# Patient Record
Sex: FEMALE | Race: WHITE | Employment: FULL TIME | URBAN - METROPOLITAN AREA
[De-identification: names, ages, dates, MRNs, and addresses within clinical notes are randomized per-mention and may not be internally consistent; named-entity substitution may affect disease eponyms.]

---

## 2017-01-27 ENCOUNTER — PRENATAL OFFICE VISIT (OUTPATIENT)
Dept: FAMILY MEDICINE | Facility: CLINIC | Age: 32
End: 2017-01-27
Payer: COMMERCIAL

## 2017-01-27 VITALS
TEMPERATURE: 98.2 F | HEART RATE: 80 BPM | BODY MASS INDEX: 20.89 KG/M2 | HEIGHT: 65 IN | WEIGHT: 125.4 LBS | SYSTOLIC BLOOD PRESSURE: 92 MMHG | DIASTOLIC BLOOD PRESSURE: 64 MMHG

## 2017-01-27 DIAGNOSIS — Z34.91 PRENATAL CARE IN FIRST TRIMESTER: Primary | ICD-10-CM

## 2017-01-27 DIAGNOSIS — Z12.4 SCREENING FOR CERVICAL CANCER: ICD-10-CM

## 2017-01-27 LAB
ABO + RH BLD: NORMAL
ABO + RH BLD: NORMAL
ALBUMIN UR-MCNC: NEGATIVE MG/DL
APPEARANCE UR: CLEAR
BETA HCG QUAL IFA URINE: POSITIVE
BILIRUB UR QL STRIP: NEGATIVE
BLD GP AB SCN SERPL QL: NORMAL
BLOOD BANK CMNT PATIENT-IMP: NORMAL
COLOR UR AUTO: YELLOW
ERYTHROCYTE [DISTWIDTH] IN BLOOD BY AUTOMATED COUNT: 11.7 % (ref 10–15)
GLUCOSE UR STRIP-MCNC: NEGATIVE MG/DL
HCT VFR BLD AUTO: 37.1 % (ref 35–47)
HGB BLD-MCNC: 12.7 G/DL (ref 11.7–15.7)
HGB UR QL STRIP: NEGATIVE
KETONES UR STRIP-MCNC: NEGATIVE MG/DL
LEUKOCYTE ESTERASE UR QL STRIP: NEGATIVE
MCH RBC QN AUTO: 30.7 PG (ref 26.5–33)
MCHC RBC AUTO-ENTMCNC: 34.2 G/DL (ref 31.5–36.5)
MCV RBC AUTO: 90 FL (ref 78–100)
NITRATE UR QL: NEGATIVE
PH UR STRIP: 7 PH (ref 5–7)
PLATELET # BLD AUTO: 299 10E9/L (ref 150–450)
RBC # BLD AUTO: 4.14 10E12/L (ref 3.8–5.2)
SP GR UR STRIP: 1.02 (ref 1–1.03)
SPECIMEN EXP DATE BLD: NORMAL
URN SPEC COLLECT METH UR: NORMAL
UROBILINOGEN UR STRIP-ACNC: 0.2 EU/DL (ref 0.2–1)
WBC # BLD AUTO: 7.8 10E9/L (ref 4–11)

## 2017-01-27 PROCEDURE — 87591 N.GONORRHOEAE DNA AMP PROB: CPT | Performed by: FAMILY MEDICINE

## 2017-01-27 PROCEDURE — 99207 ZZC FIRST OB VISIT: CPT | Performed by: FAMILY MEDICINE

## 2017-01-27 PROCEDURE — 86762 RUBELLA ANTIBODY: CPT | Performed by: FAMILY MEDICINE

## 2017-01-27 PROCEDURE — 87389 HIV-1 AG W/HIV-1&-2 AB AG IA: CPT | Performed by: FAMILY MEDICINE

## 2017-01-27 PROCEDURE — 36415 COLL VENOUS BLD VENIPUNCTURE: CPT | Performed by: FAMILY MEDICINE

## 2017-01-27 PROCEDURE — 87491 CHLMYD TRACH DNA AMP PROBE: CPT | Performed by: FAMILY MEDICINE

## 2017-01-27 PROCEDURE — 87340 HEPATITIS B SURFACE AG IA: CPT | Performed by: FAMILY MEDICINE

## 2017-01-27 PROCEDURE — 86900 BLOOD TYPING SEROLOGIC ABO: CPT | Performed by: FAMILY MEDICINE

## 2017-01-27 PROCEDURE — 84703 CHORIONIC GONADOTROPIN ASSAY: CPT | Performed by: FAMILY MEDICINE

## 2017-01-27 PROCEDURE — 85027 COMPLETE CBC AUTOMATED: CPT | Performed by: FAMILY MEDICINE

## 2017-01-27 PROCEDURE — 86850 RBC ANTIBODY SCREEN: CPT | Performed by: FAMILY MEDICINE

## 2017-01-27 PROCEDURE — 81003 URINALYSIS AUTO W/O SCOPE: CPT | Performed by: FAMILY MEDICINE

## 2017-01-27 PROCEDURE — 86901 BLOOD TYPING SEROLOGIC RH(D): CPT | Performed by: FAMILY MEDICINE

## 2017-01-27 PROCEDURE — 86780 TREPONEMA PALLIDUM: CPT | Performed by: FAMILY MEDICINE

## 2017-01-27 PROCEDURE — G0145 SCR C/V CYTO,THINLAYER,RESCR: HCPCS | Performed by: FAMILY MEDICINE

## 2017-01-27 PROCEDURE — 87624 HPV HI-RISK TYP POOLED RSLT: CPT | Performed by: FAMILY MEDICINE

## 2017-01-27 NOTE — PROGRESS NOTES
Serina Washington is a 31 year old  who presents to the clinic for an new ob visit.       6w4d    Estimated Date of Delivery: Sep 18, 2017  Reviewed nurse intake visit on not yet completed  Concerns: has been feeling nauseated and fatigued.  Not vomiting but does feel queasy.  Has been able to eat and remain hydrated.  Has also been very fatigued, just feels like she needs a lot more sleep.    Some rre cramping, no vaginal bleeding.    This was a planned pregnancy, pt and  excited.      There is no problem list on file for this patient.    History reviewed. No pertinent past medical history.  History reviewed. No pertinent past surgical history.  Current Outpatient Prescriptions   Medication Sig Dispense Refill     Prenatal Multivit-Min-Fe-FA (PRENATAL VITAMINS PO) Take 1 tablet by mouth daily         ====================================================  PERSONAL/SOCIAL HISTORY  Social History     Social History     Marital Status:      Spouse Name: N/A     Number of Children: N/A     Years of Education: N/A     Social History Main Topics     Smoking status: Never Smoker      Smokeless tobacco: Not on file     Alcohol Use: Not on file     Drug Use: Not on file     Sexual Activity: Not on file     Other Topics Concern     Not on file     Social History Narrative     No narrative on file     =====================================================   REVIEW OF SYSTEMS  C: NEGATIVE for fever, chills, change in weight  I: NEGATIVE for worrisome rashes, moles or lesions  E: NEGATIVE for vision changes or irritation  ENT: NEGATIVE for ear, mouth and throat problems  R: NEGATIVE for significant cough or SOB  B: NEGATIVE for masses, tenderness or discharge  CV: NEGATIVE for chest pain, palpitations or peripheral edema  M: NEGATIVE for significant arthralgias or myalgia  N: NEGATIVE for weakness, dizziness or paresthesias  P: NEGATIVE for changes in mood or  "affect  ====================================================  PHYSICAL EXAM:  Ht 5' 4.75\" (1.645 m)  Wt 125 lb 6.4 oz (56.881 kg)  BMI 21.02 kg/m2  LMP 12/12/2016 (Exact Date)  Breastfeeding? No        GENERAL:  Pleasant pregnant female, alert, well groomed.  SKIN:  Warm and dry, without lesions or rashes  HEAD: Symmetrical features.  EYES:  PERRLA,   MOUTH:  Buccal mucosa pink, moist without lesions.    NECK:  Thyroid without enlargement and nodules.  Lymph nodes not palpable.   LUNGS:  Clear to auscultation.  HEART:  RRR without murmur.  ABDOMEN: Soft without masses , tenderness or organomegaly.  No CVA tenderness.   MUSCULOSKELETAL:  Full range of motion  EXTREMITIES:  No edema. No significant varicosities.   GENITALIA:  BUS WNL, no lesions noted   VAGINA:  Pink, normal rugae and discharge normal and physiologic  CERVIX:  smooth, without discharge or CMT and nulliparous os,   firm/ closed 4 cm long.  UTERUS: Anteverted, nontender 6 weeks in size.  ADNEXA: Without masses or tenderness  PELVIS:   Adequate, Pelvis proven to -pelvis not tested.    =========================================  ICSI Routine Prenatal Carfe Guideline  ASSESSMENT/PLAN    No diagnosis found.     RECOMMENDED WEIGHT GAIN: 25-35 lbs.  Instructed on best evidence for: weight gain for her BMI for pregnancy; healthy diet and foods to avoid; exercise and activity during pregnancy;avoiding exposure to toxoplasmosis; and maintenance of a generally healthy lifestyle.   Discussed the harms, benefits, side effects and alternative therapies for current prescribed and OTC medications.    Reviewed plan of care and co-management with OB.  Reviewed facilities at [facility] and referral sites if necessary.    Early pregnancy education completed.    Patient Instructions   You can call [phone] to schedule your ultrasound.      We'll let you know your results as they become available.    I'll plan on seeing you back again in 4 weeks.    Please feel " free to message or call with any questions or concerns.    Congratulations!

## 2017-01-27 NOTE — PATIENT INSTRUCTIONS
You can call [phone] to schedule your ultrasound.      We'll let you know your results as they become available.    I'll plan on seeing you back again in 4 weeks.    Please feel free to message or call with any questions or concerns.    Congratulations!

## 2017-01-27 NOTE — NURSING NOTE
"Initial BP 92/64 mmHg  Pulse 80  Temp(Src) 98.2  F (36.8  C) (Tympanic)  Ht 5' 4.75\" (1.645 m)  Wt 125 lb 6.4 oz (56.881 kg)  BMI 21.02 kg/m2  LMP 12/12/2016 (Exact Date)  Breastfeeding? No Estimated body mass index is 21.02 kg/(m^2) as calculated from the following:    Height as of this encounter: 5' 4.75\" (1.645 m).    Weight as of this encounter: 125 lb 6.4 oz (56.881 kg). .      "

## 2017-01-27 NOTE — MR AVS SNAPSHOT
After Visit Summary   1/27/2017    Serina Washington    MRN: [id number]           Patient Information     Date Of Birth          1985        Visit Information        Provider Department      1/27/2017 11:00 AM Gemini Esquivel DO [facility]        Today's Diagnoses     Prenatal care in first trimester    -  1     Screening for cervical cancer           Care Instructions    You can call [phone] to schedule your ultrasound.      We'll let you know your results as they become available.    I'll plan on seeing you back again in 4 weeks.    Please feel free to message or call with any questions or concerns.    Congratulations!        Follow-ups after your visit        Future tests that were ordered for you today     Open Future Orders        Priority Expected Expires Ordered    US OB < 14 Weeks Single Routine  1/27/2018 1/27/2017            Who to contact     Normal or non-critical lab and imaging results will be communicated to you by Smart Adventurehart, letter or phone within 4 business days after the clinic has received the results. If you do not hear from us within 7 days, please contact the clinic through Smart Adventurehart or phone. If you have a critical or abnormal lab result, we will notify you by phone as soon as possible.  Submit refill requests through [employer] or call your pharmacy and they will forward the refill request to us. Please allow 3 business days for your refill to be completed.          If you need to speak with a  for additional information , please call: [phone]           Additional Information About Your Visit        [employer] Information     [employer] gives you secure access to your electronic health record. If you see a primary care provider, you can also send messages to your care team and make appointments. If you have questions, please call your primary care clinic.  If you do not have a primary care provider, please call [phone] and they will  "assist you.        Care EveryWhere ID     This is your Care EveryWhere ID. This could be used by other organizations to access your [place] medical records  [id number]        Your Vitals Were     Pulse Temperature Height BMI (Body Mass Index) Last Period Breastfeeding?    80 98.2  F (36.8  C) (Tympanic) 5' 4.75\" (1.645 m) 21.02 kg/m2 12/12/2016 (Exact Date) No       Blood Pressure from Last 3 Encounters:   01/27/17 92/64   09/26/16 84/60    Weight from Last 3 Encounters:   01/27/17 125 lb 6.4 oz (56.881 kg)   09/26/16 123 lb (55.792 kg)              We Performed the Following     *UA reflex to Microscopic and Culture ([facility] (except [place] and [place])     ABO/Rh type and screen     Anti Treponema     Beta HCG qual IFA urine     CBC with platelets     CHLAMYDIA TRACHOMATIS PCR     Hepatitis B surface antigen     HIV Antigen Antibody Combo     HPV High Risk Types DNA Cervical     NEISSERIA GONORRHOEA PCR     Pap imaged thin layer screen with HPV - recommended age 30 - 65 years (select HPV order below)     Rubella Antibody IgG Quantitative        Primary Care Provider    None Specified       No primary provider on file.        Thank you!     Thank you for choosing [facility]  for your care. Our goal is always to provide you with excellent care. Hearing back from our patients is one way we can continue to improve our services. Please take a few minutes to complete the written survey that you may receive in the mail after your visit with us. Thank you!             Your Updated Medication List - Protect others around you: Learn how to safely use, store and throw away your medicines at www.disposemymeds.org.          This list is accurate as of: 1/27/17 12:05 PM.  Always use your most recent med list.                   Brand Name Dispense Instructions for use    PRENATAL VITAMINS PO      Take 1 tablet by mouth daily         "

## 2017-01-28 LAB — T PALLIDUM IGG+IGM SER QL: NEGATIVE

## 2017-01-29 LAB
C TRACH DNA SPEC QL NAA+PROBE: NORMAL
N GONORRHOEA DNA SPEC QL NAA+PROBE: NORMAL
SPECIMEN SOURCE: NORMAL
SPECIMEN SOURCE: NORMAL

## 2017-01-30 LAB
HBV SURFACE AG SERPL QL IA: NONREACTIVE
HIV 1+2 AB+HIV1 P24 AG SERPL QL IA: NORMAL
RUBV IGG SERPL IA-ACNC: 72 IU/ML

## 2017-01-31 LAB
COPATH REPORT: NORMAL
PAP: NORMAL

## 2017-02-01 ENCOUNTER — PRENATAL OFFICE VISIT (OUTPATIENT)
Dept: FAMILY MEDICINE | Facility: CLINIC | Age: 32
End: 2017-02-01
Payer: COMMERCIAL

## 2017-02-01 DIAGNOSIS — Z34.00 PRENATAL CARE, FIRST PREGNANCY: Primary | ICD-10-CM

## 2017-02-01 PROCEDURE — 99207 ZZC NO CHARGE NURSE ONLY: CPT | Performed by: FAMILY MEDICINE

## 2017-02-02 LAB
FINAL DIAGNOSIS: NORMAL
HPV HR 12 DNA CVX QL NAA+PROBE: NEGATIVE
HPV16 DNA SPEC QL NAA+PROBE: NEGATIVE
HPV18 DNA SPEC QL NAA+PROBE: NEGATIVE
SPECIMEN DESCRIPTION: NORMAL

## 2017-02-06 ENCOUNTER — MYC MEDICAL ADVICE (OUTPATIENT)
Dept: FAMILY MEDICINE | Facility: CLINIC | Age: 32
End: 2017-02-06

## 2017-02-06 ENCOUNTER — HOSPITAL ENCOUNTER (OUTPATIENT)
Dept: ULTRASOUND IMAGING | Facility: CLINIC | Age: 32
Discharge: HOME | End: 2017-02-06
Attending: FAMILY MEDICINE | Admitting: FAMILY MEDICINE
Payer: COMMERCIAL

## 2017-02-06 DIAGNOSIS — Z34.91 PRENATAL CARE IN FIRST TRIMESTER: ICD-10-CM

## 2017-02-06 PROCEDURE — 76801 OB US < 14 WKS SINGLE FETUS: CPT

## 2017-03-14 ENCOUNTER — PRENATAL OFFICE VISIT (OUTPATIENT)
Dept: FAMILY MEDICINE | Facility: CLINIC | Age: 32
End: 2017-03-14
Payer: COMMERCIAL

## 2017-03-14 VITALS
WEIGHT: 126.6 LBS | DIASTOLIC BLOOD PRESSURE: 60 MMHG | BODY MASS INDEX: 21.09 KG/M2 | TEMPERATURE: 98.6 F | HEIGHT: 65 IN | HEART RATE: 92 BPM | SYSTOLIC BLOOD PRESSURE: 112 MMHG

## 2017-03-14 DIAGNOSIS — Z34.01 PRENATAL CARE, FIRST PREGNANCY, FIRST TRIMESTER: Primary | ICD-10-CM

## 2017-03-14 PROCEDURE — 99207 ZZC PRENATAL VISIT: CPT | Performed by: FAMILY MEDICINE

## 2017-03-14 PROCEDURE — 87086 URINE CULTURE/COLONY COUNT: CPT | Performed by: FAMILY MEDICINE

## 2017-03-14 NOTE — PROGRESS NOTES
13w1d    Has been feeling a bit better.  Still fatigued but nausea has improved.   Very rare cramping, no VB or change in discharge.  Tolerating prenatals    Reviewed quad screen, they will consider for next visit.    Plan:    Patient Instructions   We'll visit again in 4 weeks and can consider the Quad screen testing at that time if you wish.

## 2017-03-14 NOTE — MR AVS SNAPSHOT
"              After Visit Summary   3/14/2017    Serina Washington    MRN: [id number]           Patient Information     Date Of Birth          1985        Visit Information        Provider Department      3/14/2017 3:40 PM Gemini Esquivel, DO [facility]        Today's Diagnoses     Prenatal care, first pregnancy, first trimester    -  1      Care Instructions    We'll visit again in 4 weeks and can consider the Quad screen testing at that time if you wish.        Follow-ups after your visit        Who to contact     Normal or non-critical lab and imaging results will be communicated to you by Cloudbothart, letter or phone within 4 business days after the clinic has received the results. If you do not hear from us within 7 days, please contact the clinic through [employer] or phone. If you have a critical or abnormal lab result, we will notify you by phone as soon as possible.  Submit refill requests through [employer] or call your pharmacy and they will forward the refill request to us. Please allow 3 business days for your refill to be completed.          If you need to speak with a  for additional information , please call: [phone]           Additional Information About Your Visit        MyChart Information     [employer] gives you secure access to your electronic health record. If you see a primary care provider, you can also send messages to your care team and make appointments. If you have questions, please call your primary care clinic.  If you do not have a primary care provider, please call [phone] and they will assist you.        Care EveryWhere ID     This is your Care EveryWhere ID. This could be used by other organizations to access your [place] medical records  [id number]        Your Vitals Were     Pulse Temperature Height Last Period Breastfeeding? BMI (Body Mass Index)    92 98.6  F (37  C) (Tympanic) 5' 5\" (1.651 m) 12/12/2016 (Exact Date) No 21.07 kg/m2       " Blood Pressure from Last 3 Encounters:   03/14/17 112/60   01/27/17 92/64   09/26/16 (!) 84/60    Weight from Last 3 Encounters:   03/14/17 126 lb 9.6 oz (57.4 kg)   01/27/17 125 lb 6.4 oz (56.9 kg)   09/26/16 123 lb (55.8 kg)              We Performed the Following     Urine Culture Aerobic Bacterial        Primary Care Provider Office Phone # Fax #    Gemini Cleveland DO [place] [phone]       [facility] [address] [place] [address]        Thank you!     Thank you for choosing [facility]  for your care. Our goal is always to provide you with excellent care. Hearing back from our patients is one way we can continue to improve our services. Please take a few minutes to complete the written survey that you may receive in the mail after your visit with us. Thank you!             Your Updated Medication List - Protect others around you: Learn how to safely use, store and throw away your medicines at www.disposemymeds.org.          This list is accurate as of: 3/14/17  4:12 PM.  Always use your most recent med list.                   Brand Name Dispense Instructions for use    PRENATAL VITAMINS PO      Take 1 tablet by mouth daily

## 2017-03-16 LAB
BACTERIA SPEC CULT: NORMAL
MICRO REPORT STATUS: NORMAL
SPECIMEN SOURCE: NORMAL

## 2017-04-11 ENCOUNTER — PRENATAL OFFICE VISIT (OUTPATIENT)
Dept: FAMILY MEDICINE | Facility: CLINIC | Age: 32
End: 2017-04-11
Payer: COMMERCIAL

## 2017-04-11 VITALS
WEIGHT: 131.4 LBS | BODY MASS INDEX: 21.87 KG/M2 | HEART RATE: 76 BPM | TEMPERATURE: 98.1 F | DIASTOLIC BLOOD PRESSURE: 60 MMHG | SYSTOLIC BLOOD PRESSURE: 104 MMHG

## 2017-04-11 DIAGNOSIS — Z34.02 PRENATAL CARE, FIRST PREGNANCY, SECOND TRIMESTER: Primary | ICD-10-CM

## 2017-04-11 PROCEDURE — 99207 ZZC PRENATAL VISIT: CPT | Performed by: FAMILY MEDICINE

## 2017-04-11 NOTE — PROGRESS NOTES
17w1d  Has been feeling better.  Energy level is improved.  ? fetal movement, perhaps felt something last week.  No cramping or VB.  Reviewed quad screen, pt declines    Plan:    Patient Instructions   Please schedule your anomaly screen ultrasound at 20 weeks.  I'll let you know the results as soon as I get them.  You cna call [phone] to schedule    Your next regular pregnancy visit will be in 4 weeks.  Please schedule that with my OB partners either here at [facility] with Dr Cash or at the [facility].    I'll see you back here again in 8 weeks.  We'll plan on doing your gestational diabetes screen at that visit.  Please plan on being in clinic for 1 hour.  You will need to have nothing to eat or drink but water for the 2 hours prior to you visit.

## 2017-04-11 NOTE — MR AVS SNAPSHOT
After Visit Summary   4/11/2017    Serina Washington    MRN: [id number]           Patient Information     Date Of Birth          1985        Visit Information        Provider Department      4/11/2017 9:40 AM Gemini Esquivel, DO [facility]        Today's Diagnoses     Prenatal care, first pregnancy, second trimester    -  1      Care Instructions    Please schedule your anomaly screen ultrasound at 20 weeks.  I'll let you know the results as soon as I get them.  You cna call [phone] to schedule    Your next regular pregnancy visit will be in 4 weeks.  Please schedule that with my OB partners either here at [facility] with Dr Cash or at the [facility].    I'll see you back here again in 8 weeks.  We'll plan on doing your gestational diabetes screen at that visit.  Please plan on being in clinic for 1 hour.  You will need to have nothing to eat or drink but water for the 2 hours prior to you visit.          Follow-ups after your visit        Additional Services     OB/GYN REFERRAL       Your provider has referred you to:  [facility]: [facility] [phone]   [email or web address]    Comanagement prenatal care    Please be aware that coverage of these services is subject to the terms and limitations of your health insurance plan.  Call member services at your health plan with any benefit or coverage questions.      Please bring the following with you to your appointment:    (1) Any X-Rays, CTs or MRIs which have been performed.  Contact the facility where they were done to arrange for  prior to your scheduled appointment.   (2) List of current medications   (3) This referral request   (4) Any documents/labs given to you for this referral                  Future tests that were ordered for you today     Open Future Orders        Priority Expected Expires Ordered    US OB > 14 Weeks Complete Single Routine  4/11/2018  4/11/2017            Who to contact     Normal or non-critical lab and imaging results will be communicated to you by 2Uhart, letter or phone within 4 business days after the clinic has received the results. If you do not hear from us within 7 days, please contact the clinic through 2Uhart or phone. If you have a critical or abnormal lab result, we will notify you by phone as soon as possible.  Submit refill requests through [employer] or call your pharmacy and they will forward the refill request to us. Please allow 3 business days for your refill to be completed.          If you need to speak with a  for additional information , please call: [phone]           Additional Information About Your Visit        [employer] Information     [employer] gives you secure access to your electronic health record. If you see a primary care provider, you can also send messages to your care team and make appointments. If you have questions, please call your primary care clinic.  If you do not have a primary care provider, please call [phone] and they will assist you.        Care EveryWhere ID     This is your Care EveryWhere ID. This could be used by other organizations to access your [place] medical records  [id number]        Your Vitals Were     Pulse Temperature Last Period Breastfeeding? BMI (Body Mass Index)       76 98.1  F (36.7  C) (Tympanic) 12/12/2016 (Exact Date) No 21.87 kg/m2        Blood Pressure from Last 3 Encounters:   04/11/17 104/60   03/14/17 112/60   01/27/17 92/64    Weight from Last 3 Encounters:   04/11/17 131 lb 6.4 oz (59.6 kg)   03/14/17 126 lb 9.6 oz (57.4 kg)   01/27/17 125 lb 6.4 oz (56.9 kg)              We Performed the Following     OB/GYN REFERRAL        Primary Care Provider Office Phone # Fax #    Gemini Esquivel [phone]       [facility] [address] [place] [address]        Thank you!     [facility]  [facility] your [facility]. Our goal is always to provide you with excellent care. Hearing back from our patients is one way we can continue to improve our services. Please take a few minutes to complete the written survey that you may receive in the mail after your visit with us. Thank you!             Your Updated Medication List - Protect others around you: Learn how to safely use, store and throw away your medicines at www.disposemymeds.org.          This list is accurate as of: 4/11/17 10:17 AM.  Always use your most recent med list.                   Brand Name Dispense Instructions for use    PRENATAL VITAMINS PO      Take 1 tablet by mouth daily

## 2017-04-11 NOTE — PATIENT INSTRUCTIONS
Please schedule your anomaly screen ultrasound at 20 weeks.  I'll let you know the results as soon as I get them.  You cna call [phone] to schedule    Your next regular pregnancy visit will be in 4 weeks.  Please schedule that with my OB partners either here at [facility] with Dr Cash or at the [facility].    I'll see you back here again in 8 weeks.  We'll plan on doing your gestational diabetes screen at that visit.  Please plan on being in clinic for 1 hour.  You will need to have nothing to eat or drink but water for the 2 hours prior to you visit.

## 2017-04-14 ENCOUNTER — MYC MEDICAL ADVICE (OUTPATIENT)
Dept: FAMILY MEDICINE | Facility: CLINIC | Age: 32
End: 2017-04-14

## 2017-05-01 ENCOUNTER — HOSPITAL ENCOUNTER (OUTPATIENT)
Dept: ULTRASOUND IMAGING | Facility: CLINIC | Age: 32
Discharge: HOME | End: 2017-05-01
Attending: FAMILY MEDICINE | Admitting: FAMILY MEDICINE
Payer: COMMERCIAL

## 2017-05-01 DIAGNOSIS — Z34.02 PRENATAL CARE, FIRST PREGNANCY, SECOND TRIMESTER: ICD-10-CM

## 2017-05-01 PROCEDURE — 76805 OB US >/= 14 WKS SNGL FETUS: CPT

## 2017-05-16 ENCOUNTER — PRENATAL OFFICE VISIT (OUTPATIENT)
Dept: OBGYN | Facility: CLINIC | Age: 32
End: 2017-05-16
Payer: COMMERCIAL

## 2017-05-16 VITALS
SYSTOLIC BLOOD PRESSURE: 98 MMHG | HEIGHT: 65 IN | BODY MASS INDEX: 23.32 KG/M2 | HEART RATE: 73 BPM | DIASTOLIC BLOOD PRESSURE: 63 MMHG | WEIGHT: 140 LBS

## 2017-05-16 DIAGNOSIS — Z3A.22 22 WEEKS GESTATION OF PREGNANCY: Primary | ICD-10-CM

## 2017-05-16 PROCEDURE — 99207 ZZC PRENATAL VISIT: CPT | Performed by: OBSTETRICS & GYNECOLOGY

## 2017-05-16 NOTE — MR AVS SNAPSHOT
After Visit Summary   5/16/2017    Serina Washington    MRN: [id number]           Patient Information     Date Of Birth          1985        Visit Information        Provider Department      5/16/2017 11:30 AM Quinn Cash MD [facility]        Today's Diagnoses     22 weeks gestation of pregnancy    -  1       Follow-ups after your visit        Follow-up notes from your care team     Return in about 4 weeks (around 6/13/2017).      Your next 10 appointments already scheduled     Jun 05, 2017  8:20 AM CDT   SHORT with Gemini Esquivel,    [facility]    [address] [id number] [phone]            Jun 05, 2017  8:45 AM CDT   LAB with  LAB   [facility]    [address] [id number] [phone]           Patient must bring picture ID.  Patient should be prepared to give a urine specimen  Please do not eat 10-12 hours before your appointment if you are coming in fasting for labs on lipids, cholesterol, or glucose (sugar).  Pregnant women should follow their Care Team instructions. Water with medications is okay. Do not drink coffee or other fluids.   If you have concerns about taking  your medications, please ask at office or if scheduling via CloudSplit, send a message by clicking on Secure Messaging, Message Your Care Team.              Future tests that were ordered for you today     Open Future Orders        Priority Expected Expires Ordered    Anti Treponema Routine  7/16/2017 5/16/2017    OB hemoglobin Routine  7/16/2017 5/16/2017    Glucose tolerance, gest screen, 1 hour Routine  7/16/2017 5/16/2017            Who to contact     If you have questions or need follow up information about today's clinic visit or your schedule please contact [facility] directly at [phone].  Normal or non-critical lab and imaging  "results will be communicated to you by MyChart, letter or phone within 4 business days after the clinic has received the results. If you do not hear from us within 7 days, please contact the clinic through VoxPopMe or phone. If you have a critical or abnormal lab result, we will notify you by phone as soon as possible.  Submit refill requests through [employer] or call your pharmacy and they will forward the refill request to us. Please allow 3 business days for your refill to be completed.          Additional Information About Your Visit        [employer] Information     [employer] gives you secure access to your electronic health record. If you see a primary care provider, you can also send messages to your care team and make appointments. If you have questions, please call your primary care clinic.  If you do not have a primary care provider, please call [phone] and they will assist you.        Care EveryWhere ID     This is your Care EveryWhere ID. This could be used by other organizations to access your [place] medical records  [id number]        Your Vitals Were     Pulse Height Last Period BMI (Body Mass Index)          73 5' 5\" (1.651 m) 12/12/2016 (Exact Date) 23.3 kg/m2         Blood Pressure from Last 3 Encounters:   05/16/17 98/63   04/11/17 104/60   03/14/17 112/60    Weight from Last 3 Encounters:   05/16/17 140 lb (63.5 kg)   04/11/17 131 lb 6.4 oz (59.6 kg)   03/14/17 126 lb 9.6 oz (57.4 kg)               Primary Care Provider Office Phone # Fax #    Gemini Esquivel [phone]       [facility] [address] [place] [address]        Thank you!     Thank you for choosing [facility]  for your care. Our goal is always to provide you with excellent care. Hearing back from our patients is one way we can continue to improve our services. Please take a few minutes to complete the written survey that you may receive in the mail after your visit with us. Thank " you!             Your Updated Medication List - Protect others around you: Learn how to safely use, store and throw away your medicines at www.disposemymeds.org.          This list is accurate as of: 5/16/17  3:23 PM.  Always use your most recent med list.                   Brand Name Dispense Instructions for use    PRENATAL VITAMINS PO      Take 1 tablet by mouth daily

## 2017-05-16 NOTE — NURSING NOTE
"Initial BP 98/63 (BP Location: Right arm, Patient Position: Chair, Cuff Size: Adult Small)  Pulse 73  Ht 5' 5\" (1.651 m)  Wt 140 lb (63.5 kg)  LMP 12/12/2016 (Exact Date)  BMI 23.3 kg/m2 Estimated body mass index is 23.3 kg/(m^2) as calculated from the following:    Height as of this encounter: 5' 5\" (1.651 m).    Weight as of this encounter: 140 lb (63.5 kg). .      "

## 2017-05-16 NOTE — PROGRESS NOTES
"CC: Prenatal visit    S: ULTRASOUND films are reviewed   Having a GIRL!  Feeling well  Feeling movement  O: BP 98/63 (BP Location: Right arm, Patient Position: Chair, Cuff Size: Adult Small)  Pulse 73  Ht 5' 5\" (1.651 m)  Wt 140 lb (63.5 kg)  LMP 12/12/2016 (Exact Date)  BMI 23.3 kg/m2  See above table    A: IUP @ 22+1 week EGA    P RTC 4 weeks  GCT/Hgb next visit  Quinn Cash      "

## 2017-06-05 ENCOUNTER — PRENATAL OFFICE VISIT (OUTPATIENT)
Dept: FAMILY MEDICINE | Facility: CLINIC | Age: 32
End: 2017-06-05
Payer: COMMERCIAL

## 2017-06-05 VITALS
WEIGHT: 143.4 LBS | HEIGHT: 65 IN | DIASTOLIC BLOOD PRESSURE: 66 MMHG | SYSTOLIC BLOOD PRESSURE: 92 MMHG | BODY MASS INDEX: 23.89 KG/M2 | TEMPERATURE: 97 F | HEART RATE: 80 BPM

## 2017-06-05 DIAGNOSIS — Z34.02 PRENATAL CARE, FIRST PREGNANCY, SECOND TRIMESTER: Primary | ICD-10-CM

## 2017-06-05 DIAGNOSIS — Z3A.22 22 WEEKS GESTATION OF PREGNANCY: ICD-10-CM

## 2017-06-05 LAB
GLUCOSE 1H P 50 G GLC PO SERPL-MCNC: 137 MG/DL (ref 60–129)
HGB BLD-MCNC: 11.6 G/DL (ref 11.7–15.7)

## 2017-06-05 PROCEDURE — 86780 TREPONEMA PALLIDUM: CPT | Performed by: OBSTETRICS & GYNECOLOGY

## 2017-06-05 PROCEDURE — 99207 ZZC PRENATAL VISIT: CPT | Performed by: FAMILY MEDICINE

## 2017-06-05 PROCEDURE — 00000218 ZZHCL STATISTIC OBHBG - HEMOGLOBIN: Performed by: OBSTETRICS & GYNECOLOGY

## 2017-06-05 PROCEDURE — 82950 GLUCOSE TEST: CPT | Performed by: OBSTETRICS & GYNECOLOGY

## 2017-06-05 PROCEDURE — 36415 COLL VENOUS BLD VENIPUNCTURE: CPT | Performed by: OBSTETRICS & GYNECOLOGY

## 2017-06-05 NOTE — PROGRESS NOTES
25w0d    * low back pain when standing after sitting for the last 1-2 weeks  Feels like it is her tailbone that hurts.  Resolves after a minute or so of standing.  Tries to get up frequently.    Good fetal movement.  No cramping or contractions.  No VB or LOF.    Doing GCT today    Plan:    Patient Instructions   Dr Cash will let you know your results from today when available.  Please let me know if you would like to visit with physical therapy for the tail bone pain.    I'll see you back in 4 weeks.

## 2017-06-05 NOTE — PATIENT INSTRUCTIONS
Dr Cash will let you know your results from today when available.  Please let me know if you would like to visit with physical therapy for the tail bone pain.    I'll see you back in 4 weeks.

## 2017-06-05 NOTE — MR AVS SNAPSHOT
"              After Visit Summary   6/5/2017    Serina Washington    MRN: [id number]           Patient Information     Date Of Birth          1985        Visit Information        Provider Department      6/5/2017 8:20 AM Gemini Esquivel, [facility] Instructions    Dr Cash will let you know your results from today when available.  Please let me know if you would like to visit with physical therapy for the tail bone pain.    I'll see you back in 4 weeks.              Follow-ups after your visit        Who to contact     Normal or non-critical lab and imaging results will be communicated to you by [employer], letter or phone within 4 business days after the clinic has received the results. If you do not hear from us within 7 days, please contact the clinic through GeneCapturet or phone. If you have a critical or abnormal lab result, we will notify you by phone as soon as possible.  Submit refill requests through [employer] or call your pharmacy and they will forward the refill request to us. Please allow 3 business days for your refill to be completed.          If you need to speak with a  for additional information , please call: [phone]           Additional Information About Your Visit        [employer] Information     Ink[employer] gives you secure access to your electronic health record. If you see a primary care provider, you can also send messages to your care team and make appointments. If you have questions, please call your primary care clinic.  If you do not have a primary care provider, please call [phone] and they will assist you.        Care EveryWhere ID     This is your Care EveryWhere ID. This could be used by other organizations to access your [place] medical records  [id number]        Your Vitals Were     Pulse Temperature Height Last Period Breastfeeding? BMI (Body Mass Index)    80 97  F (36.1  C) (Tympanic) 5' 5\" (1.651 m) 12/12/2016 (Exact Date) No " 23.86 kg/m2       Blood Pressure from Last 3 Encounters:   06/05/17 92/66   05/16/17 98/63   04/11/17 104/60    Weight from Last 3 Encounters:   06/05/17 143 lb 6.4 oz (65 kg)   05/16/17 140 lb (63.5 kg)   04/11/17 131 lb 6.4 oz (59.6 kg)              Today, you had the following     No orders found for display       Primary Care Provider Office Phone # Fax #    Gemini Cleveland DO Alvin [phone]       [facility] [address] [place] [address]        Thank you!     Thank you for choosing [facility]  for your care. Our goal is always to provide you with excellent care. Hearing back from our patients is one way we can continue to improve our services. Please take a few minutes to complete the written survey that you may receive in the mail after your visit with us. Thank you!             Your Updated Medication List - Protect others around you: Learn how to safely use, store and throw away your medicines at www.disposemymeds.org.          This list is accurate as of: 6/5/17  8:52 AM.  Always use your most recent med list.                   Brand Name Dispense Instructions for use    PRENATAL VITAMINS PO      Take 1 tablet by mouth daily

## 2017-06-06 DIAGNOSIS — Z34.02 PRENATAL CARE, FIRST PREGNANCY, SECOND TRIMESTER: Primary | ICD-10-CM

## 2017-06-06 LAB — T PALLIDUM IGG+IGM SER QL: NEGATIVE

## 2017-06-08 ENCOUNTER — MYC MEDICAL ADVICE (OUTPATIENT)
Dept: FAMILY MEDICINE | Facility: CLINIC | Age: 32
End: 2017-06-08

## 2017-06-09 DIAGNOSIS — Z34.02 PRENATAL CARE, FIRST PREGNANCY, SECOND TRIMESTER: ICD-10-CM

## 2017-06-09 LAB
GLUCOSE 1H P 100 G GLC PO SERPL-MCNC: 130 MG/DL (ref 60–179)
GLUCOSE 2H P 100 G GLC PO SERPL-MCNC: 182 MG/DL (ref 60–154)
GLUCOSE 3H P 100 G GLC PO SERPL-MCNC: 137 MG/DL (ref 60–139)
GLUCOSE P FAST SERPL-MCNC: 77 MG/DL (ref 60–94)

## 2017-06-09 PROCEDURE — 36415 COLL VENOUS BLD VENIPUNCTURE: CPT | Performed by: OBSTETRICS & GYNECOLOGY

## 2017-06-09 PROCEDURE — 82951 GLUCOSE TOLERANCE TEST (GTT): CPT | Performed by: OBSTETRICS & GYNECOLOGY

## 2017-06-09 PROCEDURE — 82952 GTT-ADDED SAMPLES: CPT | Performed by: OBSTETRICS & GYNECOLOGY

## 2017-07-11 ENCOUNTER — PRENATAL OFFICE VISIT (OUTPATIENT)
Dept: OBGYN | Facility: CLINIC | Age: 32
End: 2017-07-11
Payer: COMMERCIAL

## 2017-07-11 VITALS
HEIGHT: 65 IN | WEIGHT: 150.2 LBS | HEART RATE: 86 BPM | DIASTOLIC BLOOD PRESSURE: 62 MMHG | SYSTOLIC BLOOD PRESSURE: 94 MMHG | BODY MASS INDEX: 25.02 KG/M2

## 2017-07-11 DIAGNOSIS — Z34.03 PRENATAL CARE, FIRST PREGNANCY, THIRD TRIMESTER: Primary | ICD-10-CM

## 2017-07-11 PROCEDURE — 99207 ZZC PRENATAL VISIT: CPT | Performed by: OBSTETRICS & GYNECOLOGY

## 2017-07-11 NOTE — NURSING NOTE
"Initial BP 94/62 (BP Location: Left arm, Cuff Size: Adult Regular)  Pulse 86  Ht 5' 5\" (1.651 m)  Wt 150 lb 3.2 oz (68.1 kg)  LMP 12/12/2016 (Exact Date)  BMI 24.99 kg/m2 Estimated body mass index is 24.99 kg/(m^2) as calculated from the following:    Height as of this encounter: 5' 5\" (1.651 m).    Weight as of this encounter: 150 lb 3.2 oz (68.1 kg). .      "

## 2017-07-11 NOTE — PROGRESS NOTES
"CC: Prenatal visit    S: She is having a better time with her back  She is doing more stretching  O: BP 94/62 (BP Location: Left arm, Cuff Size: Adult Regular)  Pulse 86  Ht 5' 5\" (1.651 m)  Wt 150 lb 3.2 oz (68.1 kg)  LMP 12/12/2016 (Exact Date)  BMI 24.99 kg/m2  See above table    A: IUP @ 30+1 week EGA    P RTC 2 weeks  PTL precautions reviewed     Quinn Cash      "

## 2017-07-11 NOTE — MR AVS SNAPSHOT
After Visit Summary   7/11/2017    Serina Washington    MRN: [id number]           Patient Information     Date Of Birth          1985        Visit Information        Provider Department      7/11/2017 11:30 AM Quinn Cash MD [facility]        Today's Diagnoses     Prenatal care, first pregnancy, third trimester    -  1       Follow-ups after your visit        Follow-up notes from your care team     Return in about 2 weeks (around 7/25/2017).      Your next 10 appointments already scheduled     Aug 04, 2017  8:20 AM CDT   ESTABLISHED PRENATAL with Gemini Esquivel DO   [facility]    [address] [id number] [phone]            Aug 18, 2017  8:20 AM CDT   ESTABLISHED PRENATAL with Gemini Esquivel DO   [facility]    [address] [id number] [phone]              Who to contact     If you have questions or need follow up information about today's clinic visit or your schedule please contact [facility] directly at [phone].  Normal or non-critical lab and imaging results will be communicated to you by MyChart, letter or phone within 4 business days after the clinic has received the results. If you do not hear from us within 7 days, please contact the clinic through zervedhart or phone. If you have a critical or abnormal lab result, we will notify you by phone as soon as possible.  Submit refill requests through [employer] or call your pharmacy and they will forward the refill request to us. Please allow 3 business days for your refill to be completed.          Additional Information About Your Visit        MyChart Information     [employer] gives you secure access to your electronic health record. If you see a primary care provider, you can also send messages to your care team and make appointments. If you have questions,  "please call your primary care clinic.  If you do not have a primary care provider, please call [phone] and they will assist you.        Care EveryWhere ID     This is your Care EveryWhere ID. This could be used by other organizations to access your [place] medical records  [id number]        Your Vitals Were     Pulse Height Last Period BMI (Body Mass Index)          86 5' 5\" (1.651 m) 12/12/2016 (Exact Date) 24.99 kg/m2         Blood Pressure from Last 3 Encounters:   07/11/17 94/62   06/05/17 92/66   05/16/17 98/63    Weight from Last 3 Encounters:   07/11/17 150 lb 3.2 oz (68.1 kg)   06/05/17 143 lb 6.4 oz (65 kg)   05/16/17 140 lb (63.5 kg)              Today, you had the following     No orders found for display       Primary Care Provider Office Phone # Fax #    Gemini Mckinneymansi Esquivel [phone]       [facility] [address] [place] [address]        Equal Access to Services     [facility] : Hadii aad ku hadasho Soomaali, waaxda luqadaha, qaybta kaalmada adeegyada, marge ehrnandez . So [facility] [phone].    ATENCIÓN: Si habla español, tiene a sanchez disposición servicios gratuitos de asistencia lingüística. Brandon al [phone].    We comply with applicable federal civil rights laws and [place] laws. We do not discriminate on the basis of race, color, national origin, age, disability sex, sexual orientation or gender identity.            Thank you!     Thank you for choosing [facility]  for your care. Our goal is always to provide you with excellent care. Hearing back from our patients is one way we can continue to improve our services. Please take a few minutes to complete the written survey that you may receive in the mail after your visit with us. Thank you!             Your Updated Medication List - Protect others around you: Learn how to safely use, store and throw away your medicines at www.disposemymeds.org.          This list " is accurate as of: 7/11/17  1:51 PM.  Always use your most recent med list.                   Brand Name Dispense Instructions for use Diagnosis    PRENATAL VITAMINS PO      Take 1 tablet by mouth daily

## 2017-08-04 ENCOUNTER — PRENATAL OFFICE VISIT (OUTPATIENT)
Dept: FAMILY MEDICINE | Facility: CLINIC | Age: 32
End: 2017-08-04
Payer: COMMERCIAL

## 2017-08-04 VITALS
BODY MASS INDEX: 26.06 KG/M2 | DIASTOLIC BLOOD PRESSURE: 60 MMHG | WEIGHT: 156.4 LBS | TEMPERATURE: 97.9 F | SYSTOLIC BLOOD PRESSURE: 112 MMHG | HEART RATE: 100 BPM | HEIGHT: 65 IN

## 2017-08-04 DIAGNOSIS — Z34.03 PRENATAL CARE, FIRST PREGNANCY, THIRD TRIMESTER: Primary | ICD-10-CM

## 2017-08-04 DIAGNOSIS — Z23 NEED FOR VACCINATION: ICD-10-CM

## 2017-08-04 PROCEDURE — 90715 TDAP VACCINE 7 YRS/> IM: CPT | Performed by: FAMILY MEDICINE

## 2017-08-04 PROCEDURE — 99207 ZZC PRENATAL VISIT: CPT | Performed by: FAMILY MEDICINE

## 2017-08-04 PROCEDURE — 90471 IMMUNIZATION ADMIN: CPT | Performed by: FAMILY MEDICINE

## 2017-08-04 RX ORDER — BENZOCAINE/MENTHOL 6 MG-10 MG
LOZENGE MUCOUS MEMBRANE PRN
COMMUNITY
End: 2019-05-16

## 2017-08-04 NOTE — PROGRESS NOTES
33w4d    Increased levels of fatigue for the past couple of weeks.  Just gets tired more easily  Good fetal movement reported.  No cramping or contractions.  No VB or LOF.    Administered Adacel vaccine today.  Reviewed PLT, when to call    Plan:  Patient Instructions   We'll see you back in 2 weeks

## 2017-08-04 NOTE — MR AVS SNAPSHOT
After Visit Summary   8/4/2017    Serina Washington    MRN: [id number]           Patient Information     Date Of Birth          1985        Visit Information        Provider Department      8/4/2017 8:20 AM Gemini Esquivel DO [facility]        Care Instructions    We'll see you back in 2 weeks            Follow-ups after your visit        Your next 10 appointments already scheduled     Aug 18, 2017  8:20 AM CDT   ESTABLISHED PRENATAL with Gemini Esquivel DO   [facility]    [address] [address]   [phone]              Who to contact     Normal or non-critical lab and imaging results will be communicated to you by Twicehart, letter or phone within 4 business days after the clinic has received the results. If you do not hear from us within 7 days, please contact the clinic through MyChart or phone. If you have a critical or abnormal lab result, we will notify you by phone as soon as possible.  Submit refill requests through [employer] or call your pharmacy and they will forward the refill request to us. Please allow 3 business days for your refill to be completed.          If you need to speak with a  for additional information , please call: [phone]           Additional Information About Your Visit        [employer] Information     [employer] gives you secure access to your electronic health record. If you see a primary care provider, you can also send messages to your care team and make appointments. If you have questions, please call your primary care clinic.  If you do not have a primary care provider, please call [phone] and they will assist you.        Care EveryWhere ID     This is your Care EveryWhere ID. This could be used by other organizations to access your [place] medical records  [id number]        Your Vitals Were     Pulse Temperature Height Last Period Breastfeeding? BMI  "(Body Mass Index)    100 97.9  F (36.6  C) (Tympanic) 5' 5\" (1.651 m) 12/12/2016 (Exact Date) No 26.03 kg/m2       Blood Pressure from Last 3 Encounters:   08/04/17 112/60   07/11/17 94/62   06/05/17 92/66    Weight from Last 3 Encounters:   08/04/17 156 lb 6.4 oz (70.9 kg)   07/11/17 150 lb 3.2 oz (68.1 kg)   06/05/17 143 lb 6.4 oz (65 kg)              Today, you had the following     No orders found for display       Primary Care Provider Office Phone # Fax #    Gemini Cleveland DO Alvin [phone]       [facility] [address] [place] [address]        Equal Access to Services     AMINATA CHENG : Hadii aad nabila hadasho Soomaali, waaxda luqadaha, qaybta kaalmada adeegyada, marge hernandez . So [facility] [phone].    ATENCIÓN: Si habla español, tiene a sanchez disposición servicios gratuitos de asistencia lingüística. Brandon al [phone].    We comply with applicable federal civil rights laws and [place] laws. We do not discriminate on the basis of race, color, national origin, age, disability sex, sexual orientation or gender identity.            Thank you!     Thank you for choosing [facility]  for your care. Our goal is always to provide you with excellent care. Hearing back from our patients is one way we can continue to improve our services. Please take a few minutes to complete the written survey that you may receive in the mail after your visit with us. Thank you!             Your Updated Medication List - Protect others around you: Learn how to safely use, store and throw away your medicines at www.disposemymeds.org.          This list is accurate as of: 8/4/17  9:08 AM.  Always use your most recent med list.                   Brand Name Dispense Instructions for use Diagnosis    hydrocortisone 1 % cream    CORTAID     Apply topically as needed        PRENATAL VITAMINS PO      Take 1 tablet by mouth daily          "

## 2017-08-18 ENCOUNTER — PRENATAL OFFICE VISIT (OUTPATIENT)
Dept: FAMILY MEDICINE | Facility: CLINIC | Age: 32
End: 2017-08-18
Payer: COMMERCIAL

## 2017-08-18 VITALS
BODY MASS INDEX: 26.62 KG/M2 | HEART RATE: 116 BPM | SYSTOLIC BLOOD PRESSURE: 104 MMHG | DIASTOLIC BLOOD PRESSURE: 60 MMHG | HEIGHT: 65 IN | TEMPERATURE: 97.3 F | WEIGHT: 159.8 LBS

## 2017-08-18 DIAGNOSIS — Z34.03 PRENATAL CARE, FIRST PREGNANCY, THIRD TRIMESTER: Primary | ICD-10-CM

## 2017-08-18 PROCEDURE — 99207 ZZC PRENATAL VISIT: CPT | Performed by: FAMILY MEDICINE

## 2017-08-18 NOTE — PATIENT INSTRUCTIONS
Everything looks great today.  Please let us know if you have another day where you feel lightheaded.      Good luck with the rest of your pregnancy and delivery!  We can't wait to meet baby!

## 2017-08-18 NOTE — MR AVS SNAPSHOT
After Visit Summary   8/18/2017    Serina Washington    MRN: [id number]           Patient Information     Date Of Birth          1985        Visit Information        Provider Department      8/18/2017 8:20 AM Gemini Esquivel,  [facility]        Care Instructions    Everything looks great today.  Please let us know if you have another day where you feel lightheaded.      Good luck with the rest of your pregnancy and delivery!  We can't wait to meet baby!          Follow-ups after your visit        Your next 10 appointments already scheduled     Aug 25, 2017  8:00 AM CDT   ESTABL[facility]    [address] [address]   [phone]            Sep 01, 2017  9:00 AM CDT   ESTABLISHED PRENATAL with Atiya Sharp MD   [facility]    [address] [address]   [phone]            Sep 08, 2017  9:00 AM CDT   ESTABLISHED PRENATAL with Quinn Cash MD   [facility]    [address] [address]   [phone]            Sep 15, 2017  9:00 AM CDT   ESTABLISHED PRENATAL with Mu Miranda MD   [facility]    [address] [address]   [phone]            Sep 22, 2017  8:30 AM CDT   ESTABLISHED PRENATAL with Mu Miranda MD   [facility]    [address] [address]   [phone]              Who to contact     Normal or non-critical lab and imaging results will be communicated to you by MyChart, letter or phone within 4 business days after the clinic has received the results. If you do not hear from us within 7 days, please contact the clinic through MyChart or phone. If you have a critical or  "abnormal lab result, we will notify you by phone as soon as possible.  Submit refill requests through [employer] or call your pharmacy and they will forward the refill request to us. Please allow 3 business days for your refill to be completed.          If you need to speak with a  for additional information , please call: [phone]           Additional Information About Your Visit        [employer] Information     [employer] gives you secure access to your electronic health record. If you see a primary care provider, you can also send messages to your care team and make appointments. If you have questions, please call your primary care clinic.  If you do not have a primary care provider, please call [phone] and they will assist you.        Care EveryWhere ID     This is your Care EveryWhere ID. This could be used by other organizations to access your [place] medical records  [id number]        Your Vitals Were     Pulse Temperature Height Last Period Breastfeeding? BMI (Body Mass Index)    116 97.3  F (36.3  C) (Tympanic) 5' 5\" (1.651 m) 12/12/2016 (Exact Date) No 26.59 kg/m2       Blood Pressure from Last 3 Encounters:   08/18/17 104/60   08/04/17 112/60   07/11/17 94/62    Weight from Last 3 Encounters:   08/18/17 159 lb 12.8 oz (72.5 kg)   08/04/17 156 lb 6.4 oz (70.9 kg)   07/11/17 150 lb 3.2 oz (68.1 kg)              Today, you had the following     No orders found for display       Primary Care Provider Office Phone # Fax #    Gemini Megha Esquivel [phone] [address] [place] [address]        Equal Access to Services     [facility]: Hadii moshe Breaux, leana briones, qaradha kaalmarge rodríguez . So [facility] [phone].    ATENCIÓN: Si habla español, tiene a sanchez disposición servicios gratuitos de asistencia lingüística. Llame al [phone].    We comply with applicable federal civil rights laws and [place] " laws. We do not discriminate on the basis of race, color, national origin, age, disability sex, sexual orientation or gender identity.            Thank you!     Thank you for choosing [facility]  for your care. Our goal is always to provide you with excellent care. Hearing back from our patients is one way we can continue to improve our services. Please take a few minutes to complete the written survey that you may receive in the mail after your visit with us. Thank you!             Your Updated Medication List - Protect others around you: Learn how to safely use, store and throw away your medicines at www.disposemymeds.org.          This list is accurate as of: 8/18/17  9:05 AM.  Always use your most recent med list.                   Brand Name Dispense Instructions for use Diagnosis    hydrocortisone 1 % cream    CORTAID     Apply topically as needed        PRENATAL VITAMINS PO      Take 1 tablet by mouth daily

## 2017-08-18 NOTE — PROGRESS NOTES
"35w4d    Has been feeling well.  A bit more fatigued and \"heavier\".  Good fetal movement, no contractions, no VB or LOF.    Last Friday evening and into Saturday felt very fatigued and \"lightheaded\".  Rested most the the day and felt better Saturday evening.  This feeling has not returned.  No CP, SOB or palpitations associated.      Pt has already scheduled appointments weekly with OB.      Plan:    Labor precautions discussed.  Reviewed reasons to call/seek care.  return to clinic 1 week  Patient Instructions   Everything looks great today.  Please let us know if you have another day where you feel lightheaded.      Good luck with the rest of your pregnancy and delivery!  We can't wait to meet baby!      "

## 2017-08-25 ENCOUNTER — PRENATAL OFFICE VISIT (OUTPATIENT)
Dept: OBGYN | Facility: CLINIC | Age: 32
End: 2017-08-25
Payer: COMMERCIAL

## 2017-08-25 VITALS
BODY MASS INDEX: 26.82 KG/M2 | HEIGHT: 65 IN | HEART RATE: 86 BPM | WEIGHT: 161 LBS | SYSTOLIC BLOOD PRESSURE: 110 MMHG | DIASTOLIC BLOOD PRESSURE: 67 MMHG

## 2017-08-25 DIAGNOSIS — Z34.03 PRENATAL CARE, FIRST PREGNANCY, THIRD TRIMESTER: Primary | ICD-10-CM

## 2017-08-25 PROCEDURE — 99207 ZZC PRENATAL VISIT: CPT | Performed by: OBSTETRICS & GYNECOLOGY

## 2017-08-25 PROCEDURE — 87186 SC STD MICRODIL/AGAR DIL: CPT | Performed by: OBSTETRICS & GYNECOLOGY

## 2017-08-25 PROCEDURE — 87653 STREP B DNA AMP PROBE: CPT | Performed by: OBSTETRICS & GYNECOLOGY

## 2017-08-25 NOTE — PROGRESS NOTES
"CC: prenatal  S: sometimes night leg cramps on and off.  No lof/vb/dc.  Good fm. No ctx  /67 (BP Location: Left arm, Patient Position: Chair, Cuff Size: Adult Regular)  Pulse 86  Ht 5' 5\" (1.651 m)  Wt 161 lb (73 kg)  LMP 12/12/2016 (Exact Date)  BMI 26.79 kg/m2   Ext-no e/c/c  gbs done  Declined cervix exam  A/P routine precautions  RTC in 1 week  Planning on natural but not against pain medications  Breast feeding planned  Alexia Isaacs MD    "

## 2017-08-25 NOTE — NURSING NOTE
"Chief Complaint   Patient presents with     Prenatal Care       Initial /67 (BP Location: Left arm, Patient Position: Chair, Cuff Size: Adult Regular)  Pulse 86  Ht 5' 5\" (1.651 m)  Wt 161 lb (73 kg)  LMP 12/12/2016 (Exact Date)  BMI 26.79 kg/m2 Estimated body mass index is 26.79 kg/(m^2) as calculated from the following:    Height as of this encounter: 5' 5\" (1.651 m).    Weight as of this encounter: 161 lb (73 kg).  Medication Reconciliation: complete     Maryanne Courtney LPN      "

## 2017-08-25 NOTE — MR AVS SNAPSHOT
After Visit Summary   8/25/2017    Serina Washington    MRN: [id number]           Patient Information     Date Of Birth          1985        Visit Information        Provider Department      8/25/2017 9:15 AM Alexia Isaacs MD [facility]        Today's Diagnoses     Prenatal care, first pregnancy, third trimester    -  1       Follow-ups after your visit        Your next 10 appointments already scheduled     Sep 01, 2017  9:00 AM CDT   ESTABLISHED PRENATAL with Atiya Sharp MD   [facility]    [address] [address]   [phone]            Sep 08, 2017  9:00 AM CDT   ESTABLISHED PRENATAL with Quinn Cash MD   [facility]    [address] [address]   [phone]            Sep 15, 2017  9:00 AM CDT   ESTABLISHED PRENATAL with Mu Miranda MD   [facility]    [address] [address]   [phone]            Sep 22, 2017  8:30 AM CDT   ESTABLISHED PRENATAL with Mu Miranda MD   [facility]    [address] [address]   [phone]              Who to contact     If you have questions or need follow up information about today's clinic visit or your schedule please contact [facility] directly at [phone].  Normal or non-critical lab and imaging results will be communicated to you by MyChart, letter or phone within 4 business days after the clinic has received the results. If you do not hear from us within 7 days, please contact the clinic through MyChart or phone. If you have a critical or abnormal lab result, we will notify you by phone as soon as possible.  Submit refill requests through [employer] or call your pharmacy and they will forward the refill request to  "us. Please allow 3 business days for your refill to be completed.          Additional Information About Your Visit        MyChart Information     HelpHubhart gives you secure access to your electronic health record. If you see a primary care provider, you can also send messages to your care team and make appointments. If you have questions, please call your primary care clinic.  If you do not have a primary care provider, please call [phone] and they will assist you.        Care EveryWhere ID     This is your Care EveryWhere ID. This could be used by other organizations to access your [place] medical records  [id number]        Your Vitals Were     Pulse Height Last Period BMI (Body Mass Index)          86 5' 5\" (1.651 m) 12/12/2016 (Exact Date) 26.79 kg/m2         Blood Pressure from Last 3 Encounters:   08/25/17 110/67   08/18/17 104/60   08/04/17 112/60    Weight from Last 3 Encounters:   08/25/17 161 lb (73 kg)   08/18/17 159 lb 12.8 oz (72.5 kg)   08/04/17 156 lb 6.4 oz (70.9 kg)              We Performed the Following     Strep, Group B by [facility]        Primary Care Provider Office Phone # Fax #    Gemini Megha Esquivel [phone] [address] [place] [address]        Equal Access to Services     NOAH CHENG : Hadii aad ku hadasho Soomaali, waaxda luqadaha, qaybta kaalmada adeegyada, marge canas haydhaval hernandez . So [facility] [phone].    ATENCIÓN: Si habla español, tiene a sanchez disposición servicios gratuitos de asistencia lingüística. Llame al [phone].    We comply with applicable federal civil rights laws and [place] laws. We do not discriminate on the basis of race, color, national origin, age, disability sex, sexual orientation or gender identity.            Thank you!     Thank you for choosing [facility]  for your care. Our goal is always to provide you with excellent care. Hearing back from our patients is one way we can continue to improve our " services. Please take a few minutes to complete the written survey that you may receive in the mail after your visit with us. Thank you!             Your Updated Medication List - Protect others around you: Learn how to safely use, store and throw away your medicines at www.disposemymeds.org.          This list is accurate as of: 8/25/17  9:29 AM.  Always use your most recent med list.                   Brand Name Dispense Instructions for use Diagnosis    hydrocortisone 1 % cream    CORTAID     Apply topically as needed        PRENATAL VITAMINS PO      Take 1 tablet by mouth daily

## 2017-08-26 LAB
GP B STREP DNA SPEC QL NAA+PROBE: POSITIVE
SPECIMEN SOURCE: ABNORMAL

## 2017-08-28 PROBLEM — O99.820 GBS (GROUP B STREPTOCOCCUS CARRIER), +RV CULTURE, CURRENTLY PREGNANT: Status: ACTIVE | Noted: 2017-08-28

## 2017-08-29 LAB
BACTERIA SPEC CULT: ABNORMAL
SPECIMEN SOURCE: ABNORMAL

## 2017-09-01 ENCOUNTER — PRENATAL OFFICE VISIT (OUTPATIENT)
Dept: OBGYN | Facility: CLINIC | Age: 32
End: 2017-09-01
Payer: COMMERCIAL

## 2017-09-01 VITALS
SYSTOLIC BLOOD PRESSURE: 98 MMHG | HEART RATE: 110 BPM | HEIGHT: 65 IN | WEIGHT: 163 LBS | DIASTOLIC BLOOD PRESSURE: 62 MMHG | BODY MASS INDEX: 27.16 KG/M2

## 2017-09-01 DIAGNOSIS — Z34.03 PRENATAL CARE, FIRST PREGNANCY, THIRD TRIMESTER: Primary | ICD-10-CM

## 2017-09-01 PROCEDURE — 99207 ZZC PRENATAL VISIT: CPT | Performed by: OBSTETRICS & GYNECOLOGY

## 2017-09-01 NOTE — MR AVS SNAPSHOT
After Visit Summary   9/1/2017    Serina Washington    MRN: [id number]           Patient Information     Date Of Birth          1985        Visit Information        Provider Department      9/1/2017 9:00 AM Atiya Sharp MD [facility]        Today's Diagnoses     Prenatal care, first pregnancy, third trimester    -  1       Follow-ups after your visit        Your next 10 appointments already scheduled     Sep 08, 2017  9:00 AM CDT   ESTABLISHED PRENATAL with Quinn Cash MD   [facility]    [address] [address]   [phone]            Sep 15, 2017  9:00 AM CDT   ESTABLISHED PRENATAL with Mu Miranda MD   [facility]    [address] [address]   [phone]            Sep 22, 2017  8:30 AM CDT   ESTABLISHED PRENATAL with Mu Miranda MD   [facility]    [address] [address]   [phone]              Who to contact     If you have questions or need follow up information about today's clinic visit or your schedule please contact [facility] directly at [phone].  Normal or non-critical lab and imaging results will be communicated to you by Aphriahart, letter or phone within 4 business days after the clinic has received the results. If you do not hear from us within 7 days, please contact the clinic through Aphriahart or phone. If you have a critical or abnormal lab result, we will notify you by phone as soon as possible.  Submit refill requests through [employer]lingo or call your pharmacy and they will forward the refill request to us. Please allow 3 business days for your refill to be completed.          Additional Information About Your Visit        Aphriahart Information     tolingo gives you secure access to your electronic health record. If you see  "a primary care provider, you can also send messages to your care team and make appointments. If you have questions, please call your primary care clinic.  If you do not have a primary care provider, please call [phone] and they will assist you.        Care EveryWhere ID     This is your Care EveryWhere ID. This could be used by other organizations to access your [place] medical records  [id number]        Your Vitals Were     Pulse Height Last Period Breastfeeding? BMI (Body Mass Index)       110 5' 5\" (1.651 m) 12/12/2016 (Exact Date) No 27.12 kg/m2        Blood Pressure from Last 3 Encounters:   09/01/17 98/62   08/25/17 110/67   08/18/17 104/60    Weight from Last 3 Encounters:   09/01/17 163 lb (73.9 kg)   08/25/17 161 lb (73 kg)   08/18/17 159 lb 12.8 oz (72.5 kg)              Today, you had the following     No orders found for display       Primary Care Provider Office Phone # Fax #    Gemini Megha Esquivel [phone] [address] [place] [address]        Equal Access to Services     [facility]: Hadii aad nabila hadyaso Soabner, waaxda luqadaha, qaybta kaalmada adeegyada, marge hernandez . So [facility] [phone].    ATENCIÓN: Si habla español, tiene a sanchez disposición servicios gratuitos de asistencia lingüística. Llame al [phone].    We comply with applicable federal civil rights laws and [place] laws. We do not discriminate on the basis of race, color, national origin, age, disability sex, sexual orientation or gender identity.            Thank you!     Thank you for choosing [facility]  for your care. Our goal is always to provide you with excellent care. Hearing back from our patients is one way we can continue to improve our services. Please take a few minutes to complete the written survey that you may receive in the mail after your visit with us. Thank you!             Your Updated Medication List - Protect others around you: " Learn how to safely use, store and throw away your medicines at www.disposemymeds.org.          This list is accurate as of: 9/1/17 10:21 AM.  Always use your most recent med list.                   Brand Name Dispense Instructions for use Diagnosis    hydrocortisone 1 % cream    CORTAID     Apply topically as needed        PRENATAL VITAMINS PO      Take 1 tablet by mouth daily

## 2017-09-01 NOTE — NURSING NOTE
"Chief Complaint   Patient presents with     Prenatal Care       Initial BP 98/62 (BP Location: Right arm, Patient Position: Chair, Cuff Size: Adult Regular)  Pulse 110  Ht 5' 5\" (1.651 m)  Wt 163 lb (73.9 kg)  LMP 12/12/2016 (Exact Date)  Breastfeeding? No  BMI 27.12 kg/m2 Estimated body mass index is 27.12 kg/(m^2) as calculated from the following:    Height as of this encounter: 5' 5\" (1.651 m).    Weight as of this encounter: 163 lb (73.9 kg).  Medication Reconciliation: complete   Fara Ortez CMA      "

## 2017-09-01 NOTE — PROGRESS NOTES
"CC: Here for routine prenatal visit @ 37w4d   HPI: + FM, no ctx, no LOF, no VB.  No complaints.     PE: BP 98/62 (BP Location: Right arm, Patient Position: Chair, Cuff Size: Adult Regular)  Pulse 110  Ht 5' 5\" (1.651 m)  Wt 163 lb (73.9 kg)  LMP 2016 (Exact Date)  Breastfeeding? No  BMI 27.12 kg/m2   See OB flowsheet    GBS positive  Patient declines exam today    A/P G1 @ 37w4d normal pregnancy    1. Routine prenatal care.  Discussed with Serina Washington, the following; indications; the agents and methods of labor augmentation, including risks, benefits, and alternative approaches; and the possible need for  birth. EFW is AGA.    The Labor Induction:what you need to know information sheet was made available to her. Questions and concerns were addressed and patient agrees to above if necessary during the course of her labor.    RTC 1 week    Atiya Sharp M.D.    "

## 2017-09-01 NOTE — PROGRESS NOTES
Prenatal Breastfeeding Education Toolkit provided for patient to review, helping her to make an informed decision on a feeding choice for her baby. Questions directed to the provider.  Fara Ortez, [facility]

## 2017-09-08 ENCOUNTER — PRENATAL OFFICE VISIT (OUTPATIENT)
Dept: OBGYN | Facility: CLINIC | Age: 32
End: 2017-09-08
Payer: COMMERCIAL

## 2017-09-08 VITALS
HEART RATE: 115 BPM | SYSTOLIC BLOOD PRESSURE: 102 MMHG | WEIGHT: 162.4 LBS | HEIGHT: 65 IN | BODY MASS INDEX: 27.06 KG/M2 | DIASTOLIC BLOOD PRESSURE: 68 MMHG

## 2017-09-08 DIAGNOSIS — Z34.03 PRENATAL CARE, FIRST PREGNANCY, THIRD TRIMESTER: Primary | ICD-10-CM

## 2017-09-08 DIAGNOSIS — O99.820 GBS (GROUP B STREPTOCOCCUS CARRIER), +RV CULTURE, CURRENTLY PREGNANT: ICD-10-CM

## 2017-09-08 PROCEDURE — 99207 ZZC PRENATAL VISIT: CPT | Performed by: OBSTETRICS & GYNECOLOGY

## 2017-09-08 NOTE — NURSING NOTE
"Initial /68 (BP Location: Right arm, Cuff Size: Adult Regular)  Pulse 115  Ht 5' 5\" (1.651 m)  Wt 162 lb 6.4 oz (73.7 kg)  LMP 12/12/2016 (Exact Date)  BMI 27.02 kg/m2 Estimated body mass index is 27.02 kg/(m^2) as calculated from the following:    Height as of this encounter: 5' 5\" (1.651 m).    Weight as of this encounter: 162 lb 6.4 oz (73.7 kg). .      "

## 2017-09-08 NOTE — MR AVS SNAPSHOT
After Visit Summary   9/8/2017    Serina Washington    MRN: [id number]           Patient Information     Date Of Birth          1985        Visit Information        Provider Department      9/8/2017 9:00 AM Quinn Cash MD [facility]        Today's Diagnoses     Prenatal care, first pregnancy, third trimester    -  1    GBS (group B Streptococcus carrier), +RV culture, currently pregnant           Follow-ups after your visit        Follow-up notes from your care team     Return in about 1 week (around 9/15/2017).      Your next 10 appointments already scheduled     Sep 15, 2017  9:00 AM CDT   ESTABLISHED PRENATAL with Mu Miranda MD   [facility]    [address] [address]   [phone]            Sep 22, 2017  8:30 AM CDT   ESTABLISHED PRENATAL with Mu Miranda MD   [facility])    [address] [address]   [phone]              Who to contact     If you have questions or need follow up information about today's clinic visit or your schedule please contact [facility] directly at [phone].  Normal or non-critical lab and imaging results will be communicated to you by MyChart, letter or phone within 4 business days after the clinic has received the results. If you do not hear from us within 7 days, please contact the clinic through Ringpayhart or phone. If you have a critical or abnormal lab result, we will notify you by phone as soon as possible.  Submit refill requests through [employer] or call your pharmacy and they will forward the refill request to us. Please allow 3 business days for your refill to be completed.          Additional Information About Your Visit        Ringpayhart Information     Simperium gives you secure access to your electronic health record. If you see a primary care provider, you can also  "send messages to your care team and make appointments. If you have questions, please call your primary care clinic.  If you do not have a primary care provider, please call [phone] and they will assist you.        Care EveryWhere ID     This is your Care EveryWhere ID. This could be used by other organizations to access your [place] medical records  [id number]        Your Vitals Were     Pulse Height Last Period BMI (Body Mass Index)          115 5' 5\" (1.651 m) 12/12/2016 (Exact Date) 27.02 kg/m2         Blood Pressure from Last 3 Encounters:   09/08/17 102/68   09/01/17 98/62   08/25/17 110/67    Weight from Last 3 Encounters:   09/08/17 162 lb 6.4 oz (73.7 kg)   09/01/17 163 lb (73.9 kg)   08/25/17 161 lb (73 kg)              Today, you had the following     No orders found for display       Primary Care Provider Office Phone # Fax #    Gemini Cleveland DO Alvin [phone] [address] [place] [address]        Equal Access to Services     NOAH CHENG AH: Hadii moshe Breaux, waaxda anselmo, qaybta kaalmaamita nicolas, marge bullock. So [facility] [phone].    ATENCIÓN: Si habla español, tiene a sanchez disposición servicios gratuitos de asistencia lingüística. [facility] [phone].    We comply with applicable federal civil rights laws and [place] laws. We do not discriminate on the basis of race, color, national origin, age, disability sex, sexual orientation or gender identity.            Thank you!     Thank you for choosing [facility]  for your care. Our goal is always to provide you with excellent care. Hearing back from our patients is one way we can continue to improve our services. Please take a few minutes to complete the written survey that you may receive in the mail after your visit with us. Thank you!             Your Updated Medication List - Protect others around you: Learn how to safely use, store and throw away your " medicines at www.disposemymeds.org.          This list is accurate as of: 9/8/17  9:45 AM.  Always use your most recent med list.                   Brand Name Dispense Instructions for use Diagnosis    hydrocortisone 1 % cream    CORTAID     Apply topically as needed        PRENATAL VITAMINS PO      Take 1 tablet by mouth daily

## 2017-09-08 NOTE — PROGRESS NOTES
"CC: Prenatal visit    S: Feeling good Mvmt   No LOF   No VB  Occasional tightenings  GBS+-  Having some itching at night after buying a new lotion- upper arms and legs  O: /68 (BP Location: Right arm, Cuff Size: Adult Regular)  Pulse 115  Ht 5' 5\" (1.651 m)  Wt 162 lb 6.4 oz (73.7 kg)  LMP 12/12/2016 (Exact Date)  BMI 27.02 kg/m2  See above table    A: IUP @ 38+4 week EGA  1. Prenatal care, first pregnancy, third trimester    2. GBS (group B Streptococcus carrier), +RV culture, currently pregnant     pruritis - temporally related to the new lotion- counseled to stop  P RTC 1 weeks  Labor precautions  Penicillin in labor   GBS discussed/clarified    Quinn Cash      "

## 2017-09-09 ENCOUNTER — ANESTHESIA (OUTPATIENT)
Dept: OBGYN | Facility: CLINIC | Age: 32
End: 2017-09-09
Payer: COMMERCIAL

## 2017-09-09 ENCOUNTER — HOSPITAL ENCOUNTER (INPATIENT)
Facility: CLINIC | Age: 32
LOS: 4 days | Discharge: HOME | End: 2017-09-13
Attending: OBSTETRICS & GYNECOLOGY | Admitting: OBSTETRICS & GYNECOLOGY
Payer: COMMERCIAL

## 2017-09-09 ENCOUNTER — ANESTHESIA EVENT (OUTPATIENT)
Dept: OBGYN | Facility: CLINIC | Age: 32
End: 2017-09-09
Payer: COMMERCIAL

## 2017-09-09 DIAGNOSIS — Z98.891 S/P C-SECTION: Primary | ICD-10-CM

## 2017-09-09 PROBLEM — Z34.90 NORMAL PREGNANCY: Status: ACTIVE | Noted: 2017-09-09

## 2017-09-09 LAB
ABO + RH BLD: NORMAL
ABO + RH BLD: NORMAL
BLD GP AB SCN SERPL QL: NORMAL
BLOOD BANK CMNT PATIENT-IMP: NORMAL
SPECIMEN EXP DATE BLD: NORMAL

## 2017-09-09 PROCEDURE — 36415 COLL VENOUS BLD VENIPUNCTURE: CPT | Performed by: OBSTETRICS & GYNECOLOGY

## 2017-09-09 PROCEDURE — 25000125 ZZHC RX 250: Performed by: NURSE ANESTHETIST, CERTIFIED REGISTERED

## 2017-09-09 PROCEDURE — 12000031 ZZH R&B OB CRITICAL

## 2017-09-09 PROCEDURE — 3E0R3CZ INTRODUCTION OF REGIONAL ANESTHETIC INTO SPINAL CANAL, PERCUTANEOUS APPROACH: ICD-10-PCS | Performed by: OBSTETRICS & GYNECOLOGY

## 2017-09-09 PROCEDURE — 99215 OFFICE O/P EST HI 40 MIN: CPT

## 2017-09-09 PROCEDURE — 25000128 H RX IP 250 OP 636: Performed by: NURSE ANESTHETIST, CERTIFIED REGISTERED

## 2017-09-09 PROCEDURE — 37000011 ZZH ANESTHESIA WARD SERVICE: Performed by: NURSE ANESTHETIST, CERTIFIED REGISTERED

## 2017-09-09 PROCEDURE — 86900 BLOOD TYPING SEROLOGIC ABO: CPT | Performed by: OBSTETRICS & GYNECOLOGY

## 2017-09-09 PROCEDURE — S0020 INJECTION, BUPIVICAINE HYDRO: HCPCS | Performed by: NURSE ANESTHETIST, CERTIFIED REGISTERED

## 2017-09-09 PROCEDURE — 40000671 ZZH STATISTIC ANESTHESIA CASE

## 2017-09-09 PROCEDURE — 86901 BLOOD TYPING SEROLOGIC RH(D): CPT | Performed by: OBSTETRICS & GYNECOLOGY

## 2017-09-09 PROCEDURE — 25000128 H RX IP 250 OP 636: Performed by: OBSTETRICS & GYNECOLOGY

## 2017-09-09 PROCEDURE — 00HU33Z INSERTION OF INFUSION DEVICE INTO SPINAL CANAL, PERCUTANEOUS APPROACH: ICD-10-PCS | Performed by: OBSTETRICS & GYNECOLOGY

## 2017-09-09 PROCEDURE — 86780 TREPONEMA PALLIDUM: CPT | Performed by: OBSTETRICS & GYNECOLOGY

## 2017-09-09 PROCEDURE — 86850 RBC ANTIBODY SCREEN: CPT | Performed by: OBSTETRICS & GYNECOLOGY

## 2017-09-09 PROCEDURE — 25000125 ZZHC RX 250: Performed by: OBSTETRICS & GYNECOLOGY

## 2017-09-09 RX ORDER — SODIUM CHLORIDE, SODIUM LACTATE, POTASSIUM CHLORIDE, CALCIUM CHLORIDE 600; 310; 30; 20 MG/100ML; MG/100ML; MG/100ML; MG/100ML
INJECTION, SOLUTION INTRAVENOUS CONTINUOUS
Status: DISCONTINUED | OUTPATIENT
Start: 2017-09-09 | End: 2017-09-10

## 2017-09-09 RX ORDER — BUPIVACAINE HYDROCHLORIDE 2.5 MG/ML
INJECTION, SOLUTION INFILTRATION; PERINEURAL PRN
Status: DISCONTINUED | OUTPATIENT
Start: 2017-09-09 | End: 2017-09-10

## 2017-09-09 RX ORDER — OXYTOCIN/0.9 % SODIUM CHLORIDE 30/500 ML
100-340 PLASTIC BAG, INJECTION (ML) INTRAVENOUS CONTINUOUS PRN
Status: DISCONTINUED | OUTPATIENT
Start: 2017-09-09 | End: 2017-09-10

## 2017-09-09 RX ORDER — LIDOCAINE HYDROCHLORIDE 10 MG/ML
INJECTION, SOLUTION INFILTRATION; PERINEURAL PRN
Status: DISCONTINUED | OUTPATIENT
Start: 2017-09-09 | End: 2017-09-10

## 2017-09-09 RX ORDER — ACETAMINOPHEN 325 MG/1
650 TABLET ORAL EVERY 4 HOURS PRN
Status: DISCONTINUED | OUTPATIENT
Start: 2017-09-09 | End: 2017-09-10

## 2017-09-09 RX ORDER — LIDOCAINE 40 MG/G
CREAM TOPICAL
Status: DISCONTINUED | OUTPATIENT
Start: 2017-09-09 | End: 2017-09-10

## 2017-09-09 RX ORDER — LIDOCAINE HYDROCHLORIDE AND EPINEPHRINE 15; 5 MG/ML; UG/ML
INJECTION, SOLUTION EPIDURAL PRN
Status: DISCONTINUED | OUTPATIENT
Start: 2017-09-09 | End: 2017-09-10

## 2017-09-09 RX ORDER — METHYLERGONOVINE MALEATE 0.2 MG/ML
200 INJECTION INTRAVENOUS
Status: DISCONTINUED | OUTPATIENT
Start: 2017-09-09 | End: 2017-09-10

## 2017-09-09 RX ORDER — CARBOPROST TROMETHAMINE 250 UG/ML
250 INJECTION, SOLUTION INTRAMUSCULAR
Status: DISCONTINUED | OUTPATIENT
Start: 2017-09-09 | End: 2017-09-10

## 2017-09-09 RX ORDER — ONDANSETRON 2 MG/ML
4 INJECTION INTRAMUSCULAR; INTRAVENOUS EVERY 6 HOURS PRN
Status: DISCONTINUED | OUTPATIENT
Start: 2017-09-09 | End: 2017-09-10

## 2017-09-09 RX ORDER — IBUPROFEN 800 MG/1
800 TABLET, FILM COATED ORAL
Status: DISCONTINUED | OUTPATIENT
Start: 2017-09-09 | End: 2017-09-10

## 2017-09-09 RX ORDER — OXYCODONE AND ACETAMINOPHEN 5; 325 MG/1; MG/1
1 TABLET ORAL
Status: DISCONTINUED | OUTPATIENT
Start: 2017-09-09 | End: 2017-09-10

## 2017-09-09 RX ORDER — OXYTOCIN 10 [USP'U]/ML
10 INJECTION, SOLUTION INTRAMUSCULAR; INTRAVENOUS
Status: DISCONTINUED | OUTPATIENT
Start: 2017-09-09 | End: 2017-09-10

## 2017-09-09 RX ORDER — EPHEDRINE SULFATE 50 MG/ML
5 INJECTION, SOLUTION INTRAMUSCULAR; INTRAVENOUS; SUBCUTANEOUS
Status: DISCONTINUED | OUTPATIENT
Start: 2017-09-09 | End: 2017-09-10

## 2017-09-09 RX ORDER — FENTANYL CITRATE 50 UG/ML
INJECTION, SOLUTION INTRAMUSCULAR; INTRAVENOUS PRN
Status: DISCONTINUED | OUTPATIENT
Start: 2017-09-09 | End: 2017-09-10

## 2017-09-09 RX ORDER — NALOXONE HYDROCHLORIDE 0.4 MG/ML
.1-.4 INJECTION, SOLUTION INTRAMUSCULAR; INTRAVENOUS; SUBCUTANEOUS
Status: DISCONTINUED | OUTPATIENT
Start: 2017-09-09 | End: 2017-09-10

## 2017-09-09 RX ORDER — NALBUPHINE HYDROCHLORIDE 10 MG/ML
2.5-5 INJECTION, SOLUTION INTRAMUSCULAR; INTRAVENOUS; SUBCUTANEOUS EVERY 6 HOURS PRN
Status: DISCONTINUED | OUTPATIENT
Start: 2017-09-09 | End: 2017-09-09 | Stop reason: RX

## 2017-09-09 RX ORDER — ROPIVACAINE HYDROCHLORIDE 2 MG/ML
INJECTION, SOLUTION EPIDURAL; INFILTRATION; PERINEURAL
Status: DISCONTINUED
Start: 2017-09-09 | End: 2017-09-10 | Stop reason: HOSPADM

## 2017-09-09 RX ORDER — ONDANSETRON 2 MG/ML
4 INJECTION INTRAMUSCULAR; INTRAVENOUS EVERY 6 HOURS PRN
Status: DISCONTINUED | OUTPATIENT
Start: 2017-09-09 | End: 2017-09-09 | Stop reason: DRUGHIGH

## 2017-09-09 RX ORDER — FENTANYL CITRATE 50 UG/ML
INJECTION, SOLUTION INTRAMUSCULAR; INTRAVENOUS
Status: DISCONTINUED
Start: 2017-09-09 | End: 2017-09-10 | Stop reason: HOSPADM

## 2017-09-09 RX ORDER — PENICILLIN G POTASSIUM 5000000 [IU]/1
5 INJECTION, POWDER, FOR SOLUTION INTRAMUSCULAR; INTRAVENOUS ONCE
Status: COMPLETED | OUTPATIENT
Start: 2017-09-09 | End: 2017-09-09

## 2017-09-09 RX ORDER — NALOXONE HYDROCHLORIDE 0.4 MG/ML
.1-.4 INJECTION, SOLUTION INTRAMUSCULAR; INTRAVENOUS; SUBCUTANEOUS
Status: DISCONTINUED | OUTPATIENT
Start: 2017-09-09 | End: 2017-09-09

## 2017-09-09 RX ORDER — OXYTOCIN/0.9 % SODIUM CHLORIDE 30/500 ML
1-24 PLASTIC BAG, INJECTION (ML) INTRAVENOUS CONTINUOUS
Status: DISCONTINUED | OUTPATIENT
Start: 2017-09-09 | End: 2017-09-10

## 2017-09-09 RX ADMIN — SODIUM CHLORIDE, POTASSIUM CHLORIDE, SODIUM LACTATE AND CALCIUM CHLORIDE: 600; 310; 30; 20 INJECTION, SOLUTION INTRAVENOUS at 20:38

## 2017-09-09 RX ADMIN — EPHEDRINE SULFATE 5 MG: 50 INJECTION INTRAMUSCULAR; INTRAVENOUS; SUBCUTANEOUS at 21:25

## 2017-09-09 RX ADMIN — OXYTOCIN-SODIUM CHLORIDE 0.9% IV SOLN 30 UNIT/500ML 2 MILLI-UNITS/MIN: 30-0.9/5 SOLUTION at 17:47

## 2017-09-09 RX ADMIN — Medication 10 ML/HR: at 20:52

## 2017-09-09 RX ADMIN — SODIUM CHLORIDE, POTASSIUM CHLORIDE, SODIUM LACTATE AND CALCIUM CHLORIDE 1000 ML: 600; 310; 30; 20 INJECTION, SOLUTION INTRAVENOUS at 08:20

## 2017-09-09 RX ADMIN — PENICILLIN G POTASSIUM 5 MILLION UNITS: 5000000 POWDER, FOR SOLUTION INTRAMUSCULAR; INTRAPLEURAL; INTRATHECAL; INTRAVENOUS at 08:22

## 2017-09-09 RX ADMIN — BUPIVACAINE HYDROCHLORIDE 3 ML: 2.5 INJECTION, SOLUTION INFILTRATION; PERINEURAL at 20:44

## 2017-09-09 RX ADMIN — LIDOCAINE HYDROCHLORIDE,EPINEPHRINE BITARTRATE 45 MG: 15; .005 INJECTION, SOLUTION EPIDURAL; INFILTRATION; INTRACAUDAL; PERINEURAL at 20:38

## 2017-09-09 RX ADMIN — EPHEDRINE SULFATE 5 MG: 50 INJECTION INTRAMUSCULAR; INTRAVENOUS; SUBCUTANEOUS at 21:21

## 2017-09-09 RX ADMIN — BUPIVACAINE HYDROCHLORIDE 4 ML: 2.5 INJECTION, SOLUTION INFILTRATION; PERINEURAL at 20:50

## 2017-09-09 RX ADMIN — EPHEDRINE SULFATE 5 MG: 50 INJECTION INTRAMUSCULAR; INTRAVENOUS; SUBCUTANEOUS at 22:05

## 2017-09-09 RX ADMIN — FENTANYL CITRATE 100 MCG: 50 INJECTION, SOLUTION INTRAMUSCULAR; INTRAVENOUS at 20:44

## 2017-09-09 RX ADMIN — SODIUM CHLORIDE, POTASSIUM CHLORIDE, SODIUM LACTATE AND CALCIUM CHLORIDE: 600; 310; 30; 20 INJECTION, SOLUTION INTRAVENOUS at 22:09

## 2017-09-09 RX ADMIN — SODIUM CHLORIDE 2.5 MILLION UNITS: 9 INJECTION, SOLUTION INTRAVENOUS at 16:09

## 2017-09-09 RX ADMIN — SODIUM CHLORIDE 2.5 MILLION UNITS: 9 INJECTION, SOLUTION INTRAVENOUS at 23:50

## 2017-09-09 RX ADMIN — SODIUM CHLORIDE 2.5 MILLION UNITS: 9 INJECTION, SOLUTION INTRAVENOUS at 12:13

## 2017-09-09 RX ADMIN — SODIUM CHLORIDE 2.5 MILLION UNITS: 9 INJECTION, SOLUTION INTRAVENOUS at 19:48

## 2017-09-09 RX ADMIN — LIDOCAINE HYDROCHLORIDE 80 MG: 10 INJECTION, SOLUTION INFILTRATION; PERINEURAL at 17:17

## 2017-09-09 RX ADMIN — EPHEDRINE SULFATE 5 MG: 50 INJECTION INTRAMUSCULAR; INTRAVENOUS; SUBCUTANEOUS at 21:29

## 2017-09-09 NOTE — PLAN OF CARE
Problem: Labor (Cervical Ripen, Induct, Augment) (Adult,Obstetrics,Pediatric)  Goal: Signs and Symptoms of Listed Potential Problems Will be Absent or Manageable (Labor)  Signs and symptoms of listed potential problems will be absent or manageable by discharge/transition of care (reference Labor (Cervical Ripen, Induct, Augment) (Adult,Obstetrics,Pediatric) CPG).   Outcome: Declining  Pt put the call light on.  Found her in the bathroom.  Had a question because she saw green on her pad.  Checked her pad and there was moderate amount of green meconium on her pad.  Gave reassurance and told her and spouse that a PNP will be present for delivery.  Left a message for Ivan SALAZAR on his Vocera.  Will notify Dr and charge nurse.

## 2017-09-09 NOTE — ANESTHESIA PROCEDURE NOTES
Peripheral nerve/Neuraxial procedure note : epidural catheter  Pre-Procedure  Performed by  MARK PRINCE   Location: OB      Pre-Anesthestic Checklist: patient identified, IV checked, site marked, risks and benefits discussed, informed consent, monitors and equipment checked and pre-op evaluation    Timeout  Correct Patient: Yes   Correct Procedure: Yes   Correct Site: Yes   Correct Laterality: N/A   Correct Position: Yes   Site Marked: N/A   .   Procedure Documentation    .    Procedure:    Epidural catheter.  Insertion Site:L3-4  (midline approach) Injection technique: LORT air   Local skin infiltrated with 8 mL of 1% lidocaine.       Patient Prep;mask, sterile gloves, chlorhexidine gluconate and isopropyl alcohol, patient draped.  .  Needle: Touhy needle Needle Gauge: 17.    Needle Length (Inches) 3.5  # of attempts: 1 and # of redirects:  .   Catheter: 19 G . .  Catheter threaded easily  4.5 cm epidural space.  9 cm at skin.   .    Assessment/Narrative  Paresthesias: No.  .  .  .

## 2017-09-09 NOTE — ANESTHESIA PREPROCEDURE EVALUATION
Anesthesia Evaluation       history and physical reviewed .             ROS/MED HX    ENT/Pulmonary:  - neg pulmonary ROS     Neurologic:  - neg neurologic ROS     Cardiovascular:  - neg cardiovascular ROS       METS/Exercise Tolerance:     Hematologic:         Musculoskeletal:         GI/Hepatic:  - neg GI/hepatic ROS       Renal/Genitourinary:         Endo:         Psychiatric:         Infectious Disease:         Malignancy:         Other:                     Physical Exam  Normal systems: cardiovascular, pulmonary and dental    Airway   Mallampati: I  TM distance: >3 FB  Neck ROM: full    Dental     Cardiovascular       Pulmonary           neg OB ROS                 Anesthesia Plan      History & Physical Review  History and physical reviewed and following examination; no interval change.    ASA Status:  2 .  OB Epidural Asa: 2   NPO Status:  Full stomach (pregnant)    Plan for Epidural   PONV prophylaxis:  Ondansetron (or other 5HT-3) and Dexamethasone or Solumedrol       Postoperative Care  Postoperative pain management:  Neuraxial analgesia, IV analgesics and Multi-modal analgesia.      Consents  Anesthetic plan, risks, benefits and alternatives discussed with:  Patient..                          .

## 2017-09-09 NOTE — PROGRESS NOTES
Pt [facility] to the birthcenter at 0620 stating her water broke and hasn't felt the baby move since 0400. Pt was put in triage room. Pt is grossly ruptured. Monitors placed on pt. FHT's 150. Pt moved to room 2050.  SVE closed/70/-2. Orientated to room and surroundings. Call light in reach. Will cont to monitor.

## 2017-09-09 NOTE — IP AVS SNAPSHOT
MRN:[id number]                      After Visit Summary   2017    Serina Washington    MRN: [id number]           Thank you!     Thank you for choosing [facility] for your care. Our goal is always to provide you with excellent care. Hearing back from our patients is one way we can continue to improve our services. Please take a few minutes to complete the written survey that you may receive in the mail after you visit with us. Thank you!        Patient Information     Date Of Birth          1985        About your hospital stay     You were admitted on:  2017 You last received care in the:  [facility]    You were discharged on:  2017       Who to Call     For medical emergencies, please call 911.  For non-urgent questions about your medical care, please call your primary care provider or clinic, [phone]  For questions related to your surgery, please call your surgery clinic        Attending Provider     Provider Specialty    Quinn Cash MD OB/Gyn    RubenMu simeon MD OB/Gyn       Primary Care Provider Office Phone # Fax #    Gemini Cleveland DO Alvin [phone]      After Care Instructions     Activity       Review discharge instructions            Diet       Resume previous diet            Discharge Instructions - Gestational diabetic patients       Gestational diabetic patients to follow up for fasting blood sugar and 2 hour 75gm glucose load at 6 weeks postpartum.            Discharge Instructions - Postpartum visit       Schedule postpartum visit with your provider and return to clinic in 6 weeks.                  Further instructions from your care team       Postop  Birth Instructions    Activity       Do not lift more than 10 pounds for 6 weeks after surgery.  Ask family and friends for help when you need it.    No driving until you have stopped taking your pain medications (usually two weeks after  surgery).    No heavy exercise or activity for 6 weeks.  Don't do anything that will put a strain on your surgery site.    Don't strain when using the toilet.  Your care team may prescribe a stool softener if you have problems with your bowel movements.     To care for your incision:       Keep the incision clean and dry.    Do not soak your incision in water. No swimming or hot tubs until it has fully healed. You may soak in the bathtub if the water level is below your incision.    Do not use peroxide, gel, cream, lotion, or ointment on your incision.    Adjust your clothes to avoid pressure on your surgery site (check the elastic in your underwear for example).     You may see a small amount of clear or pink drainage and this is normal.  Check with your health care provider:       If the drainage increases or has an odor.    If the incision reddens, you have swelling, or develop a rash.    If you have increased pain and the medicine we prescribed doesn't help.    If you have a fever above 100.4 F (38 C) with or without chills when placing thermometer under your tongue.   The area around your incision (surgery wound), will feel numb.  This is normal. The numbness should go away in less than a year.     Keep your hands clean:  Always wash your hands before touching your incision (surgery wound). This helps reduce your risk of infection. If your hands aren't dirty, you may use an alcohol hand-rub to clean your hands. Keep your nails clean and short.    Call your healthcare provider if you have any of these symptoms:       You soak a sanitary pad with blood within 1 hour, or you see blood clots larger than a golf ball.    Bleeding that lasts more than 6 weeks.    Vaginal discharge that smells bad.    Severe pain, cramping or tenderness in your lower belly area.    A need to urinate more frequently (use the toilet more often), more urgently (use the toilet very quickly), or it burns when you urinate.    Nausea and  vomiting.    Redness, swelling or pain around a vein in your leg.    Problems breastfeeding or a red or painful area on your breast.    Chest pain and cough or are gasping for air.    Problems with coping with sadness, anxiety or depression. If you have concerns about hurting yourself or the baby, call your provider immediately.      You have questions or concerns after you return home.     Make a 1 week follow up appt. With Dr. Miranda.             Pending Results     No orders found from 9/7/2017 to 9/10/2017.            Statement of Approval     Ordered          09/13/17 1310  I have reviewed and agree with all the recommendations and orders detailed in this document.  EFFECTIVE NOW     Approved and electronically signed by:  Alexia Isaacs MD             Admission Information     Date & Time Provider Department Dept. Phone    9/9/2017 [facility] [phone]      Your Vitals Were     Blood Pressure Pulse Temperature Respirations Last Period Pulse Oximetry    105/65 83 97.4  F (36.3  C) (Oral) 16 12/12/2016 (Exact Date) 98%      MyChart Information     [facility] gives you secure access to your electronic health record. If you see a primary care provider, you can also send messages to your care team and make appointments. If you have questions, please call your primary care clinic.  If you do not have a primary care provider, please call [phone] and they will assist you.        Care EveryWhere ID     This is your Care EveryWhere ID. This could be used by other organizations to access your [facility] medical records  [id number]        Equal Access to Services     [facility] AH: Hadii aad ku hadasho Sokennali, waaxda luqadaha, qaybta kaalmada adeegyada, marge bullock. So [facility] [phone].    ATENCIÓN: Si habla español, tiene a sanchez disposición servicios gratuitos de asistencia lingüística. Llame al [phone].    We  comply with applicable federal civil rights laws and [place] laws. We do not discriminate on the basis of race, color, national origin, age, disability sex, sexual orientation or gender identity.               Review of your medicines      START taking        Dose / Directions    ibuprofen 400 MG tablet   Commonly known as:  ADVIL/MOTRIN        Dose:  400-800 mg   Take 1-2 tablets (400-800 mg) by mouth every 6 hours as needed for other (cramping)   Quantity:  120 tablet   Refills:  0       oxyCODONE-acetaminophen 5-325 MG per tablet   Commonly known as:  PERCOCET        Dose:  1-2 tablet   Take 1-2 tablets by mouth every 4 hours as needed for moderate to severe pain   Quantity:  30 tablet   Refills:  0       senna-docusate 8.6-50 MG per tablet   Commonly known as:  SENOKOT-S;PERICOLACE        Dose:  1-2 tablet   Take 1-2 tablets by mouth 2 times daily   Quantity:  100 tablet   Refills:  0         CONTINUE these medicines which have NOT CHANGED        Dose / Directions    hydrocortisone 1 % cream   Commonly known as:  CORTAID        Apply topically as needed   Refills:  0       PRENATAL VITAMINS PO        Dose:  2 tablet   Take 2 tablets by mouth every evening [place]    Refills:  0            Where to get your medicines      These medications were sent to [facility] [place], [place] - [address]  [address] [address]     Phone:  [phone]     ibuprofen 400 MG tablet    senna-docusate 8.6-50 MG per tablet         Some of these will need a paper prescription and others can be bought over the counter. Ask your nurse if you have questions.     Bring a paper prescription for each of these medications     oxyCODONE-acetaminophen 5-325 MG per tablet                Protect others around you: Learn how to safely use, store and throw away your medicines at www.disposemymeds.org.             Medication List: This is a list of all your medications and when to take them. Check  marks below indicate your daily home schedule. Keep this list as a reference.      Medications           Morning Afternoon Evening Bedtime As Needed    hydrocortisone 1 % cream   Commonly known as:  CORTAID   Apply topically as needed                                ibuprofen 400 MG tablet   Commonly known as:  ADVIL/MOTRIN   Take 1-2 tablets (400-800 mg) by mouth every 6 hours as needed for other (cramping)   Last time this was given:  800 mg on 2017  8:32 AM                                oxyCODONE-acetaminophen 5-325 MG per tablet   Commonly known as:  PERCOCET   Take 1-2 tablets by mouth every 4 hours as needed for moderate to severe pain   Last time this was given:  1 tablet on 2017  1:28 PM                                PRENATAL VITAMINS PO   Take 2 tablets by mouth every evening [place]                                 senna-docusate 8.6-50 MG per tablet   Commonly known as:  SENOKOT-S;PERICOLACE   Take 1-2 tablets by mouth 2 times daily   Last time this was given:  1 tablet on 2017  8:33 AM

## 2017-09-09 NOTE — PLAN OF CARE
Problem: Labor (Cervical Ripen, Induct, Augment) (Adult,Obstetrics,Pediatric)  Goal: Signs and Symptoms of Listed Potential Problems Will be Absent or Manageable (Labor)  Signs and symptoms of listed potential problems will be absent or manageable by discharge/transition of care (reference Labor (Cervical Ripen, Induct, Augment) (Adult,Obstetrics,Pediatric) CPG).   Outcome: No Change  Pt did not tolerate her second SVE.  It brought her to tears and there was minimal change.  2/80/-2 .  Notified Dr Miranda.  He encouraged pitocin augmentation as soon as pt is willing.  Discussed with pt and .  They are in agreement.  Had her epidural placed and started pitocin @ 1747.  Pit had the induction discussion with Dr Sharp in the clinic.  AGA baby is expected.

## 2017-09-09 NOTE — H&P
[facility] Labor and Delivery History and Physical    Serina Washington MRN# [id number]   Age: 32 year old YOB: 1985     Date of Admission:  2017    Primary care provider: Gemini Esquivel           Chief Complaint:   Serina Washington is a 32 year old female  who is 38w5d pregnant and being admitted for active labor management, SROM and antibiotic therapy. SROM @ [address] [facility]  with small blood. Known GBS positive no allergy          Pregnancy history:     OBSTETRIC HISTORY:    Obstetric History       T0      L0     SAB0   TAB0   Ectopic0   Multiple0   Live Births0       # Outcome Date GA Lbr Mack/2nd Weight Sex Delivery Anes PTL Lv   1 Current                   EDC: Estimated Date of Delivery: 17    Prenatal Labs:   Lab Results   Component Value Date    ABO O 2017    RH  Pos 2017    AS Neg 2017    HEPBANG Nonreactive 2017    CHPCRT  2017     Negative   Negative for C. trachomatis rRNA by transcription mediated amplification.   A negative result by transcription mediated amplification does not preclude the   presence of C. trachomatis infection because results are dependent on proper   and adequate collection, absence of inhibitors, and sufficient rRNA to be   detected.      GCPCRT  2017     Negative   Negative for N. gonorrhoeae rRNA by transcription mediated amplification.   A negative result by transcription mediated amplification does not preclude the   presence of N. gonorrhoeae infection because results are dependent on proper   and adequate collection, absence of inhibitors, and sufficient rRNA to be   detected.      TREPAB Negative 2017    HGB 11.6 (L) 2017       GBS Status:   Lab Results   Component Value Date    GBS Positive (A) 2017       Active Problem List  Patient Active Problem List   Diagnosis     Prenatal care, first pregnancy     GBS (group B Streptococcus carrier), +RV culture, currently pregnant      Normal pregnancy       Medication Prior to Admission  Prescriptions Prior to Admission   Medication Sig Dispense Refill Last Dose     hydrocortisone (CORTAID) 1 % cream Apply topically as needed   Past Month at Unknown time     Prenatal Multivit-Min-Fe-FA (PRENATAL VITAMINS PO) Take 1 tablet by mouth daily   9/8/2017 at 2100   .        Maternal Past Medical History:   No past medical history on file.                    Family History:   I have reviewed this patient's family history            Social History:   I have reviewed this patient's social history         Review of Systems:   The Review of Systems is negative other than noted in the HPI          Physical Exam:   Vitals were reviewed  All vitals stable  Temp: 97.5  F (36.4  C) Temp src: Oral BP: 122/74                Constitutional:   awake, alert, cooperative, no apparent distress, and appears stated age     Neck:   Supple, symmetrical, trachea midline, no adenopathy, thyroid symmetric, not enlarged and no tenderness, skin normal     Lungs:   No increased work of breathing, good air exchange, clear to auscultation bilaterally, no crackles or wheezing     Cardiovascular:   Normal apical impulse, regular rate and rhythm, normal S1 and S2, no S3 or S4, and no murmur noted     Abdomen:   No scars, normal bowel sounds, soft, non-distended, non-tender, no masses palpated, no hepatosplenomegally     Genitounirinary:   External Genitalia:  General appearance; normal     Musculoskeletal:   There is no redness, warmth, or swelling of the joints.  Full range of motion noted.  Motor strength is 5 out of 5 all extremities bilaterally.  Tone is normal.     Neurologic:   Awake, alert, oriented to name, place and time.  Cranial nerves II-XII are grossly intact.  Motor is 5 out of 5 bilaterally.  Cerebellar finger to nose, heel to shin intact.  Sensory is intact.  Babinski down going, Romberg negative, and gait is normal.     Neuropsychiatric:   General: normal, calm and  normal eye contact  Level of consciousness: alert / normal  Affect: normal  Orientation: oriented to self, place, time and situation  Memory and insight: normal, memory for past and recent events intact and thought process normal     Skin:   no bruising or bleeding, normal skin color, texture, turgor and no redness, warmth, or swelling                          Cervix:   Membranes: SROM 0400   Dilation: 1   Effacement: 80%   Station:-1   Consistency: soft   Position: Mid  Presentation:Cephalic  Fetal Heart Rate Tracing: reactive and reassuring, Tier 1 (normal)  Tocometer: external monitor, frequency q 5 minutes and inadequate                       Assessment:   Serina Washington is a 38w5d pregnant female admitted with active labor management, SROM and antibiotic therapy.    Discussed with Serina Washington, the following; indications; the agents and methods of labor augmentation, including risks, benefits, and alternative approaches; and the possible need for  birth. EFW is AGA.    The Labor Induction:what you need to know information sheet was made available to her. Questions and concerns were addressed and patient agrees to above if necessary during the course of her labor.    Quinn Cash MD          Plan:   Admit - see IP orders  Pain medication prn  Prophylactic antibiotic for + GBS status Penicillin   Anticipate   Labor augmentation with Pitocin as needed   Quinn Cash MD

## 2017-09-09 NOTE — IP AVS SNAPSHOT
[facility]    [address] [address]    Phone:  [phone]    Fax:  [phone]                                       After Visit Summary   9/9/2017    Serina Washington    MRN: [id number]           After Visit Summary Signature Page     I have received my discharge instructions, and my questions have been answered. I have discussed any challenges I see with this plan with the nurse or doctor.    ..........................................................................................................................................  Patient/Patient Representative Signature      ..........................................................................................................................................  Patient Representative Print Name and Relationship to Patient    ..................................................               ................................................  Date                                            Time    ..........................................................................................................................................  Reviewed by Signature/Title    ...................................................              ..............................................  Date                                                            Time

## 2017-09-10 ENCOUNTER — ANESTHESIA (OUTPATIENT)
Dept: SURGERY | Facility: CLINIC | Age: 32
End: 2017-09-10
Payer: COMMERCIAL

## 2017-09-10 ENCOUNTER — ANESTHESIA EVENT (OUTPATIENT)
Dept: SURGERY | Facility: CLINIC | Age: 32
End: 2017-09-10
Payer: COMMERCIAL

## 2017-09-10 LAB — T PALLIDUM IGG+IGM SER QL: NEGATIVE

## 2017-09-10 PROCEDURE — 25000132 ZZH RX MED GY IP 250 OP 250 PS 637: Performed by: OBSTETRICS & GYNECOLOGY

## 2017-09-10 PROCEDURE — 27210995 ZZH RX 272: Performed by: OBSTETRICS & GYNECOLOGY

## 2017-09-10 PROCEDURE — 59510 CESAREAN DELIVERY: CPT | Performed by: OBSTETRICS & GYNECOLOGY

## 2017-09-10 PROCEDURE — 25000128 H RX IP 250 OP 636: Performed by: OBSTETRICS & GYNECOLOGY

## 2017-09-10 PROCEDURE — 25000125 ZZHC RX 250: Performed by: NURSE ANESTHETIST, CERTIFIED REGISTERED

## 2017-09-10 PROCEDURE — 59514 CESAREAN DELIVERY ONLY: CPT | Mod: AS | Performed by: NURSE PRACTITIONER

## 2017-09-10 PROCEDURE — 37000009 ZZH ANESTHESIA TECHNICAL FEE, EACH ADDTL 15 MIN: Performed by: OBSTETRICS & GYNECOLOGY

## 2017-09-10 PROCEDURE — 71000027 ZZH RECOVERY PHASE 2 EACH 15 MINS: Performed by: OBSTETRICS & GYNECOLOGY

## 2017-09-10 PROCEDURE — 36000056 ZZH SURGERY LEVEL 3 1ST 30 MIN: Performed by: OBSTETRICS & GYNECOLOGY

## 2017-09-10 PROCEDURE — 36000058 ZZH SURGERY LEVEL 3 EA 15 ADDTL MIN: Performed by: OBSTETRICS & GYNECOLOGY

## 2017-09-10 PROCEDURE — 27210794 ZZH OR GENERAL SUPPLY STERILE: Performed by: OBSTETRICS & GYNECOLOGY

## 2017-09-10 PROCEDURE — 27110028 ZZH OR GENERAL SUPPLY NON-STERILE: Performed by: OBSTETRICS & GYNECOLOGY

## 2017-09-10 PROCEDURE — 12000027 ZZH R&B OB

## 2017-09-10 PROCEDURE — 25000128 H RX IP 250 OP 636: Performed by: NURSE ANESTHETIST, CERTIFIED REGISTERED

## 2017-09-10 PROCEDURE — 37000008 ZZH ANESTHESIA TECHNICAL FEE, 1ST 30 MIN: Performed by: OBSTETRICS & GYNECOLOGY

## 2017-09-10 RX ORDER — FENTANYL CITRATE 50 UG/ML
INJECTION, SOLUTION INTRAMUSCULAR; INTRAVENOUS PRN
Status: DISCONTINUED | OUTPATIENT
Start: 2017-09-10 | End: 2017-09-10

## 2017-09-10 RX ORDER — LIDOCAINE 40 MG/G
CREAM TOPICAL
Status: DISCONTINUED | OUTPATIENT
Start: 2017-09-10 | End: 2017-09-10

## 2017-09-10 RX ORDER — ONDANSETRON 2 MG/ML
4 INJECTION INTRAMUSCULAR; INTRAVENOUS EVERY 6 HOURS PRN
Status: DISCONTINUED | OUTPATIENT
Start: 2017-09-10 | End: 2017-09-10

## 2017-09-10 RX ORDER — KETOROLAC TROMETHAMINE 30 MG/ML
30 INJECTION, SOLUTION INTRAMUSCULAR; INTRAVENOUS EVERY 6 HOURS
Status: COMPLETED | OUTPATIENT
Start: 2017-09-10 | End: 2017-09-10

## 2017-09-10 RX ORDER — SCOLOPAMINE TRANSDERMAL SYSTEM 1 MG/1
1 PATCH, EXTENDED RELEASE TRANSDERMAL ONCE
Status: DISCONTINUED | OUTPATIENT
Start: 2017-09-10 | End: 2017-09-10

## 2017-09-10 RX ORDER — LIDOCAINE 40 MG/G
CREAM TOPICAL
Status: DISCONTINUED | OUTPATIENT
Start: 2017-09-10 | End: 2017-09-13 | Stop reason: HOSPADM

## 2017-09-10 RX ORDER — MEPERIDINE HYDROCHLORIDE 25 MG/ML
12.5 INJECTION INTRAMUSCULAR; INTRAVENOUS; SUBCUTANEOUS EVERY 5 MIN PRN
Status: DISCONTINUED | OUTPATIENT
Start: 2017-09-10 | End: 2017-09-10

## 2017-09-10 RX ORDER — CITRIC ACID/SODIUM CITRATE 334-500MG
30 SOLUTION, ORAL ORAL
Status: COMPLETED | OUTPATIENT
Start: 2017-09-10 | End: 2017-09-10

## 2017-09-10 RX ORDER — ONDANSETRON 4 MG/1
4 TABLET, ORALLY DISINTEGRATING ORAL EVERY 30 MIN PRN
Status: DISCONTINUED | OUTPATIENT
Start: 2017-09-10 | End: 2017-09-10

## 2017-09-10 RX ORDER — CITRIC ACID/SODIUM CITRATE 334-500MG
SOLUTION, ORAL ORAL
Status: DISCONTINUED
Start: 2017-09-10 | End: 2017-09-10 | Stop reason: HOSPADM

## 2017-09-10 RX ORDER — OXYTOCIN 10 [USP'U]/ML
10 INJECTION, SOLUTION INTRAMUSCULAR; INTRAVENOUS
Status: DISCONTINUED | OUTPATIENT
Start: 2017-09-10 | End: 2017-09-13 | Stop reason: HOSPADM

## 2017-09-10 RX ORDER — LANOLIN 100 %
OINTMENT (GRAM) TOPICAL
Status: DISCONTINUED | OUTPATIENT
Start: 2017-09-10 | End: 2017-09-13 | Stop reason: HOSPADM

## 2017-09-10 RX ORDER — ONDANSETRON 4 MG/1
4 TABLET, ORALLY DISINTEGRATING ORAL EVERY 6 HOURS PRN
Status: DISCONTINUED | OUTPATIENT
Start: 2017-09-10 | End: 2017-09-10

## 2017-09-10 RX ORDER — SODIUM CHLORIDE, SODIUM LACTATE, POTASSIUM CHLORIDE, CALCIUM CHLORIDE 600; 310; 30; 20 MG/100ML; MG/100ML; MG/100ML; MG/100ML
INJECTION, SOLUTION INTRAVENOUS CONTINUOUS
Status: DISCONTINUED | OUTPATIENT
Start: 2017-09-10 | End: 2017-09-10

## 2017-09-10 RX ORDER — LIDOCAINE 40 MG/G
CREAM TOPICAL
Status: DISCONTINUED | OUTPATIENT
Start: 2017-09-10 | End: 2017-09-10 | Stop reason: HOSPADM

## 2017-09-10 RX ORDER — ONDANSETRON 2 MG/ML
4 INJECTION INTRAMUSCULAR; INTRAVENOUS EVERY 30 MIN PRN
Status: DISCONTINUED | OUTPATIENT
Start: 2017-09-10 | End: 2017-09-10

## 2017-09-10 RX ORDER — OXYTOCIN/0.9 % SODIUM CHLORIDE 30/500 ML
PLASTIC BAG, INJECTION (ML) INTRAVENOUS PRN
Status: DISCONTINUED | OUTPATIENT
Start: 2017-09-10 | End: 2017-09-10

## 2017-09-10 RX ORDER — METHYLERGONOVINE MALEATE 0.2 MG/ML
INJECTION INTRAVENOUS PRN
Status: DISCONTINUED | OUTPATIENT
Start: 2017-09-10 | End: 2017-09-10

## 2017-09-10 RX ORDER — MAGNESIUM HYDROXIDE 1200 MG/15ML
LIQUID ORAL PRN
Status: DISCONTINUED | OUTPATIENT
Start: 2017-09-10 | End: 2017-09-10 | Stop reason: HOSPADM

## 2017-09-10 RX ORDER — OXYTOCIN/0.9 % SODIUM CHLORIDE 30/500 ML
100 PLASTIC BAG, INJECTION (ML) INTRAVENOUS CONTINUOUS
Status: DISCONTINUED | OUTPATIENT
Start: 2017-09-10 | End: 2017-09-13 | Stop reason: HOSPADM

## 2017-09-10 RX ORDER — OXYTOCIN/0.9 % SODIUM CHLORIDE 30/500 ML
340 PLASTIC BAG, INJECTION (ML) INTRAVENOUS CONTINUOUS PRN
Status: DISCONTINUED | OUTPATIENT
Start: 2017-09-10 | End: 2017-09-13 | Stop reason: HOSPADM

## 2017-09-10 RX ORDER — HYDROMORPHONE HYDROCHLORIDE 1 MG/ML
.3-.5 INJECTION, SOLUTION INTRAMUSCULAR; INTRAVENOUS; SUBCUTANEOUS EVERY 5 MIN PRN
Status: DISCONTINUED | OUTPATIENT
Start: 2017-09-10 | End: 2017-09-13 | Stop reason: HOSPADM

## 2017-09-10 RX ORDER — HYDROMORPHONE HYDROCHLORIDE 1 MG/ML
.3-.5 INJECTION, SOLUTION INTRAMUSCULAR; INTRAVENOUS; SUBCUTANEOUS EVERY 30 MIN PRN
Status: DISCONTINUED | OUTPATIENT
Start: 2017-09-10 | End: 2017-09-13 | Stop reason: HOSPADM

## 2017-09-10 RX ORDER — LIDOCAINE HCL/EPINEPHRINE/PF 2%-1:200K
VIAL (ML) INJECTION PRN
Status: DISCONTINUED | OUTPATIENT
Start: 2017-09-10 | End: 2017-09-10

## 2017-09-10 RX ORDER — FENTANYL CITRATE 50 UG/ML
25-50 INJECTION, SOLUTION INTRAMUSCULAR; INTRAVENOUS
Status: DISCONTINUED | OUTPATIENT
Start: 2017-09-10 | End: 2017-09-10

## 2017-09-10 RX ORDER — SIMETHICONE 80 MG
80 TABLET,CHEWABLE ORAL 4 TIMES DAILY PRN
Status: DISCONTINUED | OUTPATIENT
Start: 2017-09-10 | End: 2017-09-13 | Stop reason: HOSPADM

## 2017-09-10 RX ORDER — NALOXONE HYDROCHLORIDE 0.4 MG/ML
.1-.4 INJECTION, SOLUTION INTRAMUSCULAR; INTRAVENOUS; SUBCUTANEOUS
Status: DISCONTINUED | OUTPATIENT
Start: 2017-09-10 | End: 2017-09-10

## 2017-09-10 RX ORDER — CEFAZOLIN SODIUM 2 G/100ML
2 INJECTION, SOLUTION INTRAVENOUS
Status: COMPLETED | OUTPATIENT
Start: 2017-09-10 | End: 2017-09-10

## 2017-09-10 RX ORDER — NALBUPHINE HYDROCHLORIDE 10 MG/ML
2.5-5 INJECTION, SOLUTION INTRAMUSCULAR; INTRAVENOUS; SUBCUTANEOUS EVERY 6 HOURS PRN
Status: DISCONTINUED | OUTPATIENT
Start: 2017-09-10 | End: 2017-09-10 | Stop reason: RX

## 2017-09-10 RX ORDER — DIPHENHYDRAMINE HCL 25 MG
25 CAPSULE ORAL EVERY 6 HOURS PRN
Status: DISCONTINUED | OUTPATIENT
Start: 2017-09-10 | End: 2017-09-13 | Stop reason: HOSPADM

## 2017-09-10 RX ORDER — DIPHENHYDRAMINE HYDROCHLORIDE 50 MG/ML
25 INJECTION INTRAMUSCULAR; INTRAVENOUS EVERY 6 HOURS PRN
Status: DISCONTINUED | OUTPATIENT
Start: 2017-09-10 | End: 2017-09-13 | Stop reason: HOSPADM

## 2017-09-10 RX ORDER — SODIUM CHLORIDE, SODIUM LACTATE, POTASSIUM CHLORIDE, CALCIUM CHLORIDE 600; 310; 30; 20 MG/100ML; MG/100ML; MG/100ML; MG/100ML
INJECTION, SOLUTION INTRAVENOUS CONTINUOUS
Status: DISCONTINUED | OUTPATIENT
Start: 2017-09-10 | End: 2017-09-10 | Stop reason: HOSPADM

## 2017-09-10 RX ORDER — HYDROCORTISONE 2.5 %
CREAM (GRAM) TOPICAL 3 TIMES DAILY PRN
Status: DISCONTINUED | OUTPATIENT
Start: 2017-09-10 | End: 2017-09-13 | Stop reason: HOSPADM

## 2017-09-10 RX ORDER — SODIUM CHLORIDE, SODIUM LACTATE, POTASSIUM CHLORIDE, CALCIUM CHLORIDE 600; 310; 30; 20 MG/100ML; MG/100ML; MG/100ML; MG/100ML
INJECTION, SOLUTION INTRAVENOUS CONTINUOUS PRN
Status: DISCONTINUED | OUTPATIENT
Start: 2017-09-10 | End: 2017-09-10

## 2017-09-10 RX ORDER — NALOXONE HYDROCHLORIDE 0.4 MG/ML
.1-.4 INJECTION, SOLUTION INTRAMUSCULAR; INTRAVENOUS; SUBCUTANEOUS
Status: DISCONTINUED | OUTPATIENT
Start: 2017-09-10 | End: 2017-09-13 | Stop reason: HOSPADM

## 2017-09-10 RX ORDER — EPHEDRINE SULFATE 50 MG/ML
5 INJECTION, SOLUTION INTRAMUSCULAR; INTRAVENOUS; SUBCUTANEOUS
Status: DISCONTINUED | OUTPATIENT
Start: 2017-09-10 | End: 2017-09-10

## 2017-09-10 RX ORDER — BISACODYL 10 MG
10 SUPPOSITORY, RECTAL RECTAL DAILY PRN
Status: DISCONTINUED | OUTPATIENT
Start: 2017-09-12 | End: 2017-09-13 | Stop reason: HOSPADM

## 2017-09-10 RX ORDER — OXYCODONE AND ACETAMINOPHEN 5; 325 MG/1; MG/1
1-2 TABLET ORAL EVERY 4 HOURS PRN
Status: DISCONTINUED | OUTPATIENT
Start: 2017-09-10 | End: 2017-09-13 | Stop reason: HOSPADM

## 2017-09-10 RX ORDER — IBUPROFEN 400 MG/1
400-800 TABLET, FILM COATED ORAL EVERY 6 HOURS PRN
Status: DISCONTINUED | OUTPATIENT
Start: 2017-09-10 | End: 2017-09-13 | Stop reason: HOSPADM

## 2017-09-10 RX ORDER — DEXTROSE, SODIUM CHLORIDE, SODIUM LACTATE, POTASSIUM CHLORIDE, AND CALCIUM CHLORIDE 5; .6; .31; .03; .02 G/100ML; G/100ML; G/100ML; G/100ML; G/100ML
INJECTION, SOLUTION INTRAVENOUS CONTINUOUS
Status: DISCONTINUED | OUTPATIENT
Start: 2017-09-10 | End: 2017-09-13 | Stop reason: HOSPADM

## 2017-09-10 RX ORDER — MORPHINE SULFATE 1 MG/ML
INJECTION, SOLUTION EPIDURAL; INTRATHECAL; INTRAVENOUS PRN
Status: DISCONTINUED | OUTPATIENT
Start: 2017-09-10 | End: 2017-09-10

## 2017-09-10 RX ORDER — MISOPROSTOL 200 UG/1
400 TABLET ORAL
Status: DISCONTINUED | OUTPATIENT
Start: 2017-09-10 | End: 2017-09-13 | Stop reason: HOSPADM

## 2017-09-10 RX ORDER — AMOXICILLIN 250 MG
1-2 CAPSULE ORAL 2 TIMES DAILY
Status: DISCONTINUED | OUTPATIENT
Start: 2017-09-10 | End: 2017-09-13 | Stop reason: HOSPADM

## 2017-09-10 RX ORDER — ONDANSETRON 2 MG/ML
4 INJECTION INTRAMUSCULAR; INTRAVENOUS EVERY 6 HOURS PRN
Status: DISCONTINUED | OUTPATIENT
Start: 2017-09-10 | End: 2017-09-13 | Stop reason: HOSPADM

## 2017-09-10 RX ADMIN — SODIUM CHLORIDE, POTASSIUM CHLORIDE, SODIUM LACTATE AND CALCIUM CHLORIDE: 600; 310; 30; 20 INJECTION, SOLUTION INTRAVENOUS at 02:55

## 2017-09-10 RX ADMIN — LIDOCAINE HYDROCHLORIDE,EPINEPHRINE BITARTRATE 2 ML: 20; .005 INJECTION, SOLUTION EPIDURAL; INFILTRATION; INTRACAUDAL; PERINEURAL at 03:14

## 2017-09-10 RX ADMIN — KETOROLAC TROMETHAMINE 30 MG: 30 INJECTION, SOLUTION INTRAMUSCULAR at 11:21

## 2017-09-10 RX ADMIN — METHYLERGONOVINE MALEATE 200 MCG: 0.2 INJECTION INTRAVENOUS at 03:30

## 2017-09-10 RX ADMIN — LIDOCAINE HYDROCHLORIDE,EPINEPHRINE BITARTRATE 4 ML: 20; .005 INJECTION, SOLUTION EPIDURAL; INFILTRATION; INTRACAUDAL; PERINEURAL at 03:09

## 2017-09-10 RX ADMIN — OXYCODONE HYDROCHLORIDE AND ACETAMINOPHEN 1 TABLET: 5; 325 TABLET ORAL at 20:26

## 2017-09-10 RX ADMIN — CEFAZOLIN SODIUM 2 G: 2 INJECTION, SOLUTION INTRAVENOUS at 02:53

## 2017-09-10 RX ADMIN — FENTANYL CITRATE 50 MCG: 50 INJECTION, SOLUTION INTRAMUSCULAR; INTRAVENOUS at 03:27

## 2017-09-10 RX ADMIN — MORPHINE SULFATE 4 MG: 1 INJECTION, SOLUTION EPIDURAL; INTRATHECAL; INTRAVENOUS at 03:40

## 2017-09-10 RX ADMIN — KETOROLAC TROMETHAMINE 30 MG: 30 INJECTION, SOLUTION INTRAMUSCULAR at 23:35

## 2017-09-10 RX ADMIN — SIMETHICONE CHEW TAB 80 MG 80 MG: 80 TABLET ORAL at 11:21

## 2017-09-10 RX ADMIN — OXYTOCIN-SODIUM CHLORIDE 0.9% IV SOLN 30 UNIT/500ML 400 ML: 30-0.9/5 SOLUTION at 04:11

## 2017-09-10 RX ADMIN — OXYCODONE HYDROCHLORIDE AND ACETAMINOPHEN 1 TABLET: 5; 325 TABLET ORAL at 17:00

## 2017-09-10 RX ADMIN — KETOROLAC TROMETHAMINE 30 MG: 30 INJECTION, SOLUTION INTRAMUSCULAR at 17:00

## 2017-09-10 RX ADMIN — Medication: at 17:00

## 2017-09-10 RX ADMIN — LIDOCAINE HYDROCHLORIDE,EPINEPHRINE BITARTRATE 4 ML: 20; .005 INJECTION, SOLUTION EPIDURAL; INFILTRATION; INTRACAUDAL; PERINEURAL at 03:12

## 2017-09-10 RX ADMIN — KETOROLAC TROMETHAMINE 30 MG: 30 INJECTION, SOLUTION INTRAMUSCULAR at 05:29

## 2017-09-10 RX ADMIN — SODIUM CHLORIDE, POTASSIUM CHLORIDE, SODIUM LACTATE AND CALCIUM CHLORIDE: 600; 310; 30; 20 INJECTION, SOLUTION INTRAVENOUS at 03:58

## 2017-09-10 RX ADMIN — SENNOSIDES AND DOCUSATE SODIUM 1 TABLET: 8.6; 5 TABLET ORAL at 11:21

## 2017-09-10 RX ADMIN — MIDAZOLAM HYDROCHLORIDE 1 MG: 1 INJECTION, SOLUTION INTRAMUSCULAR; INTRAVENOUS at 03:28

## 2017-09-10 RX ADMIN — SIMETHICONE CHEW TAB 80 MG 80 MG: 80 TABLET ORAL at 17:00

## 2017-09-10 RX ADMIN — SENNOSIDES AND DOCUSATE SODIUM 1 TABLET: 8.6; 5 TABLET ORAL at 20:26

## 2017-09-10 RX ADMIN — SODIUM CHLORIDE, POTASSIUM CHLORIDE, SODIUM LACTATE AND CALCIUM CHLORIDE: 600; 310; 30; 20 INJECTION, SOLUTION INTRAVENOUS at 00:38

## 2017-09-10 RX ADMIN — ANTISEPTIC SURGICAL SCRUB: 0.04 SOLUTION TOPICAL at 02:40

## 2017-09-10 RX ADMIN — SODIUM CITRATE AND CITRIC ACID MONOHYDRATE 30 ML: 500; 334 SOLUTION ORAL at 02:53

## 2017-09-10 NOTE — PROGRESS NOTES
FHT with minimal to absent variability, baseline 150, no accels, +VD and +LD. Multiple fluid boluses, position changed often, oxygen therapy at 10L, no improvement in FHT. Dr Miranda updated. Decision for  section made at 0217.    Katey Sequeira RN 9/10/2017

## 2017-09-10 NOTE — PROVIDER NOTIFICATION
09/10/17 0018   Provider Notification   Provider Name/Title Dr Miranda   Method of Notification Phone   Notification Reason Variability Change;Status Update;SVE     FHT with minimal to absent variablilty. Multiple fluid flushes given, O2 administered, pitocin halved with no improvement in FHT. Pitocin turned off, FHT improved minimally. Dr. Miranda updated. Per Dr Miranda, keep pitocin off, monitor and update as needed.     Katey Sequeira RN 9/10/2017

## 2017-09-10 NOTE — ANESTHESIA CARE TRANSFER NOTE
Patient: Serina Washington    Procedure(s):  Primary  Section - Wound Class: II-Clean Contaminated    Diagnosis: Fetal Intolerance of Labor  Diagnosis Additional Information: No value filed.    Anesthesia Type:   Epidural     Note:  Airway :Nasal Cannula  Patient transferred to:Phase II        Vitals: (Last set prior to Anesthesia Care Transfer)    CRNA VITALS  9/10/2017 0349 - 9/10/2017 0427      9/10/2017             Pulse: 88    SpO2: 93 %                Electronically Signed By: Reynold Kyle CRNA, APRN CRNA  September 10, 2017  4:27 AM

## 2017-09-10 NOTE — PROGRESS NOTES
Intrapartum note  Paged by RN re: Cat 2 fetal heart tracing. Pitocin was stopped and resuscitation measures employed have not improved fetal heart tracing status at Category 2. Had been on Pi

## 2017-09-10 NOTE — PROGRESS NOTES
Pt repositioned to R side, fluid flush running, oxygen administered via simple face mask at 10L. FHT improving, moderate variability noted, baseline 145. Will continue to monitor.     Katey Sequeira RN 9/10/2017 1:29 AM

## 2017-09-10 NOTE — PROVIDER NOTIFICATION
09/09/17 1957   Provider Notification   Provider Name/Title Dr Miranda   Method of Notification Phone   Notification Reason Labor Status;Status Update     Dr Miranda updated, will continue with augmentation of labor per policy.     Katey Sequeira RN 9/9/2017 7:58 PM

## 2017-09-10 NOTE — PROGRESS NOTES
Paged by RN re: Cat 2 fetal heart tracing.   Resuscitative measures have not improved status of fetal tracing.   Pitocin was stopped; it was administere

## 2017-09-10 NOTE — PLAN OF CARE
Problem: Goal Outcome Summary  Goal: Goal Outcome Summary  Outcome: No Change  Data: Vital signs within normal limits. Postpartum checks within normal limits - see flow record. Patient eating and drinking normally. Patient has an indwelling catheter. . Patient has performed SBA to w/c to see NB in special care nursery.   No apparent signs of infection. Incision healing well. Patient is not performing self cares and is not able to care for infant.  is in SCN.  Positive attachment behaviors are  observed with infant. Support persons are present.  Action:  Pain plan was discussed. Patient would like pain meds to be brought in when they are due. Patient was medicated during the shift for pain and cramping. See MAR.Patient education done about breastfeeding and  cares. See flow record.  Response:   Patient reassessed within 1 hour after each medication for pain. Patient stated that pain had improved. Patient stated that she was comfortable. .   Plan: Anticipate discharge on 17.

## 2017-09-10 NOTE — ANESTHESIA PREPROCEDURE EVALUATION
Anesthesia Evaluation     . Pt has had prior anesthetic. Type: General           ROS/MED HX    ENT/Pulmonary:  - neg pulmonary ROS     Neurologic:  - neg neurologic ROS     Cardiovascular:  - neg cardiovascular ROS       METS/Exercise Tolerance:  >4 METS   Hematologic:  - neg hematologic  ROS       Musculoskeletal:  - neg musculoskeletal ROS       GI/Hepatic:  - neg GI/hepatic ROS      (-) liver disease   Renal/Genitourinary:  - ROS Renal section negative       Endo:  - neg endo ROS       Psychiatric:  - neg psychiatric ROS       Infectious Disease:  - neg infectious disease ROS       Malignancy:      - no malignancy   Other: Comment: Fetal intolerance of labor   - neg other ROS                 Physical Exam  Normal systems: cardiovascular, pulmonary and dental    Airway   Mallampati: II    Dental     Cardiovascular       Pulmonary                     Anesthesia Plan      History & Physical Review  History and physical reviewed and following examination; no interval change.    ASA Status:  2 .    NPO Status:  > 2 hours    Plan for Epidural     Agrees to epidural, OK with general if necessary.      Postoperative Care  Postoperative pain management:  IV analgesics.      Consents  Anesthetic plan, risks, benefits and alternatives discussed with:  Patient..                          .

## 2017-09-10 NOTE — PROGRESS NOTES
Pt in PACU Phase II at 0430. Vitals stable. Fundus firm at U2. Bleeding small, rubra, no clots.     Katey Sequeira RN 9/10/2017 4:42 AM

## 2017-09-10 NOTE — PLAN OF CARE
Problem: Labor (Cervical Ripen, Induct, Augment) (Adult,Obstetrics,Pediatric)  Goal: Signs and Symptoms of Listed Potential Problems Will be Absent or Manageable (Labor)  Signs and symptoms of listed potential problems will be absent or manageable by discharge/transition of care (reference Labor (Cervical Ripen, Induct, Augment) (Adult,Obstetrics,Pediatric) CPG).   Outcome: Improving  Pt comfortable after epidural dosing. Turned to R side with peanut ball. SVE 2/80/-1, cervix more mid and softer than before. FHT baseline 130-150, + accels, occasional LD/VD, minimal to moderate variability, category 2 tracing. Pitocin running at 8mu/hr. Contractions every 3 minutes lasting 70-90 seconds long. Vitals stable, no temp, mec fluid. Will continue to monitor.      Katey Sequeira RN 9/9/2017

## 2017-09-10 NOTE — ANESTHESIA POSTPROCEDURE EVALUATION
Patient: Serina Washington    Procedure(s):  Primary  Section - Wound Class: II-Clean Contaminated    Diagnosis:Fetal Intolerance of Labor  Diagnosis Additional Information: No value filed.    Anesthesia Type:  Epidural    Note:  Anesthesia Post Evaluation    Patient location during evaluation: Phase 2  Patient participation: Able to fully participate in evaluation  Level of consciousness: awake  Pain management: adequate  Airway patency: patent  Cardiovascular status: acceptable  Respiratory status: acceptable  Hydration status: acceptable  PONV: none     Anesthetic complications: None          Last vitals:  Vitals:    09/10/17 0200 09/10/17 0210 09/10/17 0245   BP: 97/55     Pulse:      Resp:      Temp:  37.5  C (99.5  F) 37.1  C (98.8  F)   SpO2: 100%           Electronically Signed By: Reynold Kyle CRNA, APRN CRNA  September 10, 2017  4:27 AM

## 2017-09-10 NOTE — L&D DELIVERY NOTE
"Delivery Summary    Serina Washington MRN# [id number]   Age: 32 year old YOB: 1985       32 year old  38w5d presented with PROM, meconium stained on  early AM  GBS positive status  Cx on admission 0/60/-2 at 0700,   Pitocin induction with max to 8 mU/min  S/p epidural placement  Progressed to 2 cm dilation; but remained at that dilation for greater than 8 hrs  Cat 2 fetal heart tracing with , min-mod variability and recurrent variable decels; resuscitative measures ineffective   section recommended.   Risks reviewed and patient agreed to proceed.     Uncomplicated PTLCS  Viable female infant, delivered.   Weight 8#15oz  Apgar 9 and 9 at 1 and 5 minutes, respectively  Normal appearing uterus, bilateral fallopian tubes and ovaries.    mL   \"Regine Sanford\"    Mu Miranda MD  [facility]      Labor Event Times    Labor onset date:  17 Onset time:   5:30 AM   Decision date/time (emergent ):  9/10/2017 0217            Labor Events     labor?:  No      Rupture identifier:  Rupture 1   Rupture date/time: 1730   Rupture type:  Spontaneous rupture of membranes occuring during spontaneous labor or augmentation   Fluid color:  Clear      1:1 continuous labor support provided by?:  RN          Delivery/Placenta Date and Time    Delivery Date:  9/10/17 Delivery Time:   3:25 AM   Placenta Date/Time:  9/10/2017  3:27 AM   Oxytocin given at the time of delivery:  after delivery of baby      Apgars    Living status:  Living    1 Minute 5 Minute 10 Minute 15 Minute 20 Minute   Skin color: 1  1       Heart rate: 2  2       Reflex irritability: 2  2       Muscle tone: 2  2       Respiratory effort: 2  2       Total: 9  9          Apgars assigned by:  DANIKA HOYOS      Cord    Vessels:  3 Vessels Complications:  None         [place] Resuscitation    Methods:  None, Suctioning      [place] Care at Delivery:  bulb   Output in Delivery " "Room:  Voided      [place] Measurements    Weight:  8 lb 15.2 oz Length:  1' 8\"   Head circumference:  34.3 cm       Skin to Skin and Feeding Plan    Skin to skin initiation date/time:     Skin to skin end date/time:     How do you plan to feed your baby:  Breastfeeding      Labor Events and Shoulder Dystocia    Fetal Tracing Prior to Delivery:  Category 2   Shoulder dystocia present?:  Neg            Delivery (Maternal) (Provider to Complete) (078511)          Mother's Information  Mother: Serina Washington #[id number]    Start of Mother's Information     IO Blood Loss  17 0530 - 09/10/17 0424    Mom's I/O Activity            End of Mother's Information  Mother: Serina Washington #[id number]            Delivery - Provider to Complete ([id number])    Delivering clinician:  MU ORTIZ   Attempted Delivery Types (Choose all that apply):  Spontaneous Vaginal Delivery   Delivery Type (Choose the 1 that will go to the Birth History):  , Low Transverse                      Specifics:  Primary nulliparius   Indications for Primary:  Fetal Status   Other personnel:   Provider Role   DANIKA HOYOS Delivery Nurse   GABRIEL GARDNER Delivery Assist            Placenta    Delayed Cord Clamping:  Done   Date/Time:  9/10/2017  3:27 AM   Removal:  Manual Removal   Comments:  intact, 3 vessel cord   Disposition:  Hospital disposal      Anesthesia    Method:  Epidural         Presentation and Position    Presentation:  Vertex    Occiput Transverse                    Mu Ortiz MD  "

## 2017-09-10 NOTE — PLAN OF CARE
Problem: Postpartum ( Delivery) (Adult)  Goal: Signs and Symptoms of Listed Potential Problems Will be Absent or Manageable (Postpartum)  Signs and symptoms of listed potential problems will be absent or manageable by discharge/transition of care (reference Postpartum ( Delivery) (Adult) CPG).   Outcome: Improving  Data: Vital signs within normal limits. Postpartum checks within normal limits - see flow record. Patient eating and drinking normally. Patient has an indwelling catheter. . Patient has not yet ambulated..   No apparent signs of infection. Incision healing well. Patient Is not performing self cares and Is not able to care for infant due to  being in SCN. Positive attachment behaviors are observed with infant. Support persons are present.  Action:  Pain plan was discussed. Patient would like pain meds to be brought in when they are due. Patient was medicated during the shift for pain. See MAR.Patient education done about postpartum cares, pain management/plan, fall risk and rest. See flow record.  Response:   Patient reassessed within 1 hour after each medication for pain. Patient stated that pain had improved .   Plan: Anticipate discharge on .

## 2017-09-10 NOTE — BRIEF OP NOTE
[facility]  Brief Op Note    Patient Name:Serina Washington  MRN: [id number]  YOB: 1985  Surgery Date: 9/10/2017    Surgeon: Mu Miranda MD  Assistant: Mayito Scrheiber NP who was instrumental in retraction, uterine fundal pressure to facilitate delivery of infant from hysterotomy, maintaining hemostasis.     Pre-operative Diagnosis: 1) Intrauterine pregnancy at 38w6d 2) Category 2 fetal heart tracing, remote from delivery  Post-operative Diagnosis: 1) Viable female infant, delivered  Procedure: Primary lower transverse  section via Pfannenstiel skin incision with two layer uterine closure    Anesthesia: Epidural  FRA Score: 2    EBL: 500 mL   IV fluids: 1500 mL crystalloids  Urine Output: 400 mL of clear yellow urine at the end of the procedure    Complications: None  Specimen: None  Drains: Rodriges catheter      Findings: Viable female infant delivered in cephalic presentation at 0325 on 9/10/2017. Meconium stained fluid. No nuchal cord. Weight 8#15oz. Apgar 9 and 9 at 1 and 5 minutes, respectively. Normal appearing uterus, bilateral fallopian tubes and ovaries.     Technique: To follow with full operative note    Mu Miranda MD  [facility]

## 2017-09-10 NOTE — PLAN OF CARE
Problem: Postpartum ( Delivery) (Adult)  Goal: Signs and Symptoms of Listed Potential Problems Will be Absent or Manageable (Postpartum)  Signs and symptoms of listed potential problems will be absent or manageable by discharge/transition of care (reference Postpartum ( Delivery) (Adult) CPG).  Outcome: Improving  S:Delivery  B:Spontaneous Labor,38w6d    Lab Results   Component Value Date     GBS Positive (A) 2017    with antibiotic treatment greater than or equal to 4 hours prior to delivery.  A: Patient delivered Primary C/S for  fetal intolerance at 0325 with Dr. SOPHIA Miranda in attendance and baby placed on mother's abdomen for delayed cord clamping. Baby received from surgeon. Baby to warmer for assessment/resusitative efforts. Skin to skin for 2 minutes prior to nursery. Baby brought to nursery for HFNC.. Apgars 9/9.  IV infusion of Oxytocin  infused. Placenta removal manual. See Flowsheet for VS and PP checks. Labor care plan goals met, transition now to postpartum care.  R: Expect routine postpartum care. Anticipate first feeding within the hour or whenever infant displays feeding cues. Continue skin to skin. Prior discussion with mother indicates that feeding plan is Breast feeding . Educated mother on importance of exclusive breastfeeding, expected feeding readiness cues and encouraged her to observe for these cues while rooming in. Informed her that breastfeeding assistance would be provided.

## 2017-09-10 NOTE — PROGRESS NOTES
Intrapartum Note  Paged by RN regarding Cat 2 fetal heart tracing.   Resuscitative measures have not improved status of fetal heart tracing.   Pitocin was stopped; infusing at max of 8 mU/min.   Labor course has been complicated by thick meconium stained amniotic fluid. Cx has remained unchanged for > 8 hours.   Seen and evaluated patient.   Discussed findings of fetal heart tracing demonstrating  with minimal-moderate variability with recurrent variable decelerations.  Patient is afebrile and hemodynamically stable.   Options reviewed regarding expectant management vs  section with associated risks.   Patient elects to proceed with  section; risks reviewed including but not limited to bleeding, need for blood transfusion, infection, injury to surrounding organs (ie bowel/intestines, bladder, ureters, major blood vessels and nerves). If any of these organs are injured, then we identify it and try to fix it. If we cant fix it ourselves, then we consult surgeons that specialize in those areas that are damaged and they fix it for us. Unintended injuries can go unnoticed at the time of surgery as well, and present with complications days to weeks later, which may required additional surgeries. Also discussed fetal injury and  hysterectomy for life threatening bleeding. Patient conveyed understanding of these risks and agrees to proceed. She signed the consent form.     Mu Miranda MD  [facility]

## 2017-09-10 NOTE — PROGRESS NOTES
Dominguez Rodriges CRNA called and in room at 2035. Patient and procedure correctly identified/verified with CRNA. Time out completed. Consent signed. 1000cc fluid bolus given. Patient in position for epidural placement. Epidural placed without complications. Test dose/bolus given by CRNA and patient tolerated well. Patient rates her pain after procedure as 0/10. Ephedrine Given .    Katey Sequeira RN 9/9/2017 9:38 PM

## 2017-09-11 LAB — HGB BLD-MCNC: 10.6 G/DL (ref 11.7–15.7)

## 2017-09-11 PROCEDURE — 36415 COLL VENOUS BLD VENIPUNCTURE: CPT | Performed by: OBSTETRICS & GYNECOLOGY

## 2017-09-11 PROCEDURE — 85018 HEMOGLOBIN: CPT | Performed by: OBSTETRICS & GYNECOLOGY

## 2017-09-11 PROCEDURE — 12000027 ZZH R&B OB

## 2017-09-11 PROCEDURE — 12000031 ZZH R&B OB CRITICAL

## 2017-09-11 PROCEDURE — 25000132 ZZH RX MED GY IP 250 OP 250 PS 637: Performed by: OBSTETRICS & GYNECOLOGY

## 2017-09-11 RX ADMIN — IBUPROFEN 800 MG: 400 TABLET ORAL at 23:42

## 2017-09-11 RX ADMIN — IBUPROFEN 800 MG: 400 TABLET ORAL at 05:09

## 2017-09-11 RX ADMIN — SENNOSIDES AND DOCUSATE SODIUM 2 TABLET: 8.6; 5 TABLET ORAL at 20:43

## 2017-09-11 RX ADMIN — OXYCODONE HYDROCHLORIDE AND ACETAMINOPHEN 1 TABLET: 5; 325 TABLET ORAL at 18:31

## 2017-09-11 RX ADMIN — OXYCODONE HYDROCHLORIDE AND ACETAMINOPHEN 1 TABLET: 5; 325 TABLET ORAL at 11:59

## 2017-09-11 RX ADMIN — SENNOSIDES AND DOCUSATE SODIUM 1 TABLET: 8.6; 5 TABLET ORAL at 11:34

## 2017-09-11 RX ADMIN — IBUPROFEN 800 MG: 400 TABLET ORAL at 17:55

## 2017-09-11 RX ADMIN — IBUPROFEN 800 MG: 400 TABLET ORAL at 11:34

## 2017-09-11 RX ADMIN — OXYCODONE HYDROCHLORIDE AND ACETAMINOPHEN 1 TABLET: 5; 325 TABLET ORAL at 06:56

## 2017-09-11 RX ADMIN — OXYCODONE HYDROCHLORIDE AND ACETAMINOPHEN 1 TABLET: 5; 325 TABLET ORAL at 23:42

## 2017-09-11 NOTE — PLAN OF CARE
Problem: Postpartum ( Delivery) (Adult)  Goal: Signs and Symptoms of Listed Potential Problems Will be Absent or Manageable (Postpartum)  Signs and symptoms of listed potential problems will be absent or manageable by discharge/transition of care (reference Postpartum ( Delivery) (Adult) CPG).   Outcome: Improving  Data: Vital signs within normal limits. Postpartum checks within normal limits - see flow record. Patient eating and drinking normally. Patient able to empty bladder independently. . Patient ambulating independently..   No apparent signs of infection. Incision healing well. Patient Is performing self cares and Is able to care for infant. Positive attachment behaviors are observed with infant. Support persons are present.  Action:  Pain plan was discussed. Patient will request pain med when she is ready for it.  Patient would like pain meds to be brought in when they are due. Patient was medicated during the shift for pain and cramping. See MAR.Patient education done about breastfeeding,  cares, postpartum cares, pain management/plan, fall risk,  safety and rest. See flow record.  Response:   Patient reassessed within 1 hour after each medication for pain. Patient stated that pain had improved. Patient stated that she was comfortable. .   Plan: Anticipate discharge on .     Katey Sequeira RN 2017 6:39 AM

## 2017-09-11 NOTE — PLAN OF CARE
Problem: Postpartum ( Delivery) (Adult)  Goal: Signs and Symptoms of Listed Potential Problems Will be Absent or Manageable (Postpartum)  Signs and symptoms of listed potential problems will be absent or manageable by discharge/transition of care (reference Postpartum ( Delivery) (Adult) CPG).   Outcome: Improving  Data: Vital signs within normal limits. Postpartum checks within normal limits - see flow record. Patient eating and drinking normally. Patient needs to void after alegria removal . Patient ambulating independently..   No apparent signs of infection. Incision healing well. Patient Is performing self cares and Is able to care for infant. Positive attachment behaviors are observed with infant. Support persons are present.  Action:  Pain plan was discussed. Patient would like pain meds to be brought in when they are due. Patient was medicated during the shift for pain and cramping. See MAR.Patient education done about breastfeeding,  cares, postpartum cares, pain management/plan, fall risk,  safety and rest. See flow record.  Response:   Patient reassessed within 1 hour after each medication for pain. Patient stated that pain had improved. Patient stated that she was comfortable. .   Plan: Anticipate discharge on .     Pt showered, walked in leos.     Katey Sequeiar RN 9/10/2017 9:11 PM

## 2017-09-11 NOTE — PLAN OF CARE
Problem: Postpartum ( Delivery) (Adult)  Goal: Signs and Symptoms of Listed Potential Problems Will be Absent or Manageable (Postpartum)  Signs and symptoms of listed potential problems will be absent or manageable by discharge/transition of care (reference Postpartum ( Delivery) (Adult) CPG).   Outcome: Improving  Pt is using ibuprofen and percocet for discomfort with good relief.  Walking in hallway today without difficulty and voiding well.  Placed a hat in room to measure output due to pt stating she feels like her ankles are swelling.  Incision C/D/I and fundus firm.  Normal lochia.  Runs a lower blood pressure but no signs of dizziness being lightheaded, headaches or visual changes.  Pt requested stockings for her legs.  Medium patty hose placed which pt feels has helped

## 2017-09-11 NOTE — PROGRESS NOTES
PPD#1  Doing well. Pain well controlled on oral pain medication. Tolerating regular diet. Voiding well. Ambulating without difficulty. Passing flatus.  Lochia is scant. Breast feeding.     Vitals:    09/10/17 2330 09/10/17 2334 17 0811 17 0814   BP: 91/54 99/60 (!) 86/49 (!) 87/49   Cuff Size:       Pulse:       Resp:       Temp:  97.3  F (36.3  C)     TempSrc:  Oral     SpO2:    96%     Urine output: 4200 in the last 24 hours.     General Appearance: NAD  Abdomen: Soft, NT, ND. Fundus firm at U-2  Inciaion: Clean dry and intact.   Extremities: NT, trace edema    Hemoglobin   Date Value Ref Range Status   2017 10.6 (L) 11.7 - 15.7 g/dL Final   2017 11.6 (L) 11.7 - 15.7 g/dL Final       A/P: 32 year old  PPD#1 s/p PLTCS for Cat 2 fetal heart tracing remote from delivery. Hemodynamically stable.   -- routine post-op/PP cares  -- encouraged to ambulate   -- anticipate discharge home on PPD#3 when meeting discharge goals    Mu Miranda MD  [facility]

## 2017-09-12 PROCEDURE — 25000132 ZZH RX MED GY IP 250 OP 250 PS 637: Performed by: OBSTETRICS & GYNECOLOGY

## 2017-09-12 PROCEDURE — 12000027 ZZH R&B OB

## 2017-09-12 RX ADMIN — SENNOSIDES AND DOCUSATE SODIUM 1 TABLET: 8.6; 5 TABLET ORAL at 10:54

## 2017-09-12 RX ADMIN — OXYCODONE HYDROCHLORIDE AND ACETAMINOPHEN 1 TABLET: 5; 325 TABLET ORAL at 10:55

## 2017-09-12 RX ADMIN — SENNOSIDES AND DOCUSATE SODIUM 1 TABLET: 8.6; 5 TABLET ORAL at 19:43

## 2017-09-12 RX ADMIN — IBUPROFEN 800 MG: 400 TABLET ORAL at 06:33

## 2017-09-12 RX ADMIN — OXYCODONE HYDROCHLORIDE AND ACETAMINOPHEN 1 TABLET: 5; 325 TABLET ORAL at 15:00

## 2017-09-12 RX ADMIN — OXYCODONE HYDROCHLORIDE AND ACETAMINOPHEN 1 TABLET: 5; 325 TABLET ORAL at 06:33

## 2017-09-12 RX ADMIN — IBUPROFEN 800 MG: 400 TABLET ORAL at 19:43

## 2017-09-12 RX ADMIN — IBUPROFEN 800 MG: 400 TABLET ORAL at 12:38

## 2017-09-12 RX ADMIN — OXYCODONE HYDROCHLORIDE AND ACETAMINOPHEN 1 TABLET: 5; 325 TABLET ORAL at 19:43

## 2017-09-12 NOTE — PROGRESS NOTES
PPD#2  Doing well. Pain well controlled on oral pain medication. Tolerating regular diet. Voiding well. Ambulating without difficulty. + BMs.  Lochia is scant. Breast feeding.     Vitals:    17 0814 17 1659 17 2330 17 0827   BP: (!) 87/49 (!) 85/54 100/57 (!) 86/57   Cuff Size:       Pulse:    67   Resp: 16 16 18 16   Temp:  97.6  F (36.4  C)  97.9  F (36.6  C)   TempSrc:  Oral     SpO2: 96%  98%      General Appearance: NAD  Abdomen: Soft, NT, ND. Fundus firm at U-2  Inciaion: Clean dry and intact.   Extremities: NT, trace edema    Hemoglobin   Date Value Ref Range Status   2017 10.6 (L) 11.7 - 15.7 g/dL Final   2017 11.6 (L) 11.7 - 15.7 g/dL Final       A/P: 32 year old  PPD#2 s/p PLTCS for Cat 2 fetal heart tracing remote from delivery. Hemodynamically stable.   -- routine post-op/PP cares  -- encouraged to ambulate   -- anticipate discharge home on PPD#3 when meeting discharge goals    Mu Miranda MD  [facility]

## 2017-09-12 NOTE — PROGRESS NOTES
Data: Vital signs within normal limits. Postpartum checks within normal limits - see flow record. Patient eating and drinking normally. Patient able to empty bladder independently. . Patient ambulating independently..  No apparent signs of infection. Incision healing well. Patient is performing self cares and is able to care for infant. Positive attachment behaviors are observed with infant. Support persons are present. Pt expressing some breast/nipple soreness, has been applying lanolin cream to nipples. Pt given cold compresses for additional comfort.   Action: Patient medicated with ibuprofen and percocet during the shift for pain. See MAR.Patient education done about breastfeeding,  cares and postpartum cares. See flow record.  Response:   Patient reassessed within 1 hour after each medication and pain. Patient stated that pain had improved. Patient stated that she was comfortable. .   Plan: Pt denies any further questions or concerns at this time. Hits call light appropriately to make needs known. Call light within reach. Anticipate discharge on 2017.

## 2017-09-12 NOTE — PLAN OF CARE
Problem: Goal Outcome Summary  Goal: Goal Outcome Summary  Outcome: Improving  Pt is following her postpartum  pathway with VSS, assessments WNL and pain is being adequately controlled using oral pain medications and ice to abdominal incision for comfort. Pt reported being very fatigued this morning and was able to nap for an hour. Father of the baby is present and involved in baby cares. Pt received some education on breast feeding. See doc flow.

## 2017-09-12 NOTE — PROGRESS NOTES
1900: report received from Amanda PAINTING RN; this writer assumed care and responsibility of this patient.  Pt stable; ambulating independently in room and around unit.  Performing self cares independently and infant cares with minimal assistance.   Pain plan discussed with pt - pt prefers to call for pain medication overnight.  Pt reported mild pain with getting in and out of bed - declined need for additional medication at this time; ice applied to incision.   Questions encouraged and answered.  Enc to call with needs.

## 2017-09-12 NOTE — LACTATION NOTE
This note was copied from a baby's chart.  Mom and this writer has been attempting all day today to get infant to latch.  Early morning infant under bili lights and iv infusing.  Very fussy-would go to breast and fall asleep.  Discussed with parents not to use the pacifier which had been given for being under the lights and fussiness. Worked with mom throughout the day to get to latch.  Multiple positions tried/infant would refuse to suck.  See note about starting supplementation.  Currently mom is attempting every 2 hours for approx 10-15 min.  If no latch takes place/mom pumps and dad dropper feeds formula.  If nurses well.  Give 10-15mls supplementation and if nurses poorly give 20-30 mls supplementation per Lasha Washington-BJ.

## 2017-09-12 NOTE — PLAN OF CARE
Problem: Postpartum ( Delivery) (Adult)  Goal: Signs and Symptoms of Listed Potential Problems Will be Absent or Manageable (Postpartum)  Signs and symptoms of listed potential problems will be absent or manageable by discharge/transition of care (reference Postpartum ( Delivery) (Adult) CPG).   Outcome: Improving  VSS, postpartum assessment WDL - see flowsheet.  Pt independent in self cares and gaining confidence in infant cares.  Pt ambulating independently; voiding appropriately; tolerating regular diet.    Fundus firm, midline, U/1; lochia rubra - scant; denies clots.  Educated pt and spouse regarding postpartum bleeding, what to expect, causes for concern.  Incision healing appropriately - no drainage, no s/s of infection.   Pt reported incision pain overnight; PRN percocet and ibuprofen administered.  Pain reassessed one hour after administration - pt reported decreased pain.  Pt prefers to call for pain medication when needed.    Pt breastfeeding infant on demand overnight - breastfeeding latch improving. Pt bonding appropriately with infant. Spouse present and attentive to pt and infants needs.    Plan for discharge .  Questions encouraged and answered.

## 2017-09-13 VITALS
HEART RATE: 83 BPM | SYSTOLIC BLOOD PRESSURE: 105 MMHG | TEMPERATURE: 97.4 F | RESPIRATION RATE: 16 BRPM | DIASTOLIC BLOOD PRESSURE: 65 MMHG | OXYGEN SATURATION: 98 %

## 2017-09-13 PROCEDURE — 25000132 ZZH RX MED GY IP 250 OP 250 PS 637: Performed by: OBSTETRICS & GYNECOLOGY

## 2017-09-13 RX ORDER — AMOXICILLIN 250 MG
1-2 CAPSULE ORAL 2 TIMES DAILY
Qty: 100 TABLET | Refills: 0 | Status: SHIPPED | OUTPATIENT
Start: 2017-09-13 | End: 2017-10-24

## 2017-09-13 RX ORDER — IBUPROFEN 400 MG/1
400-800 TABLET, FILM COATED ORAL EVERY 6 HOURS PRN
Qty: 120 TABLET | Refills: 0 | Status: SHIPPED | OUTPATIENT
Start: 2017-09-13 | End: 2019-11-19

## 2017-09-13 RX ORDER — OXYCODONE AND ACETAMINOPHEN 5; 325 MG/1; MG/1
1-2 TABLET ORAL EVERY 4 HOURS PRN
Qty: 30 TABLET | Refills: 0 | Status: SHIPPED | OUTPATIENT
Start: 2017-09-13 | End: 2017-10-24

## 2017-09-13 RX ADMIN — IBUPROFEN 800 MG: 400 TABLET ORAL at 08:32

## 2017-09-13 RX ADMIN — IBUPROFEN 800 MG: 400 TABLET ORAL at 01:18

## 2017-09-13 RX ADMIN — SENNOSIDES AND DOCUSATE SODIUM 1 TABLET: 8.6; 5 TABLET ORAL at 08:33

## 2017-09-13 RX ADMIN — OXYCODONE HYDROCHLORIDE AND ACETAMINOPHEN 1 TABLET: 5; 325 TABLET ORAL at 00:47

## 2017-09-13 RX ADMIN — OXYCODONE HYDROCHLORIDE AND ACETAMINOPHEN 1 TABLET: 5; 325 TABLET ORAL at 13:28

## 2017-09-13 RX ADMIN — OXYCODONE HYDROCHLORIDE AND ACETAMINOPHEN 1 TABLET: 5; 325 TABLET ORAL at 08:33

## 2017-09-13 NOTE — DISCHARGE INSTRUCTIONS

## 2017-09-13 NOTE — DISCHARGE SUMMARY
[facility] [place] Discharge Summary    Serina Washington MRN# [id number]   Age: 32 year old YOB: 1985     Date of Admission:  2017  Date of Discharge::  2017  Admitting Physician:  Mu Miranda MD  Discharge Physician:  Alexia Isaacs MD     Home clinic: [facility]          Admission Diagnoses:   Normal pregnancy  Normal pregnancy  Fetal Intolerance of Labor  S/P           Discharge Diagnosis:   Intrauterine pregnancy at 38w6d   weeks gestation  Primary  section          Procedures:   Procedure(s): Primary low transverse  section       No other procedures performed during this admission           Medications Prior to Admission:     Prescriptions Prior to Admission   Medication Sig Dispense Refill Last Dose     hydrocortisone (CORTAID) 1 % cream Apply topically as needed   Past Month at Unknown time     Prenatal Multivit-Min-Fe-FA (PRENATAL VITAMINS PO) Take 2 tablets by mouth every evening [facility]    2017 at 2100             Discharge Medications:     Current Discharge Medication List      START taking these medications    Details   oxyCODONE-acetaminophen (PERCOCET) 5-325 MG per tablet Take 1-2 tablets by mouth every 4 hours as needed for moderate to severe pain  Qty: 30 tablet, Refills: 0    Associated Diagnoses: S/P       ibuprofen (ADVIL/MOTRIN) 400 MG tablet Take 1-2 tablets (400-800 mg) by mouth every 6 hours as needed for other (cramping)  Qty: 120 tablet, Refills: 0    Associated Diagnoses: S/P       senna-docusate (SENOKOT-S;PERICOLACE) 8.6-50 MG per tablet Take 1-2 tablets by mouth 2 times daily  Qty: 100 tablet, Refills: 0    Associated Diagnoses: S/P          CONTINUE these medications which have NOT CHANGED    Details   hydrocortisone (CORTAID) 1 % cream Apply topically as needed      Prenatal Multivit-Min-Fe-FA (PRENATAL VITAMINS PO) Take 2 tablets by mouth every evening [facility]  "[facility]                   Consultations:   No consultations were requested during this admission          Brief History of Labor or Admission:     32 year old  38w5d presented with PROM, meconium stained on  early AM  GBS positive status  Cx on admission 0/60/-2 at 0700,   Pitocin induction with max to 8 mU/min  S/p epidural placement  Progressed to 2 cm dilation; but remained at that dilation for greater than 8 hrs  Cat 2 fetal heart tracing with , min-mod variability and recurrent variable decels; resuscitative measures ineffective   section recommended.   Risks reviewed and patient agreed to proceed.     Uncomplicated PTLCS  Viable female infant, delivered.   Weight 8#15oz  Apgar 9 and 9 at 1 and 5 minutes, respectively  Normal appearing uterus, bilateral fallopian tubes and ovaries.    mL   \"Regine Sanford\"    Mu Miranda MD  [facility] Course:   The patient's hospital course was unremarkable.  She recovered as anticipated and experienced no post-operative complications.  On discharge, her pain was well controlled. Vaginal bleeding is similar to peak menstrual flow.  Voiding without difficulty.  Ambulating well and tolerating a normal diet.  No fever or significant wound drainage.  Breastfeeding well.  Infant is stable.  Had several bowel movements already.  She was discharged on post-partum day #3.  She is somewhat weepy at times about having the  and troubles with breastfeeding.  Just feels tired.  Denies depression or SI/HI.    /65  Pulse 83  Temp 97.4  F (36.3  C) (Oral)  Resp 16  LMP 2016 (Exact Date)  SpO2 98%  Breastfeeding? Unknown  Alert and orientedx3, in NAD  Lungs-CTA bl  CV-RRR  Abdomen-soft,nontender, NABS, fundus U -3cm/firm/appropriately TTP  Incision clear/dry/intact  Ext-no e/c/c    Post-partum hemoglobin:   Hemoglobin   Date Value Ref Range Status   2017 10.6 (L) 11.7 - 15.7 " g/dL Final             Discharge Instructions and Follow-Up:   Discharge diet: Regular   Discharge activity: No lifting, driving, or strenuous exercise for 6 week(s)  No driving or operating machinery while on narcotic analgesics  Pelvic rest: abstain from intercourse and do not use tampons for 6 week(s)   Discharge follow-up: Follow up with Dr. CASSIDY in 6 weeks   Wound care: Ice to area for comfort  Keep wound clean and dry           Discharge Disposition:   Discharged to home      Attestation:  I have reviewed today's vital signs, notes, medications, labs and imaging.  Amount of time performed on this discharge summary: 20 minutes.    Alexia Isaacs MD

## 2017-09-13 NOTE — PLAN OF CARE
Problem: Goal Outcome Summary  Goal: Goal Outcome Summary  Outcome: Completed Date Met:  17  Pt has VSS, postpartum assessments WNL, is tolerating activities and caring for her  with the supportive of her . Bonding appropriately with her baby.  Pt reports adequate pain control using oral pain medications. Pt is preparing for discharge today;awaitng orders.

## 2017-09-13 NOTE — PLAN OF CARE
Problem: Postpartum ( Delivery) (Adult)  Goal: Signs and Symptoms of Listed Potential Problems Will be Absent or Manageable (Postpartum)  Signs and symptoms of listed potential problems will be absent or manageable by discharge/transition of care (reference Postpartum ( Delivery) (Adult) CPG).   Outcome: Improving  Pt follows PP pathway without incident, tolerates po meds for pain which are working well for her.  She is up and about independently caring for herself & her .  She is breastfeeding, that has improved; she is also pumping & supplementing.  FOB present & supportive, bonding well; infant rooming in with parents tonight.  Will continue to monitor & update as needed.

## 2017-09-15 NOTE — OP NOTE
[facility]  Full Operative Note    Patient Name:Serina Washington  MRN: [id number]  YOB: 1985  Surgery Date: 9/10/2017    Surgeon: Mu Miranda MD  Assistant: Mayito Schreiber NP who was instrumental in retraction, uterine fundal pressure to facilitate delivery of infant from hysterotomy, maintaining hemostasis.     Pre-operative Diagnosis: 1) Intrauterine pregnancy at 38w6d 2) Category 2 fetal heart tracing, remote from delivery  Post-operative Diagnosis: 1) Viable female infant, delivered  Procedure: Primary lower transverse  section via Pfannenstiel skin incision with two layer uterine closure    Anesthesia: Epidural  FRA Score: 2    EBL: 500 mL   IV fluids: 1500 mL crystalloids  Urine Output: 400 mL of clear yellow urine at the end of the procedure    Complications: None  Specimen: None  Drains: Rodriges catheter    Findings: Viable female infant delivered in cephalic presentation at 0325 on 9/10/2017. Meconium stained fluid. No nuchal cord. Weight 8#15oz. Apgar 9 and 9 at 1 and 5 minutes, respectively. Normal appearing uterus, bilateral fallopian tubes and ovaries.     Indication: Paged by RN regarding Cat 2 fetal heart tracing at cervical dilation of 2 cm  Resuscitative measures have not improved status of fetal heart tracing.   Pitocin was stopped; infusing at max of 8 mU/min.   Labor course has been complicated by thick meconium stained amniotic fluid. Cx has remained unchanged for > 8 hours.   Seen and evaluated patient.   Discussed findings of fetal heart tracing demonstrating  with minimal-moderate variability with recurrent variable decelerations.  Patient is afebrile and hemodynamically stable.   Options reviewed regarding expectant management vs  section with associated risks.   Patient elects to proceed with  section; risks reviewed including but not limited to bleeding, need for blood transfusion, infection, injury to  surrounding organs (ie bowel/intestines, bladder, ureters, major blood vessels and nerves). If any of these organs are injured, then we identify it and try to fix it. If we cant fix it ourselves, then we consult surgeons that specialize in those areas that are damaged and they fix it for us. Unintended injuries can go unnoticed at the time of surgery as well, and present with complications days to weeks later, which may required additional surgeries. Also discussed fetal injury and  hysterectomy for life threatening bleeding. Patient conveyed understanding of these risks and agrees to proceed. She signed the consent form.     Technique: After signing the consent form, the patient was taken to the operating room where epidural anesthesia was found to be adequate. She was then positioned in the supine position with a leftward tilt. She was then prepped and draped in the usual sterile fashion. A formal TIME-OUT was conducted with correct identification of the patient and procedure being performed. The site of incision was tested for adequate anesthesia before incision was made and verified with the anesthesia staff and members. A Pfannenstiel skin incision was made with the scalpel and carried down to the underlying fascia with the scalpel. The fascia was incised at the midline and extended laterally with the Johnson scissors. Kocher clamps were applied to the superior and inferior aspect of the fascia, lifted and tented up so as to bluntly and sharply dissect the fascia from the underlying rectus muscles. The rectus muscles were the  at the midline in a blunt fashion. The peritoneum was identified and entered bluntly. The peritoneum was extended superiorly and inferiorly by stretching and blunt dissection with careful attention to avoid injury to the bowel and bladder. Once adequate extension of the peritoneum was achieved to allow for eventual delivery of the baby, the Santana 0 retractor was inserted  into the peritoneum. A bladder flap was created using Metzenbaum scissors.     A lower uterine segment incision was made with the scalpel and extended laterally in a digital blunt fashion. The head was grasped, elevated and delivered along with the rest of the infant through the hysterotomy without difficulty. The cord was clamped, cut and handed off to the waiting nursery team. Cord gases were collected and sent.     The placenta was delivered spontaneously using gentle traction of the cord. The uterus was exteriorized and cleared of all clots and debris. Gentle massage was employed to achieve firmness to the uterus along with the instillation of uterotonics in the way of Pitocin and methergine. The hysterotomy incision was repaired with 0 Vicryl in a running locked fashion. A second imbricating layer was used using 0 Monocryl in a running fashion. The repaired hysterotomy incision was inspected for hemostasis and achieved. .    The posterior cul-de-sac was irrigated and suctioned with warm saline and the uterus returned to the abdomen. The paracolic gutters were irrigated and suctioned with warm saline. The repaired hysterotomy inspected for adequate hemostasis. The Santana 0 retraction was removed from the peritoneum    The peritoneum was reapproximated using 2-0 Vicryl in a running fashion. The rectus muscles were inspected for adequate hemostasis and  re-approximated using 2-0 Vicryl in an interrupted figure of eight fashion. The fascia layer was reapproximated using 0 Vicryl in a running fashion. The subcutaneous layer was irrigated, inspected for adequate hemostasis and re-approximated using 3-0 plain gut in a running fashion. The skin was closed with subcuticular fashion using 4-0 Vicryl.     Sponge, laps, needle counts were correct times 2. The patient tolerated the procedure well and was taken to the PACU in stable condition. Two grams of ancef were given prior to skin incision.     Mu Ledesma  MD Ruben  [facility]

## 2017-09-21 ENCOUNTER — PRENATAL OFFICE VISIT (OUTPATIENT)
Dept: OBGYN | Facility: CLINIC | Age: 32
End: 2017-09-21
Payer: COMMERCIAL

## 2017-09-21 VITALS
WEIGHT: 149.2 LBS | HEIGHT: 65 IN | SYSTOLIC BLOOD PRESSURE: 91 MMHG | BODY MASS INDEX: 24.86 KG/M2 | HEART RATE: 78 BPM | DIASTOLIC BLOOD PRESSURE: 61 MMHG

## 2017-09-21 DIAGNOSIS — Z98.891 S/P C-SECTION: Primary | ICD-10-CM

## 2017-09-21 PROBLEM — Z34.00 PRENATAL CARE, FIRST PREGNANCY: Status: RESOLVED | Noted: 2017-02-01 | Resolved: 2017-09-21

## 2017-09-21 PROCEDURE — 99024 POSTOP FOLLOW-UP VISIT: CPT | Performed by: OBSTETRICS & GYNECOLOGY

## 2017-09-21 ASSESSMENT — PATIENT HEALTH QUESTIONNAIRE - PHQ9
SUM OF ALL RESPONSES TO PHQ QUESTIONS 1-9: 8
5. POOR APPETITE OR OVEREATING: SEVERAL DAYS

## 2017-09-21 ASSESSMENT — ANXIETY QUESTIONNAIRES
6. BECOMING EASILY ANNOYED OR IRRITABLE: SEVERAL DAYS
1. FEELING NERVOUS, ANXIOUS, OR ON EDGE: MORE THAN HALF THE DAYS
5. BEING SO RESTLESS THAT IT IS HARD TO SIT STILL: NOT AT ALL
7. FEELING AFRAID AS IF SOMETHING AWFUL MIGHT HAPPEN: NEARLY EVERY DAY
2. NOT BEING ABLE TO STOP OR CONTROL WORRYING: SEVERAL DAYS
GAD7 TOTAL SCORE: 9
3. WORRYING TOO MUCH ABOUT DIFFERENT THINGS: SEVERAL DAYS

## 2017-09-21 NOTE — PROGRESS NOTES
"SUBJECTIVE:  Serina Washington is a 32 year old female  here for a postpartum visit.  She had a  Section  on 9/10/2017 delivering a healthy baby girl weighing 8 lbs 15 oz at term.  Arrest of progress and CPD with fetal intolerance of labor    delivery complications:  No  breast feeding:  Yes,   bladder problems:  No  bowel problems/hemorrhoids:  No  episiotomy/laceration/incision healed? No  vaginal flow:  Small and intermittent  [place]:  No  contraception:  none  emotional adjustment:  doing well and happy   PHQ-9 score:    PHQ-9 SCORE 2017   Total Score 8           back to work:  8 weeks      OBJECTIVE:  Blood pressure 91/61, pulse 78, height 5' 5\" (1.651 m), weight 149 lb 3.2 oz (67.7 kg), last menstrual period 2016, currently breastfeeding.   General - pleasant female in no acute distress.  Breast - no nodularity, asymmetry or nipple discharge bilaterally.  Abdomen - soft, nontender, nondistended, no hepatosplenomegaly.  Pelvic - EG: normal adult female, BUS: within normal limits, Vagina: well rugated, no discharge, Cervix: no lesions or CMT, Uterus: firm, normal sized and nontender, Adnexae: no masses or tenderness.  Rectovaginal - deferred.    ASSESSMENT:  normal postpartum exam  (Z98.891) S/P   (primary encounter diagnosis)  Comment:   Hemoglobin   Date Value Ref Range Status   2017 10.6 (L) 11.7 - 15.7 g/dL Final   ]   Plan: postpartum cares     PLAN:  Discussed kegel exercises   May resume normal activities without restrictions  Pap smear was not  done today    The patient will use abstinence for birth control. Full counseling was provided, and all questions answered. Compliance is strongly emphasized.  Return to clinic in 5 weeks    Quinn Cash    "

## 2017-09-21 NOTE — NURSING NOTE
"Chief Complaint   Patient presents with     Post Partum Exam       Initial BP 91/61 (BP Location: Right arm, Patient Position: Sitting, Cuff Size: Adult Regular)  Pulse 78  Ht 5' 5\" (1.651 m)  Wt 149 lb 3.2 oz (67.7 kg)  LMP 12/12/2016 (Exact Date)  Breastfeeding? Yes  BMI 24.83 kg/m2 Estimated body mass index is 24.83 kg/(m^2) as calculated from the following:    Height as of this encounter: 5' 5\" (1.651 m).    Weight as of this encounter: 149 lb 3.2 oz (67.7 kg).  Medication Reconciliation: complete   Nadine Easley CMA      "

## 2017-09-21 NOTE — MR AVS SNAPSHOT
"              After Visit Summary   2017    Serina Washington    MRN: [id number]           Patient Information     Date Of Birth          1985        Visit Information        Provider Department      2017 1:00 PM Quinn Cash MD [facility]        Today's Diagnoses     S/P     -  1       Follow-ups after your visit        Follow-up notes from your care team     Return in about 5 weeks (around 10/26/2017).      Who to contact     If you have questions or need follow up information about today's clinic visit or your schedule please contact [facility] directly at [phone].  Normal or non-critical lab and imaging results will be communicated to you by MyChart, letter or phone within 4 business days after the clinic has received the results. If you do not hear from us within 7 days, please contact the clinic through [employer] or phone. If you have a critical or abnormal lab result, we will notify you by phone as soon as possible.  Submit refill requests through [employer] or call your pharmacy and they will forward the refill request to us. Please allow 3 business days for your refill to be completed.          Additional Information About Your Visit        MyChart Information     [employer] gives you secure access to your electronic health record. If you see a primary care provider, you can also send messages to your care team and make appointments. If you have questions, please call your primary care clinic.  If you do not have a primary care provider, please call [phone] and they will assist you.        Care EveryWhere ID     This is your Care EveryWhere ID. This could be used by other organizations to access your [place] medical records  [id number]        Your Vitals Were     Pulse Height Last Period Breastfeeding? BMI (Body Mass Index)       78 5' 5\" (1.651 m) 2016 (Exact Date) Yes 24.83 kg/m2        Blood Pressure from Last 3 Encounters:   17 " 91/61   17 105/65   17 102/68    Weight from Last 3 Encounters:   17 149 lb 3.2 oz (67.7 kg)   17 162 lb 6.4 oz (73.7 kg)   17 163 lb (73.9 kg)              Today, you had the following     No orders found for display       Primary Care Provider Office Phone # Fax #    Gemini EsquivelDO [phone] [address] [place] [address]        Equal Access to Services     [facility]: Hadii aad ku hadasho Soomaali, waaxda luqadaha, qaybta kaalmada adeegyada, marge hernandez . So [facility] [phone].    ATENCIÓN: Si habla español, tiene a sanchez disposición servicios gratuitos de asistencia lingüística. [facility] [phone].    We comply with applicable federal civil rights laws and [place] laws. We do not discriminate on the basis of race, color, national origin, age, disability sex, sexual orientation or gender identity.            Thank you!     Thank you for choosing [facility]  for your care. Our goal is always to provide you with excellent care. Hearing back from our patients is one way we can continue to improve our services. Please take a few minutes to complete the written survey that you may receive in the mail after your visit with us. Thank you!             Your Updated Medication List - Protect others around you: Learn how to safely use, store and throw away your medicines at www.disposemymeds.org.          This list is accurate as of: 17  2:39 PM.  Always use your most recent med list.                   Brand Name Dispense Instructions for use Diagnosis    hydrocortisone 1 % cream    CORTAID     Apply topically as needed        ibuprofen 400 MG tablet    ADVIL/MOTRIN    120 tablet    Take 1-2 tablets (400-800 mg) by mouth every 6 hours as needed for other (cramping)    S/P        oxyCODONE-acetaminophen 5-325 MG per tablet    PERCOCET    30 tablet    Take 1-2 tablets by mouth every 4 hours as needed for  moderate to severe pain    S/P        PRENATAL VITAMINS PO      Take 2 tablets by mouth every evening [place]        senna-docusate 8.6-50 MG per tablet    SENOKOT-S;PERICOLACE    100 tablet    Take 1-2 tablets by mouth 2 times daily    S/P

## 2017-09-22 ASSESSMENT — ANXIETY QUESTIONNAIRES: GAD7 TOTAL SCORE: 9

## 2017-10-24 ENCOUNTER — PRENATAL OFFICE VISIT (OUTPATIENT)
Dept: OBGYN | Facility: CLINIC | Age: 32
End: 2017-10-24
Payer: COMMERCIAL

## 2017-10-24 VITALS
BODY MASS INDEX: 24.59 KG/M2 | HEART RATE: 88 BPM | TEMPERATURE: 97.1 F | WEIGHT: 147.6 LBS | HEIGHT: 65 IN | DIASTOLIC BLOOD PRESSURE: 69 MMHG | SYSTOLIC BLOOD PRESSURE: 103 MMHG

## 2017-10-24 PROCEDURE — 99207 ZZC POST PARTUM EXAM: CPT | Performed by: OBSTETRICS & GYNECOLOGY

## 2017-10-24 ASSESSMENT — ANXIETY QUESTIONNAIRES
1. FEELING NERVOUS, ANXIOUS, OR ON EDGE: SEVERAL DAYS
7. FEELING AFRAID AS IF SOMETHING AWFUL MIGHT HAPPEN: NOT AT ALL
2. NOT BEING ABLE TO STOP OR CONTROL WORRYING: SEVERAL DAYS
GAD7 TOTAL SCORE: 3
6. BECOMING EASILY ANNOYED OR IRRITABLE: NOT AT ALL
3. WORRYING TOO MUCH ABOUT DIFFERENT THINGS: SEVERAL DAYS
5. BEING SO RESTLESS THAT IT IS HARD TO SIT STILL: NOT AT ALL

## 2017-10-24 ASSESSMENT — PATIENT HEALTH QUESTIONNAIRE - PHQ9
5. POOR APPETITE OR OVEREATING: NOT AT ALL
SUM OF ALL RESPONSES TO PHQ QUESTIONS 1-9: 3

## 2017-10-24 NOTE — NURSING NOTE
"Chief Complaint   Patient presents with     Prenatal Care     hands and knuckles are hurting and numbness around the incision of        Initial /69 (BP Location: Right arm, Patient Position: Chair, Cuff Size: Adult Regular)  Pulse 88  Temp 97.1  F (36.2  C) (Oral)  Ht 5' 5\" (1.651 m)  Wt 147 lb 9.6 oz (67 kg)  LMP 2016 (Exact Date)  Breastfeeding? Yes  BMI 24.56 kg/m2 Estimated body mass index is 24.56 kg/(m^2) as calculated from the following:    Height as of this encounter: 5' 5\" (1.651 m).    Weight as of this encounter: 147 lb 9.6 oz (67 kg).  Medication Reconciliation: complete     Patricia Mcmullen CMA      "

## 2017-10-24 NOTE — PROGRESS NOTES
"6 week Postpartum Visit Note    S:  Serina Washington is here for her 6-week postpartum checkup.     Delivery Date: 9/10/2017.    Delivering provider:  Mu Miranda MD   Type of delivery:  PLTCS for Cat 2 fetal heart tracing remote from delivery  Infant gender:  Girl \" Regine Sanford\", weight 8 pounds 15 oz.  Feeding Method:  .  Complications reported with feeding:  none, infant thriving .    Bleeding:  Light.   Menses resumed:  No  Bowel/Urinary problems:  No    PHQ-9 score: 3  Last Pap smear: NIL, HPV negative as of 1/2017    Contraception Planned:  Natural planning  She  has not had intercourse since delivery..    Current tobacco use:  No  Hx of Abuse:  No  ================================================================  ROS: 10 point ROS neg other than the symptoms noted above in the HPI.     O:  EXAM:  /69 (BP Location: Right arm, Patient Position: Chair, Cuff Size: Adult Regular)  Pulse 88  Temp 97.1  F (36.2  C) (Oral)  Ht 5' 5\" (1.651 m)  Wt 147 lb 9.6 oz (67 kg)  LMP 12/12/2016 (Exact Date)  Breastfeeding? Yes  BMI 24.56 kg/m2    General: healthy, alert and no distress  Psych: negative for sleep disturbance, anxiety, nervous breakdown, depression, thoughts of self-harm, thoughts of hurting someone else and hallucinations  Breasts:  Lactating, Nipples intact with no lesions, Non-tender and No S/S of yeast or mastitis  Abdomen: Soft, flat, non-tender  Incision:  well healed   Vulva:  Deferred  Vagina:  Deferred  Cervix:  Deferred.    Uterus:  Deferred    Adnexa:  Deferred  Recto-vaginal:   Deferred    A:   32 year old  s/p PLTCS here for  6 weeks postpartum visit. Doing well.     P:  Contraception: Natural planning  Feeding: Breast  Return for annual or PRN    Mu Miranda MD  [facility]                  "

## 2017-10-24 NOTE — MR AVS SNAPSHOT
"              After Visit Summary   10/24/2017    Serina Washington    MRN: [id number]           Patient Information     Date Of Birth          1985        Visit Information        Provider Department      10/24/2017 9:30 AM Mu Miranda MD [facility]        Today's Diagnoses     Routine postpartum follow-up    -  1       Follow-ups after your visit        Who to contact     If you have questions or need follow up information about today's clinic visit or your schedule please contact [facility] directly at [phone].  Normal or non-critical lab and imaging results will be communicated to you by [employer], letter or phone within 4 business days after the clinic has received the results. If you do not hear from us within 7 days, please contact the clinic through [employer] or phone. If you have a critical or abnormal lab result, we will notify you by phone as soon as possible.  Submit refill requests through [employer] or call your pharmacy and they will forward the refill request to us. Please allow 3 business days for your refill to be completed.          Additional Information About Your Visit        MyChart Information     [employer] gives you secure access to your electronic health record. If you see a primary care provider, you can also send messages to your care team and make appointments. If you have questions, please call your primary care clinic.  If you do not have a primary care provider, please call [phone] and they will assist you.        Care EveryWhere ID     This is your Care EveryWhere ID. This could be used by other organizations to access your [place] medical records  [id number]        Your Vitals Were     Pulse Temperature Height Last Period Breastfeeding? BMI (Body Mass Index)    88 97.1  F (36.2  C) (Oral) 5' 5\" (1.651 m) 12/12/2016 (Exact Date) Yes 24.56 kg/m2       Blood Pressure from Last 3 Encounters:   10/24/17 103/69   09/21/17 91/61   09/13/17 " 105/65    Weight from Last 3 Encounters:   10/24/17 147 lb 9.6 oz (67 kg)   17 149 lb 3.2 oz (67.7 kg)   17 162 lb 6.4 oz (73.7 kg)              Today, you had the following     No orders found for display       Primary Care Provider Office Phone # Fax #    Gemini Esquivel [phone] [address] [place] [address]        Equal Access to Services     [facility] : Hadii aad ku hadasho Soomaali, waaxda luqadaha, qaybta kaalmada adeegyada, waxay idiin hayaan adepravin hernandez . So [facility] [phone].    ATENCIÓN: Si habla español, tiene a sanchez disposición servicios gratuitos de asistencia lingüística. [facility] [phone].    We comply with applicable federal civil rights laws and [place] laws. We do not discriminate on the basis of race, color, national origin, age, disability, sex, sexual orientation, or gender identity.            Thank you!     Thank you for choosing [facility]  for your care. Our goal is always to provide you with excellent care. Hearing back from our patients is one way we can continue to improve our services. Please take a few minutes to complete the written survey that you may receive in the mail after your visit with us. Thank you!             Your Updated Medication List - Protect others around you: Learn how to safely use, store and throw away your medicines at www.disposemymeds.org.          This list is accurate as of: 10/24/17 11:07 AM.  Always use your most recent med list.                   Brand Name Dispense Instructions for use Diagnosis    hydrocortisone 1 % cream    CORTAID     Apply topically as needed        ibuprofen 400 MG tablet    ADVIL/MOTRIN    120 tablet    Take 1-2 tablets (400-800 mg) by mouth every 6 hours as needed for other (cramping)    S/P        PRENATAL VITAMINS PO      Take 2 tablets by mouth every evening [place]        UNABLE TO FIND      MEDICATION NAME: paul

## 2017-10-25 ASSESSMENT — ANXIETY QUESTIONNAIRES: GAD7 TOTAL SCORE: 3

## 2019-05-15 ENCOUNTER — MYC MEDICAL ADVICE (OUTPATIENT)
Dept: FAMILY MEDICINE | Facility: CLINIC | Age: 34
End: 2019-05-15

## 2019-05-16 ENCOUNTER — OFFICE VISIT (OUTPATIENT)
Dept: FAMILY MEDICINE | Facility: CLINIC | Age: 34
End: 2019-05-16
Payer: COMMERCIAL

## 2019-05-16 ENCOUNTER — TELEPHONE (OUTPATIENT)
Dept: FAMILY MEDICINE | Facility: CLINIC | Age: 34
End: 2019-05-16

## 2019-05-16 VITALS
DIASTOLIC BLOOD PRESSURE: 72 MMHG | HEIGHT: 65 IN | BODY MASS INDEX: 24.99 KG/M2 | WEIGHT: 150 LBS | SYSTOLIC BLOOD PRESSURE: 118 MMHG | HEART RATE: 72 BPM | TEMPERATURE: 98 F

## 2019-05-16 DIAGNOSIS — O20.9 BLEEDING IN EARLY PREGNANCY: Primary | ICD-10-CM

## 2019-05-16 LAB — B-HCG SERPL-ACNC: 291 IU/L (ref 0–5)

## 2019-05-16 PROCEDURE — 99213 OFFICE O/P EST LOW 20 MIN: CPT | Performed by: FAMILY MEDICINE

## 2019-05-16 PROCEDURE — 84702 CHORIONIC GONADOTROPIN TEST: CPT | Performed by: FAMILY MEDICINE

## 2019-05-16 PROCEDURE — 36415 COLL VENOUS BLD VENIPUNCTURE: CPT | Performed by: FAMILY MEDICINE

## 2019-05-16 ASSESSMENT — MIFFLIN-ST. JEOR: SCORE: 1378.34

## 2019-05-16 NOTE — TELEPHONE ENCOUNTER
Spoke with pt warm passed    She awoke this AM with bright red vaginal bleeding  Slight cramping   LNMP 4/7/2019  Reg x 26 x 5   +HPT 5/1/2019  EGA   5wks 4 days by LMP  Blood type O positive   Gr 2  1001  Del by CB 9/2017 female   Failure to progress and mild fetal distress by Dr Cash   Pt is having bright red bleeding now and passed small clot  Some mild cramping  Crying emotional  Would like to be seen     appt made for today with KATHIE Esquivel  Clinic  RN/Constantino Gimenez

## 2019-05-16 NOTE — TELEPHONE ENCOUNTER
Pt is calling because she sent a mychart letting Dr Esquivel know that she was pregnant, but she woke up this morning bleeding and thinks she may be having a miscarriage. Please call to triage and advise.     Diana Gaytan, [place]

## 2019-05-16 NOTE — PATIENT INSTRUCTIONS
I will let you know this afternoon's results as soon as I see them.      If your flow pick sup a lot (filling 1 pad per hour) or you develop severe pain, please make sure to get to the emergency room.

## 2019-05-16 NOTE — PROGRESS NOTES
SUBJECTIVE:   Serina Washington is a 33 year old female who presents to clinic today for the following health issues:    *  vaginal bleeding    Pt is currently 5w4d by LMP with her second child.  Has been having some intermittent cramping for a few weeks but nothing concerning.  This AM woke and went to the bathroom and noted a few drops of bright red blood.  Bleeding volume picked up and she ended up having to wear a pad.  Felt flow was similar to her heavy menstrual flow, filled the pad in about 5 hours.  Noted passage of a few small clots but nothing she would not expect with her menses.  No further cramping and no pelvic pain noted.    Feels that flow has slowed since lunch    Additional history: as documented    Reviewed  and updated as needed this visit by clinical staff  Tobacco  Allergies  Meds  Med Hx  Surg Hx  Fam Hx  Soc Hx        Reviewed and updated as needed this visit by Provider  Tobacco  Meds  Med Hx  Surg Hx  Fam Hx  Soc Hx        ROS: Remainder of Constitutional, CV, Respiratory, GI,  negative with exception of that mentioned above    PE:  VS as above   Gen:  WN/WD/WH female in NAD, tearful   Abd: soft, postive bowel sounds, NT/ND, no HSM, no rebounding/guarding/ridigity   :  normal appearing external female gentalia without lesions, cervix appears wnl, no lesions.  Small amount of bright red blood in vaginal vault, small amount of flow noted.  Bimanual exam reveals normally shaped and sized uterus, no cervical motion or adnexal tenderness.  No masses appreciated.    A/P:      ICD-10-CM    1. Bleeding in early pregnancy O20.9 HCG Quantitative Pregnancy, Blood (ABF063)     Reviewed with pt and spouse.  Given the volume of flow the concern is for pregnancy loss.  Reviewed that we would not be able to determine viability on ultrasound at this gestational age.  Pt's exam is normal and she has no pain ectopic unlikely.    Would advise quant HCG today and probable repeat Saturday.   Discussed limits of a single HCG level.      Pt is known Rh positive from previous pregnancy, Rhogam not indicated.    Reassurance given to pt and spouse regarding reasons for early pregnancy loss and likely ability to go on to subsequent normal pregnancies.  They verbalized understanding and were reassured.    Reviewed red flag and warning signs that should prompt emergent evaluation including heavy vaginal flow and pelvic pain.  Pt and spouse verbalized understanding and agree to seek care if needed.    Will notify of results from today when available.

## 2019-05-16 NOTE — NURSING NOTE
"Initial /72   Pulse 72   Temp 98  F (36.7  C) (Tympanic)   Ht 1.638 m (5' 4.5\")   Wt 68 kg (150 lb)   LMP 04/07/2019   Breastfeeding? No   BMI 25.35 kg/m   Estimated body mass index is 25.35 kg/m  as calculated from the following:    Height as of this encounter: 1.638 m (5' 4.5\").    Weight as of this encounter: 68 kg (150 lb). .      "

## 2019-05-17 DIAGNOSIS — O20.9 BLEEDING IN EARLY PREGNANCY: Primary | ICD-10-CM

## 2019-05-20 ENCOUNTER — MYC MEDICAL ADVICE (OUTPATIENT)
Dept: FAMILY MEDICINE | Facility: CLINIC | Age: 34
End: 2019-05-20

## 2019-05-20 DIAGNOSIS — O20.9 BLEEDING IN EARLY PREGNANCY: ICD-10-CM

## 2019-05-20 DIAGNOSIS — O03.9 MISCARRIAGE: Primary | ICD-10-CM

## 2019-05-20 LAB — B-HCG SERPL-ACNC: 159 IU/L (ref 0–5)

## 2019-05-20 PROCEDURE — 36415 COLL VENOUS BLD VENIPUNCTURE: CPT | Performed by: FAMILY MEDICINE

## 2019-05-20 PROCEDURE — 84702 CHORIONIC GONADOTROPIN TEST: CPT | Performed by: FAMILY MEDICINE

## 2019-05-30 DIAGNOSIS — O03.9 MISCARRIAGE: ICD-10-CM

## 2019-05-30 LAB — B-HCG SERPL-ACNC: 75 IU/L (ref 0–5)

## 2019-05-30 PROCEDURE — 84702 CHORIONIC GONADOTROPIN TEST: CPT | Performed by: FAMILY MEDICINE

## 2019-05-30 PROCEDURE — 36415 COLL VENOUS BLD VENIPUNCTURE: CPT | Performed by: FAMILY MEDICINE

## 2019-07-11 NOTE — PROGRESS NOTES
Pt up with stand by assist in room.  Pt ambulated in leos.  Rodriges removed.  Pt advised to call RN when she needs to void.  Pt up in chair breast feeding.  Plan:  Shower this evening.   No

## 2019-11-18 ENCOUNTER — TELEPHONE (OUTPATIENT)
Dept: FAMILY MEDICINE | Facility: CLINIC | Age: 34
End: 2019-11-18

## 2019-11-18 NOTE — TELEPHONE ENCOUNTER
Pt placed a appt request thru [facility] and was asking for an appt to see  on Tuesday , an  appt was  made for 1:40 pm.      in the Comments sections this is what she wrote:  Its looking like raphael miscarried again, first day of last period was sept 29th. I had a positive pregnancy test weeks after. I started bleeding on Nov 16th, per my calculations this was 6 weeks and 6 days, symptoms are very much similar to previous miscarriage in may this yr- heavy bleeding ,clots.... this has been so emotionally difficult and frustrating.The timing is bad as Im out of town for work but will be back late 11/18, id like to see you, if you have any appts open on 11/19 or 11/20 in the afternoon, if you think I should wait, please let me know.     Thank you,     Serina.       Diana Gaytan, [place]

## 2019-11-19 ENCOUNTER — OFFICE VISIT (OUTPATIENT)
Dept: FAMILY MEDICINE | Facility: CLINIC | Age: 34
End: 2019-11-19
Payer: COMMERCIAL

## 2019-11-19 VITALS
RESPIRATION RATE: 16 BRPM | HEIGHT: 65 IN | WEIGHT: 152.2 LBS | BODY MASS INDEX: 25.36 KG/M2 | SYSTOLIC BLOOD PRESSURE: 130 MMHG | DIASTOLIC BLOOD PRESSURE: 68 MMHG | HEART RATE: 80 BPM | TEMPERATURE: 97.8 F

## 2019-11-19 DIAGNOSIS — O03.9 MISCARRIAGE: Primary | ICD-10-CM

## 2019-11-19 LAB
B-HCG SERPL-ACNC: 922 IU/L (ref 0–5)
ERYTHROCYTE [DISTWIDTH] IN BLOOD BY AUTOMATED COUNT: 12.2 % (ref 10–15)
HCT VFR BLD AUTO: 38.8 % (ref 35–47)
HGB BLD-MCNC: 13 G/DL (ref 11.7–15.7)
MCH RBC QN AUTO: 30.5 PG (ref 26.5–33)
MCHC RBC AUTO-ENTMCNC: 33.5 G/DL (ref 31.5–36.5)
MCV RBC AUTO: 91 FL (ref 78–100)
PLATELET # BLD AUTO: 338 10E9/L (ref 150–450)
RBC # BLD AUTO: 4.26 10E12/L (ref 3.8–5.2)
WBC # BLD AUTO: 8 10E9/L (ref 4–11)

## 2019-11-19 PROCEDURE — 85027 COMPLETE CBC AUTOMATED: CPT | Performed by: FAMILY MEDICINE

## 2019-11-19 PROCEDURE — 84702 CHORIONIC GONADOTROPIN TEST: CPT | Performed by: FAMILY MEDICINE

## 2019-11-19 PROCEDURE — 99213 OFFICE O/P EST LOW 20 MIN: CPT | Performed by: FAMILY MEDICINE

## 2019-11-19 PROCEDURE — 36415 COLL VENOUS BLD VENIPUNCTURE: CPT | Performed by: FAMILY MEDICINE

## 2019-11-19 ASSESSMENT — MIFFLIN-ST. JEOR: SCORE: 1383.31

## 2019-11-19 NOTE — NURSING NOTE
"Initial /68   Pulse 80   Temp 97.8  F (36.6  C) (Tympanic)   Resp 16   Ht 1.638 m (5' 4.5\")   Wt 69 kg (152 lb 3.2 oz)   LMP 09/29/2019 (Exact Date)   Breastfeeding No   BMI 25.72 kg/m   Estimated body mass index is 25.72 kg/m  as calculated from the following:    Height as of this encounter: 1.638 m (5' 4.5\").    Weight as of this encounter: 69 kg (152 lb 3.2 oz). .      "

## 2019-11-19 NOTE — PROGRESS NOTES
"Subjective     Serina Washington is a 34 year old female who presents to clinic today for the following health issues:    HPI     * concerned about miscarriage - LMP 9/29/19, started heavy bleeding/clotting over the weekend.    Pt had a positive pregnancy test a few weeks ago.  Was feeling okay and then started having some bleeding.  Was initially light and not persistent.  Had to go out of town for a work meeting and bleeding picked up significantly overnight both Sunday and Monday.  Felt she was okay during the day and was able to manage flow.  Flow was very heavy and was passing clots.  This AM pt passed tissue which she recognized as an embryo.  Since then, bleeding has slowed significantly.      She has had very minimal pain with this episode, only mild cramping.  Denies any pain at all now.    Pt and  both upset and concerned.  Had a miscarriage in May and waited a few months after this to begin trying again.  Conceived again their second month trying.  Periods had been regular leading up to this.  Did have a successful pregnancy, their first, in 2017.    Reviewed and updated as needed this visit by Provider         Review of Systems   ROS COMP: Constitutional, HEENT, cardiovascular, pulmonary, gi and gu systems are negative, except as otherwise noted.      Objective    /68   Pulse 80   Temp 97.8  F (36.6  C) (Tympanic)   Resp 16   Ht 1.638 m (5' 4.5\")   Wt 69 kg (152 lb 3.2 oz)   LMP 09/29/2019 (Exact Date)   Breastfeeding No   BMI 25.72 kg/m    Body mass index is 25.72 kg/m .  Physical Exam   PE:  VS as above   Gen:  WN/WD/WH female in NAD, tearful   Abd: soft, postive bowel sounds, NT/ND, no HSM, no rebounding/guarding/ridigity   Ext:  No pedal edema   :  deferred      Diagnostic Test Results:  pending        A/p:      ICD-10-CM    1. Miscarriage O03.9 CBC with platelets     HCG Quantitative Pregnancy, Blood ([id number])     OB/GYN REFERRAL     Patient Instructions   I will let you know lab " results from this afternoon when available.  Please schedule to see Dr Cash when it works best for you.    Reviewed with pt and  that symptoms are very consistent with another early pregnancy loss.  Pt recognizes symptoms as very consistent with her past experience.  She feels bleeding has slowed today and has no pain.  Will defer exam at this time as it is unlikely to add to clinical picture.    Labs pending now.  Anticipate HCG will be lower then expected.  Referral to GYN for discussion of second miscarriage.  Will defer f/u HCG to that time.    Reviewed with pt that I would expect bleeding to be slowing.  Should bleeding  or she develop any pelvic pain or fever she should seek emergent care.  Pt and  verbalized understanding.    Reassurance and support given.

## 2019-11-19 NOTE — PATIENT INSTRUCTIONS
I will let you know lab results from this afternoon when available.  Please schedule to see Dr Cash when it works best for you.

## 2019-11-26 ENCOUNTER — OFFICE VISIT (OUTPATIENT)
Dept: OBGYN | Facility: CLINIC | Age: 34
End: 2019-11-26
Payer: COMMERCIAL

## 2019-11-26 VITALS
HEART RATE: 81 BPM | RESPIRATION RATE: 18 BRPM | DIASTOLIC BLOOD PRESSURE: 60 MMHG | WEIGHT: 155 LBS | SYSTOLIC BLOOD PRESSURE: 100 MMHG | TEMPERATURE: 98.1 F | BODY MASS INDEX: 25.83 KG/M2 | HEIGHT: 65 IN

## 2019-11-26 DIAGNOSIS — O03.9 SAB (SPONTANEOUS ABORTION): Primary | ICD-10-CM

## 2019-11-26 PROCEDURE — 99213 OFFICE O/P EST LOW 20 MIN: CPT | Performed by: OBSTETRICS & GYNECOLOGY

## 2019-11-26 ASSESSMENT — MIFFLIN-ST. JEOR: SCORE: 1396.02

## 2019-11-26 NOTE — NURSING NOTE
"Initial /60 (BP Location: Left arm, Patient Position: Chair, Cuff Size: Adult Small)   Pulse 81   Temp 98.1  F (36.7  C) (Tympanic)   Resp 18   Ht 1.638 m (5' 4.5\")   Wt 70.3 kg (155 lb)   LMP 09/29/2019 (Exact Date)   BMI 26.19 kg/m   Estimated body mass index is 26.19 kg/m  as calculated from the following:    Height as of this encounter: 1.638 m (5' 4.5\").    Weight as of this encounter: 70.3 kg (155 lb). .      "

## 2019-11-26 NOTE — PROGRESS NOTES
CC:  Consult from Gemini Esquivel  for recurrent miscarriage   HPI:  Serina Washington is a 34 year old female is a   .  Patient's last menstrual period was 2019 (exact date).  Menses are regular q 28-30 days, lasting 5 days.   She presents today with her  to discuss her most recent pregnancy which ended spontaneously.      Patients records are available and reviewed at today's visit.    Past GYN history:  No STD history       Last PAP smear:  Normal  Last TSH:   TSH   Date Value Ref Range Status   2016 1.33 0.40 - 4.00 mU/L Final      , normal?  Yes    History reviewed. No pertinent past medical history.    Past Surgical History:   Procedure Laterality Date      SECTION N/A 9/10/2017    Procedure:  SECTION;  Primary  Section;  Surgeon: Mu Miranda MD;  Location: [facility] OR     SURGICAL HISTORY OF -       left knee surgery       Family History   Problem Relation Age of Onset     Kidney Disease Father         dialysis     Hypertension Father      Gastrointestinal Disease Father         stomach ulcer     Hypertension Mother      Other Cancer Paternal Grandfather      Other Cancer Paternal Grandmother        Allergies: Patient has no known allergies.    Current Outpatient Medications   Medication Sig Dispense Refill     Prenatal Multivit-Min-Fe-FA (PRENATAL VITAMINS PO) Take 2 tablets by mouth every evening [facility]          ROS:  C: NEGATIVE for fever, chills, change in weight  I: NEGATIVE for worrisome rashes, moles or lesions  E: NEGATIVE for vision changes or irritation  E/M: NEGATIVE for ear, mouth and throat problems  R: NEGATIVE for significant cough or SOB  CV: NEGATIVE for chest pain, palpitations or peripheral edema  GI: NEGATIVE for nausea, abdominal pain, heartburn, or change in bowel habits  : NEGATIVE for frequency, dysuria, hematuria, vaginal discharge  M: NEGATIVE for significant arthralgias or myalgia  N: NEGATIVE for weakness, dizziness  "or paresthesias  E: NEGATIVE for temperature intolerance, skin/hair changes  P: NEGATIVE for changes in mood or affect    EXAM:  Blood pressure 100/60, pulse 81, temperature 98.1  F (36.7  C), temperature source Tympanic, resp. rate 18, height 1.638 m (5' 4.5\"), weight 70.3 kg (155 lb), last menstrual period 2019, not currently breastfeeding.   BMI= Body mass index is 26.19 kg/m .  General - pleasant female in no acute distress.  No exam today    ASSESSMENT/PLAN:  (O03.9) SAB (spontaneous )  (primary encounter diagnosis)  Comment: History consistent with a completed SAB  Plan: Conception after the next menstrual period.  Contact the clinic with a positive hCG  Recommend quantitative hCG with serum progesterone level to assure IUP.      Letter will be sent to the referring provider.    Quinn Cash MD  15 of 15 minutes spent Face to face counseling relative to the issues noted above.      "

## 2019-12-03 PROBLEM — Z34.90 NORMAL PREGNANCY: Status: RESOLVED | Noted: 2017-09-09 | Resolved: 2019-12-03

## 2020-01-02 ENCOUNTER — MYC MEDICAL ADVICE (OUTPATIENT)
Dept: FAMILY MEDICINE | Facility: CLINIC | Age: 35
End: 2020-01-02

## 2020-01-02 DIAGNOSIS — M25.562 ACUTE PAIN OF LEFT KNEE: Primary | ICD-10-CM

## 2020-02-12 ENCOUNTER — TRANSFERRED RECORDS (OUTPATIENT)
Dept: HEALTH INFORMATION MANAGEMENT | Facility: CLINIC | Age: 35
End: 2020-02-12

## 2020-02-28 ENCOUNTER — OFFICE VISIT (OUTPATIENT)
Dept: FAMILY MEDICINE | Facility: CLINIC | Age: 35
End: 2020-02-28
Payer: COMMERCIAL

## 2020-02-28 VITALS
DIASTOLIC BLOOD PRESSURE: 72 MMHG | BODY MASS INDEX: 25.39 KG/M2 | RESPIRATION RATE: 16 BRPM | TEMPERATURE: 98.5 F | SYSTOLIC BLOOD PRESSURE: 112 MMHG | HEIGHT: 65 IN | HEART RATE: 68 BPM | WEIGHT: 152.4 LBS

## 2020-02-28 DIAGNOSIS — M25.562 ACUTE PAIN OF LEFT KNEE: ICD-10-CM

## 2020-02-28 DIAGNOSIS — Z01.818 PREOP GENERAL PHYSICAL EXAM: Primary | ICD-10-CM

## 2020-02-28 LAB
ERYTHROCYTE [DISTWIDTH] IN BLOOD BY AUTOMATED COUNT: 12.5 % (ref 10–15)
HCG UR QL: NEGATIVE
HCT VFR BLD AUTO: 40.9 % (ref 35–47)
HGB BLD-MCNC: 13.8 G/DL (ref 11.7–15.7)
MCH RBC QN AUTO: 30.1 PG (ref 26.5–33)
MCHC RBC AUTO-ENTMCNC: 33.7 G/DL (ref 31.5–36.5)
MCV RBC AUTO: 89 FL (ref 78–100)
PLATELET # BLD AUTO: 345 10E9/L (ref 150–450)
RBC # BLD AUTO: 4.59 10E12/L (ref 3.8–5.2)
WBC # BLD AUTO: 8.3 10E9/L (ref 4–11)

## 2020-02-28 PROCEDURE — 85027 COMPLETE CBC AUTOMATED: CPT | Performed by: FAMILY MEDICINE

## 2020-02-28 PROCEDURE — 36415 COLL VENOUS BLD VENIPUNCTURE: CPT | Performed by: FAMILY MEDICINE

## 2020-02-28 PROCEDURE — 81025 URINE PREGNANCY TEST: CPT | Performed by: FAMILY MEDICINE

## 2020-02-28 PROCEDURE — 99214 OFFICE O/P EST MOD 30 MIN: CPT | Performed by: FAMILY MEDICINE

## 2020-02-28 ASSESSMENT — MIFFLIN-ST. JEOR: SCORE: 1384.22

## 2020-02-28 NOTE — PROGRESS NOTES
[facility]  [address] [address]  [phone]  Dept: [phone]    PRE-OP EVALUATION:  Today's date: 2020    Serina Washington (: 1985) presents for pre-operative evaluation assessment as requested by Dr. Gray.  She requires evaluation and anesthesia risk assessment prior to undergoing surgery/procedure for treatment of Left Knee.    Proposed Surgery/ Procedure: Left Knee Scope, PMM, decompression parameniscal cyst.   Date of Surgery/ Procedure: 3/4/20  Time of Surgery/ Procedure: To be determined.   Hospital/Surgical Facility: [facility]   Fax number for surgical facility: [phone]  Primary Physician: Gemini Esquivel  Type of Anesthesia Anticipated: General     Patient has a Health Care Directive or Living Will:  NO    1. NO - Do you have a history of heart attack, stroke, stent, bypass or surgery on an artery in the head, neck, heart or legs?  2. YES - DO YOU EVER HAVE ANY PAIN OR DISCOMFORT IN YOUR CHEST? Rare fleeting sharp pain that is unchanged over years.  3. NO - Do you have a history of  Heart Failure?  4. NO - Are you troubled by shortness of breath when: walking on the level, up a slight hill or at night?  5. NO - Do you currently have a cold, bronchitis or other respiratory infection?  6. NO - Do you have a cough, shortness of breath or wheezing?  7. NO - Do you sometimes get pains in the calves of your legs when you walk?  8. NO - Do you or anyone in your family have previous history of blood clots?  9. NO - Do you or does anyone in your family have a serious bleeding problem such as prolonged bleeding following surgeries or cuts?  10. NO - Have you ever had problems with anemia or been told to take iron pills?  11. YES - HAVE YOU HAD ANY ABNORMAL BLOOD LOSS SUCH AS BLACK, TARRY OR BLOODY STOOLS, OR ABNORMAL VAGINAL BLEEDING? During miscarriage, none since  12. NO - Have you ever had a blood transfusion?  13. NO - Have  "you or any of your relatives ever had problems with anesthesia?  14. NO - Do you have sleep apnea, excessive snoring or daytime drowsiness?  15. NO - Do you have any prosthetic heart valves?  16. NO - Do you have prosthetic joints?  17. NO - Is there any chance that you may be pregnant?      HPI:     HPI related to upcoming procedure: acute pain of knee related to a parameniscal cyst    See problem list for active medical problems.  Problems all longstanding and stable, except as noted/documented.  See ROS for pertinent symptoms related to these conditions.      MEDICAL HISTORY:     Patient Active Problem List    Diagnosis Date Noted     S/P  09/10/2017     Priority: Medium     GBS (group B Streptococcus carrier), +RV culture, currently pregnant 2017     Priority: Medium      History reviewed. No pertinent past medical history.  Past Surgical History:   Procedure Laterality Date      SECTION N/A 9/10/2017    Procedure:  SECTION;  Primary  Section;  Surgeon: Mu Miranda MD;  Location: [facility] OR     SURGICAL HISTORY OF -       left knee surgery     Current Outpatient Medications   Medication Sig Dispense Refill     Prenatal Multivit-Min-Fe-FA (PRENATAL VITAMINS PO) Take 2 tablets by mouth every evening [facility]       OTC products: None, except as noted above    No Known Allergies   Latex Allergy: NO    Social History     Tobacco Use     Smoking status: Never Smoker     Smokeless tobacco: Never Used   Substance Use Topics     Alcohol use: No     Alcohol/week: 0.0 standard drinks     History   Drug Use No       REVIEW OF SYSTEMS:   Constitutional, HEENT, cardiovascular, pulmonary, gi and gu systems are negative, except as otherwise noted.    EXAM:   /72   Pulse 68   Temp 98.5  F (36.9  C) (Tympanic)   Resp 16   Ht 1.638 m (5' 4.5\")   Wt 69.1 kg (152 lb 6.4 oz)   BMI 25.76 kg/m      GENERAL APPEARANCE: healthy, alert and no distress     EYES: EOMI, " PERRL     HENT: ear canals and TM's normal and nose and mouth without ulcers or lesions     NECK: no adenopathy, no asymmetry, masses, or scars and thyroid normal to palpation     RESP: lungs clear to auscultation - no rales, rhonchi or wheezes     CV: regular rates and rhythm, normal S1 S2, no S3 or S4 and no murmur, click or rub     ABDOMEN:  soft, nontender, no HSM or masses and bowel sounds normal     MS: extremities normal- no gross deformities noted, no evidence of inflammation in joints, FROM in all extremities.     NEURO: Normal strength and tone, sensory exam grossly normal, mentation intact and speech normal     PSYCH: mentation appears normal. and affect normal/bright     LYMPHATICS: No cervical adenopathy    DIAGNOSTICS:     EKG: Not indicated due to non-vascular surgery and low risk of event (age <65 and without cardiac risk factors)  Hemoglobin (indicated for history of anemia or procedure with significant blood loss such as tonsillectomy, major intraperitoneal surgery, vascular surgery, major spine surgery, total joint replacement)  Labs Resulted Today:   Results for orders placed or performed in visit on 02/28/20   HCG Qual, Urine ([id number])     Status: None   Result Value Ref Range    HCG Qual Urine Negative NEG^Negative   CBC with platelets     Status: None   Result Value Ref Range    WBC 8.3 4.0 - 11.0 10e9/L    RBC Count 4.59 3.8 - 5.2 10e12/L    Hemoglobin 13.8 11.7 - 15.7 g/dL    Hematocrit 40.9 35.0 - 47.0 %    MCV 89 78 - 100 fl    MCH 30.1 26.5 - 33.0 pg    MCHC 33.7 31.5 - 36.5 g/dL    RDW 12.5 10.0 - 15.0 %    Platelet Count 345 150 - 450 10e9/L       Recent Labs   Lab Test 11/19/19  1418 09/11/17  0641  01/27/17  1216 09/26/16  1327   HGB 13.0 10.6*   < > 12.7 12.9     --   --  299 356   NA  --   --   --   --  138   POTASSIUM  --   --   --   --  3.8   CR  --   --   --   --  0.66    < > = values in this interval not displayed.        IMPRESSION:   Reason for surgery/procedure: knee  pain    The proposed surgical procedure is considered INTERMEDIATE risk.    REVISED CARDIAC RISK INDEX  The patient has the following serious cardiovascular risks for perioperative complications such as (MI, PE, VFib and 3  AV Block):  No serious cardiac risks  INTERPRETATION: 0 risks: Class I (very low risk - 0.4% complication rate)    The patient has the following additional risks for perioperative complications:  No identified additional risks      ICD-10-CM    1. Preop general physical exam Z01.818 HCG Qual, Urine ([id number])     CBC with platelets   2. Acute pain of left knee M25.562 HCG Qual, Urine ([id number])     CBC with platelets       RECOMMENDATIONS:       --Patient is on no chronic medications    APPROVAL GIVEN to proceed with proposed procedure, without further diagnostic evaluation       Signed Electronically by: Gemini Esquivel DO    Copy of this evaluation report is provided to requesting physician.    [facility] Preop Guidelines    Revised Cardiac Risk Index

## 2020-02-28 NOTE — NURSING NOTE
"Initial /72   Pulse 68   Temp 98.5  F (36.9  C) (Tympanic)   Resp 16   Ht 1.638 m (5' 4.5\")   Wt 69.1 kg (152 lb 6.4 oz)   BMI 25.76 kg/m   Estimated body mass index is 25.76 kg/m  as calculated from the following:    Height as of this encounter: 1.638 m (5' 4.5\").    Weight as of this encounter: 69.1 kg (152 lb 6.4 oz). .    Caro Carbajal CMA ([facility])  "

## 2020-03-04 ENCOUNTER — TRANSFERRED RECORDS (OUTPATIENT)
Dept: HEALTH INFORMATION MANAGEMENT | Facility: CLINIC | Age: 35
End: 2020-03-04

## 2020-03-10 ENCOUNTER — HEALTH MAINTENANCE LETTER (OUTPATIENT)
Age: 35
End: 2020-03-10

## 2020-03-18 ENCOUNTER — TRANSFERRED RECORDS (OUTPATIENT)
Dept: HEALTH INFORMATION MANAGEMENT | Facility: CLINIC | Age: 35
End: 2020-03-18

## 2020-04-28 ENCOUNTER — TRANSFERRED RECORDS (OUTPATIENT)
Dept: HEALTH INFORMATION MANAGEMENT | Facility: CLINIC | Age: 35
End: 2020-04-28

## 2020-10-08 ENCOUNTER — OFFICE VISIT (OUTPATIENT)
Dept: FAMILY MEDICINE | Facility: CLINIC | Age: 35
End: 2020-10-08
Payer: COMMERCIAL

## 2020-10-08 VITALS
HEIGHT: 65 IN | TEMPERATURE: 97.6 F | BODY MASS INDEX: 24.96 KG/M2 | SYSTOLIC BLOOD PRESSURE: 116 MMHG | HEART RATE: 95 BPM | DIASTOLIC BLOOD PRESSURE: 68 MMHG | WEIGHT: 149.8 LBS

## 2020-10-08 DIAGNOSIS — N92.0 MENORRHAGIA WITH REGULAR CYCLE: ICD-10-CM

## 2020-10-08 DIAGNOSIS — Z00.00 ROUTINE GENERAL MEDICAL EXAMINATION AT A HEALTH CARE FACILITY: Primary | ICD-10-CM

## 2020-10-08 PROBLEM — O99.820 GBS (GROUP B STREPTOCOCCUS CARRIER), +RV CULTURE, CURRENTLY PREGNANT: Status: RESOLVED | Noted: 2017-08-28 | Resolved: 2020-10-08

## 2020-10-08 LAB
ERYTHROCYTE [DISTWIDTH] IN BLOOD BY AUTOMATED COUNT: 12.1 % (ref 10–15)
HCT VFR BLD AUTO: 40.7 % (ref 35–47)
HGB BLD-MCNC: 13.7 G/DL (ref 11.7–15.7)
MCH RBC QN AUTO: 31 PG (ref 26.5–33)
MCHC RBC AUTO-ENTMCNC: 33.7 G/DL (ref 31.5–36.5)
MCV RBC AUTO: 92 FL (ref 78–100)
PLATELET # BLD AUTO: 333 10E9/L (ref 150–450)
RBC # BLD AUTO: 4.42 10E12/L (ref 3.8–5.2)
TSH SERPL DL<=0.005 MIU/L-ACNC: 1.03 MU/L (ref 0.4–4)
WBC # BLD AUTO: 6.1 10E9/L (ref 4–11)

## 2020-10-08 PROCEDURE — 99213 OFFICE O/P EST LOW 20 MIN: CPT | Mod: 25 | Performed by: FAMILY MEDICINE

## 2020-10-08 PROCEDURE — 90686 IIV4 VACC NO PRSV 0.5 ML IM: CPT | Performed by: FAMILY MEDICINE

## 2020-10-08 PROCEDURE — 90471 IMMUNIZATION ADMIN: CPT | Performed by: FAMILY MEDICINE

## 2020-10-08 PROCEDURE — 84443 ASSAY THYROID STIM HORMONE: CPT | Performed by: FAMILY MEDICINE

## 2020-10-08 PROCEDURE — 99395 PREV VISIT EST AGE 18-39: CPT | Mod: 25 | Performed by: FAMILY MEDICINE

## 2020-10-08 PROCEDURE — 85027 COMPLETE CBC AUTOMATED: CPT | Performed by: FAMILY MEDICINE

## 2020-10-08 PROCEDURE — 36415 COLL VENOUS BLD VENIPUNCTURE: CPT | Performed by: FAMILY MEDICINE

## 2020-10-08 ASSESSMENT — MIFFLIN-ST. JEOR: SCORE: 1367.43

## 2020-10-08 NOTE — PATIENT INSTRUCTIONS
I will let you know lab results today when available.    Please call [phone] to schedule your ultrasound following this period       Preventive Health Recommendations  Female Ages 26 - 39  Yearly exam:   See your health care provider every year in order to    Review health changes.     Discuss preventive care.      Review your medicines if you your doctor has prescribed any.    Until age 30: Get a Pap test every three years (more often if you have had an abnormal result).    After age 30: Talk to your doctor about whether you should have a Pap test every 3 years or have a Pap test with HPV screening every 5 years.   You do not need a Pap test if your uterus was removed (hysterectomy) and you have not had cancer.  You should be tested each year for STDs (sexually transmitted diseases), if you're at risk.   Talk to your provider about how often to have your cholesterol checked.  If you are at risk for diabetes, you should have a diabetes test (fasting glucose).  Shots: Get a flu shot each year. Get a tetanus shot every 10 years.   Nutrition:     Eat at least 5 servings of fruits and vegetables each day.    Eat whole-grain bread, whole-wheat pasta and brown rice instead of white grains and rice.    Get adequate Calcium and Vitamin D.     Lifestyle    Exercise at least 150 minutes a week (30 minutes a day, 5 days of the week). This will help you control your weight and prevent disease.    Limit alcohol to one drink per day.    No smoking.     Wear sunscreen to prevent skin cancer.    See your dentist every six months for an exam and cleaning.

## 2020-10-08 NOTE — NURSING NOTE
"Initial /68   Pulse 95   Temp 97.6  F (36.4  C) (Tympanic)   Ht 1.638 m (5' 4.5\")   Wt 67.9 kg (149 lb 12.8 oz)   LMP 09/13/2020   Breastfeeding No   BMI 25.32 kg/m   Estimated body mass index is 25.32 kg/m  as calculated from the following:    Height as of this encounter: 1.638 m (5' 4.5\").    Weight as of this encounter: 67.9 kg (149 lb 12.8 oz). .      "

## 2020-10-08 NOTE — PROGRESS NOTES
SUBJECTIVE:   CC: Serina Washington is an 35 year old woman who presents for preventive health visit.     Patient has been advised of split billing requirements and indicates understanding: Yes     Healthy Habits:    Do you get at least three servings of calcium containing foods daily (dairy, green leafy vegetables, etc.)? yes    Amount of exercise or daily activities, outside of work: 4 day(s) per week    Problems taking medications regularly No    Medication side effects: No    Have you had an eye exam in the past two years? yes    Do you see a dentist twice per year? yes    Do you have sleep apnea, excessive snoring or daytime drowsiness? Yes daytime drowsiness       Concerns:  * heavy periods    Menses still regular, every 27-29 days.  The first few days flow is heavy, hard to leave the house.  Bleeds through at night wearing tampon and super pads.  Wakes 1-2 times per night to change.    Last period was very heavy with lots of clots.  Not painful.  Flow generally lasts 5 days.    Periods became heavier after birth of her daughter.  Heavier still after her 2 miscarriages.    Took a break from trying for pregnancy for 1-2 cycles after last loss.  Now trying again since the beginning of the year.        * fatigue   * dizziness    Has felt more cold over the last winter as well.  Weight has been more of a struggle.      *  vision has been worsening, planning an eye exam      Today's PHQ-2 Score:   PHQ-2 ( 1999 [employer]) 10/8/2020 2/28/2020   Q1: Little interest or pleasure in doing things 0 0   Q2: Feeling down, depressed or hopeless 1 0   PHQ-2 Score 1 0       Abuse: Current or Past(Physical, Sexual or Emotional)- No  Do you feel safe in your environment? Yes        Social History     Tobacco Use     Smoking status: Never Smoker     Smokeless tobacco: Never Used   Substance Use Topics     Alcohol use: No     Alcohol/week: 0.0 standard drinks     If you drink alcohol do you typically have >3 drinks per day or >7  "drinks per week? No                     Reviewed orders with patient.  Reviewed health maintenance and updated orders accordingly - Yes      Mammogram not appropriate for this patient based on age.    Pertinent mammograms are reviewed under the imaging tab.  History of abnormal Pap smear: NO - age 30-65 PAP every 5 years with negative HPV co-testing recommended  PAP / HPV Latest Ref Rng & Units 2017   PAP - NIL   HPV 16 DNA NEG Negative   HPV 18 DNA NEG Negative   OTHER HR HPV NEG Negative     Reviewed and updated as needed this visit by clinical staff  Tobacco  Allergies  Meds   Med Hx  Surg Hx  Fam Hx  Soc Hx        Reviewed and updated as needed this visit by Provider  Tobacco  Allergies  Meds   Med Hx  Surg Hx  Fam Hx  Soc Hx       History reviewed. No pertinent past medical history.   Past Surgical History:   Procedure Laterality Date     ARTHROSCOPY KNEE Left       SECTION N/A 9/10/2017    Procedure:  SECTION;  Primary  Section;  Surgeon: Mu Miranda MD;  Location: [facility]     SURGICAL HISTORY OF -       left knee surgery       ROS:  CONSTITUTIONAL: NEGATIVE for fever, chills, change in weight  INTEGUMENTARU/SKIN: NEGATIVE for worrisome rashes, moles or lesions  EYES: NEGATIVE for vision changes or irritation  ENT: NEGATIVE for ear, mouth and throat problems  RESP: NEGATIVE for significant cough or SOB  BREAST: NEGATIVE for masses, tenderness or discharge  CV: NEGATIVE for chest pain, palpitations or peripheral edema  GI: NEGATIVE for nausea, abdominal pain, heartburn, or change in bowel habits   female: as above  MUSCULOSKELETAL: NEGATIVE for significant arthralgias or myalgia  NEURO: NEGATIVE for weakness, dizziness or paresthesias  PSYCHIATRIC: NEGATIVE for changes in mood or affect    OBJECTIVE:   /68   Pulse 95   Temp 97.6  F (36.4  C) (Tympanic)   Ht 1.638 m (5' 4.5\")   Wt 67.9 kg (149 lb 12.8 oz)   LMP 2020   Breastfeeding No   " "BMI 25.32 kg/m    EXAM:  GENERAL: healthy, alert and no distress  EYES: Eyes grossly normal to inspection, PERRL and conjunctivae and sclerae normal  HENT: normal cephalic/atraumatic and ear canals and TM's normal  NECK: no adenopathy, no asymmetry, masses, or scars and thyroid normal to palpation  RESP: lungs clear to auscultation - no rales, rhonchi or wheezes  BREAST: normal without masses, tenderness or nipple discharge and no palpable axillary masses or adenopathy  CV: regular rate and rhythm, normal S1 S2, no S3 or S4, no murmur, click or rub, no peripheral edema and peripheral pulses strong  ABDOMEN: soft, nontender, no hepatosplenomegaly, no masses and bowel sounds normal  MS: no gross musculoskeletal defects noted, no edema  NEURO: Normal strength and tone, mentation intact and speech normal  PSYCH: mentation appears normal, affect normal/bright    Diagnostic Test Results:  Labs reviewed in Epic    ASSESSMENT/PLAN:       ICD-10-CM    1. Routine general medical examination at a health care facility  Z00.00 INFLUENZA VACCINE IM > 6 MONTHS VALENT IIV4 [91512]     ADMIN 1st VACCINE   2. Menorrhagia with regular cycle  N92.0 CBC with platelets     TSH with free T4 reflex     US Pelvic Complete with Transvaginal     Menorrhagia:  Reviewed eval including thyroid eval today, cbc and ultrasound.  If all wnl, consider referral to gyn for menorrhagia and fertility.  Will inform pt of results once available.    Patient has been advised of split billing requirements and indicates understanding: Yes  COUNSELING:   Reviewed preventive health counseling, as reflected in patient instructions    Estimated body mass index is 25.32 kg/m  as calculated from the following:    Height as of this encounter: 1.638 m (5' 4.5\").    Weight as of this encounter: 67.9 kg (149 lb 12.8 oz).        She reports that she has never smoked. She has never used smokeless tobacco.      Counseling Resources:  ATP IV Guidelines  Pooled Cohorts " Equation Calculator  Breast Cancer Risk Calculator  BRCA-Related Cancer Risk Assessment: FHS-7 Tool  FRAX Risk Assessment  ICSI Preventive Guidelines  Dietary Guidelines for Americans, 2010  USDA's MyPlate  ASA Prophylaxis  Lung CA Screening    Gemini Esquivel DO  [facility]    Patient Instructions   I will let you know lab results today when available.    Please call [phone] to schedule your ultrasound following this period       Preventive Health Recommendations  Female Ages 26 - 39  Yearly exam:   See your health care provider every year in order to    Review health changes.     Discuss preventive care.      Review your medicines if you your doctor has prescribed any.    Until age 30: Get a Pap test every three years (more often if you have had an abnormal result).    After age 30: Talk to your doctor about whether you should have a Pap test every 3 years or have a Pap test with HPV screening every 5 years.   You do not need a Pap test if your uterus was removed (hysterectomy) and you have not had cancer.  You should be tested each year for STDs (sexually transmitted diseases), if you're at risk.   Talk to your provider about how often to have your cholesterol checked.  If you are at risk for diabetes, you should have a diabetes test (fasting glucose).  Shots: Get a flu shot each year. Get a tetanus shot every 10 years.   Nutrition:     Eat at least 5 servings of fruits and vegetables each day.    Eat whole-grain bread, whole-wheat pasta and brown rice instead of white grains and rice.    Get adequate Calcium and Vitamin D.     Lifestyle    Exercise at least 150 minutes a week (30 minutes a day, 5 days of the week). This will help you control your weight and prevent disease.    Limit alcohol to one drink per day.    No smoking.     Wear sunscreen to prevent skin cancer.    See your dentist every six months for an exam and cleaning.

## 2020-10-20 ENCOUNTER — HOSPITAL ENCOUNTER (OUTPATIENT)
Dept: ULTRASOUND IMAGING | Facility: CLINIC | Age: 35
Discharge: HOME | End: 2020-10-20
Attending: FAMILY MEDICINE | Admitting: FAMILY MEDICINE
Payer: COMMERCIAL

## 2020-10-20 DIAGNOSIS — N92.0 MENORRHAGIA WITH REGULAR CYCLE: ICD-10-CM

## 2020-10-20 PROCEDURE — 76830 TRANSVAGINAL US NON-OB: CPT

## 2020-10-21 ENCOUNTER — MYC MEDICAL ADVICE (OUTPATIENT)
Dept: FAMILY MEDICINE | Facility: CLINIC | Age: 35
End: 2020-10-21

## 2020-10-29 ENCOUNTER — TELEPHONE (OUTPATIENT)
Dept: OBGYN | Facility: CLINIC | Age: 35
End: 2020-10-29

## 2020-10-29 ENCOUNTER — OFFICE VISIT (OUTPATIENT)
Dept: OBGYN | Facility: CLINIC | Age: 35
End: 2020-10-29
Payer: COMMERCIAL

## 2020-10-29 VITALS
BODY MASS INDEX: 25.62 KG/M2 | SYSTOLIC BLOOD PRESSURE: 117 MMHG | WEIGHT: 153.8 LBS | DIASTOLIC BLOOD PRESSURE: 72 MMHG | RESPIRATION RATE: 16 BRPM | TEMPERATURE: 98.1 F | HEART RATE: 94 BPM | HEIGHT: 65 IN

## 2020-10-29 DIAGNOSIS — N92.0 MENORRHAGIA WITH REGULAR CYCLE: ICD-10-CM

## 2020-10-29 DIAGNOSIS — Z11.59 ENCOUNTER FOR SCREENING FOR OTHER VIRAL DISEASES: Primary | ICD-10-CM

## 2020-10-29 DIAGNOSIS — N84.0 ENDOMETRIAL POLYP: Primary | ICD-10-CM

## 2020-10-29 PROCEDURE — 99213 OFFICE O/P EST LOW 20 MIN: CPT | Performed by: OBSTETRICS & GYNECOLOGY

## 2020-10-29 ASSESSMENT — MIFFLIN-ST. JEOR: SCORE: 1385.57

## 2020-10-29 NOTE — TELEPHONE ENCOUNTER
"You are now scheduled for surgery at [facility]. Below are the details for your surgery. Please read the \"Preparing for Your Surgery\" instructions and let us know if you have any questions.     Surgeon: Quinn Cash MD    Surgical Procedure: Hysteroscopy    Date of Procedure: 11/16/2020  Time: 3:00pm  Arrival Time: 2:00pm (Time can change; to be confirmed a couple days prior by pre-op surgery nurse who will be calling)    Home Preparation:   Preop physical exam:  Needed within 30 days of surgery  Pre-op Scheduled With:  Dr. Alvin Ramírez_____ PCP/FP     Date: 11/09/2020______  Time: _11:00am______    Shower with Hibiclens the night before and the morning of surgery, gently cleaning skin from neck to feet - see included instructions.     Take medications with a sip of water the morning of surgery (if approved by your doctor).    May have a light meal, toast and clear liquids, up to 8 hrs before surgery.     May have clear liquids (liquids one can see through) up to 4 hrs before surgery.    Nothing after 4 hrs before surgery.    Stop aspirin 7-10 days before surgery; Stop NSAIDS (Ibuprofen, Naproxen, etc) 5 days before surgery; Stop Plavix 7-10 days before surgery.     Sincerely,    [facility] [phone]; Fax #: [phone]  Same Day Surgery [phone]; Fax: [phone]    Pre-Op Appt Date: Patient to schedule within 30 days of surgery  Post-Op Appt Date: To be determined by provider.   Packet sent out: Yes  Pre-cert/Authorization completed: TBD by Financial Securing Office  MA Sterilization/Hysterectomy Acknowledgment Consent signed: Not Applicable     PREPROCEDURE COVID SCHEDULED: Artur 11/12 @ 1:15      "

## 2020-10-29 NOTE — PROGRESS NOTES
CC:  Consult from Gemini Esquivel  for heavy menstrual periods, infertility, and an abnormal pelvic ultrasound   HPI:  Serina Washington is a 35 year old female is a   .  No LMP recorded.  Menses are regular q 28-30 days, lasting 5 days.  Periods have been heavier in the recent.  They have been trying to conceive without success.  She is unclear whether or not she is ovulating.  Ultrasound performed on 10/20/2020 showed evidence of a endometrial polyp.  Here to discuss the next step.    Patients records are available and reviewed at today's visit.    Past GYN history:  No STD history       Last PAP smear:  Normal  Last TSH:   TSH   Date Value Ref Range Status   10/08/2020 1.03 0.40 - 4.00 mU/L Final      , normal?  Yes    History reviewed. No pertinent past medical history.    Past Surgical History:   Procedure Laterality Date     ARTHROSCOPY KNEE Left       SECTION N/A 9/10/2017    Procedure:  SECTION;  Primary  Section;  Surgeon: Mu Miranda MD;  Location: [facility]     SURGICAL HISTORY OF -       left knee surgery       Family History   Problem Relation Age of Onset     Kidney Disease Father         dialysis     Hypertension Father      Gastrointestinal Disease Father         stomach ulcer     Hypertension Mother      Thyroid Disease Mother      Other Cancer Paternal Grandfather      Other Cancer Paternal Grandmother        Allergies: Patient has no known allergies.    Current Outpatient Medications   Medication Sig Dispense Refill     Prenatal Multivit-Min-Fe-FA (PRENATAL VITAMINS PO) Take 2 tablets by mouth every evening [facility]         ROS:  C: NEGATIVE for fever, chills, change in weight  I: NEGATIVE for worrisome rashes, moles or lesions  E: NEGATIVE for vision changes or irritation  E/M: NEGATIVE for ear, mouth and throat problems  R: NEGATIVE for significant cough or SOB  CV: NEGATIVE for chest pain, palpitations or peripheral edema  GI: NEGATIVE for  nausea, abdominal pain, heartburn, or change in bowel habits  : NEGATIVE for frequency, dysuria, hematuria, vaginal discharge  M: NEGATIVE for significant arthralgias or myalgia  N: NEGATIVE for weakness, dizziness or paresthesias  E: NEGATIVE for temperature intolerance, skin/hair changes  P: NEGATIVE for changes in mood or affect    EXAM:  not currently breastfeeding.   BMI= There is no height or weight on file to calculate BMI.  General - pleasant female in no acute distress.    No exam today     We did review the ultrasound films of the October 20    This study showed a mobile lesion within the endometrial cavity lined by blood consistent with an endometrial polyp      ASSESSMENT/PLAN:  (N84.0) Endometrial polyp  (primary encounter diagnosis)  (N92.0) Menorrhagia with regular cycle  Comment: Secondary infertility  Plan: Case Request: HYSTEROSCOPY, THERAPEUTIC            I described the procedures as indicated above. The types of anesthesia were reviewed. The pre and post-op expectations were noted. We discussed the risks and benefits of the above procedures. The risk of bleeding, infection and damage to other organs was reviewed. The possibility of much larger incision(s) was discussed.  No guarantees were made.  She did express understanding and desires to proceed with surgery.          Letter will be sent to the referring provider.    Quinn Cash MD    15 of 15 minutes spent Face to face counseling relative to the issues noted above.

## 2020-11-01 ENCOUNTER — HOSPITAL ENCOUNTER (EMERGENCY)
Facility: CLINIC | Age: 35
Discharge: HOME | End: 2020-11-01
Attending: FAMILY MEDICINE | Admitting: FAMILY MEDICINE
Payer: COMMERCIAL

## 2020-11-01 ENCOUNTER — APPOINTMENT (OUTPATIENT)
Dept: GENERAL RADIOLOGY | Facility: CLINIC | Age: 35
End: 2020-11-01
Attending: FAMILY MEDICINE
Payer: COMMERCIAL

## 2020-11-01 VITALS
OXYGEN SATURATION: 99 % | HEART RATE: 70 BPM | WEIGHT: 150 LBS | SYSTOLIC BLOOD PRESSURE: 101 MMHG | TEMPERATURE: 97.8 F | RESPIRATION RATE: 18 BRPM | HEIGHT: 64 IN | BODY MASS INDEX: 25.61 KG/M2 | DIASTOLIC BLOOD PRESSURE: 68 MMHG

## 2020-11-01 DIAGNOSIS — F41.0 PANIC ATTACK: ICD-10-CM

## 2020-11-01 LAB
ALBUMIN SERPL-MCNC: 4 G/DL (ref 3.4–5)
ALP SERPL-CCNC: 45 U/L (ref 40–150)
ALT SERPL W P-5'-P-CCNC: 23 U/L (ref 0–50)
ANION GAP SERPL CALCULATED.3IONS-SCNC: 7 MMOL/L (ref 6–17)
AST SERPL W P-5'-P-CCNC: 18 U/L (ref 0–45)
BASOPHILS # BLD AUTO: 0.1 10E9/L (ref 0–0.2)
BASOPHILS NFR BLD AUTO: 1.1 %
BILIRUB SERPL-MCNC: 0.4 MG/DL (ref 0.2–1.3)
BUN SERPL-MCNC: 10 MG/DL (ref 7–30)
CALCIUM SERPL-MCNC: 9.1 MG/DL (ref 8.5–10.1)
CHLORIDE SERPL-SCNC: 108 MMOL/L (ref 94–109)
CO2 SERPL-SCNC: 23 MMOL/L (ref 20–32)
CREAT SERPL-MCNC: 0.78 MG/DL (ref 0.52–1.04)
CRP SERPL-MCNC: <2.9 MG/L (ref 0–8)
D DIMER PPP FEU-MCNC: 0.3 UG/ML FEU (ref 0–0.5)
DIFFERENTIAL METHOD BLD: NORMAL
EOSINOPHIL # BLD AUTO: 0.2 10E9/L (ref 0–0.7)
EOSINOPHIL NFR BLD AUTO: 2.5 %
ERYTHROCYTE [DISTWIDTH] IN BLOOD BY AUTOMATED COUNT: 11.9 % (ref 10–15)
GFR SERPL CREATININE-BSD FRML MDRD: >90 ML/MIN/{1.73_M2}
GLUCOSE SERPL-MCNC: 115 MG/DL (ref 70–99)
HCG SERPL QL: NEGATIVE
HCT VFR BLD AUTO: 38.7 % (ref 35–47)
HGB BLD-MCNC: 13.2 G/DL (ref 11.7–15.7)
IMM GRANULOCYTES # BLD: 0 10E9/L (ref 0–0.4)
IMM GRANULOCYTES NFR BLD: 0.1 %
LIPASE SERPL-CCNC: 209 U/L (ref 73–393)
LYMPHOCYTES # BLD AUTO: 3.3 10E9/L (ref 0.8–5.3)
LYMPHOCYTES NFR BLD AUTO: 39.3 %
MCH RBC QN AUTO: 30.4 PG (ref 26.5–33)
MCHC RBC AUTO-ENTMCNC: 34.1 G/DL (ref 31.5–36.5)
MCV RBC AUTO: 89 FL (ref 78–100)
MONOCYTES # BLD AUTO: 0.9 10E9/L (ref 0–1.3)
MONOCYTES NFR BLD AUTO: 10.8 %
NEUTROPHILS # BLD AUTO: 3.9 10E9/L (ref 1.6–8.3)
NEUTROPHILS NFR BLD AUTO: 46.2 %
NRBC # BLD AUTO: 0 10*3/UL
NRBC BLD AUTO-RTO: 0 /100
PLATELET # BLD AUTO: 334 10E9/L (ref 150–450)
POTASSIUM SERPL-SCNC: 3.4 MMOL/L (ref 3.4–5.3)
PROT SERPL-MCNC: 7.8 G/DL (ref 6.8–8.8)
RBC # BLD AUTO: 4.34 10E12/L (ref 3.8–5.2)
SODIUM SERPL-SCNC: 138 MMOL/L (ref 133–144)
TROPONIN I SERPL-MCNC: <0.015 UG/L (ref 0–0.04)
WBC # BLD AUTO: 8.5 10E9/L (ref 4–11)

## 2020-11-01 PROCEDURE — 99285 EMERGENCY DEPT VISIT HI MDM: CPT | Mod: 25 | Performed by: FAMILY MEDICINE

## 2020-11-01 PROCEDURE — 93010 ELECTROCARDIOGRAM REPORT: CPT | Performed by: FAMILY MEDICINE

## 2020-11-01 PROCEDURE — 85379 FIBRIN DEGRADATION QUANT: CPT | Performed by: FAMILY MEDICINE

## 2020-11-01 PROCEDURE — 93005 ELECTROCARDIOGRAM TRACING: CPT | Performed by: FAMILY MEDICINE

## 2020-11-01 PROCEDURE — 84484 ASSAY OF TROPONIN QUANT: CPT | Performed by: FAMILY MEDICINE

## 2020-11-01 PROCEDURE — 86140 C-REACTIVE PROTEIN: CPT | Performed by: FAMILY MEDICINE

## 2020-11-01 PROCEDURE — 80053 COMPREHEN METABOLIC PANEL: CPT | Performed by: FAMILY MEDICINE

## 2020-11-01 PROCEDURE — 83690 ASSAY OF LIPASE: CPT | Performed by: FAMILY MEDICINE

## 2020-11-01 PROCEDURE — 85025 COMPLETE CBC W/AUTO DIFF WBC: CPT | Performed by: FAMILY MEDICINE

## 2020-11-01 PROCEDURE — 84703 CHORIONIC GONADOTROPIN ASSAY: CPT | Performed by: FAMILY MEDICINE

## 2020-11-01 PROCEDURE — 71046 X-RAY EXAM CHEST 2 VIEWS: CPT

## 2020-11-01 ASSESSMENT — MIFFLIN-ST. JEOR: SCORE: 1360.4

## 2020-11-01 NOTE — ED PROVIDER NOTES
History     Chief Complaint   Patient presents with     Chest Pain   Chest pain  HPI  Serina Washington is a 35 year old female, past medical history significant for endometrial polyps, menorrhagia, presents to the emergency department with concerns of chest pain.  History is obtained from the patient who presents with her .  She states that she was asleep and was awoken by chest pain in the epigastric left chest area that was pressure/fullness with a sharp stabbing component.  Could not recall last meal.  Uncertain if it gets worse with deep breathing or coughing.  She has not had any recent illness or cough.  She felt short of breath and anxious when this happened no lightheadedness.  Has not had symptoms like this before but she questions whether she is having a panic attack.  Began approximately 1 hour prior to presentation and at the time of evaluation she has no discomfort unless she moves.      Allergies:  No Known Allergies    Problem List:    Patient Active Problem List    Diagnosis Date Noted     Endometrial polyp 10/29/2020     Priority: Medium     Added automatically from request for surgery [id number]       Menorrhagia with regular cycle 10/29/2020     Priority: Medium     Added automatically from request for surgery [id number]       S/P  09/10/2017     Priority: Medium        Past Medical History:    No past medical history on file.    Past Surgical History:    Past Surgical History:   Procedure Laterality Date     ARTHROSCOPY KNEE Left       SECTION N/A 9/10/2017    Procedure:  SECTION;  Primary  Section;  Surgeon: Mu Miranda MD;  Location: [facility]     SURGICAL HISTORY OF -       left knee surgery       Family History:    Family History   Problem Relation Age of Onset     Kidney Disease Father         dialysis     Hypertension Father      Gastrointestinal Disease Father         stomach ulcer     Hypertension Mother      Thyroid Disease Mother      Other  "Cancer Paternal Grandfather      Other Cancer Paternal Grandmother        Social History:  Marital Status:   [2]  Social History     Tobacco Use     Smoking status: Never Smoker     Smokeless tobacco: Never Used   Substance Use Topics     Alcohol use: No     Alcohol/week: 0.0 standard drinks     Drug use: No        Medications:         Prenatal Multivit-Min-Fe-FA (PRENATAL VITAMINS PO)          Review of Systems   All other systems reviewed and are negative.      Physical Exam   BP: 125/89  Pulse: 76  Temp: 97.8  F (36.6  C)  Resp: 14  Height: 162.6 cm (5' 4\")  Weight: 68 kg (150 lb)  SpO2: 100 %      Physical Exam  Vitals signs and nursing note reviewed.   Constitutional:       Appearance: She is well-developed and normal weight.      Comments: Anxious.  Does not appear ill or toxic   HENT:      Head: Normocephalic and atraumatic.   Eyes:      Extraocular Movements: Extraocular movements intact.      Pupils: Pupils are equal, round, and reactive to light.   Neck:      Musculoskeletal: Normal range of motion and neck supple.   Cardiovascular:      Rate and Rhythm: Normal rate and regular rhythm.      Heart sounds: Normal heart sounds.   Pulmonary:      Effort: Pulmonary effort is normal.      Breath sounds: Normal breath sounds.   Abdominal:      General: Bowel sounds are normal.      Palpations: Abdomen is soft.   Musculoskeletal: Normal range of motion.   Skin:     General: Skin is warm and dry.      Capillary Refill: Capillary refill takes less than 2 seconds.   Neurological:      General: No focal deficit present.      Mental Status: She is alert.   Psychiatric:         Mood and Affect: Mood normal.         Behavior: Behavior normal.         ED Course        Procedures               EKG Interpretation:      Interpreted by Satya Chaney MD  Time reviewed: Time obtained 2:58 AM time interpreted same.  86 bpm, sinus rhythm, normal axis, normal intervals, no acute ST-T wave changes.  Impression normal " EKG.        Critical Care time:  none               Results for orders placed or performed during the hospital encounter of 11/01/20 (from the past 24 hour(s))   CBC with platelets differential   Result Value Ref Range    WBC 8.5 4.0 - 11.0 10e9/L    RBC Count 4.34 3.8 - 5.2 10e12/L    Hemoglobin 13.2 11.7 - 15.7 g/dL    Hematocrit 38.7 35.0 - 47.0 %    MCV 89 78 - 100 fl    MCH 30.4 26.5 - 33.0 pg    MCHC 34.1 31.5 - 36.5 g/dL    RDW 11.9 10.0 - 15.0 %    Platelet Count 334 150 - 450 10e9/L    Diff Method Automated Method     % Neutrophils 46.2 %    % Lymphocytes 39.3 %    % Monocytes 10.8 %    % Eosinophils 2.5 %    % Basophils 1.1 %    % Immature Granulocytes 0.1 %    Nucleated RBCs 0 0 /100    Absolute Neutrophil 3.9 1.6 - 8.3 10e9/L    Absolute Lymphocytes 3.3 0.8 - 5.3 10e9/L    Absolute Monocytes 0.9 0.0 - 1.3 10e9/L    Absolute Eosinophils 0.2 0.0 - 0.7 10e9/L    Absolute Basophils 0.1 0.0 - 0.2 10e9/L    Abs Immature Granulocytes 0.0 0 - 0.4 10e9/L    Absolute Nucleated RBC 0.0    CRP inflammation   Result Value Ref Range    CRP Inflammation <2.9 0.0 - 8.0 mg/L   Comprehensive metabolic panel   Result Value Ref Range    Sodium 138 133 - 144 mmol/L    Potassium 3.4 3.4 - 5.3 mmol/L    Chloride 108 94 - 109 mmol/L    Carbon Dioxide 23 20 - 32 mmol/L    Anion Gap 7 6 - 17 mmol/L    Glucose 115 (H) 70 - 99 mg/dL    Urea Nitrogen 10 7 - 30 mg/dL    Creatinine 0.78 0.52 - 1.04 mg/dL    GFR Estimate >90 >60 mL/min/[1.73_m2]    GFR Estimate If Black >90 >60 mL/min/[1.73_m2]    Calcium 9.1 8.5 - 10.1 mg/dL    Bilirubin Total 0.4 0.2 - 1.3 mg/dL    Albumin 4.0 3.4 - 5.0 g/dL    Protein Total 7.8 6.8 - 8.8 g/dL    Alkaline Phosphatase 45 40 - 150 U/L    ALT 23 0 - 50 U/L    AST 18 0 - 45 U/L   Lipase   Result Value Ref Range    Lipase 209 73 - 393 U/L   Troponin I   Result Value Ref Range    Troponin I ES <0.015 0.000 - 0.045 ug/L   HCG qualitative Blood   Result Value Ref Range    HCG Qualitative Serum Negative  NEG^Negative   D dimer quantitative   Result Value Ref Range    D Dimer 0.3 0.0 - 0.50 ug/ml FEU   XR Chest 2 Views    Narrative    EXAM: XR CHEST 2 VW  LOCATION: [facility]  DATE/TIME: 11/1/2020 4:27 AM    INDICATION: Chest pain  COMPARISON: 09/26/2016      Impression    IMPRESSION: Negative chest.     4:58 AM  Recheck on the patient at this time.  Remains asymptomatic.  EKG was normal in appearance and reviewed with her, CBC, CMP, cardiac troponin, D-dimer are all without significant abnormals.  Chest x-ray is unremarkable.      Medications - No data to display    Assessments & Plan (with Medical Decision Making)   35-year-old female past medical history reviewed as above who presents to the emergency department with concerns of chest pain as discussed in the HPI and documented with respect to abnormals on exam.  Stable adult range normal vital signs, nonill and nontoxic in appearance.  No focal findings on exam.  Lab diagnostics including cardiac troponin and D-dimer are acutely negative.  EKG revealed no acute abnormalities.  Chest x-ray was without abnormalities.  The patient's discomfort resolved completely in the emergency department without directed intervention.  Differential diagnosis of her entrance complaint was discussed with her and her , based upon her presentation and evaluation I doubt ACS, pleuritis/pericarditis, PE, atypical pneumonia, pneumothorax.  Suspect that this most likely represents a panic attack however certainly possible that she may have some atypical GERD/reflux as explanation.  Consider possibility of biliary colic however at this time no LFT abnormalities, no CBC abnormality, negative Sales's clinically.  If symptoms recur in a similar fashion would consider further evaluation potentially with abdominal ultrasound of the hepatobiliary system.  In the meantime I have reassured the patient.  She will pay close attention with possible recurrent symptoms and return  as needed.    Disclaimer: This note consists of symbols derived from keyboarding, dictation and/or voice recognition software. As a result, there may be errors in the script that have gone undetected. Please consider this when interpreting information found in this chart.      I have reviewed the nursing notes.    I have reviewed the findings, diagnosis, plan and need for follow up with the patient.       New Prescriptions    No medications on file       Final diagnoses:   Panic attack       11/1/2020   [facility] EMERGENCY DEPT     Satya Chaney MD  11/04/20 0652

## 2020-11-01 NOTE — ED TRIAGE NOTES
Pt was awakened by severe chest pain that started about 15 minutes ago, pt denies hx of panic attacks, has never had pain like this before. Chest pain is on L side, terrible pressure with sharp pains when she moves. Pt says she was hyperventilating at home, does not feel SOA at rest now. No pain at  rest but is still sharp with movement.

## 2020-11-01 NOTE — ED AVS SNAPSHOT
[facility] Emergency Dept  [address] [address]  Phone: [phone]  Fax: [phone]                                    Serina Washington   MRN: [id number]    Department: [facility] Emergency Dept   Date of Visit: 11/1/2020           After Visit Summary Signature Page    I have received my discharge instructions, and my questions have been answered. I have discussed any challenges I see with this plan with the nurse or doctor.    ..........................................................................................................................................  Patient/Patient Representative Signature      ..........................................................................................................................................  Patient Representative Print Name and Relationship to Patient    ..................................................               ................................................  Date                                   Time    ..........................................................................................................................................  Reviewed by Signature/Title    ...................................................              ..............................................  Date                                               Time          22EPIC Rev 08/18

## 2020-11-12 ENCOUNTER — OFFICE VISIT (OUTPATIENT)
Dept: FAMILY MEDICINE | Facility: CLINIC | Age: 35
End: 2020-11-12
Payer: COMMERCIAL

## 2020-11-12 VITALS
OXYGEN SATURATION: 98 % | WEIGHT: 151.8 LBS | BODY MASS INDEX: 26.06 KG/M2 | DIASTOLIC BLOOD PRESSURE: 72 MMHG | SYSTOLIC BLOOD PRESSURE: 106 MMHG | RESPIRATION RATE: 18 BRPM | TEMPERATURE: 97.6 F | HEART RATE: 70 BPM

## 2020-11-12 DIAGNOSIS — Z11.59 ENCOUNTER FOR SCREENING FOR OTHER VIRAL DISEASES: ICD-10-CM

## 2020-11-12 DIAGNOSIS — Z01.818 PREOP GENERAL PHYSICAL EXAM: Primary | ICD-10-CM

## 2020-11-12 DIAGNOSIS — N84.0 ENDOMETRIAL POLYP: ICD-10-CM

## 2020-11-12 DIAGNOSIS — N92.0 MENORRHAGIA WITH REGULAR CYCLE: ICD-10-CM

## 2020-11-12 PROCEDURE — 99214 OFFICE O/P EST MOD 30 MIN: CPT | Performed by: NURSE PRACTITIONER

## 2020-11-12 PROCEDURE — U0003 INFECTIOUS AGENT DETECTION BY NUCLEIC ACID (DNA OR RNA); SEVERE ACUTE RESPIRATORY SYNDROME CORONAVIRUS 2 (SARS-COV-2) (CORONAVIRUS DISEASE [COVID-19]), AMPLIFIED PROBE TECHNIQUE, MAKING USE OF HIGH THROUGHPUT TECHNOLOGIES AS DESCRIBED BY CMS-2020-01-R: HCPCS | Performed by: OBSTETRICS & GYNECOLOGY

## 2020-11-12 NOTE — PATIENT INSTRUCTIONS
"Preparing for Your Surgery  Getting started  A surgery nurse will call you to review your health history and instructions. They will give you an arrival time based on your scheduled surgery time.  Please be ready to share the following:    Your doctor's clinic name and phone number    Your medical, surgical and anesthesia history    A list of allergies and sensitivities    A list of medicines, including herbal treatments and over-the-counter drugs    Whether the patient has a legal guardian (ask how to send us the papers in advance)  Preparing for surgery    Within 30 days of surgery: Have an exam at your family clinic (primary care clinic), or go to a pre-operative clinic. This exam is called a \"History and Physical,\" or H&P.    At your H&P exam, talk to your care team about all medicines you take. If you need to stop any medicines before surgery, ask when to start taking them again.  ? We do this for your safety. Many medicines can make you bleed too much during surgery. Some change how well surgery (anesthesia) drugs work.    Call your insurance company to see what it will and won't pay for. Ask if they need to pre-approve the surgery. (If no insurance, call [phone].)    Call your surgeon's clinic if there's any change in your health. This includes signs of a cold or flu (sore throat, runny nose, cough, rash, fever). It also includes a scrape or scratch near the surgery site.    If you have questions on the day of surgery, call your surgery center.  Eating and drinking guidelines  For your safety: Unless your surgeon tells you otherwise, follow the guidelines below.    Eat and drink as usual until 8 hours before surgery. After that, no food or milk.    Drink clear liquids until 2 hours before surgery. These are liquids you can see through, like water, Gatorade and Propel Water. You may also have black coffee and tea (no cream or milk).    Nothing by mouth within 2 hours of surgery. This includes gum, candy " and breath mints.    Stop alcohol the midnight before surgery.    If your family clinic tells you to take medicine on the morning of surgery, it's okay to take it with a sip of water.  Preventing infection    Shower or bathe the night before and morning of your surgery. Follow the instructions your clinic gave you. (If no instructions, use regular soap.)    Don't shave or clip hair near your surgery site. This can lead to skin infection.    Don't smoke the morning of surgery. Smoking increases the risk of infection. You may chew nicotine gum up to 2 hours before surgery. A nicotine patch is okay.  ? Note: Some surgeries require you to completely quit smoking and nicotine. Check with your surgeon.    Your care team will make every effort to keep you safe from infection. We will:  ? Clean our hands often with soap and water (or an alcohol-based hand rub).  ? Clean the skin at your surgery site with a special soap that kills germs. We'll also remove hair from the site as needed.  ? Wear special hair covers, masks, gowns and gloves during surgery.  ? Give antibiotic medicine, if prescribed. Not all surgeries need antibiotics.  What to bring on the day of surgery    Photo ID and insurance card    Copy of your health care directive, if you have one    Glasses and hearing aides (bring cases)  ? You can't wear contacts during surgery    Inhaler and eye drops, if you use them (tell us about these when you arrive)    CPAP machine or breathing device, if you use them    A few personal items, if spending the night    If you have . . .  ? A pacemaker or ICD (cardiac defibrillator): Bring the ID card.  ? An implanted stimulator: Bring the remote control.  ? A legal guardian: Bring a copy of the certified (court-stamped) guardianship papers.  Please remove any jewelry, including body piercings. Leave jewelry and other valuables at home.  If you're going home the day of surgery  Important: If you don't follow the rules below, we  must cancel your surgery.     Arrange for someone to drive you home after surgery. You may not drive, take a taxi or take public transportation by yourself (unless you'll have local anesthesia only).    Arrange for a responsible adult to stay with you overnight. If you don't, we may keep you in the hospital overnight, and you may need to pay the costs yourself.  Questions?   If you have any questions for your care team, list them here: _________________________________________________________________________________________________________________________________________________________________________________________________________________________________________________________________________________________________________________________  For informational purposes only. Not to replace the advice of your health care provider. Copyright   0156-4171 [employer]. All rights reserved. Clinically reviewed by Aby Anand MD. [employer] 811371 - REV 07/19.    How to Take Your Medication Before Surgery  You may hold your multivitamin today until after your surgery.

## 2020-11-12 NOTE — PROGRESS NOTES
[facility]  [address] [address]  Phone: [phone]  Primary Provider: Gemini Esquivel  Pre-op Performing Provider: NATALIYA PATEL    PREOPERATIVE EVALUATION:  Today's date: 11/12/2020    Serina Washington is a 35 year old female who presents for a preoperative evaluation.    Surgical Information:  Surgery/Procedure: HYSTEROSCOPY, THERAPEUTIC  Surgery Location: [facility]   Surgeon: Quinn Cash MD  Surgery Date: 11/16/2020  Time of Surgery: 11:45 arrive at 10:45  Where patient plans to recover: At home with family  Fax number for surgical facility: Note does not need to be faxed, will be available electronically in Epic.    Type of Anesthesia Anticipated: Choice    Subjective     HPI related to upcoming procedure: History of infertility concerns and ongoing menorrhagia for 6+ months. Recent ultrasound showing endometrial polyp.     Preop Questions 11/12/2020   1. Have you ever had a heart attack or stroke? No   2. Have you ever had surgery on your heart or blood vessels, such as a stent placement, a coronary artery bypass, or surgery on an artery in your head, neck, heart, or legs? No   3. Do you have chest pain with activity? No   4. Do you have a history of  heart failure? No   5. Do you currently have a cold, bronchitis or symptoms of other infection? No   6. Do you have a cough, shortness of breath, or wheezing? No   7. Do you or anyone in your family have previous history of blood clots? No   8. Do you or does anyone in your family have a serious bleeding problem such as prolonged bleeding following surgeries or cuts? No   9. Have you ever had problems with anemia or been told to take iron pills? No   10. Have you had any abnormal blood loss such as black, tarry or bloody stools, or abnormal vaginal bleeding? No   11. Have you ever had a blood transfusion? No   12. Are you willing to have a blood transfusion if it is medically needed  before, during, or after your surgery? Yes- if life threatening   13. Have you or any of your relatives ever had problems with anesthesia? No   14. Do you have sleep apnea, excessive snoring or daytime drowsiness? No   15. Do you have any artifical heart valves or other implanted medical devices like a pacemaker, defibrillator, or continuous glucose monitor? No   16. Do you have artificial joints? No   17. Are you allergic to latex? No   18. Is there any chance that you may be pregnant? No       Health Care Directive:  Patient does not have a Health Care Directive or Living Will: Discussed advance care planning with patient; however, patient declined at this time.    Review of Systems  CONSTITUTIONAL: NEGATIVE for fever, chills, change in weight  INTEGUMENTARY/SKIN: NEGATIVE for worrisome rashes, moles or lesions  EYES: NEGATIVE for vision changes or irritation  ENT/MOUTH: NEGATIVE for ear, mouth and throat problems  RESP: NEGATIVE for significant cough or SOB  BREAST: NEGATIVE for masses, tenderness or discharge  CV: NEGATIVE for chest pain, palpitations or peripheral edema  GI: NEGATIVE for nausea, abdominal pain, heartburn, or change in bowel habits  : NEGATIVE for frequency, dysuria, or hematuria  MUSCULOSKELETAL: NEGATIVE for significant arthralgias or myalgia  NEURO: NEGATIVE for weakness, dizziness or paresthesias  ENDOCRINE: NEGATIVE for temperature intolerance, skin/hair changes  HEME: NEGATIVE for bleeding problems  PSYCHIATRIC: POSITIVE for recent panic attack episode- was seen in ED on 20. Otherwise negative for changes to mood of affect    Patient Active Problem List    Diagnosis Date Noted     Endometrial polyp 10/29/2020     Priority: Medium     Added automatically from request for surgery [id number]       Menorrhagia with regular cycle 10/29/2020     Priority: Medium     Added automatically from request for surgery [id number]       S/P  09/10/2017     Priority: Medium      History reviewed.  No pertinent past medical history.  Past Surgical History:   Procedure Laterality Date     ARTHROSCOPY KNEE Left       SECTION N/A 9/10/2017    Procedure:  SECTION;  Primary  Section;  Surgeon: Mu Miranda MD;  Location: [facility] OR     SURGICAL HISTORY OF -       left knee surgery     Current Outpatient Medications   Medication Sig Dispense Refill     Prenatal Multivit-Min-Fe-FA (PRENATAL VITAMINS PO) Take 2 tablets by mouth every evening [facility]         No Known Allergies     Social History     Tobacco Use     Smoking status: Never Smoker     Smokeless tobacco: Never Used   Substance Use Topics     Alcohol use: No     Alcohol/week: 0.0 standard drinks     Family History   Problem Relation Age of Onset     Kidney Disease Father         dialysis     Hypertension Father      Gastrointestinal Disease Father         stomach ulcer     Hypertension Mother      Thyroid Disease Mother      Other Cancer Paternal Grandfather      Other Cancer Paternal Grandmother      History   Drug Use No         Objective     /72 (BP Location: Right arm, Patient Position: Sitting, Cuff Size: Adult Regular)   Pulse 70   Temp 97.6  F (36.4  C) (Tympanic)   Resp 18   Wt 68.9 kg (151 lb 12.8 oz)   LMP 2020   SpO2 98%   BMI 26.06 kg/m      Physical Exam    GENERAL APPEARANCE: healthy, alert and no distress     EYES: EOMI, PERRL     HENT: ear canals and TM's normal and nose and mouth without ulcers or lesions     NECK: no adenopathy, no asymmetry, masses, or scars and thyroid normal to palpation     RESP: lungs clear to auscultation - no rales, rhonchi or wheezes     CV: regular rates and rhythm, normal S1 S2, no S3 or S4 and no murmur, click or rub     ABDOMEN:  soft, nontender, no HSM or masses and bowel sounds normal     MS: extremities normal- no gross deformities noted, no evidence of inflammation in joints, FROM in all extremities.     SKIN: no suspicious lesions or rashes      NEURO: Normal strength and tone, sensory exam grossly normal, mentation intact and speech normal     PSYCH: mentation appears normal. and affect normal/bright     LYMPHATICS: No cervical adenopathy    Recent Labs   Lab Test 11/01/20  0319 10/08/20  0930   HGB 13.2 13.7    333     --    POTASSIUM 3.4  --    CR 0.78  --         Diagnostics:  Labs recently completed on 11/1/20, without significant findings as noted above. May be drawn day of procedure as needed.   No EKG this visit, completed in the last 90 days.    Revised Cardiac Risk Index (RCRI):  The patient has the following serious cardiovascular risks for perioperative complications:   - No serious cardiac risks = 0 points     RCRI Interpretation: 0 points: Class I (very low risk - 0.4% complication rate)     Assessment & Plan   The proposed surgical procedure is considered INTERMEDIATE risk.    Preop general physical exam  Pre-op exam without significant findings. Patient given information to prepare for procedure. Patient will hold prenatal vitamin through day of surgery. Questions answered. COVID-19 screen completed today at pre-op appointment.    Endometrial polyp    Menorrhagia with regular cycle      Risks and Recommendations:  The patient has the following additional risks and recommendations for perioperative complications:   - No identified additional risk factors other than previously addressed    Medication Instructions:  Patient is on no chronic medications. Stop taking prenatal vitamin today. May resume following procedure.    RECOMMENDATION:   APPROVAL GIVEN to proceed with proposed procedure, without further diagnostic evaluation.    Signed Electronically by: Stephanie Morton NP    Copy of this evaluation report is provided to requesting physician.

## 2020-11-12 NOTE — H&P (VIEW-ONLY)
[facility]  [address] [address]  Phone: [phone]  Primary Provider: Gemini Esquivel  Pre-op Performing Provider: NATALIYA PATEL    PREOPERATIVE EVALUATION:  Today's date: 11/12/2020    Serina Washington is a 35 year old female who presents for a preoperative evaluation.    Surgical Information:  Surgery/Procedure: HYSTEROSCOPY, THERAPEUTIC  Surgery Location: [facility]   Surgeon: Quinn Cash MD  Surgery Date: 11/16/2020  Time of Surgery: 11:45 arrive at 10:45  Where patient plans to recover: At home with family  Fax number for surgical facility: Note does not need to be faxed, will be available electronically in Epic.    Type of Anesthesia Anticipated: Choice    Subjective     HPI related to upcoming procedure: History of infertility concerns and ongoing menorrhagia for 6+ months. Recent ultrasound showing endometrial polyp.     Preop Questions 11/12/2020   1. Have you ever had a heart attack or stroke? No   2. Have you ever had surgery on your heart or blood vessels, such as a stent placement, a coronary artery bypass, or surgery on an artery in your head, neck, heart, or legs? No   3. Do you have chest pain with activity? No   4. Do you have a history of  heart failure? No   5. Do you currently have a cold, bronchitis or symptoms of other infection? No   6. Do you have a cough, shortness of breath, or wheezing? No   7. Do you or anyone in your family have previous history of blood clots? No   8. Do you or does anyone in your family have a serious bleeding problem such as prolonged bleeding following surgeries or cuts? No   9. Have you ever had problems with anemia or been told to take iron pills? No   10. Have you had any abnormal blood loss such as black, tarry or bloody stools, or abnormal vaginal bleeding? No   11. Have you ever had a blood transfusion? No   12. Are you willing to have a blood transfusion if it is medically needed  before, during, or after your surgery? Yes- if life threatening   13. Have you or any of your relatives ever had problems with anesthesia? No   14. Do you have sleep apnea, excessive snoring or daytime drowsiness? No   15. Do you have any artifical heart valves or other implanted medical devices like a pacemaker, defibrillator, or continuous glucose monitor? No   16. Do you have artificial joints? No   17. Are you allergic to latex? No   18. Is there any chance that you may be pregnant? No       Health Care Directive:  Patient does not have a Health Care Directive or Living Will: Discussed advance care planning with patient; however, patient declined at this time.    Review of Systems  CONSTITUTIONAL: NEGATIVE for fever, chills, change in weight  INTEGUMENTARY/SKIN: NEGATIVE for worrisome rashes, moles or lesions  EYES: NEGATIVE for vision changes or irritation  ENT/MOUTH: NEGATIVE for ear, mouth and throat problems  RESP: NEGATIVE for significant cough or SOB  BREAST: NEGATIVE for masses, tenderness or discharge  CV: NEGATIVE for chest pain, palpitations or peripheral edema  GI: NEGATIVE for nausea, abdominal pain, heartburn, or change in bowel habits  : NEGATIVE for frequency, dysuria, or hematuria  MUSCULOSKELETAL: NEGATIVE for significant arthralgias or myalgia  NEURO: NEGATIVE for weakness, dizziness or paresthesias  ENDOCRINE: NEGATIVE for temperature intolerance, skin/hair changes  HEME: NEGATIVE for bleeding problems  PSYCHIATRIC: POSITIVE for recent panic attack episode- was seen in ED on 20. Otherwise negative for changes to mood of affect    Patient Active Problem List    Diagnosis Date Noted     Endometrial polyp 10/29/2020     Priority: Medium     Added automatically from request for surgery [id number]       Menorrhagia with regular cycle 10/29/2020     Priority: Medium     Added automatically from request for surgery [id number]       S/P  09/10/2017     Priority: Medium      History reviewed.  No pertinent past medical history.  Past Surgical History:   Procedure Laterality Date     ARTHROSCOPY KNEE Left       SECTION N/A 9/10/2017    Procedure:  SECTION;  Primary  Section;  Surgeon: Mu Miranda MD;  Location: [facility]     SURGICAL HISTORY OF -       left knee surgery     Current Outpatient Medications   Medication Sig Dispense Refill     Prenatal Multivit-Min-Fe-FA (PRENATAL VITAMINS PO) Take 2 tablets by mouth every evening [facility]         No Known Allergies     Social History     Tobacco Use     Smoking status: Never Smoker     Smokeless tobacco: Never Used   Substance Use Topics     Alcohol use: No     Alcohol/week: 0.0 standard drinks     Family History   Problem Relation Age of Onset     Kidney Disease Father         dialysis     Hypertension Father      Gastrointestinal Disease Father         stomach ulcer     Hypertension Mother      Thyroid Disease Mother      Other Cancer Paternal Grandfather      Other Cancer Paternal Grandmother      History   Drug Use No         Objective     /72 (BP Location: Right arm, Patient Position: Sitting, Cuff Size: Adult Regular)   Pulse 70   Temp 97.6  F (36.4  C) (Tympanic)   Resp 18   Wt 68.9 kg (151 lb 12.8 oz)   LMP 2020   SpO2 98%   BMI 26.06 kg/m      Physical Exam    GENERAL APPEARANCE: healthy, alert and no distress     EYES: EOMI, PERRL     HENT: ear canals and TM's normal and nose and mouth without ulcers or lesions     NECK: no adenopathy, no asymmetry, masses, or scars and thyroid normal to palpation     RESP: lungs clear to auscultation - no rales, rhonchi or wheezes     CV: regular rates and rhythm, normal S1 S2, no S3 or S4 and no murmur, click or rub     ABDOMEN:  soft, nontender, no HSM or masses and bowel sounds normal     MS: extremities normal- no gross deformities noted, no evidence of inflammation in joints, FROM in all extremities.     SKIN: no suspicious lesions or rashes      NEURO: Normal strength and tone, sensory exam grossly normal, mentation intact and speech normal     PSYCH: mentation appears normal. and affect normal/bright     LYMPHATICS: No cervical adenopathy    Recent Labs   Lab Test 11/01/20  0319 10/08/20  0930   HGB 13.2 13.7    333     --    POTASSIUM 3.4  --    CR 0.78  --         Diagnostics:  Labs recently completed on 11/1/20, without significant findings as noted above. May be drawn day of procedure as needed.   No EKG this visit, completed in the last 90 days.    Revised Cardiac Risk Index (RCRI):  The patient has the following serious cardiovascular risks for perioperative complications:   - No serious cardiac risks = 0 points     RCRI Interpretation: 0 points: Class I (very low risk - 0.4% complication rate)     Assessment & Plan   The proposed surgical procedure is considered INTERMEDIATE risk.    Preop general physical exam  Pre-op exam without significant findings. Patient given information to prepare for procedure. Patient will hold prenatal vitamin through day of surgery. Questions answered. COVID-19 screen completed today at pre-op appointment.    Endometrial polyp    Menorrhagia with regular cycle      Risks and Recommendations:  The patient has the following additional risks and recommendations for perioperative complications:   - No identified additional risk factors other than previously addressed    Medication Instructions:  Patient is on no chronic medications. Stop taking prenatal vitamin today. May resume following procedure.    RECOMMENDATION:   APPROVAL GIVEN to proceed with proposed procedure, without further diagnostic evaluation.    Signed Electronically by: Stephanie Morton NP    Copy of this evaluation report is provided to requesting physician.

## 2020-11-13 ENCOUNTER — ANESTHESIA EVENT (OUTPATIENT)
Dept: SURGERY | Facility: CLINIC | Age: 35
End: 2020-11-13
Payer: COMMERCIAL

## 2020-11-13 LAB
SARS-COV-2 RNA SPEC QL NAA+PROBE: NOT DETECTED
SPECIMEN SOURCE: NORMAL

## 2020-11-13 NOTE — ANESTHESIA PREPROCEDURE EVALUATION
Anesthesia Pre-Procedure Evaluation    Patient: Serina Washington   MRN: [id number] : 1985          Preoperative Diagnosis: Endometrial polyp [N84.0]  Menorrhagia with regular cycle [N92.0]    Procedure(s):  HYSTEROSCOPY, THERAPEUTIC    No past medical history on file.  Past Surgical History:   Procedure Laterality Date     ARTHROSCOPY KNEE Left       SECTION N/A 9/10/2017    Procedure:  SECTION;  Primary  Section;  Surgeon: Mu Miranda MD;  Location: [facility] OR     SURGICAL HISTORY OF -       left knee surgery       Anesthesia Evaluation     . Pt has had prior anesthetic. Type: Regional and General           ROS/MED HX    ENT/Pulmonary:  - neg pulmonary ROS     Neurologic:  - neg neurologic ROS     Cardiovascular:  - neg cardiovascular ROS       METS/Exercise Tolerance:  >4 METS   Hematologic:  - neg hematologic  ROS       Musculoskeletal:  - neg musculoskeletal ROS       GI/Hepatic:  - neg GI/hepatic ROS       Renal/Genitourinary:  - ROS Renal section negative       Endo:  - neg endo ROS       Psychiatric:  - neg psychiatric ROS       Infectious Disease:  - neg infectious disease ROS       Malignancy:      - no malignancy   Other:    - neg other ROS                      Physical Exam  Normal systems: cardiovascular, pulmonary and dental    Airway   Mallampati: I  TM distance: >3 FB  Neck ROM: full    Dental     Cardiovascular       Pulmonary             Lab Results   Component Value Date    WBC 8.5 2020    HGB 13.2 2020    HCT 38.7 2020     2020    CRP <2.9 2020     2020    POTASSIUM 3.4 2020    CHLORIDE 108 2020    CO2 23 2020    BUN 10 2020    CR 0.78 2020     (H) 2020    LING 9.1 2020    ALBUMIN 4.0 2020    PROTTOTAL 7.8 2020    ALT 23 2020    AST 18 2020    ALKPHOS 45 2020    BILITOTAL 0.4 2020    LIPASE 209 2020    TSH 1.03  "10/08/2020    HCG Negative 02/28/2020    HCGS Negative 11/01/2020       Preop Vitals  BP Readings from Last 3 Encounters:   11/12/20 106/72   11/01/20 101/68   10/29/20 117/72    Pulse Readings from Last 3 Encounters:   11/12/20 70   11/01/20 70   10/29/20 94      Resp Readings from Last 3 Encounters:   11/12/20 18   11/01/20 18   10/29/20 16    SpO2 Readings from Last 3 Encounters:   11/12/20 98%   11/01/20 99%   09/12/17 98%      Temp Readings from Last 1 Encounters:   11/12/20 36.4  C (97.6  F) (Tympanic)    Ht Readings from Last 1 Encounters:   11/01/20 1.626 m (5' 4\")      Wt Readings from Last 1 Encounters:   11/12/20 68.9 kg (151 lb 12.8 oz)    Estimated body mass index is 26.06 kg/m  as calculated from the following:    Height as of 11/1/20: 1.626 m (5' 4\").    Weight as of 11/12/20: 68.9 kg (151 lb 12.8 oz).       Anesthesia Plan      History & Physical Review  History and physical reviewed and following examination; no interval change.    ASA Status:  1 .    NPO Status:  > 6 hours    Plan for General with Propofol and Intravenous induction. Maintenance will be TIVA.    PONV prophylaxis:  Ondansetron (or other 5HT-3) and Dexamethasone or Solumedrol         Postoperative Care  Postoperative pain management:  IV analgesics, Oral pain medications and Multi-modal analgesia.      Consents  Anesthetic plan, risks, benefits and alternatives discussed with:  Patient..                 ISABELLA Corcoran CRNA  "

## 2020-11-16 ENCOUNTER — ANESTHESIA (OUTPATIENT)
Dept: SURGERY | Facility: CLINIC | Age: 35
End: 2020-11-16
Payer: COMMERCIAL

## 2020-11-16 ENCOUNTER — HOSPITAL ENCOUNTER (OUTPATIENT)
Facility: CLINIC | Age: 35
Discharge: HOME | End: 2020-11-16
Attending: OBSTETRICS & GYNECOLOGY | Admitting: OBSTETRICS & GYNECOLOGY
Payer: COMMERCIAL

## 2020-11-16 VITALS
BODY MASS INDEX: 25.78 KG/M2 | TEMPERATURE: 98.7 F | RESPIRATION RATE: 16 BRPM | WEIGHT: 151 LBS | SYSTOLIC BLOOD PRESSURE: 106 MMHG | HEART RATE: 68 BPM | DIASTOLIC BLOOD PRESSURE: 73 MMHG | HEIGHT: 64 IN | OXYGEN SATURATION: 97 %

## 2020-11-16 DIAGNOSIS — N84.0 ENDOMETRIAL POLYP: ICD-10-CM

## 2020-11-16 DIAGNOSIS — N92.0 MENORRHAGIA WITH REGULAR CYCLE: ICD-10-CM

## 2020-11-16 DIAGNOSIS — N84.0 ENDOMETRIAL POLYP: Primary | ICD-10-CM

## 2020-11-16 LAB — HCG UR QL: NEGATIVE

## 2020-11-16 PROCEDURE — 370N000002 HC ANESTHESIA TECHNICAL FEE, EACH ADDTL 15 MIN: Performed by: OBSTETRICS & GYNECOLOGY

## 2020-11-16 PROCEDURE — 88305 TISSUE EXAM BY PATHOLOGIST: CPT | Mod: TC | Performed by: OBSTETRICS & GYNECOLOGY

## 2020-11-16 PROCEDURE — 250N000009 HC RX 250: Performed by: NURSE ANESTHETIST, CERTIFIED REGISTERED

## 2020-11-16 PROCEDURE — 999N000136 HC STATISTIC PRE PROC ASSESS II: Performed by: OBSTETRICS & GYNECOLOGY

## 2020-11-16 PROCEDURE — 761N000007 HC RECOVERY PHASE 2 EACH 15 MINS: Performed by: OBSTETRICS & GYNECOLOGY

## 2020-11-16 PROCEDURE — 58558 HYSTEROSCOPY BIOPSY: CPT | Performed by: OBSTETRICS & GYNECOLOGY

## 2020-11-16 PROCEDURE — 250N000011 HC RX IP 250 OP 636: Performed by: OBSTETRICS & GYNECOLOGY

## 2020-11-16 PROCEDURE — 370N000001 HC ANESTHESIA TECHNICAL FEE, 1ST 30 MIN: Performed by: OBSTETRICS & GYNECOLOGY

## 2020-11-16 PROCEDURE — 258N000003 HC RX IP 258 OP 636: Performed by: NURSE ANESTHETIST, CERTIFIED REGISTERED

## 2020-11-16 PROCEDURE — 81025 URINE PREGNANCY TEST: CPT | Performed by: NURSE ANESTHETIST, CERTIFIED REGISTERED

## 2020-11-16 PROCEDURE — 271N000001 HC OR GENERAL SUPPLY NON-STERILE: Performed by: OBSTETRICS & GYNECOLOGY

## 2020-11-16 PROCEDURE — 272N000001 HC OR GENERAL SUPPLY STERILE: Performed by: OBSTETRICS & GYNECOLOGY

## 2020-11-16 PROCEDURE — 250N000009 HC RX 250: Performed by: OBSTETRICS & GYNECOLOGY

## 2020-11-16 PROCEDURE — 88305 TISSUE EXAM BY PATHOLOGIST: CPT | Mod: 26 | Performed by: PATHOLOGY

## 2020-11-16 PROCEDURE — 250N000011 HC RX IP 250 OP 636: Performed by: NURSE ANESTHETIST, CERTIFIED REGISTERED

## 2020-11-16 PROCEDURE — 360N000020 HC SURGERY LEVEL 3 1ST 30 MIN: Performed by: OBSTETRICS & GYNECOLOGY

## 2020-11-16 PROCEDURE — 360N000021 HC SURGERY LEVEL 3 EA 15 ADDTL MIN: Performed by: OBSTETRICS & GYNECOLOGY

## 2020-11-16 PROCEDURE — 250N000013 HC RX MED GY IP 250 OP 250 PS 637: Performed by: NURSE ANESTHETIST, CERTIFIED REGISTERED

## 2020-11-16 RX ORDER — GABAPENTIN 300 MG/1
300 CAPSULE ORAL ONCE
Status: COMPLETED | OUTPATIENT
Start: 2020-11-16 | End: 2020-11-16

## 2020-11-16 RX ORDER — SODIUM CHLORIDE, SODIUM LACTATE, POTASSIUM CHLORIDE, CALCIUM CHLORIDE 600; 310; 30; 20 MG/100ML; MG/100ML; MG/100ML; MG/100ML
INJECTION, SOLUTION INTRAVENOUS CONTINUOUS
Status: DISCONTINUED | OUTPATIENT
Start: 2020-11-16 | End: 2020-11-16 | Stop reason: HOSPADM

## 2020-11-16 RX ORDER — DEXAMETHASONE SODIUM PHOSPHATE 4 MG/ML
INJECTION, SOLUTION INTRA-ARTICULAR; INTRALESIONAL; INTRAMUSCULAR; INTRAVENOUS; SOFT TISSUE PRN
Status: DISCONTINUED | OUTPATIENT
Start: 2020-11-16 | End: 2020-11-16

## 2020-11-16 RX ORDER — IBUPROFEN 600 MG/1
600 TABLET, FILM COATED ORAL
Status: DISCONTINUED | OUTPATIENT
Start: 2020-11-16 | End: 2020-11-16 | Stop reason: HOSPADM

## 2020-11-16 RX ORDER — IBUPROFEN 200 MG
600 TABLET ORAL EVERY 6 HOURS PRN
COMMUNITY
Start: 2020-11-16 | End: 2021-11-09

## 2020-11-16 RX ORDER — ONDANSETRON 4 MG/1
4 TABLET, ORALLY DISINTEGRATING ORAL EVERY 30 MIN PRN
Status: DISCONTINUED | OUTPATIENT
Start: 2020-11-16 | End: 2020-11-16 | Stop reason: HOSPADM

## 2020-11-16 RX ORDER — NALOXONE HYDROCHLORIDE 0.4 MG/ML
.1-.4 INJECTION, SOLUTION INTRAMUSCULAR; INTRAVENOUS; SUBCUTANEOUS
Status: DISCONTINUED | OUTPATIENT
Start: 2020-11-16 | End: 2020-11-16 | Stop reason: HOSPADM

## 2020-11-16 RX ORDER — HYDROXYZINE HYDROCHLORIDE 50 MG/1
50 TABLET, FILM COATED ORAL EVERY 6 HOURS PRN
Status: DISCONTINUED | OUTPATIENT
Start: 2020-11-16 | End: 2020-11-16 | Stop reason: HOSPADM

## 2020-11-16 RX ORDER — PROPOFOL 10 MG/ML
INJECTION, EMULSION INTRAVENOUS CONTINUOUS PRN
Status: DISCONTINUED | OUTPATIENT
Start: 2020-11-16 | End: 2020-11-16

## 2020-11-16 RX ORDER — LIDOCAINE 40 MG/G
CREAM TOPICAL
Status: DISCONTINUED | OUTPATIENT
Start: 2020-11-16 | End: 2020-11-16 | Stop reason: HOSPADM

## 2020-11-16 RX ORDER — LIDOCAINE HYDROCHLORIDE 10 MG/ML
INJECTION, SOLUTION INFILTRATION; PERINEURAL PRN
Status: DISCONTINUED | OUTPATIENT
Start: 2020-11-16 | End: 2020-11-16

## 2020-11-16 RX ORDER — FENTANYL CITRATE 50 UG/ML
INJECTION, SOLUTION INTRAMUSCULAR; INTRAVENOUS PRN
Status: DISCONTINUED | OUTPATIENT
Start: 2020-11-16 | End: 2020-11-16

## 2020-11-16 RX ORDER — ACETAMINOPHEN 325 MG/1
975 TABLET ORAL ONCE
Status: COMPLETED | OUTPATIENT
Start: 2020-11-16 | End: 2020-11-16

## 2020-11-16 RX ORDER — HYDROMORPHONE HYDROCHLORIDE 1 MG/ML
.3-.5 INJECTION, SOLUTION INTRAMUSCULAR; INTRAVENOUS; SUBCUTANEOUS EVERY 10 MIN PRN
Status: DISCONTINUED | OUTPATIENT
Start: 2020-11-16 | End: 2020-11-16 | Stop reason: HOSPADM

## 2020-11-16 RX ORDER — CEFAZOLIN SODIUM 2 G/100ML
2 INJECTION, SOLUTION INTRAVENOUS
Status: COMPLETED | OUTPATIENT
Start: 2020-11-16 | End: 2020-11-16

## 2020-11-16 RX ORDER — KETOROLAC TROMETHAMINE 30 MG/ML
30 INJECTION, SOLUTION INTRAMUSCULAR; INTRAVENOUS ONCE
Status: COMPLETED | OUTPATIENT
Start: 2020-11-16 | End: 2020-11-16

## 2020-11-16 RX ORDER — ONDANSETRON 2 MG/ML
4 INJECTION INTRAMUSCULAR; INTRAVENOUS EVERY 30 MIN PRN
Status: DISCONTINUED | OUTPATIENT
Start: 2020-11-16 | End: 2020-11-16 | Stop reason: HOSPADM

## 2020-11-16 RX ORDER — CEFAZOLIN SODIUM 1 G/50ML
1 INJECTION, SOLUTION INTRAVENOUS SEE ADMIN INSTRUCTIONS
Status: DISCONTINUED | OUTPATIENT
Start: 2020-11-16 | End: 2020-11-16 | Stop reason: HOSPADM

## 2020-11-16 RX ORDER — BUPIVACAINE HYDROCHLORIDE 5 MG/ML
INJECTION, SOLUTION PERINEURAL PRN
Status: DISCONTINUED | OUTPATIENT
Start: 2020-11-16 | End: 2020-11-16 | Stop reason: HOSPADM

## 2020-11-16 RX ORDER — MEPERIDINE HYDROCHLORIDE 25 MG/ML
12.5 INJECTION INTRAMUSCULAR; INTRAVENOUS; SUBCUTANEOUS
Status: DISCONTINUED | OUTPATIENT
Start: 2020-11-16 | End: 2020-11-16 | Stop reason: HOSPADM

## 2020-11-16 RX ORDER — HYDROXYZINE HYDROCHLORIDE 25 MG/1
25 TABLET, FILM COATED ORAL EVERY 6 HOURS PRN
Status: DISCONTINUED | OUTPATIENT
Start: 2020-11-16 | End: 2020-11-16 | Stop reason: HOSPADM

## 2020-11-16 RX ORDER — ONDANSETRON 2 MG/ML
INJECTION INTRAMUSCULAR; INTRAVENOUS PRN
Status: DISCONTINUED | OUTPATIENT
Start: 2020-11-16 | End: 2020-11-16

## 2020-11-16 RX ORDER — FENTANYL CITRATE 50 UG/ML
25-50 INJECTION, SOLUTION INTRAMUSCULAR; INTRAVENOUS
Status: DISCONTINUED | OUTPATIENT
Start: 2020-11-16 | End: 2020-11-16 | Stop reason: HOSPADM

## 2020-11-16 RX ADMIN — ONDANSETRON 4 MG: 2 INJECTION INTRAMUSCULAR; INTRAVENOUS at 12:43

## 2020-11-16 RX ADMIN — DEXAMETHASONE SODIUM PHOSPHATE 8 MG: 4 INJECTION, SOLUTION INTRA-ARTICULAR; INTRALESIONAL; INTRAMUSCULAR; INTRAVENOUS; SOFT TISSUE at 12:43

## 2020-11-16 RX ADMIN — FENTANYL CITRATE 100 MCG: 50 INJECTION, SOLUTION INTRAMUSCULAR; INTRAVENOUS at 12:43

## 2020-11-16 RX ADMIN — PROPOFOL 200 MCG/KG/MIN: 10 INJECTION, EMULSION INTRAVENOUS at 12:43

## 2020-11-16 RX ADMIN — LIDOCAINE HYDROCHLORIDE 0.2 ML: 10 INJECTION, SOLUTION EPIDURAL; INFILTRATION; INTRACAUDAL; PERINEURAL at 11:52

## 2020-11-16 RX ADMIN — SODIUM CHLORIDE, POTASSIUM CHLORIDE, SODIUM LACTATE AND CALCIUM CHLORIDE: 600; 310; 30; 20 INJECTION, SOLUTION INTRAVENOUS at 11:52

## 2020-11-16 RX ADMIN — ACETAMINOPHEN 975 MG: 325 TABLET, FILM COATED ORAL at 11:27

## 2020-11-16 RX ADMIN — CEFAZOLIN SODIUM 2 G: 2 INJECTION, SOLUTION INTRAVENOUS at 12:38

## 2020-11-16 RX ADMIN — LIDOCAINE HYDROCHLORIDE 100 MG: 10 INJECTION, SOLUTION INFILTRATION; PERINEURAL at 12:43

## 2020-11-16 RX ADMIN — KETOROLAC TROMETHAMINE 30 MG: 30 INJECTION, SOLUTION INTRAMUSCULAR at 11:52

## 2020-11-16 RX ADMIN — GABAPENTIN 300 MG: 300 CAPSULE ORAL at 11:27

## 2020-11-16 RX ADMIN — MIDAZOLAM 2 MG: 1 INJECTION INTRAMUSCULAR; INTRAVENOUS at 12:38

## 2020-11-16 ASSESSMENT — MIFFLIN-ST. JEOR: SCORE: 1364.93

## 2020-11-16 NOTE — OP NOTE
[facility] Gynecology Brief Operative Note    Pre-operative diagnosis: Endometrial polyp [N84.0]  Menorrhagia with regular cycle [N92.0]   Post-operative diagnosis: Same   Procedure: Dilation and curettage  Hysteroscopy   Surgeon: Quinn Cash MD   Assistant(s): None   Anesthesia: MAC (monitored anesthesia care) and Paracervical block:  .5% Buvicaine   Estimated blood loss: 10 ml     Total IV fluids: (See anesthesia record)  700ml   Blood transfusion: No transfusion was given during surgery   Total urine output: Not measured   Drains:  Distension media: NS None  300 ml deficit   Specimens: Endometrial polyp and curettings   Findings: Endometrial polyp   Complications: None   Condition: Stable   Comments: Serina Washington   1985  [id number]      Serina Washington  presented for the above procedure.  She has endometrial polyp , is s/p SAB  I met with Serina  and her , Rufino and discussed the planned procedure as well as the expected post operative course.  Risks of complications were noted and postoperative signs to watch for outlined.  Questions were answered and consent signed.  She was taken to the OR @ [facility] where she was placed in the supine position. She underwent General anesthesia with endotracheal intubation.  She was then placed in the Dorso-lithotomy position in [facility].  An examination under anesthesia was performed that showed: mid position uterus / multip size  She was prepped and draped.  A timeout was held confirming her identity and proposed procedures. All were in agreement.     After adequate anesthesia was documented a speculum was placed in the vagina cervix was isolated.  Paracervical block was placed with 0.5% bupivacaine and a tenaculum placed at 12:00.  Uterus was sounded to 8 cm and easily serially dilated to #7 Hegar dilator.  A Myosure scope was placed into the endocervical loss and the distal end of the polyp was encountered approximately third the  distance into the endocervix.  The base of the polyp was found in the lower uterine segment on her right.  Both tubal ostia were clearly visualized.  The hysteroscopic morcellator was utilized to remove the polyp and the excess posterior endometrium.  She then underwent sharp curettage until palpably clear.  Repeat hysteroscopy showed some fragments that were removed with the morcellator.  At this point procedure was complete.  Sponge and counts were correct.  Patient was awakened taken the PACU in good condition.  Quinn Cash MD

## 2020-11-16 NOTE — OR NURSING
Uneventful recovery until pt. Requested and stated she is ready to go home.  VSS, ate and drank, no nausea.  While dressing on the side of the bed pt became dizz and laid back dopwn on the bed. /70.  Stable but weak.  Pt advised she should lie down for a while longer for observation and steadiness.  Pt complied.  Observe until stable to attempt to get up again.

## 2020-11-16 NOTE — ANESTHESIA CARE TRANSFER NOTE
Patient: Serina Washington    Procedure(s):  HYSTEROSCOPY, THERAPEUTIC    Diagnosis: Endometrial polyp [N84.0]  Menorrhagia with regular cycle [N92.0]  Diagnosis Additional Information: No value filed.    Anesthesia Type:   General     Note:  Airway :Face Mask  Patient transferred to:Phase II  Handoff Report: Identifed the Patient, Identified the Reponsible Provider, Reviewed the pertinent medical history, Discussed the surgical course, Reviewed Intra-OP anesthesia mangement and issues during anesthesia, Set expectations for post-procedure period and Allowed opportunity for questions and acknowledgement of understanding      Vitals: (Last set prior to Anesthesia Care Transfer)    CRNA VITALS  11/16/2020 1245 - 11/16/2020 1316      11/16/2020             Pulse:  61    SpO2:  94 %                Electronically Signed By: ISABELLA Juárez CRNA  November 16, 2020  1:16 PM

## 2020-11-16 NOTE — DISCHARGE INSTRUCTIONS
Nausea and Vomiting  What are nausea and vomiting?   Nausea is the queasy feeling you usually have before you vomit. Vomiting is the forceful emptying (throwing up) of the stomach's contents through the mouth.   What causes nausea and vomiting?   Nausea and vomiting are symptoms that may occur with many conditions, such as:   Anesthesia medications   side effect of narcotic medicines  exposure to unpleasant odors or sights   stress and anxiety     How is it treated?   At first you should rest your stomach for a few hours by eating nothing solid and sipping only clear liquids. A little later you can eat soft bland foods that are easy to digest.   It is important to drink small amounts (1 to 4 ounces) often so that you do not become dehydrated. Gradually drink larger amounts of the clear fluids. If you vomit, wait an hour, then start over with a smaller amount of fluid.   Eat slowly and avoid foods that are acidic, spicy, fatty, or fibrous (such as meats, coarse grains, and raw vegetables). Also avoid extremely hot or cold food. In addition, avoid dairy products if you have diarrhea. You may start eating your normal diet again in 3 days or so, when all signs of illness have passed.   Rest as much as possible. Sit or lie down with your head propped up. Do not lie flat for at least 2 hours after eating. Nausea and vomiting usually last only a short period of time. If you have cramping or pain in your belly you can try putting a heating pad set at low or a covered hot water bottle on your belly. Never set a heating pad on high because you could get burned.   If you have been vomiting for more than a day or have had diarrhea for over 3 days, you may need to have an exam by your provider, including a check for dehydration. If you are very dehydrated, you may need to be given fluids intravenously (IV). In children and older adults dehydration can quickly become life threatening.   When should I call my healthcare provider?    Talk with your provider if you are unable to keep fluids down for more than 12 hours or if you have any of the following symptoms with nausea and vomiting:   severe headache or neck ache, or stiff neck   severe abdominal pain   diarrhea and vomiting that last more than 24 hours   blood in the vomited material that may look red, brown, or black, or like coffee grounds   bloody diarrhea   very forceful vomiting   signs of dehydration such as dry mouth, excessive thirst, little or no urination, severe weakness, dizziness, or lightheadedness.   If you have nausea and pain in the jaw, arm, shoulder, chest, or back; sweating; shortness of breath; or lightheadedness; call 911 for emergency care.     Post-operative Infection  What is a wound infection?    watch for signs of infection. Signs that the wound/incision is infected include:   Pus or cloudy fluid draining from the incision.   A pimple or yellow crust forming on the incision   The scab is increasing in size.   Increasing redness occurs around the incisions  A red streak is spreading from the wound toward the heart.   The incision has become extremely tender and/or hot   The lymph node draining that area of skin may become large and tender.   You may develop a fever over 100?F (37.8?C).     What is the cause?   Most skin infections follow breaks in the skin (for example, surgery,  from cuts, puncture wounds, animal bites, splinters, thorns, or burns). Bacteria (especially staphylococcus or streptococcus) then invade the wound and cause the infection.    Deeper wounds (like surgical incisions) are much more likely to become infected than superficial wounds (for example, scrapes).     What is the treatment?   Call your doctor's clinic if you feel you have the beginnings of an infection   Antibiotics You will probably need antibiotics prescribed by your healthcare provider. This medicine will kill the germs that are causing the wound infection. Try not to forget any  of the doses.  Even if you feel better in a few days, take the antibiotic until it is completely gone to keep the infection from flaring up again.    Fever and pain relief Take acetaminophen or ibuprofen if you develop a fever over 102?F (39?C)    How can I help prevent infections?   Wash around all new incisions vigorously with soap and water for 5 to 10 minutes to remove dirt and bacteria.      When should I call my healthcare provider?   Call IMMEDIATELY if:   The redness keeps spreading.   The wound becomes extremely painful.   Call during office hours if:   The fever is not gone 48 hours after you start taking an antibiotic.   The wound infection does not look better 3 days after your start taking an antibiotic.   The wound isn't completely healed within 10 days.   You have other questions or concerns.   Break through Bleeding  As instructed per Surgeon or Nurse.    Post Op Infection  Be alert for signs of infection: redness, swelling, heat, drainage of pus, and/or elevated temperature.  Contact your surgeon if these occur.    Nausea   If post op nausea occurs, at first rest your stomach for a few hours by eating nothing solid and sipping only clear liquids.  Call your Surgeon if nausea does not resolve in 24 hours.                    Same Day Surgery Discharge Instructions  Special Precautions After Surgery - Adult    1. It is not unusual to feel lightheaded or faint, up to 24 hours after surgery or while taking pain medication.  If you have these symptoms; sit for a few minutes before standing and have someone assist you when getting up.  2. You should rest and relax for the next 24 hours and must have someone stay with you for at least 24 hours after your discharge.  3. DO NOT DRIVE any vehicle or operate mechanical equipment for 24 hours following the end of your surgery.  DO NOT DRIVE while taking narcotic pain medications that have been prescribed by your physician.  If you had a limb operated on, you  must be able to use it fully to drive.  4. DO NOT drink alcoholic beverages for 24 hours following surgery or while taking prescription pain medication.  5. Drink clear liquids (apple juice, ginger ale, broth, 7-Up, etc.).  Progress to your regular diet as you feel able.  6. Any questions call your physician and do not make important decisions for 24 hours.      __________________________________________________________________________________________________________________________________  IMPORTANT NUMBERS:    [facility] Main Number:  [phone], [phone]  Pharmacy:  [phone]  Same Day Surgery:  [phone], Monday - Friday until 8:30 p.m.  Urgent Care:  [phone]  Emergency Room:  [phone]      [facility]:  [phone]                                                                             [facility]:  [phone] option 1  [facility]:  [phone]     [facility]:  [phone]   [facility]:  [phone]   Home Medical Equipment: [phone]  [facility]:  [phone]

## 2020-11-16 NOTE — ANESTHESIA POSTPROCEDURE EVALUATION
Patient: Serina Washington    Procedure(s):  HYSTEROSCOPY, THERAPEUTIC    Diagnosis:Endometrial polyp [N84.0]  Menorrhagia with regular cycle [N92.0]  Diagnosis Additional Information: No value filed.    Anesthesia Type:  General    Note:  Anesthesia Post Evaluation    Patient location during evaluation: Phase 2  Patient participation: Able to fully participate in evaluation  Level of consciousness: awake and alert  Pain management: adequate  Airway patency: patent  Cardiovascular status: acceptable and hemodynamically stable  Respiratory status: acceptable and room air  Hydration status: acceptable  PONV: none     Anesthetic complications: None          Last vitals:  Vitals:    11/16/20 1105 11/16/20 1322 11/16/20 1330   BP: 113/83 95/68 90/55   Pulse: 72 64 64   Resp: 16 16 16   Temp: 37.1  C (98.7  F)     SpO2: 96%  99%         Electronically Signed By: ISABELLA Juárez CRNA  November 16, 2020  1:54 PM

## 2020-11-18 LAB — COPATH REPORT: NORMAL

## 2020-12-07 ENCOUNTER — NURSE TRIAGE (OUTPATIENT)
Dept: NURSING | Facility: CLINIC | Age: 35
End: 2020-12-07

## 2020-12-07 ENCOUNTER — VIRTUAL VISIT (OUTPATIENT)
Dept: URGENT CARE | Facility: CLINIC | Age: 35
End: 2020-12-07
Payer: COMMERCIAL

## 2020-12-07 DIAGNOSIS — J40 BRONCHITIS: Primary | ICD-10-CM

## 2020-12-07 PROCEDURE — 99214 OFFICE O/P EST MOD 30 MIN: CPT | Mod: TEL | Performed by: PHYSICIAN ASSISTANT

## 2020-12-07 RX ORDER — ALBUTEROL SULFATE 90 UG/1
2 AEROSOL, METERED RESPIRATORY (INHALATION) 4 TIMES DAILY
Qty: 18 G | Refills: 0 | Status: SHIPPED | OUTPATIENT
Start: 2020-12-07 | End: 2023-01-11

## 2020-12-07 RX ORDER — PREDNISONE 20 MG/1
20 TABLET ORAL 2 TIMES DAILY
Qty: 10 TABLET | Refills: 0 | Status: SHIPPED | OUTPATIENT
Start: 2020-12-07 | End: 2020-12-12

## 2020-12-07 ASSESSMENT — ENCOUNTER SYMPTOMS
SORE THROAT: 1
COUGH: 1
NEUROLOGICAL NEGATIVE: 1
GASTROINTESTINAL NEGATIVE: 1
FACIAL SWELLING: 0
DIAPHORESIS: 0
FEVER: 0
MUSCULOSKELETAL NEGATIVE: 1
ACTIVITY CHANGE: 1
APPETITE CHANGE: 0
WHEEZING: 0
UNEXPECTED WEIGHT CHANGE: 0
CHOKING: 0
EYES NEGATIVE: 1
FATIGUE: 1
STRIDOR: 0
SINUS PAIN: 0
CHEST TIGHTNESS: 0
CARDIOVASCULAR NEGATIVE: 1
TROUBLE SWALLOWING: 0
PSYCHIATRIC NEGATIVE: 1
SHORTNESS OF BREATH: 1
RHINORRHEA: 0
VOICE CHANGE: 0
SINUS PRESSURE: 0
APNEA: 0
CHILLS: 0

## 2020-12-07 NOTE — PROGRESS NOTES
"The patient has been notified of the following:      \"We have found that certain health care needs can be provided without the need for a face to face visit.  This service lets us provide the care you need with a phone conversation.       I will have full access to your [facility] medical record during this entire phone call.   I will be taking notes for your medical record.      Since this is like an office visit, we will bill your insurance company for this service.       There are potential benefits and risks of telephone visits (e.g. limits to patient confidentiality) that differ from in-person visits.?  Confidentiality still applies for telephone services, and nobody will record the visit.  It is important to be in a quiet, private space that is free of distractions (including cell phone or other devices) during the visit.??      If during the course of the call I believe a telephone visit is not appropriate, you will not be charged for this service\"     Consent has been obtained for this service by care team member: Yes       SUBJECTIVE:   Serina Washington is a 35 year old female presenting with a chief complaint of cough    She is an established patient of [facility]    Onset of symptoms was 2 week(s) ago.  Course of illness is waxing and waning.    Severity moderate  Current and Associated symptoms: stuffy nose, cough - non-productive, shortness of breath, sore throat and fatigue  Treatment measures tried include Decongestants, OTC Cough med, Fluids and Rest.  Predisposing factors include None.      Review of Systems   Constitutional: Positive for activity change and fatigue. Negative for appetite change, chills, diaphoresis, fever and unexpected weight change.   HENT: Positive for congestion and sore throat. Negative for dental problem, drooling, ear discharge, ear pain, facial swelling, hearing loss, mouth sores, nosebleeds, postnasal drip, rhinorrhea, sinus pressure, sinus pain, sneezing, tinnitus, trouble " swallowing and voice change.    Eyes: Negative.    Respiratory: Positive for cough and shortness of breath. Negative for apnea, choking, chest tightness, wheezing and stridor.    Cardiovascular: Negative.    Gastrointestinal: Negative.    Genitourinary: Negative.    Musculoskeletal: Negative.    Skin: Negative.    Neurological: Negative.    Psychiatric/Behavioral: Negative.        No past medical history on file.  Family History   Problem Relation Age of Onset     Kidney Disease Father         dialysis     Hypertension Father      Gastrointestinal Disease Father         stomach ulcer     Hypertension Mother      Thyroid Disease Mother      Other Cancer Paternal Grandfather      Other Cancer Paternal Grandmother      Current Outpatient Medications   Medication Sig Dispense Refill     ibuprofen (ADVIL/MOTRIN) 200 MG tablet Take 3 tablets (600 mg) by mouth every 6 hours as needed for other (mild and/or inflammatory pain)       Prenatal Multivit-Min-Fe-FA (PRENATAL VITAMINS PO) Take 2 tablets by mouth every evening [place]       Social History     Tobacco Use     Smoking status: Never Smoker     Smokeless tobacco: Never Used   Substance Use Topics     Alcohol use: No     Alcohol/week: 0.0 standard drinks       OBJECTIVE  Vital Signs and PE were not done as this was a telephone visit.       ASSESSMENT/PLAN:  (J40) Bronchitis  (primary encounter diagnosis)  Plan: albuterol (PROAIR HFA/PROVENTIL HFA/VENTOLIN         HFA) 108 (90 Base) MCG/ACT inhaler, predniSONE         (DELTASONE) 20 MG tablet,         amoxicillin-clavulanate (AUGMENTIN) 875-125 MG         tablet, Symptomatic COVID-19 Virus         (Coronavirus) by PCR    Our Telephone visit was 15 min in duration.   Age 12 months or more  Okay to use Zarbee's   Okay to use Rx Children Tylenol if prescribed (Dose based on weight)    Age 2-12:   Okay to use Children Motrin or Tylenol over the counter.    Adults:  Okay to take acetaminophen 500 mg- 2 tabs (Total  of 1000 mg) every 8 hrs   Okay to take ibuprofen 200 mg- 3 tabs (Total of 600 mg) every 6 hours        Okay to use Neti pot for sinus lavage up to three times daily for congestion and sinus pressure if present. Daily hot shower can be beneficial for congestion and body aches. Okay to use bedroom vaporizer or humidifier if symptoms are worse at night. Nightly Vicks Vapor rub and 5-10 mg of Melatonin okay to use for sleep.     Over the counter cough medication and decongestants okay if not prescribed by me during this visit. For homeopathic alternatives to cough syrup and decongestant, feel free to try Elderberry extract.    Okay to use salt water gargles, warm tea (or warm water with lemon and honey), and lozenges for any throat discomfort. Chloraseptic spray is also highly encourages for throat pain/irritation.     Patient will need to get plenty of rest and drink at least 1.5-2 liters of fluids daily for adults and 1-1.5 liters for children. If vomiting and not tolerating liquids for more than 24 hrs, please go to your nearest emergency department for IV fluids and further treatment.     Patient is not contagious after 1 week from start of symptoms. If possible, wear mask for first 7 days. Wash hands regularly and vigorously for 30 seconds often.     Discharge Instructions for COVID-19 Patients  You have--or may have--COVID-19. Please follow the instructions listed below.   If you have a weakened immune system, discuss with your doctor any other actions you need to take.  How can I protect others?  If you have symptoms (fever, cough, body aches or trouble breathing):    Stay home and away from others (self-isolate) until:  ? At least 10 days have passed since your symptoms started, And   ? You've had no fever--and no medicine that reduces fever--for 1 full day (24 hours), And    ? Your other symptoms have resolved (gotten better).  If you don't show symptoms, but testing showed that you have COVID-19:    Stay home  "and away from others (self-isolate). Follow the tips under \"How do I self-isolate?\" below for 10 days (20 days if you have a weak immune system).    You don't need to be retested for COVID-19 before going back to school or work. As long as you're fever-free and feeling better, you can go back to school, work and other activities after waiting the 10 or 20 days.   How do I self-isolate?    Stay in your own room, even for meals. Use your own bathroom if you can.    Stay away from others in your home. No hugging, kissing or shaking hands. No visitors.    Don't go to work, school or anywhere else.    Clean \"high touch\" surfaces often (doorknobs, counters, handles). Use household cleaning spray or wipes. You'll find a full list of  on the EPA website: www.epa.gov/pesticide-registration/list-n-disinfectants-use-against-sars-cov-2.    Cover your mouth and nose with a mask or other face covering to avoid spreading germs.    Wash your hands and face often. Use soap and water.    Caregivers in these groups are at risk for severe illness due to COVID-19:  ? People 65 years and older  ? People who live in a nursing home or long-term care facility  ? People with chronic disease (lung, heart, cancer, diabetes, kidney, liver, immunologic)  ? People who have a weakened immune system, including those who:    Are in cancer treatment    Take medicine that weakens the immune system, such as corticosteroids    Had a bone marrow or organ transplant    Have an immune deficiency    Have poorly controlled HIV or AIDS    Are obese (body mass index of 40 or higher)    Smoke regularly    Caregivers should wear gloves while washing dishes, handling laundry and cleaning bedrooms and bathrooms.    Use caution when washing and drying laundry: Don't shake dirty laundry and use the warmest water setting that you can.    For more tips on managing your health at home, go to www.cdc.gov/coronavirus/2019-ncov/downloads/10Things.pdf.  How can I " take care of myself at home?  1. Get lots of rest. Drink extra fluids (unless a doctor has told you not to).    2. Take Tylenol (acetaminophen) for fever or pain. If you have liver or kidney problems, ask your family doctor if it's okay to take Tylenol.     Adults can take either:  ? 650 mg (two 325 mg pills) every 4 to 6 hours, or   ? 1,000 mg (two 500 mg pills) every 8 hours as needed.  ? Note: Don't take more than 3,000 mg in one day. Acetaminophen is found in many medicines (both prescribed and over-the-counter medicines). Read all labels to be sure you don't take too much.   For children, check the Tylenol bottle for the right dose. The dose is based on the child's age or weight.  3. If you have other health problems (like cancer, heart failure, an organ transplant or severe kidney disease): Call your specialty clinic if you don't feel better in the next 2 days.    4. Know when to call 911. Emergency warning signs include:  ? Trouble breathing or shortness of breath  ? Pain or pressure in the chest that doesn't go away  ? Feeling confused like you haven't felt before, or not being able to wake up  ? Bluish-colored lips or face    5. Your doctor may have prescribed a blood thinner medicine. Follow their instructions.  Where can I get more information?    [facility] - About COVID-19: [email or web address]    CDC - What to Do If You're Sick: www.cdc.gov/coronavirus/2019-ncov/about/steps-when-sick.html    CDC - Ending Home Isolation: www.cdc.gov/coronavirus/2019-ncov/hcp/disposition-in-home-patients.html    CDC - Caring for Someone: www.cdc.gov/coronavirus/2019-ncov/if-you-are-sick/care-for-someone.html    [facility] - Interim Guidance for Hospital Discharge to Home: www.health.[facility].[place].us/diseases/coronavirus/hcp/hospdischarge.pdf    [facility] clinical trials (COVID-19 research studies): clinicalaffairs.[facility]-clinical-trials    Below are the COVID-19 hotlines at the [facility]  [facility] ([facility]). Interpreters are available.  ? For health questions: Call 563-286-7507 or 1-625.290.7097 (7 a.m. to 7 p.m.)  ? For questions about schools and childcare: Call 136-217-0843 or 1-989.861.2824 (7 a.m. to 7 p.m.)    For informational purposes only. Not to replace the advice of your health care provider. Clinically reviewed by the Infection Prevention Team. Copyright   2020 [facility]. All rights reserved. [facility]inksport [id number] - REV 08/04/20.    Giuseppe Jason PA-C on 12/7/2020 at 3:34 PM

## 2020-12-07 NOTE — TELEPHONE ENCOUNTER
"Pt calling  + cough for the past 2 weeks off & on   Pt has coughing spasm that \"scares me\"  Feels like she cannot catch her breath when spasm occurs & gets lightheaded.    Pt may have has an exposure to covid 19 11/18/2020  Per protocol pt to be evaluated within 24 hrs  Reviewed care advice & when to call cack  Pt agrees with plan  Transferred call to    Radha Araya RN  [facility]      Additional Information    Negative: Severe difficulty breathing (e.g., struggling for each breath, speaks in single words)    Negative: Bluish (or gray) lips or face now    Negative: [1] Rapid onset of cough AND [2] has hives    Negative: Coughing started suddenly after medicine, an allergic food or bee sting    Negative: [1] Difficulty breathing AND [2] exposure to flames, smoke, or fumes    Negative: [1] Stridor AND [2] difficulty breathing    Negative: Sounds like a life-threatening emergency to the triager    Negative: Choked on object of food that could be caught in the throat    Negative: Chest pain is main symptom    Negative: [1] Previous asthma attacks AND [2] this feels like asthma attack    Negative: Cough lasts > 3 weeks    Negative: Wet (productive) cough (i.e., white-yellow, yellow, green, or brendan colored sputum)    Negative: Chest pain  (Exception: MILD central chest pain, present only when coughing)    Negative: Difficulty breathing    Negative: [1] Coughed up blood AND [2] > 1 tablespoon (15 ml) (Exception: blood-tinged sputum)    Negative: Fever > 103 F (39.4 C)    Negative: [1] Fever > 101 F (38.3 C) AND [2] age > 60    Negative: [1] Fever > 100.0 F (37.8 C) AND [2] bedridden (e.g., nursing home patient, CVA, chronic illness, recovering from surgery)    Negative: [1] Fever > 100.0 F (37.8 C) AND [2] diabetes mellitus or weak immune system (e.g., HIV positive, cancer chemo, splenectomy, organ transplant, chronic steroids)    Negative: Wheezing is present    Negative: [1] Ankle " swelling AND [2] swelling is increasing    Negative: Patient sounds very sick or weak to the triager    [1] Continuous (nonstop) coughing interferes with work or school AND [2] no improvement using cough treatment per protocol    Negative: SEVERE coughing spells (e.g., whooping sound after coughing, vomiting after coughing)    Protocols used: COUGH - ACUTE NON-PRODUCTIVE-A-AH  COVID 19 Nurse Triage Plan/Patient Instructions    Please be aware that novel coronavirus (COVID-19) may be circulating in the community. If you develop symptoms such as fever, cough, or SOB or if you have concerns about the presence of another infection including coronavirus (COVID-19), please contact your health care provider or visit [email or web address].     Disposition/Instructions    Virtual Visit with provider recommended. Reference Visit Selection Guide.    Thank you for taking steps to prevent the spread of this virus.  o Limit your contact with others.  o Wear a simple mask to cover your cough.  o Wash your hands well and often.    Resources    [facility]: About COVID-19: [email or web address]    CDC: What to Do If You're Sick: www.cdc.gov/coronavirus/2019-ncov/about/steps-when-sick.html    CDC: Ending Home Isolation: www.cdc.gov/coronavirus/2019-ncov/hcp/disposition-in-home-patients.html     CDC: Caring for Someone: www.cdc.gov/coronavirus/2019-ncov/if-you-are-sick/care-for-someone.html     [facility]: Interim Guidance for Hospital Discharge to Home: www.health.[facility].[place]/diseases/coronavirus/hcp/hospdischarge.pdf    [facility] clinical trials (COVID-19 research studies): clinicalaffairs.[facility]-clinical-trials     Below are the COVID-19 hotlines at the [place] [facility] of [facility] ([facility]). Interpreters are available.   o For health questions: Call 075-066-8539 or 1-218.127.1036 (7 a.m. to 7 p.m.)  o For questions about schools and childcare: Call 934-964-8180 or 1-506.568.7957 (7 a.m. to 7 p.m.)

## 2020-12-27 ENCOUNTER — HEALTH MAINTENANCE LETTER (OUTPATIENT)
Age: 35
End: 2020-12-27

## 2021-02-19 ENCOUNTER — TELEPHONE (OUTPATIENT)
Dept: OBGYN | Facility: CLINIC | Age: 36
End: 2021-02-19

## 2021-02-19 DIAGNOSIS — O09.299 HISTORY OF MISCARRIAGE, CURRENTLY PREGNANT, UNSPECIFIED TRIMESTER: ICD-10-CM

## 2021-02-19 DIAGNOSIS — O09.299 HISTORY OF MISCARRIAGE, CURRENTLY PREGNANT, UNSPECIFIED TRIMESTER: Primary | ICD-10-CM

## 2021-02-19 PROBLEM — N96 HISTORY OF MULTIPLE MISCARRIAGES: Status: ACTIVE | Noted: 2021-02-19

## 2021-02-19 LAB
B-HCG SERPL-ACNC: 217 IU/L (ref 0–5)
PROGEST SERPL-MCNC: 29.4 NG/ML

## 2021-02-19 PROCEDURE — 36415 COLL VENOUS BLD VENIPUNCTURE: CPT | Performed by: OBSTETRICS & GYNECOLOGY

## 2021-02-19 PROCEDURE — 84702 CHORIONIC GONADOTROPIN TEST: CPT | Performed by: OBSTETRICS & GYNECOLOGY

## 2021-02-19 PROCEDURE — 84144 ASSAY OF PROGESTERONE: CPT | Performed by: OBSTETRICS & GYNECOLOGY

## 2021-02-19 NOTE — TELEPHONE ENCOUNTER
Reason for Call:  Other call back    Detailed comments: Pt calling to schedule early OB visit with Dr. Cash.  Offered 1st opening 3-3-21 - pt doesn't know if this is early enough.  LMP 1-25-21.  Was told by Dr. Cash at last visit that if she found out she was pregnant to call right away and get in right away.  Has h/o 2 pregnancy losses.    Phone Number Patient can be reached at: Home number on file [phone] (home)    Best Time:     Can we leave a detailed message on this number? NA    Call taken on 2/19/2021 at 10:56 AM by Kiara Brannon

## 2021-02-19 NOTE — TELEPHONE ENCOUNTER
Labs ordered.  Quant today and Monday.  Depending on results, we could have her seen for viability ultrasound in office or Radiology    Atiya Sharp M.D.

## 2021-02-19 NOTE — TELEPHONE ENCOUNTER
Please advise if patient is ok to wait for her first OB 3/3/21 with patients h/o 2 losses? Should labs be ordered? LMP: 21 (3w4d).     Copy of Dr. Cash's office visit plan from 19:    ASSESSMENT/PLAN:  (O03.9) SAB (spontaneous )  (primary encounter diagnosis)  Comment: History consistent with a completed SAB  Plan: Conception after the next menstrual period.  Contact the clinic with a positive hCG  Recommend quantitative hCG with serum progesterone level to assure IUP.

## 2021-02-22 DIAGNOSIS — O09.299 HISTORY OF MISCARRIAGE, CURRENTLY PREGNANT, UNSPECIFIED TRIMESTER: ICD-10-CM

## 2021-02-22 LAB — B-HCG SERPL-ACNC: 461 IU/L (ref 0–5)

## 2021-02-22 PROCEDURE — 84702 CHORIONIC GONADOTROPIN TEST: CPT | Performed by: OBSTETRICS & GYNECOLOGY

## 2021-02-22 PROCEDURE — 36415 COLL VENOUS BLD VENIPUNCTURE: CPT | Performed by: OBSTETRICS & GYNECOLOGY

## 2021-02-24 DIAGNOSIS — O09.299 HISTORY OF MISCARRIAGE, CURRENTLY PREGNANT, UNSPECIFIED TRIMESTER: ICD-10-CM

## 2021-02-24 LAB — B-HCG SERPL-ACNC: 719 IU/L (ref 0–5)

## 2021-02-24 PROCEDURE — 84702 CHORIONIC GONADOTROPIN TEST: CPT | Performed by: OBSTETRICS & GYNECOLOGY

## 2021-02-24 PROCEDURE — 36415 COLL VENOUS BLD VENIPUNCTURE: CPT | Performed by: OBSTETRICS & GYNECOLOGY

## 2021-03-08 ENCOUNTER — MYC MEDICAL ADVICE (OUTPATIENT)
Dept: OBGYN | Facility: CLINIC | Age: 36
End: 2021-03-08

## 2021-03-08 ENCOUNTER — TELEPHONE (OUTPATIENT)
Dept: OBGYN | Facility: CLINIC | Age: 36
End: 2021-03-08

## 2021-03-08 DIAGNOSIS — O09.299 HISTORY OF MISCARRIAGE, CURRENTLY PREGNANT, UNSPECIFIED TRIMESTER: Primary | ICD-10-CM

## 2021-03-08 NOTE — TELEPHONE ENCOUNTER
"S-(situation): Reporting bright red bleeding after intercourse    B-(background): Reports hx of 2 mis-carraiges. (Has 1 daughter, then 2 mis-carraiges)     A-(assessment):   \"I am 6 weeks PG and I had some bright red bleeding after intercourse when I wiped and I thought I might be having another mis-carraige. I took a shower, then put a pad on, and then it stopped, thankfully. There was a pink dot the size of a eryn or less on the pad but I wasn't sure if I need to come in?.. I had a little cramping yesterday, too.\"    R-(recommendations): Advised rest and nothing per vagina for now.    Please advise in Dr. Cash's absence. Gerri Nelson RN    "

## 2021-03-08 NOTE — TELEPHONE ENCOUNTER
Telephone message also placed. Per Dr. Hayicle- ultrasound recommended. Patient advised, number given to schedule. Advised 1st OB should be at 8-10 weeks.     Ricardo HAWKINS RN   [facility]

## 2021-03-08 NOTE — TELEPHONE ENCOUNTER
Should have OB ultrasound done to assess for status of pregnancy   Carolin Merida MD   [facility]    Message text

## 2021-03-08 NOTE — TELEPHONE ENCOUNTER
Called patient back. No further bleeding. Discussed monitoring at home. Likely due to intercourse.  Informed of ultrasound order placed. Numbers provided for scheduling. Patient advised that 1st OB should be around 8 weeks.     Ricardo HAWKINS RN   [facility]

## 2021-03-11 ENCOUNTER — HOSPITAL ENCOUNTER (OUTPATIENT)
Dept: ULTRASOUND IMAGING | Facility: CLINIC | Age: 36
Discharge: HOME | End: 2021-03-11
Attending: OBSTETRICS & GYNECOLOGY | Admitting: OBSTETRICS & GYNECOLOGY
Payer: COMMERCIAL

## 2021-03-11 DIAGNOSIS — O09.299 HISTORY OF MISCARRIAGE, CURRENTLY PREGNANT, UNSPECIFIED TRIMESTER: ICD-10-CM

## 2021-03-11 PROCEDURE — 76801 OB US < 14 WKS SINGLE FETUS: CPT

## 2021-03-15 ENCOUNTER — TELEPHONE (OUTPATIENT)
Dept: OBGYN | Facility: CLINIC | Age: 36
End: 2021-03-15

## 2021-03-15 DIAGNOSIS — O09.299 HISTORY OF MISCARRIAGE, CURRENTLY PREGNANT, UNSPECIFIED TRIMESTER: Primary | ICD-10-CM

## 2021-03-15 NOTE — TELEPHONE ENCOUNTER
Called patient and just wanted to check to make sure that there were no other issues or concerns at this time with patients past history of miscarriages.     Patient states that she is not having any bleeding, or cramping and feels fine, just wanted to get the follow up ultrasound scheduled per Dr. Olson recommendations.     Please advise if Ok to order repeat ultrasound for after 3/18/21.        Carolin Merida MD   3/11/2021  3:24 PM CST      The ultrasound shows an early pregnancy; we cannot confirm viability as yet as is likely too early.  You should set up an appointment for 7-10 days for repeat ultrasound  Carolin Merida MD  [facility]       Thanks,    Helen Sahu RN

## 2021-03-15 NOTE — TELEPHONE ENCOUNTER
Yes, please just make sure to schedule at least 7 days out to make sure there was enough interval time to see growth/change.     Thanks,   Arina    Message text

## 2021-03-15 NOTE — TELEPHONE ENCOUNTER
Reason for Call: Request for an order or referral:    Order or referral being requested: Repeat ultrasound (OB)    Date needed: as soon as possible    Has the patient been seen by the PCP for this problem? YES    Additional comments: NA    Phone number Patient can be reached at:  Home number on file [phone] (home)    Best Time:  Any    Can we leave a detailed message on this number?  YES    Call taken on 3/15/2021 at 11:15 AM by Denise Behrendt

## 2021-03-16 ENCOUNTER — TELEPHONE (OUTPATIENT)
Dept: OBGYN | Facility: CLINIC | Age: 36
End: 2021-03-16

## 2021-03-16 NOTE — TELEPHONE ENCOUNTER
S-(situation): Pt calling to report that she spotted one time last night and again today.  It was brownish in color and not bright red blood.  Pt is concerned since she had 2 miscarriages in the past.    B-(background): , approx 7 wks.    A-(assessment): spotting    R-(recommendations): Pt was advised to avoid any strenuous activity, no lifting, pushing or pulling, pelvic rest.  Elevate legs to take gravity off of pelvis.  Monitor bleeding and cramping.  If saturating a pad per hour x 2 hours she is to be seen in the ER.  Follow up with ob/gyn triage RN as needed.    Pt agrees with this plan.    Sandie Graf  [facility] RN

## 2021-03-19 ENCOUNTER — NURSE TRIAGE (OUTPATIENT)
Dept: FAMILY MEDICINE | Facility: CLINIC | Age: 36
End: 2021-03-19

## 2021-03-19 NOTE — TELEPHONE ENCOUNTER
"Pt states that this is her third pregnancy, and she has lost the last two. Vaginal bleeding with clots has been consistent for the past few hours. No available appts today for new OB. Advised seeking care in the ER for evaluation. Pt would like to monitor her symptoms at home for a little longer. Appt kept for next week.     Reason for Disposition    MODERATE vaginal bleeding (i.e., soaking 1 pad) and present > 6 hours    Additional Information    Negative: Shock suspected (e.g., cold/pale/clammy skin, too weak to stand, low BP, rapid pulse)    Negative: Difficult to awaken or acting confused (e.g., disoriented, slurred speech)    Negative: Passed out (i.e., fainted, collapsed and was not responding)    Negative: Sounds like a life-threatening emergency to the triager    Negative: Vaginal bleeding and pregnant > 20 weeks    Negative: Not pregnant or pregnancy status unknown    Negative: SEVERE abdominal pain (e.g., excruciating)    Negative: SEVERE vaginal bleeding (i.e., soaking 2 pads / hour, large blood clots) and present for 2 or more hours    Negative: SEVERE dizziness (e.g., unable to stand, requires support to walk, feels like passing out)    Negative: MODERATE vaginal bleeding (i.e., soaking 1 pad / hour, clots) and pregnant > 12 weeks    Negative: Passed tissue (e.g., gray-white)    Negative: Shoulder pain    Answer Assessment - Initial Assessment Questions  1. ONSET: \"When did this bleeding start?\"        Bleeding started this morning about an hour ago  2. DESCRIPTION: \"Describe the bleeding that you are having.\" \"How much bleeding is there?\"     - SPOTTING: spotting, or pinkish / brownish mucous discharge; does not fill panti-liner or pad     - MILD:  less than 1 pad / hour; less than patient's usual menstrual bleeding    - MODERATE: 1-2 pads / hour; small-medium blood clots (e.g., pea, grape, small coin)     - SEVERE: soaking 2 or more pads/hour for 2 or more hours; bleeding not contained by pads or " "continuous red blood from vagina; large blood clots (e.g., golf ball, large coin)       Moderate bleeding and pt states that she passed two moderate sized clots   3. ABDOMINAL PAIN SEVERITY: If present, ask: \"How bad is it?\"  (e.g., Scale 1-10; mild, moderate, or severe)    - MILD (1-3): doesn't interfere with normal activities, abdomen soft and not tender to touch     - MODERATE (4-7): interferes with normal activities or awakens from sleep, tender to touch     - SEVERE (8-10): excruciating pain, doubled over, unable to do any normal activities      Pt reports severe cramping a few hours ago, This lasted for about an hour and then subsided, but the bleeding has remained.   4. PREGNANCY: \"Do you know how many weeks or months pregnant you are?\" \"When was the first day of your last normal menstrual period?\"      7 weeks and 4 days   5. HEMODYNAMIC STATUS: \"Are you weak or feeling lightheaded?\" If so, ask: \"Can you stand and walk normally?\"       Pt reports feeling lightheaded earlier, but this has subsided.   6. OTHER SYMPTOMS: \"What other symptoms are you having with the bleeding?\" (e.g., passed tissue, vaginal discharge, fever, menstrual-type cramps)      None currently.    Protocols used: PREGNANCY - VAGINAL BLEEDING LESS THAN 20 WEEKS EGA-A-ELIZABET Joyner, RN, BSN    "

## 2021-03-25 DIAGNOSIS — O09.299 HISTORY OF MISCARRIAGE, CURRENTLY PREGNANT, UNSPECIFIED TRIMESTER: ICD-10-CM

## 2021-03-25 LAB — B-HCG SERPL-ACNC: 142 IU/L (ref 0–5)

## 2021-03-25 PROCEDURE — 84702 CHORIONIC GONADOTROPIN TEST: CPT | Performed by: OBSTETRICS & GYNECOLOGY

## 2021-03-25 PROCEDURE — 36415 COLL VENOUS BLD VENIPUNCTURE: CPT | Performed by: OBSTETRICS & GYNECOLOGY

## 2021-03-26 ENCOUNTER — PRENATAL OFFICE VISIT (OUTPATIENT)
Dept: OBGYN | Facility: CLINIC | Age: 36
End: 2021-03-26
Payer: COMMERCIAL

## 2021-03-26 VITALS
SYSTOLIC BLOOD PRESSURE: 113 MMHG | HEART RATE: 88 BPM | DIASTOLIC BLOOD PRESSURE: 75 MMHG | HEIGHT: 65 IN | WEIGHT: 153.1 LBS | BODY MASS INDEX: 25.51 KG/M2 | TEMPERATURE: 97.3 F | RESPIRATION RATE: 16 BRPM

## 2021-03-26 DIAGNOSIS — N96 RECURRENT PREGNANCY LOSS: Primary | ICD-10-CM

## 2021-03-26 PROCEDURE — 99213 OFFICE O/P EST LOW 20 MIN: CPT | Performed by: OBSTETRICS & GYNECOLOGY

## 2021-03-26 SDOH — ECONOMIC STABILITY: FOOD INSECURITY: WITHIN THE PAST 12 MONTHS, YOU WORRIED THAT YOUR FOOD WOULD RUN OUT BEFORE YOU GOT THE MONEY TO BUY MORE.: NOT ASKED

## 2021-03-26 SDOH — ECONOMIC STABILITY: FOOD INSECURITY: WITHIN THE PAST 12 MONTHS, THE FOOD YOU BOUGHT JUST DIDN'T LAST AND YOU DIDN'T HAVE MONEY TO GET MORE.: NOT ASKED

## 2021-03-26 SDOH — ECONOMIC STABILITY: TRANSPORTATION INSECURITY
IN THE PAST 12 MONTHS, HAS LACK OF TRANSPORTATION KEPT YOU FROM MEDICAL APPOINTMENTS OR FROM GETTING MEDICATIONS?: NOT ASKED

## 2021-03-26 SDOH — ECONOMIC STABILITY: FOOD INSECURITY: HOW HARD IS IT FOR YOU TO PAY FOR THE VERY BASICS LIKE FOOD, HOUSING, MEDICAL CARE, AND HEATING?: NOT ASKED

## 2021-03-26 ASSESSMENT — MIFFLIN-ST. JEOR: SCORE: 1390.34

## 2021-03-26 ASSESSMENT — ACTIVITIES OF DAILY LIVING (ADL): LACK_OF_TRANSPORTATION: NOT ASKED

## 2021-03-31 NOTE — PROGRESS NOTES
CC:  Follow up  from Dr Spivey for presumptive initial prenatal visit  HPI:  Serina Washington is a 35 year old female is a   .  Patient's last menstrual period was 2020.  Menses are regular q 28-30 days, lasting 5 days. Her quantitative HCG is falling and she has had bleeding c/w Spontaneous miscarriage.    Her serum progesterone level was normal.  All of her pregnancies are with the same father.    Patients records are available and reviewed at today's visit.    Past GYN history:  No STD history       Last PAP smear:  Normal  Last TSH:   TSH   Date Value Ref Range Status   10/08/2020 1.03 0.40 - 4.00 mU/L Final    , normal?  Yes    History reviewed. No pertinent past medical history.    Past Surgical History:   Procedure Laterality Date     ARTHROSCOPY KNEE Left       SECTION N/A 9/10/2017    Procedure:  SECTION;  Primary  Section;  Surgeon: Mu Miranda MD;  Location: [facility] OR     OPERATIVE HYSTEROSCOPY N/A 2020    Procedure: HYSTEROSCOPY, THERAPEUTIC;  Surgeon: Quinn Cahs MD;  Location: [facility] OR     SURGICAL HISTORY OF -       left knee surgery       Family History   Problem Relation Age of Onset     Kidney Disease Father         dialysis     Hypertension Father      Gastrointestinal Disease Father         stomach ulcer     Hypertension Mother      Thyroid Disease Mother      Other Cancer Paternal Grandfather      Other Cancer Paternal Grandmother        Allergies: Patient has no known allergies.    Current Outpatient Medications   Medication Sig Dispense Refill     Prenatal Multivit-Min-Fe-FA (PRENATAL VITAMINS PO) Take 2 tablets by mouth every evening [place]        albuterol (PROAIR HFA/PROVENTIL HFA/VENTOLIN HFA) 108 (90 Base) MCG/ACT inhaler Inhale 2 puffs into the lungs 4 times daily (Patient not taking: Reported on 3/26/2021) 18 g 0     ibuprofen (ADVIL/MOTRIN) 200 MG tablet Take 3 tablets (600 mg) by mouth every 6 hours as needed for  "other (mild and/or inflammatory pain) (Patient not taking: Reported on 3/26/2021)         ROS:  C: NEGATIVE for fever, chills, change in weight  I: NEGATIVE for worrisome rashes, moles or lesions  E: NEGATIVE for vision changes or irritation  E/M: NEGATIVE for ear, mouth and throat problems  R: NEGATIVE for significant cough or SOB  CV: NEGATIVE for chest pain, palpitations or peripheral edema  GI: NEGATIVE for nausea, abdominal pain, heartburn, or change in bowel habits  : NEGATIVE for frequency, dysuria, hematuria, vaginal discharge  M: NEGATIVE for significant arthralgias or myalgia  N: NEGATIVE for weakness, dizziness or paresthesias  E: NEGATIVE for temperature intolerance, skin/hair changes  P: NEGATIVE for changes in mood or affect    EXAM:  Blood pressure 113/75, pulse 88, temperature 97.3  F (36.3  C), resp. rate 16, height 1.651 m (5' 5\"), weight 69.4 kg (153 lb 1.6 oz), last menstrual period 11/05/2020, not currently breastfeeding.   BMI= Body mass index is 25.48 kg/m .  General - pleasant female in no acute distress.  No exam today     ASSESSMENT/PLAN:  (N96) Recurrent pregnancy loss  (primary encounter diagnosis)  Comment:   Plan: [facility] REFERRAL-PRECONCEPTION                Letter will be sent to the referring provider.    Quinn Cash MD  20 of 20 minutes spent Face to face counseling relative to the issues noted above.    "

## 2021-05-17 ENCOUNTER — PRE VISIT (OUTPATIENT)
Dept: MATERNAL FETAL MEDICINE | Facility: CLINIC | Age: 36
End: 2021-05-17

## 2021-05-20 ENCOUNTER — OFFICE VISIT (OUTPATIENT)
Dept: MATERNAL FETAL MEDICINE | Facility: CLINIC | Age: 36
End: 2021-05-20
Attending: OBSTETRICS & GYNECOLOGY
Payer: COMMERCIAL

## 2021-05-20 DIAGNOSIS — Z31.5 ENCOUNTER FOR PROCREATIVE GENETIC COUNSELING: ICD-10-CM

## 2021-05-20 DIAGNOSIS — N96 RECURRENT PREGNANCY LOSS: Primary | ICD-10-CM

## 2021-05-20 DIAGNOSIS — Z31.69 ENCOUNTER FOR PRECONCEPTION CONSULTATION: ICD-10-CM

## 2021-05-20 LAB
HBA1C MFR BLD: 5.2 % (ref 0–5.6)
PROLACTIN SERPL-MCNC: 7 UG/L (ref 3–27)
TSH SERPL DL<=0.005 MIU/L-ACNC: 0.8 MU/L (ref 0.4–4)

## 2021-05-20 PROCEDURE — 96040 HC GENETIC COUNSELING, EACH 30 MINUTES: CPT | Performed by: GENETIC COUNSELOR, MS

## 2021-05-20 PROCEDURE — 85730 THROMBOPLASTIN TIME PARTIAL: CPT | Performed by: OBSTETRICS & GYNECOLOGY

## 2021-05-20 PROCEDURE — 86146 BETA-2 GLYCOPROTEIN ANTIBODY: CPT | Performed by: OBSTETRICS & GYNECOLOGY

## 2021-05-20 PROCEDURE — 84443 ASSAY THYROID STIM HORMONE: CPT | Performed by: OBSTETRICS & GYNECOLOGY

## 2021-05-20 PROCEDURE — 83036 HEMOGLOBIN GLYCOSYLATED A1C: CPT | Performed by: OBSTETRICS & GYNECOLOGY

## 2021-05-20 PROCEDURE — 999N001023 HC STATISTIC INR NC: Performed by: OBSTETRICS & GYNECOLOGY

## 2021-05-20 PROCEDURE — 99215 OFFICE O/P EST HI 40 MIN: CPT | Performed by: OBSTETRICS & GYNECOLOGY

## 2021-05-20 PROCEDURE — 86147 CARDIOLIPIN ANTIBODY EA IG: CPT | Performed by: OBSTETRICS & GYNECOLOGY

## 2021-05-20 PROCEDURE — 999N001035 HC STATISTIC THROMBIN TIME NC: Performed by: OBSTETRICS & GYNECOLOGY

## 2021-05-20 PROCEDURE — 36415 COLL VENOUS BLD VENIPUNCTURE: CPT | Performed by: OBSTETRICS & GYNECOLOGY

## 2021-05-20 PROCEDURE — 85613 RUSSELL VIPER VENOM DILUTED: CPT | Performed by: OBSTETRICS & GYNECOLOGY

## 2021-05-20 PROCEDURE — 84146 ASSAY OF PROLACTIN: CPT | Performed by: OBSTETRICS & GYNECOLOGY

## 2021-05-20 NOTE — PROGRESS NOTES
[facility]  Genetic Counseling Consult    Patient:  Serina Washington YOB: 1985   Date of Service:  21      Serina Washington was seen at the [facility] for preconception genetic consultation for the indication of recurrent pregnancy loss. She was accompanied to today's consult by her , Rufino.       Impression/Plan:   1.  Serina had a preconception genetic consultation today. We reviewed the option of chromosome analysis testing during today's consult. Serina and Rufino first want to check coverage for testing with their insurance company before undergoing this test. CPT codes were provided. They plan to reach out to me in the future should either wish to proceed with this testing. No genetic testing was ordered today.     2.  Serina had a [facility] consult with Dr. Radha Kiser and Dr. Hunter Garcia. Please see Dr. Garcia's documentation for additional details regarding work-up and recommendations for recurrent pregnancy loss.     Pregnancy History:   /Parity:      Serina is not currently pregnant    Serina s pregnancy history is significant for one term, c/s delivery of the couple's daughter together, Regine Sanford, in . She is alive and well today. This was the couple's first pregnancy together. Serina had a spontaneous miscarriage around 6 weeks gestation in May 2019, a spontaneous miscarriage around 7 weeks gestation in 2019, then a spontaneous miscarriage around 7 weeks gestation in 2021. Genetic testing on POC was not available for any of Serina's miscarriages. All are reported to have passed naturally, Serina did not need a D&C or medical intervention for any of her miscarriages. The etiology of her miscarriages is not known.     All of Serina's conceptions have been with her , Rufino.     Recurrent miscarriage is defined as three or more clinically recognized pregnancy losses  occurring prior to fetal viability (<24 weeks). Narinder's reproductive history is significant for three spontaneous losses around 6-7 weeks gestation.     We reviewed that at least 10-20% of the recognized pregnancies end in miscarriage, with most losses occurring in the first trimester. The rate of miscarriage less than 8 weeks gestation may be even greater as some women may not recognize that they are pregnant. Around half of miscarriages that occur before 20 weeks gestation are caused by chromosome abnormalities. The risk for a miscarriage increases with advancing maternal age due to a higher incidence of conceptuses with a chromosomal aneuploidy. The risk may approach 75% in women who are 45 years of age and older. In about 50% of couples with recurrent pregnancy loss, the etiology remains unknown despite a thorough evaluation and is therefore classified as idiopathic. It is estimated that couples with idiopathic recurrent pregnacy loss can have up to a 75% chance of having a successful pregnancy.    Commonly reported causes of recurrent pregnancy loss include the following:    Endocrine:    Uncontrolled diabetes mellitus     Luteal phase deficiency (remains inconclusive, limited to 1st trimester)    Polycystic ovary syndrome    Environmental agents:     Prolonged exposure to alcohol, smoking, cocaine     Immunologic    Antiphospholipid syndrome    Maternal factors (acquired, inherited, structural):    Uterine anatomic malformations    Myomas    Cervical abnormalities    Chromosomal and single gene disorders:    Fetal chromosomal abnormalities    Parental balanced translocation    Alpha thalassemia major    Thrombophilia    X-linked male lethal conditions    Familial chromosome rearrangements can lead to a history of recurrent pregnancy loss as well as increased the potential risk for a stillbirth or live born babies with birth defects and/or intellectual disabilities. In this case a parent would carry a  balanced rearrangement with an equal exchange of chromosome material. For example, a portion of chromosome 6 could be attached to chromosome 12 and a portion of 12 attached to 6. An individual with a balanced translocation is typically healthy. However, a child of this individual may inherit one of the rearranged chromosomes, along with normal chromosomes, results in an unbalanced rearrangement with total loss and gain of chromosome material. In the above example, a child could have a loss of chromosome 6 and a gain of chromosome 12. If a conceptus has unbalanced chromosome material it could lead to miscarriages or affected children. This result can be dependent on the amount of material that is gained or loss and what chromosome the material is from. If a chromosome translocation is inherited through a family there are typically multiple individuals over multiple generations with recurrent pregnancy loss and/or affected individuals. However, it is also possible for a de xin translocation, with no family history to occur. For couples who have experienced three or more unexplained pregnancy losses, it has been estimated that around 2-5% of these couples have this history related to a chromosome in one of the partners. Chromosome analysis on parents is possible by obtaining a karyotype on peripheral blood in which the chromosomes are sorted and counted in the laboratory. This result can take 7-21 days to return and insurance coverage is variable.    Today, Serina and Rufino were interested in pursuing chromosome analysis on themselves, but would first like to check for insurance coverage for testing. CPT codes were provided to Serina today (Chromosome Analysis CPT 53154, 35405, 59826, 08653). She plans to contact her insurance company to check on coverage for cost of testing, and will contact me directly should she or Rufino wish to proceed with testing. No genetic testing was ordered during today's consult.     Medical  History:   Serina s reported medical history is not expected to impact pregnancy management or risks to fetal development.       Family History:   A three-generation pedigree was obtained, and is scanned under the  Media  tab.     The following significant findings were reported by Serina and Rufino:    Rufino: he is 40 years old and reported to be in good health. He has no children with previous partners.    Serian's brother: was one in a set of twins who  in the  period, the etiology of his passing is not known. Serina reports these twins were born prematurely [facility] years ago [facility].     Serina's brother: one with a recent onset of Epilepsy in his 20s, managed by medication. No other family history of seizures or epilepsy.     Rufino's mother:  from lung cancer without a history of smoking.     Rufino's maternal uncle: no children, however it is not known to Rufino if it is necessarily by choice or of this uncle and his partner have had fertility issues.     Otherwise, the reported family history is negative for stillbirths, recurrent miscarriages other than Serina, birth defects, intellectual disabilities, known genetic conditions, and consanguinity.       Carrier Screening:   The patient reports that she and the father of the pregnancy have  ancestry. Serina is 100% Amharic, Rufino is Pakistani and [place]. The couple report no known Spiritism ancestry:       Cystic fibrosis is an autosomal recessive genetic condition that occurs with increased frequency in individuals of  ancestry and carrier screening for this condition is available.  In addition,  screening in the [facility] includes cystic fibrosis.      Expanded carrier screening for mutations in a large panel of genes associated with autosomal recessive conditions including cystic fibrosis, spinal muscular atrophy, and others, is now available.      Carrier screening was not discussed today in detail due to time  constraints. Carrier screening should be reviewed with the couple in the future, either preconception or prenatally.        Risk Assessment for chromosome conditions:   We explained that the risk for fetal chromosome abnormalities increases with maternal age. We discussed specific features of common chromosome abnormalities, including Down syndrome, trisomy 13, trisomy 18, and sex chromosome trisomies.      At age 36 at midtrimester, the risk to have a baby with Down syndrome is 1 in 216.     At age 36 at midtrimester, the risk to have a baby with any chromosome abnormality is 1 in 105.     At age 36 at delivery, the risk to have a baby with Down syndrome is 1 in 294.     At age 36 at delivery, the risk to have a baby with any chromosome abnormality is 1 in 163.        Testing Options:   We discussed that in a future pregnancy, genetic screening can be performed via blood draw as early as 10 weeks (NIPT). This would assess for common sporadic chromosome differences that could increase the risk for miscarriage in pregnancy and increase health risks for the baby. The specific details of this type of testing was not reviewed and beyond the scope of today's consult.      It was a pleasure to be involved with [facility]. Face-to-face time of the meeting was 45 minutes.    Magdalena Gutierrez MS, [facility]  Genetic Counselor  [facility]  Maternal Fetal Medicine  Ph: [phone] ([place])  Ph: [phone] ([facility])

## 2021-05-20 NOTE — PROGRESS NOTES
Maternal-Fetal Medicine Consultation    Serina Washington  : 1985  MRN: [id number]    REFERRAL:  Serina Washington is a 35 year old sent by Dr. Cash for preconception counseling due to a history of 3 consecultive early pregnancy losses.    HPI:  Serina Washington is a 35 year old .  Her first pregnancy was in  and was uncomplicated.  She had a  delivery for nonreassuring fetal heart rate tracing.  Subsequently, she had 2 miscarriages in  which were both between 6-7 weeks gestation.  Both passed spontaneously.  She and her partner then stopped trying for a time, knowing she would need a knee surgery.  When they started to try to conceive again in , she was noted to have an endometrial polyp on pelvic ultrasound.  This was removed in .  A hysteroscopy was also done at that time and the uterine shape appeared normal.      She became pregnant again in 2021, and had an intrauterine pregnancy diagnosed without any fetal pole or cardiac activity.  She again passed this spontaneously at 6-7 weeks gestation.      She is here for potential work up for recurrent pregnancy loss.    Obstetrics History:  G1:  2017; C/S for nonreassuring fetal status at 38 6/7 weeks  G2:  ; SAB first trimester  G3:  ; SAB first trimester  G4: ; SAB first trimester    Gynecologic History:  - Denies any history of abnormal pap smears  - Denies prior cervical surgery or procedures  - Denies any history of frequent UTIs, vaginal infections, or STIs    Past Medical History:  None    Past Surgical History:     delivery  Knee arthroscopy  D&C/hysteroscopy    Current Medications:  Prenatal vitamin    Allergies:  Patient has no known allergies.    Social History:   Denies alcohol, tobacco, drug use.  Lives with  and child; feels safe at home    Family History:  No history of congenital anomaly, developmental delay, stillbirth    ROS:  10-point ROS negative except as in  HPI     ASSESSMENT:  35 year old y.o.  here for preconception counseling for recurrent pregnancy loss    RECOMMENDATIONS:    #Recurrent pregnancy loss    We reviewed possible etiologies of recurrent pregnancy loss (RPL) with Serina and her partner.  We discussed potential maternal medical conditions which may cause RPL including maternal diabetes, thryroid dysfunction, or antiphospholipid antibody syndrome.  She has had a recent TSH, but none of the other labs.  We discussed obtaining a hemoglobin A1c, TSH with reflex to T4, and antiphospholipid antibody syndrome labs.  These were ordered today and she will have these drawn today.  We will contact her to inform her of the results as they become available.  She her APS labs return elevated, we discussed the potential to begin aspirin and enoxaparin in a future pregnancy for prevention of pregnancy loss.    We also discussed potential genetic causes off RPL.  We discussed the possibility of parental karyotype abnormalities which can cause recurrent miscarriage.  As the couple has a normal child together, these are less likely, but cannot be ruled out.  Serina and her partner met with our genetic counselors today as well.  They will consider evaluation with parental karyotype after discussion regarding insurance coverage.    We also discussed the possibility of a uterine abnormality which could lead to recurrent loss.  She did have a hysteroscopy with removal of a polyp in , and as long as this looked normal, further evaluation may not be of benefit.    We discussed that other interventions have not been well established as standard of care such as progesterone supplementation in the first trimester.  As this is a relatively low risk intervention, it would not be unreasonable to do this if she desired in a future pregnancy.  Likewise, if in the unfortunate event she were to have another miscarriage, I would recommend sending the products of conception for  genetic testing, particularly if the parents have not had karyotyping.    #Advanced maternal age    We discussed the increasing risk of aneuploidy as maternal age increases.  At age 35 this risk is still relatively low, and I do not believe this is causing her recurrent losses.  She also met with our genetic counselors today for further information regarding this risk.    At the conclusion of our visit, Serina and her partner indicated their questions were answered to their satisfaction.  They were encouraged to call if they had further questions, and we will be in touch with the results of her evaluation today.    Please do not hesitate to call if we can be of further assistance.    Radha Kiser MD  Maternal Fetal Medicine    I spent a total of 60 minutes on today's encounter including counseling, coordination of care, review of records, and documentation in the medical record.

## 2021-05-21 ENCOUNTER — TELEPHONE (OUTPATIENT)
Dept: MATERNAL FETAL MEDICINE | Facility: CLINIC | Age: 36
End: 2021-05-21

## 2021-05-21 LAB
B2 GLYCOPROT1 IGG SERPL IA-ACNC: 0.8 U/ML
B2 GLYCOPROT1 IGM SERPL IA-ACNC: 4.9 U/ML
CARDIOLIPIN ANTIBODY IGG: <1.6 GPL-U/ML (ref 0–19.9)
CARDIOLIPIN ANTIBODY IGM: 0.9 MPL-U/ML (ref 0–19.9)
LA PPP-IMP: NEGATIVE

## 2021-05-21 NOTE — TELEPHONE ENCOUNTER
Serina called today with questions about whether or not testing her testosterone level would be beneficial and it relevance. PT states she forgot to ask during her consult yesterday and would like to discuss with Dr. Kiser.   Informed pt, writer will send a message to Dr. Kiser and her or myself will return call to pt. PT LENA.  Reena Altamirano RN        Returned call to pt, Per Dr. Kiser, Testosterone level is not associated with miscarriage so it would not be beneficial to test for that. Reviewed that labs drawn yesterday were WNL and recommendation to test POC if miscarriage happens again may be beneficial. PT VU, appreciative of the follow up and has no further questions.  Reena Altamirano RN

## 2021-06-02 ENCOUNTER — TELEPHONE (OUTPATIENT)
Dept: MATERNAL FETAL MEDICINE | Facility: CLINIC | Age: 36
End: 2021-06-02

## 2021-06-02 NOTE — TELEPHONE ENCOUNTER
June 2, 2021    Received a detailed VM from Serina on Friday, 5/28/21, at 9:34AM stating that after checking with insurance, she and her  would like to proceed with karyotype analysis. I returned the patient's call this morning and left a detailed VM, encouraging her to contact me when she has 10-15 available before 4:30PM today to virtually consent to and review karyotype testing. My direct office telephone number was left on the patient's VM today.       Magdalena Gutierrez MS, [facility]  Genetic Counselor  [facility]  Maternal Fetal Medicine  Ph: [phone] ([place])  Ph: [phone] ([facility])

## 2021-06-04 ENCOUNTER — AMBULATORY - HEALTHEAST (OUTPATIENT)
Dept: MATERNAL FETAL MEDICINE | Facility: HOSPITAL | Age: 36
End: 2021-06-04

## 2021-06-04 DIAGNOSIS — Z31.5 ENCOUNTER FOR PROCREATIVE GENETIC COUNSELING AND TESTING: ICD-10-CM

## 2021-06-04 DIAGNOSIS — N96 RECURRENT PREGNANCY LOSS: ICD-10-CM

## 2021-06-18 ENCOUNTER — TRANSFERRED RECORDS (OUTPATIENT)
Dept: HEALTH INFORMATION MANAGEMENT | Facility: CLINIC | Age: 36
End: 2021-06-18

## 2021-06-18 ENCOUNTER — AMBULATORY - HEALTHEAST (OUTPATIENT)
Dept: LAB | Facility: HOSPITAL | Age: 36
End: 2021-06-18

## 2021-06-18 DIAGNOSIS — N96 RECURRENT PREGNANCY LOSS: ICD-10-CM

## 2021-06-18 DIAGNOSIS — Z31.5 ENCOUNTER FOR PROCREATIVE GENETIC COUNSELING AND TESTING: ICD-10-CM

## 2021-06-26 NOTE — PROGRESS NOTES
June 4, 2021    Returned patient's voicemail to coordinate chromosome analysis testing due to her history of recurrent pregnancy loss. Please see my documentation from 5/20/21 for additional details. Today, we again reviewed benefits, risks, utility, and limitations of undergoing chromosome analysis.     Chromosome analysis (karyotype) assesses for the number and structure of chromosomes. It would be able to assess if chromosomal translocations are present or erroneously rearranged, potentially being associated with recurrent pregnancy loss. It would be able to determine if there are small pieces of chromosomal material that are duplicated or deleted, however not to the level of detail that a microarray would be able to provide. This test may provide information regarding recurrence risks for future pregnancies for Serina and her partner.     Once results become available in 1-3 weeks from the time of blood draw, they will be called out to Serina for review. [facility] genetic counseling will leave a callback number only on voicemail, alerting the patient that her results are available for review with genetic counseling. Serina consented for this testing over the phone today. Serina shared that her partner is also interested in proceeding with chromosome analysis. I received Serina's verbal permission to contact him directly and consent him for chromosome analysis testing later today. Orders for chromsome analysis have been placed in Serina's [facility] chart for a future draw.      Magdalena Gutierrez MS, [facility]  Genetic Counselor  [facility]  Maternal Fetal Medicine  Ph: [phone] ([facility])  Ph: [phone] ([place])

## 2021-07-08 LAB — Lab: NORMAL

## 2021-07-09 ENCOUNTER — COMMUNICATION - HEALTHEAST (OUTPATIENT)
Dept: MATERNAL FETAL MEDICINE | Facility: HOSPITAL | Age: 36
End: 2021-07-09

## 2021-07-09 NOTE — TELEPHONE ENCOUNTER
Telephone Encounter by Magdalena Gutierrez Genetic Counselor at 7/9/2021  1:53 PM     Author: Magdalena Gutierrez Genetic Counselor Service: -- Author Type: Genetic Counselor    Filed: 7/9/2021  2:09 PM Encounter Date: 7/9/2021 Status: Signed    : Magdalena Gutierrez Genetic Counselor (Genetic Counselor)       July 9, 2021    Called and spoke with Serina regarding her chromosome analysis results. Results indicate 46, XX, normal female karyotype. No numerical or structural chromosomal abnormality was found in the patient's sample. Serina verbalized understanding.     We reviewed that this information is reassuring in that we don't expect a hereditary translocation from Serina to be the cause of her pregnancy losses, however we still don't have a precise answer for why she and her partner have had multiple miscarriages. Rufino's results are currently pending, and are expected to be available sometime next week. His results will be called out directly to him, and can be shared with Serina at that time.     Currently, Serina and Rufino are undecided about their next steps regarding pregnancy- they are unsure if they wish to proceed with IVF or attempt pregnancy naturally. Serina shared that after Rufino's results are back, the couple will consider their options in more detail.     All of the patient's questions were answered to her apparent satisfaction over the phone today. She was encouraged to contact me should any questions arise. A copy of her results will be forwarded to the office of her referring provider.       Magdalena Gutierrez MS, [facility]  Genetic Counselor  [facility]  Maternal Fetal Medicine  Ph: [phone] ([facility])  Ph: [phone] ([place])

## 2021-07-13 ENCOUNTER — TELEPHONE (OUTPATIENT)
Dept: MATERNAL FETAL MEDICINE | Facility: CLINIC | Age: 36
End: 2021-07-13

## 2021-07-13 NOTE — TELEPHONE ENCOUNTER
July 13, 2021    Called Serina at the verbal request of her , Rufino Washington, to review his chromosome analysis results with Serina. At Rufino's request, I reviewed the results from his chromosome analysis with Serina: NORMAL MALE KARYOTYPE 46, XY. No numerical or structural chromosomal abnormality were identified in Rufino's sample.     Serina expressed both relief and some frustration over the phone today: relief that his testing and all of hers have resulted unremarkable, but frustration with the lack of definitive information for the causes of her recurrent pregnancy loss. She remains uncertain about her and Rufino's next steps for growing their family, likely leaning towards pursuing a fertility consult at a local IVF clinic to determine if they are good candidates for IVF.     I encouraged Serina to contact me directly should she or Rufino have any questions or if [facility] genetic counseling can be helpful for the couple moving forward.     Magdalena Gutierrez MS, [facility]  Genetic Counselor  [facility]  Maternal Fetal Medicine  Ph: [phone] ([place])  Ph: [phone] ([facility])

## 2021-10-01 ENCOUNTER — MYC MEDICAL ADVICE (OUTPATIENT)
Dept: OBGYN | Facility: CLINIC | Age: 36
End: 2021-10-01

## 2021-10-01 DIAGNOSIS — O09.299 HISTORY OF MISCARRIAGE, CURRENTLY PREGNANT, UNSPECIFIED TRIMESTER: Primary | ICD-10-CM

## 2021-10-01 NOTE — TELEPHONE ENCOUNTER
Please review and advise.    Patient has standing orders available for HCG quants so was told if desired she could get lab testing.    Ciarra Spence   [facility]  RN

## 2021-10-07 ENCOUNTER — LAB (OUTPATIENT)
Dept: LAB | Facility: CLINIC | Age: 36
End: 2021-10-07
Payer: COMMERCIAL

## 2021-10-07 DIAGNOSIS — O09.299 HISTORY OF MISCARRIAGE, CURRENTLY PREGNANT, UNSPECIFIED TRIMESTER: ICD-10-CM

## 2021-10-07 LAB
B-HCG SERPL-ACNC: 9369 IU/L (ref 0–5)
PROGEST SERPL-MCNC: 11.4 NG/ML

## 2021-10-07 PROCEDURE — 84702 CHORIONIC GONADOTROPIN TEST: CPT

## 2021-10-07 PROCEDURE — 36415 COLL VENOUS BLD VENIPUNCTURE: CPT

## 2021-10-07 PROCEDURE — 84144 ASSAY OF PROGESTERONE: CPT

## 2021-10-09 ENCOUNTER — HEALTH MAINTENANCE LETTER (OUTPATIENT)
Age: 36
End: 2021-10-09

## 2021-10-11 DIAGNOSIS — G60.3 IDIOPATHIC PROGRESSIVE POLYNEUROPATHY: ICD-10-CM

## 2021-10-11 DIAGNOSIS — R79.89 LOW SERUM PROGESTERONE: Primary | ICD-10-CM

## 2021-10-11 RX ORDER — PROGESTERONE 200 MG/1
200 CAPSULE ORAL DAILY
Qty: 30 CAPSULE | Refills: 1 | Status: SHIPPED | OUTPATIENT
Start: 2021-10-11 | End: 2021-10-12

## 2021-10-11 NOTE — RESULT ENCOUNTER NOTE
Serina   Your progesterone level is low at 11.4.  A better level would be over 15.  I would like to send you a prescription for Progesterone (Prometrium)   200 mg  1 pill daily    Quinn Cash MD

## 2021-10-12 ENCOUNTER — OFFICE VISIT (OUTPATIENT)
Dept: OBGYN | Facility: CLINIC | Age: 36
End: 2021-10-12
Payer: COMMERCIAL

## 2021-10-12 VITALS
WEIGHT: 148.5 LBS | HEART RATE: 91 BPM | TEMPERATURE: 95.8 F | DIASTOLIC BLOOD PRESSURE: 78 MMHG | SYSTOLIC BLOOD PRESSURE: 109 MMHG | BODY MASS INDEX: 25.35 KG/M2 | HEIGHT: 64 IN

## 2021-10-12 DIAGNOSIS — R79.89 LOW SERUM PROGESTERONE: ICD-10-CM

## 2021-10-12 DIAGNOSIS — O09.299 HISTORY OF MISCARRIAGE, CURRENTLY PREGNANT, UNSPECIFIED TRIMESTER: Primary | ICD-10-CM

## 2021-10-12 PROCEDURE — 76817 TRANSVAGINAL US OBSTETRIC: CPT | Performed by: OBSTETRICS & GYNECOLOGY

## 2021-10-12 PROCEDURE — 99213 OFFICE O/P EST LOW 20 MIN: CPT | Performed by: OBSTETRICS & GYNECOLOGY

## 2021-10-12 RX ORDER — PROGESTERONE 200 MG/1
200 CAPSULE ORAL DAILY
Qty: 30 CAPSULE | Refills: 1 | Status: SHIPPED | OUTPATIENT
Start: 2021-10-12 | End: 2021-11-09

## 2021-10-12 ASSESSMENT — MIFFLIN-ST. JEOR: SCORE: 1348.59

## 2021-10-12 NOTE — PROGRESS NOTES
CC:  Follow up  from herself for early pregnancy   Previous loss and low serum progesterone  HPI:  Serina Washington is a 36 year old female is a   .  No LMP recorded.  Menses are regular q 28-30 days, lasting 5 days.  She is sure of her last menstrual period    Patients records are available and reviewed at today's visit.    Past GYN history:  No STD history       Last PAP smear:  Normal  Last TSH:   TSH   Date Value Ref Range Status   2021 0.80 0.40 - 4.00 mU/L Final      , normal?  Yes    History reviewed. No pertinent past medical history.    Past Surgical History:   Procedure Laterality Date     ARTHROSCOPY KNEE Left       SECTION N/A 9/10/2017    Procedure:  SECTION;  Primary  Section;  Surgeon: Mu Miranda MD;  Location: [facility]     OPERATIVE HYSTEROSCOPY N/A 2020    Procedure: HYSTEROSCOPY, THERAPEUTIC;  Surgeon: Quinn Cash MD;  Location: [facility]     SURGICAL HISTORY OF -       left knee surgery       Family History   Problem Relation Age of Onset     Kidney Disease Father         dialysis     Hypertension Father      Gastrointestinal Disease Father         stomach ulcer     Hypertension Mother      Thyroid Disease Mother      Other Cancer Paternal Grandfather      Other Cancer Paternal Grandmother        Allergies: Patient has no known allergies.    Current Outpatient Medications   Medication Sig Dispense Refill     Prenatal Multivit-Min-Fe-FA (PRENATAL VITAMINS PO) Take 2 tablets by mouth every evening [place]        progesterone (PROMETRIUM) 200 MG capsule Take 1 capsule (200 mg) by mouth daily 30 capsule 1     albuterol (PROAIR HFA/PROVENTIL HFA/VENTOLIN HFA) 108 (90 Base) MCG/ACT inhaler Inhale 2 puffs into the lungs 4 times daily (Patient not taking: Reported on 3/26/2021) 18 g 0     ibuprofen (ADVIL/MOTRIN) 200 MG tablet Take 3 tablets (600 mg) by mouth every 6 hours as needed for other (mild and/or inflammatory pain) (Patient not  "taking: Reported on 3/26/2021)         ROS:  C: NEGATIVE for fever, chills, change in weight  I: NEGATIVE for worrisome rashes, moles or lesions  E: NEGATIVE for vision changes or irritation  E/M: NEGATIVE for ear, mouth and throat problems  R: NEGATIVE for significant cough or SOB  CV: NEGATIVE for chest pain, palpitations or peripheral edema  GI: NEGATIVE for nausea, abdominal pain, heartburn, or change in bowel habits  : NEGATIVE for frequency, dysuria, hematuria, vaginal discharge  M: NEGATIVE for significant arthralgias or myalgia  N: NEGATIVE for weakness, dizziness or paresthesias  E: NEGATIVE for temperature intolerance, skin/hair changes  P: NEGATIVE for changes in mood or affect    EXAM:  Blood pressure 109/78, pulse 91, temperature (!) 95.8  F (35.4  C), height 1.626 m (5' 4\"), weight 67.4 kg (148 lb 8 oz), not currently breastfeeding.   BMI= Body mass index is 25.49 kg/m .  General - pleasant female in no acute distress.  Ultrasound was performed showing a walker intrauterine pregnancy with a crown-rump length of 0.39 cm consistent with 6 weeks 1 day gestation and EDC of 6/6/2022  Fetal heart rate was 91 bpm  ASSESSMENT/PLAN:  (O09.299) History of miscarriage, currently pregnant, unspecified trimester  (primary encounter diagnosis)  (R79.89) Low serum progesterone  Comment:   Plan: progesterone (PROMETRIUM) 200 MG capsule        1 p.o. orally or vaginally daily  Return to clinic in 1 week for repeat ultrasound        Letter will be sent to the referring provider.    Quinn Cash MD    "

## 2021-10-19 ENCOUNTER — OFFICE VISIT (OUTPATIENT)
Dept: OBGYN | Facility: CLINIC | Age: 36
End: 2021-10-19
Payer: COMMERCIAL

## 2021-10-19 VITALS
TEMPERATURE: 95.6 F | WEIGHT: 148 LBS | DIASTOLIC BLOOD PRESSURE: 70 MMHG | SYSTOLIC BLOOD PRESSURE: 118 MMHG | HEART RATE: 82 BPM | HEIGHT: 64 IN | BODY MASS INDEX: 25.27 KG/M2

## 2021-10-19 DIAGNOSIS — R79.89 LOW SERUM PROGESTERONE: ICD-10-CM

## 2021-10-19 DIAGNOSIS — O09.299 HISTORY OF MISCARRIAGE, CURRENTLY PREGNANT, UNSPECIFIED TRIMESTER: Primary | ICD-10-CM

## 2021-10-19 PROCEDURE — 99213 OFFICE O/P EST LOW 20 MIN: CPT | Mod: 25 | Performed by: OBSTETRICS & GYNECOLOGY

## 2021-10-19 PROCEDURE — 76817 TRANSVAGINAL US OBSTETRIC: CPT | Performed by: OBSTETRICS & GYNECOLOGY

## 2021-10-19 ASSESSMENT — MIFFLIN-ST. JEOR: SCORE: 1346.32

## 2021-10-19 NOTE — PROGRESS NOTES
CC:  Follow up  from herself for repeat US  HPI:  Serina Washington is a 36 year old female is a   .  No LMP recorded.  She has seen the [facility] preconception team.  This pregnancy was conceived naturally.  Her progesterone was 11.4.  She was started on Progesterone supplementation.  She is here for a follow up ULTRASOUND   She has had no bleeding or cramping  She is on Progesterone supplementation    Patients records are available and reviewed at today's visit.      History reviewed. No pertinent past medical history.    Past Surgical History:   Procedure Laterality Date     ARTHROSCOPY KNEE Left       SECTION N/A 9/10/2017    Procedure:  SECTION;  Primary  Section;  Surgeon: Mu Miranda MD;  Location: [facility]     OPERATIVE HYSTEROSCOPY N/A 2020    Procedure: HYSTEROSCOPY, THERAPEUTIC;  Surgeon: Quinn Cash MD;  Location: [facility]     SURGICAL HISTORY OF -       left knee surgery       Family History   Problem Relation Age of Onset     Kidney Disease Father         dialysis     Hypertension Father      Gastrointestinal Disease Father         stomach ulcer     Hypertension Mother      Thyroid Disease Mother      Other Cancer Paternal Grandfather      Other Cancer Paternal Grandmother        Allergies: Patient has no known allergies.    Current Outpatient Medications   Medication Sig Dispense Refill     Prenatal Multivit-Min-Fe-FA (PRENATAL VITAMINS PO) Take 2 tablets by mouth every evening [place]       progesterone (PROMETRIUM) 200 MG capsule Take 1 capsule (200 mg) by mouth daily 30 capsule 1     albuterol (PROAIR HFA/PROVENTIL HFA/VENTOLIN HFA) 108 (90 Base) MCG/ACT inhaler Inhale 2 puffs into the lungs 4 times daily (Patient not taking: Reported on 3/26/2021) 18 g 0     ibuprofen (ADVIL/MOTRIN) 200 MG tablet Take 3 tablets (600 mg) by mouth every 6 hours as needed for other (mild and/or inflammatory pain) (Patient not taking: Reported on 3/26/2021)    "      ROS:  C: NEGATIVE for fever, chills, change in weight  I: NEGATIVE for worrisome rashes, moles or lesions  E: NEGATIVE for vision changes or irritation  E/M: NEGATIVE for ear, mouth and throat problems  R: NEGATIVE for significant cough or SOB  CV: NEGATIVE for chest pain, palpitations or peripheral edema  GI: NEGATIVE for nausea, abdominal pain, heartburn, or change in bowel habits  : NEGATIVE for frequency, dysuria, hematuria, vaginal discharge  M: NEGATIVE for significant arthralgias or myalgia  N: NEGATIVE for weakness, dizziness or paresthesias  E: NEGATIVE for temperature intolerance, skin/hair changes  P: NEGATIVE for changes in mood or affect    EXAM:  Blood pressure 118/70, pulse 82, temperature (!) 95.6  F (35.3  C), height 1.626 m (5' 4\"), weight 67.1 kg (148 lb), not currently breastfeeding.   BMI= Body mass index is 25.4 kg/m .  General - pleasant female in no acute distress.  ULTRASOUND shows an IUP with a heart rate of 51 and a CRL of 0.44 cm c/w 6W1D- unchanged from last week      ASSESSMENT/PLAN:  (O09.299) History of miscarriage, currently pregnant, unspecified trimester  (primary encounter diagnosis)  Comment: no change in CRL over the last week  Plan: RTC in 1 week for repeat ULTRASOUND     (R79.89) Low serum progesterone  Comment: 11.4  Plan: continue progesterone supplementation  Prometrium 200 mg po daily        Letter will be sent to the referring provider.    Quinn Cash MD    "

## 2021-10-24 ENCOUNTER — APPOINTMENT (OUTPATIENT)
Dept: ULTRASOUND IMAGING | Facility: CLINIC | Age: 36
End: 2021-10-24
Attending: STUDENT IN AN ORGANIZED HEALTH CARE EDUCATION/TRAINING PROGRAM
Payer: COMMERCIAL

## 2021-10-24 ENCOUNTER — HOSPITAL ENCOUNTER (EMERGENCY)
Facility: CLINIC | Age: 36
Discharge: HOME | End: 2021-10-24
Attending: STUDENT IN AN ORGANIZED HEALTH CARE EDUCATION/TRAINING PROGRAM | Admitting: STUDENT IN AN ORGANIZED HEALTH CARE EDUCATION/TRAINING PROGRAM
Payer: COMMERCIAL

## 2021-10-24 VITALS
TEMPERATURE: 99 F | SYSTOLIC BLOOD PRESSURE: 98 MMHG | DIASTOLIC BLOOD PRESSURE: 87 MMHG | RESPIRATION RATE: 16 BRPM | BODY MASS INDEX: 25.4 KG/M2 | OXYGEN SATURATION: 100 % | WEIGHT: 148 LBS | HEART RATE: 75 BPM

## 2021-10-24 DIAGNOSIS — O03.9 MISCARRIAGE: ICD-10-CM

## 2021-10-24 LAB
ALBUMIN SERPL-MCNC: 3.6 G/DL (ref 3.4–5)
ALP SERPL-CCNC: 50 U/L (ref 40–150)
ALT SERPL W P-5'-P-CCNC: 28 U/L (ref 0–50)
ANION GAP SERPL CALCULATED.3IONS-SCNC: 7 MMOL/L (ref 3–14)
AST SERPL W P-5'-P-CCNC: 22 U/L (ref 0–45)
B-HCG SERPL-ACNC: ABNORMAL IU/L (ref 0–5)
BASOPHILS # BLD AUTO: 0 10E3/UL (ref 0–0.2)
BASOPHILS NFR BLD AUTO: 0 %
BILIRUB DIRECT SERPL-MCNC: <0.1 MG/DL (ref 0–0.2)
BILIRUB SERPL-MCNC: 0.3 MG/DL (ref 0.2–1.3)
BUN SERPL-MCNC: 6 MG/DL (ref 7–30)
CALCIUM SERPL-MCNC: 8.8 MG/DL (ref 8.5–10.1)
CHLORIDE BLD-SCNC: 103 MMOL/L (ref 94–109)
CO2 SERPL-SCNC: 26 MMOL/L (ref 20–32)
CREAT SERPL-MCNC: 0.62 MG/DL (ref 0.52–1.04)
EOSINOPHIL # BLD AUTO: 0 10E3/UL (ref 0–0.7)
EOSINOPHIL NFR BLD AUTO: 0 %
ERYTHROCYTE [DISTWIDTH] IN BLOOD BY AUTOMATED COUNT: 11.8 % (ref 10–15)
GFR SERPL CREATININE-BSD FRML MDRD: >90 ML/MIN/1.73M2
GLUCOSE BLD-MCNC: 89 MG/DL (ref 70–99)
HCT VFR BLD AUTO: 41.8 % (ref 35–47)
HGB BLD-MCNC: 14.2 G/DL (ref 11.7–15.7)
IMM GRANULOCYTES # BLD: 0 10E3/UL
IMM GRANULOCYTES NFR BLD: 0 %
LYMPHOCYTES # BLD AUTO: 0.8 10E3/UL (ref 0.8–5.3)
LYMPHOCYTES NFR BLD AUTO: 18 %
MCH RBC QN AUTO: 30.3 PG (ref 26.5–33)
MCHC RBC AUTO-ENTMCNC: 34 G/DL (ref 31.5–36.5)
MCV RBC AUTO: 89 FL (ref 78–100)
MONOCYTES # BLD AUTO: 0.6 10E3/UL (ref 0–1.3)
MONOCYTES NFR BLD AUTO: 13 %
NEUTROPHILS # BLD AUTO: 3.2 10E3/UL (ref 1.6–8.3)
NEUTROPHILS NFR BLD AUTO: 69 %
NRBC # BLD AUTO: 0 10E3/UL
NRBC BLD AUTO-RTO: 0 /100
PLATELET # BLD AUTO: 260 10E3/UL (ref 150–450)
POTASSIUM BLD-SCNC: 3.5 MMOL/L (ref 3.4–5.3)
PROGEST SERPL-MCNC: 13.1 NG/ML
PROT SERPL-MCNC: 7.9 G/DL (ref 6.8–8.8)
RBC # BLD AUTO: 4.69 10E6/UL (ref 3.8–5.2)
SODIUM SERPL-SCNC: 136 MMOL/L (ref 133–144)
WBC # BLD AUTO: 4.7 10E3/UL (ref 4–11)

## 2021-10-24 PROCEDURE — 99284 EMERGENCY DEPT VISIT MOD MDM: CPT | Performed by: STUDENT IN AN ORGANIZED HEALTH CARE EDUCATION/TRAINING PROGRAM

## 2021-10-24 PROCEDURE — 80048 BASIC METABOLIC PNL TOTAL CA: CPT | Performed by: STUDENT IN AN ORGANIZED HEALTH CARE EDUCATION/TRAINING PROGRAM

## 2021-10-24 PROCEDURE — 82248 BILIRUBIN DIRECT: CPT | Performed by: STUDENT IN AN ORGANIZED HEALTH CARE EDUCATION/TRAINING PROGRAM

## 2021-10-24 PROCEDURE — 36415 COLL VENOUS BLD VENIPUNCTURE: CPT | Performed by: STUDENT IN AN ORGANIZED HEALTH CARE EDUCATION/TRAINING PROGRAM

## 2021-10-24 PROCEDURE — 85025 COMPLETE CBC W/AUTO DIFF WBC: CPT | Performed by: STUDENT IN AN ORGANIZED HEALTH CARE EDUCATION/TRAINING PROGRAM

## 2021-10-24 PROCEDURE — 76801 OB US < 14 WKS SINGLE FETUS: CPT

## 2021-10-24 PROCEDURE — 84702 CHORIONIC GONADOTROPIN TEST: CPT | Performed by: STUDENT IN AN ORGANIZED HEALTH CARE EDUCATION/TRAINING PROGRAM

## 2021-10-24 PROCEDURE — 99284 EMERGENCY DEPT VISIT MOD MDM: CPT | Mod: 25 | Performed by: STUDENT IN AN ORGANIZED HEALTH CARE EDUCATION/TRAINING PROGRAM

## 2021-10-24 PROCEDURE — 84144 ASSAY OF PROGESTERONE: CPT | Performed by: STUDENT IN AN ORGANIZED HEALTH CARE EDUCATION/TRAINING PROGRAM

## 2021-10-24 PROCEDURE — 96360 HYDRATION IV INFUSION INIT: CPT | Performed by: STUDENT IN AN ORGANIZED HEALTH CARE EDUCATION/TRAINING PROGRAM

## 2021-10-24 PROCEDURE — 258N000003 HC RX IP 258 OP 636: Performed by: STUDENT IN AN ORGANIZED HEALTH CARE EDUCATION/TRAINING PROGRAM

## 2021-10-24 RX ORDER — METHYLERGONOVINE MALEATE 0.2 MG/1
0.2 TABLET ORAL
Qty: 6 TABLET | Refills: 0 | Status: SHIPPED | OUTPATIENT
Start: 2021-10-24 | End: 2021-10-26

## 2021-10-24 RX ADMIN — SODIUM CHLORIDE 500 ML: 9 INJECTION, SOLUTION INTRAVENOUS at 19:35

## 2021-10-24 NOTE — ED PROVIDER NOTES
History     Chief Complaint   Patient presents with     Vaginal Bleeding     HPI  Serina Washington is a 36 year old female  of estimated 6 weeks gestation who presents to the department for evaluation of lower abdominal cramping and vaginal bleeding.  Patient admits to having a history of 3 consecutive miscarriages prior to this most recent pregnancy and she has been prescribed progesterone supplementation through the maternal-fetal medicine service, most recent ultrasound 2021 identified IUP with concerning heart rate of 51 bpm.  Patient admits that she has felt fatigued and suffered from general malaise for the past 3 days but no pelvic symptoms until this morning when she began having the cramping and bleeding.  She denies fever, chills, sore throat, chest pain, cough, shortness of breath, upper abdominal pain, nausea, vomiting, constipation or diarrhea.  Specifically denies concerns for pelvic or sexually transmitted infections.    Allergies:  No Known Allergies    Problem List:    Patient Active Problem List    Diagnosis Date Noted     Low serum progesterone 10/19/2021     Priority: Medium     History of miscarriage, currently pregnant, unspecified trimester 2021     Priority: Medium     Endometrial polyp 10/29/2020     Priority: Medium     Added automatically from request for surgery [id number]       Menorrhagia with regular cycle 10/29/2020     Priority: Medium     Added automatically from request for surgery [id number]       S/P  09/10/2017     Priority: Medium        Past Medical History:    No past medical history on file.    Past Surgical History:    Past Surgical History:   Procedure Laterality Date     ARTHROSCOPY KNEE Left       SECTION N/A 9/10/2017    Procedure:  SECTION;  Primary  Section;  Surgeon: Mu Miranda MD;  Location: [facility]     OPERATIVE HYSTEROSCOPY N/A 2020    Procedure: HYSTEROSCOPY, THERAPEUTIC;  Surgeon: Shailesh  Quinn Mendez MD;  Location: [facility]     SURGICAL HISTORY OF -       left knee surgery       Family History:    Family History   Problem Relation Age of Onset     Kidney Disease Father         dialysis     Hypertension Father      Gastrointestinal Disease Father         stomach ulcer     Hypertension Mother      Thyroid Disease Mother      Other Cancer Paternal Grandfather      Other Cancer Paternal Grandmother        Social History:  Marital Status:   [2]  Social History     Tobacco Use     Smoking status: Never Smoker     Smokeless tobacco: Never Used   Vaping Use     Vaping Use: Never used   Substance Use Topics     Alcohol use: No     Alcohol/week: 0.0 standard drinks     Drug use: No        Medications:    methylergonovine (METHERGINE) 0.2 MG tablet  albuterol (PROAIR HFA/PROVENTIL HFA/VENTOLIN HFA) 108 (90 Base) MCG/ACT inhaler  ibuprofen (ADVIL/MOTRIN) 200 MG tablet  Prenatal Multivit-Min-Fe-FA (PRENATAL VITAMINS PO)  progesterone (PROMETRIUM) 200 MG capsule          Review of Systems  Constitutional:  Negative for fever or chills.  Cardiovascular:  Negative for chest discomfort.  Respiratory:  Negative for cough or respiratory infectious symptoms.   Gastrointestinal: Positive for suprapubic cramping.  Negative for upper abdominal pain, nausea, vomiting, or diarrhea.  Genitourinary: Positive for vaginal bleeding.  Negative for dysuria or hematuria.  Denies vaginal discharge or concern for pelvic infection.  Musculoskeletal: Negative for back or flank pain.  Neurological:  Negative for dizziness.    All others reviewed and are negative.      Physical Exam   BP: (!) 119/90  Pulse: 98  Temp: 98.1  F (36.7  C)  Resp: 16  Weight: 67.1 kg (148 lb)  SpO2: 98 %      Physical Exam  Constitutional:  Well developed, well nourished.  Appears nontoxic and in no acute distress.  Resting comfortably on the gurney.  HENT:  Normocephalic and atraumatic.  Symmetric in appearance.  Eyes:  Conjunctivae are normal.  Neck:  Neck  supple.  Cardiovascular:  No cyanosis.  RRR.  No audible murmurs noted.  No lower extremity edema or asymmetry.   Respiratory:  Effort normal without sign of respiratory distress.  No audible wheezing or stridor.   Gastrointestinal:  Soft nondistended abdomen.  Nontender and without guarding.  No rigidity or rebound tenderness.  Negative Sales's sign.  Negative McBurney's point.    Genitourinary:  Typical appearance of external genitalia without lesions, ulcerations, or lymphadenopathy.  Moderate amount of clotted blood within vaginal canal but no significant erythema, inflammation, or discharge.  Closed os.  Musculoskeletal:  Moves extremities spontaneously.  Neurological:  Patient is alert.  Skin:  Skin is warm and dry.  Psychiatric:  Normal mood and affect.      ED Course        Procedures             Critical Care time:  none               Results for orders placed or performed during the hospital encounter of 10/24/21 (from the past 24 hour(s))   CBC with platelets differential    Narrative    The following orders were created for panel order CBC with platelets differential.  Procedure                               Abnormality         Status                     ---------                               -----------         ------                     CBC with platelets and d...[[id number]]                      Final result                 Please view results for these tests on the individual orders.   Basic metabolic panel   Result Value Ref Range    Sodium 136 133 - 144 mmol/L    Potassium 3.5 3.4 - 5.3 mmol/L    Chloride 103 94 - 109 mmol/L    Carbon Dioxide (CO2) 26 20 - 32 mmol/L    Anion Gap 7 3 - 14 mmol/L    Urea Nitrogen 6 (L) 7 - 30 mg/dL    Creatinine 0.62 0.52 - 1.04 mg/dL    Calcium 8.8 8.5 - 10.1 mg/dL    Glucose 89 70 - 99 mg/dL    GFR Estimate >90 >60 mL/min/1.73m2   HCG quantitative pregnancy   Result Value Ref Range    hCG Quantitative 17,544 (H) 0 - 5 IU/L   Hepatic panel   Result Value Ref Range     Bilirubin Total 0.3 0.2 - 1.3 mg/dL    Bilirubin Direct <0.1 0.0 - 0.2 mg/dL    Protein Total 7.9 6.8 - 8.8 g/dL    Albumin 3.6 3.4 - 5.0 g/dL    Alkaline Phosphatase 50 40 - 150 U/L    AST 22 0 - 45 U/L    ALT 28 0 - 50 U/L   CBC with platelets and differential   Result Value Ref Range    WBC Count 4.7 4.0 - 11.0 10e3/uL    RBC Count 4.69 3.80 - 5.20 10e6/uL    Hemoglobin 14.2 11.7 - 15.7 g/dL    Hematocrit 41.8 35.0 - 47.0 %    MCV 89 78 - 100 fL    MCH 30.3 26.5 - 33.0 pg    MCHC 34.0 31.5 - 36.5 g/dL    RDW 11.8 10.0 - 15.0 %    Platelet Count 260 150 - 450 10e3/uL    % Neutrophils 69 %    % Lymphocytes 18 %    % Monocytes 13 %    % Eosinophils 0 %    % Basophils 0 %    % Immature Granulocytes 0 %    NRBCs per 100 WBC 0 <1 /100    Absolute Neutrophils 3.2 1.6 - 8.3 10e3/uL    Absolute Lymphocytes 0.8 0.8 - 5.3 10e3/uL    Absolute Monocytes 0.6 0.0 - 1.3 10e3/uL    Absolute Eosinophils 0.0 0.0 - 0.7 10e3/uL    Absolute Basophils 0.0 0.0 - 0.2 10e3/uL    Absolute Immature Granulocytes 0.0 <=0.0 10e3/uL    Absolute NRBCs 0.0 10e3/uL   US OB <14 Weeks w Transvaginal    Narrative    EXAM: US OB <14 WEEKS WITH TRANSVAGINAL SINGLE  LOCATION: [facility]  DATE/TIME: 10/24/2021 5:57 PM    INDICATION: Bleeding with concern for miscarriage  COMPARISON: None.  TECHNIQUE: Transabdominal scans were performed. Endovaginal ultrasound was performed to better visualize the embryo.    FINDINGS:  UTERUS: Single appearing intrauterine gestation sac.  CRL: Measures 0.3 cm, equals 6 6 weeks 0 days.  FETAL HEART RATE: No cardiac activity identified.  AMNIOTIC FLUID: Normal.  PLACENTA: Not yet formed. No evidence for sub-chorionic hemorrhage.    RIGHT OVARY: Normal.  LEFT OVARY: Normal.    No free pelvic fluid.      Impression    IMPRESSION:   1.  Single intrauterine gestation at 6 weeks 0 days, EDC 6/19/2022.  2.  However, no cardiac activity identified within this 3 mm embryo.    Findings Suspicious, But  Not Diagnostic of Pregnancy Failure    CRL< 7mm and no FHR    Reference: Diagnostic Criteria for Nonviable Pregnancy Early in the First Trimester. Ultrasound Quarterly; 30(1): 3-9, March 2014       Medications   0.9% sodium chloride BOLUS (0 mLs Intravenous Stopped 10/24/21 2015)       Assessments & Plan (with Medical Decision Making)   Serina Washington is a 36 year old female who presents to the department for evaluation after developing suprapubic cramping and vaginal bleeding consistent with her history of miscarriages.  On examination there is some blood within vaginal canal but no active bleeding or signs of hemorrhage, also no identifiable products of conception.  She has a known history of Rh+.  Formal ultrasound identified a single IUP of size similar to previous 2 ultrasounds, no identifiable cardiac activity.  Consulted on-call OB/GYN Dr. Baldwin who recalls the patient well, he recommends outpatient Methergine, has requested add on progesterone level, and patient is to call clinic tomorrow with update on symptoms.  She is agreeable with the discharge plan including return instructions for progressive pain, bleeding, or other concerns.        Disclaimer:  This note consists of symbols derived from keyboarding, dictation, and/or voice recognition software.  As a result, there may be errors in the script that have gone undetected.  Please consider this when interpreting information found in the chart.        I have reviewed the nursing notes.    I have reviewed the findings, diagnosis, plan and need for follow up with the patient.       Discharge Medication List as of 10/24/2021  8:46 PM      START taking these medications    Details   methylergonovine (METHERGINE) 0.2 MG tablet Take 1 tablet (200 mcg) by mouth 3 times daily for 2 days, Disp-6 tablet, R-0, E-Prescribe             Final diagnoses:   Miscarriage       10/24/2021   [facility] EMERGENCY DEPT     Terry Harrison,   10/24/21  [id number]

## 2021-10-24 NOTE — ED NOTES
3 previous miscarriages, believes this may be the 4th miscarriage and was told to come in for products of consumption, vaginal bleeding started this afternoon, cramping on and off

## 2021-10-24 NOTE — ED TRIAGE NOTES
Vaginal bleeding and abdominal cramping that started today.  Patient had an ultrasound on Tuesday and was told baby was not growing and heart rate was low.  Patient has had 3 prior miscarriages.

## 2021-10-26 ENCOUNTER — OFFICE VISIT (OUTPATIENT)
Dept: OBGYN | Facility: CLINIC | Age: 36
End: 2021-10-26
Payer: COMMERCIAL

## 2021-10-26 ENCOUNTER — TELEPHONE (OUTPATIENT)
Dept: OBGYN | Facility: CLINIC | Age: 36
End: 2021-10-26

## 2021-10-26 VITALS
DIASTOLIC BLOOD PRESSURE: 65 MMHG | WEIGHT: 146 LBS | HEIGHT: 64 IN | OXYGEN SATURATION: 96 % | SYSTOLIC BLOOD PRESSURE: 104 MMHG | HEART RATE: 93 BPM | TEMPERATURE: 97.7 F | RESPIRATION RATE: 18 BRPM | BODY MASS INDEX: 24.92 KG/M2

## 2021-10-26 DIAGNOSIS — L72.3 SEBACEOUS CYST: Primary | ICD-10-CM

## 2021-10-26 PROCEDURE — 99213 OFFICE O/P EST LOW 20 MIN: CPT | Performed by: OBSTETRICS & GYNECOLOGY

## 2021-10-26 RX ORDER — MISOPROSTOL 200 UG/1
TABLET ORAL
Qty: 8 TABLET | Refills: 0 | Status: SHIPPED | OUTPATIENT
Start: 2021-10-26 | End: 2021-11-09

## 2021-10-26 ASSESSMENT — MIFFLIN-ST. JEOR: SCORE: 1337.25

## 2021-10-26 NOTE — TELEPHONE ENCOUNTER
Pt has appt today at 10.  Follow up Miscarriage.  Fever yesterday and not feeling well.  ER last Sunday for heavy bleeding.  Does not wish to miss appt due to her concern of it being really important.    Please call ASAP to advised.    Kiesha SLAUGHTER   [facility]

## 2021-10-26 NOTE — TELEPHONE ENCOUNTER
"Called patient to fllow up re: previous note. Denies fever today. Doesn't \"feel\" good, was seen for heavy bleeding s/p miscarriage.    Recommended patient keep her appointment today in office.    Vannessa Hudson RN on 10/26/2021 at 9:46 AM    "

## 2021-10-27 ENCOUNTER — NURSE TRIAGE (OUTPATIENT)
Dept: NURSING | Facility: CLINIC | Age: 36
End: 2021-10-27

## 2021-10-27 ENCOUNTER — HOSPITAL ENCOUNTER (EMERGENCY)
Facility: CLINIC | Age: 36
Discharge: HOME | End: 2021-10-27
Attending: PHYSICIAN ASSISTANT | Admitting: PHYSICIAN ASSISTANT
Payer: COMMERCIAL

## 2021-10-27 ENCOUNTER — APPOINTMENT (OUTPATIENT)
Dept: GENERAL RADIOLOGY | Facility: CLINIC | Age: 36
End: 2021-10-27
Attending: PHYSICIAN ASSISTANT
Payer: COMMERCIAL

## 2021-10-27 VITALS
OXYGEN SATURATION: 97 % | DIASTOLIC BLOOD PRESSURE: 66 MMHG | BODY MASS INDEX: 25.06 KG/M2 | TEMPERATURE: 99.1 F | HEART RATE: 77 BPM | WEIGHT: 146 LBS | RESPIRATION RATE: 16 BRPM | SYSTOLIC BLOOD PRESSURE: 96 MMHG

## 2021-10-27 DIAGNOSIS — Z20.822 SUSPECTED 2019 NOVEL CORONAVIRUS INFECTION: ICD-10-CM

## 2021-10-27 LAB
ALBUMIN SERPL-MCNC: 3.4 G/DL (ref 3.4–5)
ALBUMIN UR-MCNC: NEGATIVE MG/DL
ALP SERPL-CCNC: 45 U/L (ref 40–150)
ALT SERPL W P-5'-P-CCNC: 29 U/L (ref 0–50)
ANION GAP SERPL CALCULATED.3IONS-SCNC: 6 MMOL/L (ref 3–14)
APPEARANCE UR: CLEAR
AST SERPL W P-5'-P-CCNC: 27 U/L (ref 0–45)
BACTERIA #/AREA URNS HPF: ABNORMAL /HPF
BASOPHILS # BLD AUTO: 0 10E3/UL (ref 0–0.2)
BASOPHILS NFR BLD AUTO: 0 %
BILIRUB SERPL-MCNC: 0.3 MG/DL (ref 0.2–1.3)
BILIRUB UR QL STRIP: NEGATIVE
BUN SERPL-MCNC: 5 MG/DL (ref 7–30)
CALCIUM SERPL-MCNC: 8.4 MG/DL (ref 8.5–10.1)
CHLORIDE BLD-SCNC: 106 MMOL/L (ref 94–109)
CO2 SERPL-SCNC: 25 MMOL/L (ref 20–32)
COLOR UR AUTO: ABNORMAL
CREAT SERPL-MCNC: 0.64 MG/DL (ref 0.52–1.04)
EOSINOPHIL # BLD AUTO: 0 10E3/UL (ref 0–0.7)
EOSINOPHIL NFR BLD AUTO: 0 %
ERYTHROCYTE [DISTWIDTH] IN BLOOD BY AUTOMATED COUNT: 11.7 % (ref 10–15)
FLUAV RNA SPEC QL NAA+PROBE: NEGATIVE
FLUBV RNA RESP QL NAA+PROBE: NEGATIVE
GFR SERPL CREATININE-BSD FRML MDRD: >90 ML/MIN/1.73M2
GLUCOSE BLD-MCNC: 86 MG/DL (ref 70–99)
GLUCOSE UR STRIP-MCNC: NEGATIVE MG/DL
HCT VFR BLD AUTO: 39.7 % (ref 35–47)
HGB BLD-MCNC: 13.5 G/DL (ref 11.7–15.7)
HGB UR QL STRIP: ABNORMAL
HOLD SPECIMEN: NORMAL
HOLD SPECIMEN: NORMAL
IMM GRANULOCYTES # BLD: 0 10E3/UL
IMM GRANULOCYTES NFR BLD: 0 %
KETONES UR STRIP-MCNC: 20 MG/DL
LEUKOCYTE ESTERASE UR QL STRIP: NEGATIVE
LYMPHOCYTES # BLD AUTO: 1.1 10E3/UL (ref 0.8–5.3)
LYMPHOCYTES NFR BLD AUTO: 30 %
MCH RBC QN AUTO: 30.3 PG (ref 26.5–33)
MCHC RBC AUTO-ENTMCNC: 34 G/DL (ref 31.5–36.5)
MCV RBC AUTO: 89 FL (ref 78–100)
MONOCYTES # BLD AUTO: 0.4 10E3/UL (ref 0–1.3)
MONOCYTES NFR BLD AUTO: 11 %
NEUTROPHILS # BLD AUTO: 2.1 10E3/UL (ref 1.6–8.3)
NEUTROPHILS NFR BLD AUTO: 59 %
NITRATE UR QL: NEGATIVE
NRBC # BLD AUTO: 0 10E3/UL
NRBC BLD AUTO-RTO: 0 /100
PH UR STRIP: 7 [PH] (ref 5–7)
PLATELET # BLD AUTO: 197 10E3/UL (ref 150–450)
POTASSIUM BLD-SCNC: 3.6 MMOL/L (ref 3.4–5.3)
PROT SERPL-MCNC: 7.3 G/DL (ref 6.8–8.8)
RBC # BLD AUTO: 4.46 10E6/UL (ref 3.8–5.2)
RBC URINE: 1 /HPF
RSV RNA SPEC NAA+PROBE: NEGATIVE
SARS-COV-2 RNA RESP QL NAA+PROBE: POSITIVE
SODIUM SERPL-SCNC: 137 MMOL/L (ref 133–144)
SP GR UR STRIP: 1 (ref 1–1.03)
SQUAMOUS EPITHELIAL: <1 /HPF
UROBILINOGEN UR STRIP-MCNC: NORMAL MG/DL
WBC # BLD AUTO: 3.6 10E3/UL (ref 4–11)
WBC URINE: 1 /HPF

## 2021-10-27 PROCEDURE — 81001 URINALYSIS AUTO W/SCOPE: CPT | Performed by: PHYSICIAN ASSISTANT

## 2021-10-27 PROCEDURE — 85025 COMPLETE CBC W/AUTO DIFF WBC: CPT | Performed by: PHYSICIAN ASSISTANT

## 2021-10-27 PROCEDURE — 250N000013 HC RX MED GY IP 250 OP 250 PS 637: Performed by: PHYSICIAN ASSISTANT

## 2021-10-27 PROCEDURE — 80053 COMPREHEN METABOLIC PANEL: CPT | Performed by: PHYSICIAN ASSISTANT

## 2021-10-27 PROCEDURE — 96361 HYDRATE IV INFUSION ADD-ON: CPT | Performed by: PHYSICIAN ASSISTANT

## 2021-10-27 PROCEDURE — C9803 HOPD COVID-19 SPEC COLLECT: HCPCS | Performed by: PHYSICIAN ASSISTANT

## 2021-10-27 PROCEDURE — 250N000011 HC RX IP 250 OP 636: Performed by: PHYSICIAN ASSISTANT

## 2021-10-27 PROCEDURE — 36415 COLL VENOUS BLD VENIPUNCTURE: CPT | Performed by: PHYSICIAN ASSISTANT

## 2021-10-27 PROCEDURE — 258N000003 HC RX IP 258 OP 636: Performed by: PHYSICIAN ASSISTANT

## 2021-10-27 PROCEDURE — 99284 EMERGENCY DEPT VISIT MOD MDM: CPT | Mod: 25 | Performed by: PHYSICIAN ASSISTANT

## 2021-10-27 PROCEDURE — 71045 X-RAY EXAM CHEST 1 VIEW: CPT

## 2021-10-27 PROCEDURE — 99284 EMERGENCY DEPT VISIT MOD MDM: CPT | Performed by: PHYSICIAN ASSISTANT

## 2021-10-27 PROCEDURE — 87637 SARSCOV2&INF A&B&RSV AMP PRB: CPT | Performed by: PHYSICIAN ASSISTANT

## 2021-10-27 PROCEDURE — 96374 THER/PROPH/DIAG INJ IV PUSH: CPT | Performed by: PHYSICIAN ASSISTANT

## 2021-10-27 RX ORDER — ACETAMINOPHEN 500 MG
500 TABLET ORAL ONCE
Status: COMPLETED | OUTPATIENT
Start: 2021-10-27 | End: 2021-10-27

## 2021-10-27 RX ORDER — ONDANSETRON 2 MG/ML
4 INJECTION INTRAMUSCULAR; INTRAVENOUS ONCE
Status: COMPLETED | OUTPATIENT
Start: 2021-10-27 | End: 2021-10-27

## 2021-10-27 RX ORDER — ONDANSETRON 4 MG/1
4 TABLET, ORALLY DISINTEGRATING ORAL EVERY 8 HOURS PRN
Qty: 10 TABLET | Refills: 0 | Status: SHIPPED | OUTPATIENT
Start: 2021-10-27 | End: 2021-10-30

## 2021-10-27 RX ADMIN — ONDANSETRON 4 MG: 2 INJECTION INTRAMUSCULAR; INTRAVENOUS at 11:42

## 2021-10-27 RX ADMIN — SODIUM CHLORIDE 500 ML: 9 INJECTION, SOLUTION INTRAVENOUS at 13:02

## 2021-10-27 RX ADMIN — ACETAMINOPHEN 500 MG: 500 TABLET ORAL at 11:37

## 2021-10-27 ASSESSMENT — ENCOUNTER SYMPTOMS
SORE THROAT: 0
SEIZURES: 0
COUGH: 1
COLOR CHANGE: 0
HEMATURIA: 0
FREQUENCY: 0
EYE DISCHARGE: 0
PALPITATIONS: 0
CHILLS: 1
ABDOMINAL PAIN: 1
WOUND: 0
APPETITE CHANGE: 1
LIGHT-HEADEDNESS: 1
EYE PAIN: 0
VOMITING: 0
PHOTOPHOBIA: 0
BLOOD IN STOOL: 0
SPEECH DIFFICULTY: 0
EYE REDNESS: 0
HEADACHES: 1
NAUSEA: 1
FLANK PAIN: 0
WHEEZING: 0
DYSURIA: 0
SHORTNESS OF BREATH: 1
MYALGIAS: 1
DIZZINESS: 1
FATIGUE: 1
FEVER: 1
ACTIVITY CHANGE: 1
DIARRHEA: 1
EYE ITCHING: 0

## 2021-10-27 NOTE — ED TRIAGE NOTES
Pt has fever, SOA.  Currently experiencing a miscarriage.  Has been seen by OB.  Pt is not vaccinated against COVID.

## 2021-10-27 NOTE — ED PROVIDER NOTES
History     Chief Complaint   Patient presents with     Fever     Shortness of Breath     Miscarriage     HPI  Serina Washington is a 36 year old female who presents to the emergency department with concern over fever up to 102.0 orally at home.  patient reports that she initially began feeling unwell on 10/23/2021 with chills, myalgias, fatigue.  She subsequently developed a headache, lightheadedness, cough, shortness of breath, loss of appetite, nausea, generalized abdominal pain and states that yesterday her fever peaked at 102.0.  She denies any loss of taste or smell, vomiting, melena, hematochezia, dysuria, lower extremity edema.  She was evaluated in the emergency department on 10/24/2021 when she developed pelvic pain, cramping.  She had ultrasound which showed a single intrauterine gestation of 6 weeks 0 days without cardiac activity 3 mm embryo which did not correspond with dates per LMP suspicious for pregnancy failure.  She did have follow up appointment in clinic yesterday and was given prescription for Cytotec, however has not started it as she has not been feeling well.  She has not had any significant vaginal bleeding since ED visit.  She denies any known close contacts with COVID-19.  She has not been vaccinated for COVID-19.  She has not passed any clots or larger tissues.      Allergies:  No Known Allergies    Problem List:    Patient Active Problem List    Diagnosis Date Noted     Low serum progesterone 10/19/2021     Priority: Medium     History of miscarriage, currently pregnant, unspecified trimester 2021     Priority: Medium     Endometrial polyp 10/29/2020     Priority: Medium     Added automatically from request for surgery [id number]       Menorrhagia with regular cycle 10/29/2020     Priority: Medium     Added automatically from request for surgery [id number]       S/P  09/10/2017     Priority: Medium      Past Medical History:    No past medical history on file.    Past  Surgical History:    Past Surgical History:   Procedure Laterality Date     ARTHROSCOPY KNEE Left       SECTION N/A 9/10/2017    Procedure:  SECTION;  Primary  Section;  Surgeon: Mu Miranda MD;  Location: [facility] OR     OPERATIVE HYSTEROSCOPY N/A 2020    Procedure: HYSTEROSCOPY, THERAPEUTIC;  Surgeon: Quinn Cash MD;  Location: [facility] OR     SURGICAL HISTORY OF -       left knee surgery     Family History:    Family History   Problem Relation Age of Onset     Kidney Disease Father         dialysis     Hypertension Father      Gastrointestinal Disease Father         stomach ulcer     Hypertension Mother      Thyroid Disease Mother      Other Cancer Paternal Grandfather      Other Cancer Paternal Grandmother      Social History:  Marital Status:   [2]  Social History     Tobacco Use     Smoking status: Never Smoker     Smokeless tobacco: Never Used   Vaping Use     Vaping Use: Never used   Substance Use Topics     Alcohol use: No     Alcohol/week: 0.0 standard drinks     Drug use: No      Medications:    albuterol (PROAIR HFA/PROVENTIL HFA/VENTOLIN HFA) 108 (90 Base) MCG/ACT inhaler  ibuprofen (ADVIL/MOTRIN) 200 MG tablet  misoprostol (CYTOTEC) 200 MCG tablet  Prenatal Multivit-Min-Fe-FA (PRENATAL VITAMINS PO)  progesterone (PROMETRIUM) 200 MG capsule      Review of Systems   Constitutional: Positive for activity change, appetite change, chills, fatigue and fever.   HENT: Negative for ear pain and sore throat.    Eyes: Negative for photophobia, pain, discharge, redness, itching and visual disturbance.   Respiratory: Positive for cough and shortness of breath. Negative for wheezing.    Cardiovascular: Negative for chest pain and palpitations.   Gastrointestinal: Positive for abdominal pain, diarrhea and nausea. Negative for blood in stool and vomiting.   Genitourinary: Positive for vaginal bleeding (none in last week). Negative for dysuria, flank pain, frequency,  hematuria and urgency.   Musculoskeletal: Positive for myalgias.   Skin: Negative for color change, rash and wound.   Neurological: Positive for dizziness, light-headedness and headaches. Negative for seizures, syncope and speech difficulty.     Physical Exam   BP: 99/70  Pulse: 94  Temp: (!) 100.6  F (38.1  C)  Resp: 16  Weight: 66.2 kg (146 lb)  SpO2: 100 %  Physical Exam  GENERAL APPEARANCE: non-toxic, ill appearing, alert, cooperative   EYES: EOMI,  PERRL, conjunctiva clear  HENT: ear canals and TM's normal.  Nose and mouth without ulcers, erythema or lesions, oral mucosa moist,   NECK: supple, nontender, no lymphadenopathy  RESP: lungs clear to auscultation - no rales, rhonchi or wheezes  CV: regular rates and rhythm, normal S1 S2, no murmur noted  ABDOMEN:  soft, no focal tenderness to palpation, no guarding or rebound tenderness, no HSM or masses and bowel sounds normal   NEURO: Normal strength and tone, sensory exam grossly normal,  normal speech and mentation  SKIN: no suspicious lesions or rashes  ED Course        Procedures       Critical Care time:  none        Results for orders placed or performed during the hospital encounter of 10/27/21   XR Chest Port 1 View     Status: None    Narrative    CHEST ONE VIEW PORTABLE   10/27/2021 12:07 PM     HISTORY: Fever. Shortness of breath.    COMPARISON: 11/1/2020.      Impression    IMPRESSION: Mild hazy opacity in both lower lungs could be infectious  in etiology. No pneumothorax. Pulmonary vascularity is within normal  limits.    DANIELA BOWEN MD         SYSTEM ID:  [id number]   Comprehensive metabolic panel     Status: Abnormal   Result Value Ref Range    Sodium 137 133 - 144 mmol/L    Potassium 3.6 3.4 - 5.3 mmol/L    Chloride 106 94 - 109 mmol/L    Carbon Dioxide (CO2) 25 20 - 32 mmol/L    Anion Gap 6 3 - 14 mmol/L    Urea Nitrogen 5 (L) 7 - 30 mg/dL    Creatinine 0.64 0.52 - 1.04 mg/dL    Calcium 8.4 (L) 8.5 - 10.1 mg/dL    Glucose 86 70 - 99 mg/dL     Alkaline Phosphatase 45 40 - 150 U/L    AST 27 0 - 45 U/L    ALT 29 0 - 50 U/L    Protein Total 7.3 6.8 - 8.8 g/dL    Albumin 3.4 3.4 - 5.0 g/dL    Bilirubin Total 0.3 0.2 - 1.3 mg/dL    GFR Estimate >90 >60 mL/min/1.73m2   Symptomatic Influenza A/B & SARS-CoV2 (COVID-19) Virus PCR Multiplex Nasopharyngeal     Status: Abnormal    Specimen: Nasopharyngeal; Swab   Result Value Ref Range    Influenza A target Negative Negative    Influenza B target Negative Negative    RSV target Negative Negative    SARS CoV2 PCR Positive (A) Negative    Narrative    Testing was performed using the Xpert Xpress CoV2/Flu/RSV Assay on the Cepheid GeneXpert Instrument. This test should be ordered for the detection of SARS-CoV-2 and influenza viruses in individuals who meet clinical and/or epidemiological criteria. Test performance is unknown in asymptomatic patients. This test is for in vitro diagnostic use under the FDA EUA for laboratories certified under CLIA to perform high or moderate complexity testing. This test has not been FDA cleared or approved. A negative result does not rule out the presence of PCR inhibitors in the specimen or target RNA in concentration below the limit of detection for the assay. If only one viral target is positive but coinfection with multiple targets is suspected, the sample should be re-tested with another FDA cleared, approved, or authorized test, if coinfection would change clinical management. This test was validated by the [facility]. These laboratories are certified under the Clinical  Laboratory Improvement Amendments of 1988 (CLIA-88) as qualified to perform high complexity laboratory testing.   UA with Microscopic reflex to Culture     Status: Abnormal    Specimen: Urine, Clean Catch   Result Value Ref Range    Color Urine Straw Colorless, Straw, Light Yellow, Yellow    Appearance Urine Clear Clear    Glucose Urine Negative Negative mg/dL    Bilirubin Urine Negative Negative     Ketones Urine 20  (A) Negative mg/dL    Specific Gravity Urine 1.004 1.003 - 1.035    Blood Urine Moderate (A) Negative    pH Urine 7.0 5.0 - 7.0    Protein Albumin Urine Negative Negative mg/dL    Urobilinogen Urine Normal Normal, 2.0 mg/dL    Nitrite Urine Negative Negative    Leukocyte Esterase Urine Negative Negative    Bacteria Urine Few (A) None Seen /HPF    RBC Urine 1 <=2 /HPF    WBC Urine 1 <=5 /HPF    Squamous Epithelials Urine <1 <=1 /HPF    Narrative    Urine Culture not indicated   CBC with platelets and differential     Status: Abnormal   Result Value Ref Range    WBC Count 3.6 (L) 4.0 - 11.0 10e3/uL    RBC Count 4.46 3.80 - 5.20 10e6/uL    Hemoglobin 13.5 11.7 - 15.7 g/dL    Hematocrit 39.7 35.0 - 47.0 %    MCV 89 78 - 100 fL    MCH 30.3 26.5 - 33.0 pg    MCHC 34.0 31.5 - 36.5 g/dL    RDW 11.7 10.0 - 15.0 %    Platelet Count 197 150 - 450 10e3/uL    % Neutrophils 59 %    % Lymphocytes 30 %    % Monocytes 11 %    % Eosinophils 0 %    % Basophils 0 %    % Immature Granulocytes 0 %    NRBCs per 100 WBC 0 <1 /100    Absolute Neutrophils 2.1 1.6 - 8.3 10e3/uL    Absolute Lymphocytes 1.1 0.8 - 5.3 10e3/uL    Absolute Monocytes 0.4 0.0 - 1.3 10e3/uL    Absolute Eosinophils 0.0 0.0 - 0.7 10e3/uL    Absolute Basophils 0.0 0.0 - 0.2 10e3/uL    Absolute Immature Granulocytes 0.0 <=0.0 10e3/uL    Absolute NRBCs 0.0 10e3/uL   Extra Blue Top Tube     Status: None   Result Value Ref Range    Hold Specimen JI    Extra Red Top Tube     Status: None   Result Value Ref Range    Hold Specimen JI    CBC with platelets differential     Status: Abnormal    Narrative    The following orders were created for panel order CBC with platelets differential.  Procedure                               Abnormality         Status                     ---------                               -----------         ------                     CBC with platelets and d...[[id number]]  Abnormal            Final result                 Please  view results for these tests on the individual orders.   Mandan Draw     Status: None    Narrative    The following orders were created for panel order Mandan Draw.  Procedure                               Abnormality         Status                     ---------                               -----------         ------                     Extra Blue Top Tube[[id number]]                              Final result               Extra Red Top Tube[[id number]]                               Final result                 Please view results for these tests on the individual orders.      Medications   0.9% sodium chloride BOLUS (500 mLs Intravenous New Bag 10/27/21 1302)   acetaminophen (TYLENOL) tablet 500 mg (500 mg Oral Given 10/27/21 1137)   ondansetron (ZOFRAN) injection 4 mg (4 mg Intravenous Given 10/27/21 1142)     Assessments & Plan (with Medical Decision Making)     I have reviewed the nursing notes.  I have reviewed the findings, diagnosis, plan and need for follow up with the patient.     New Prescriptions    No medications on file     Final diagnoses:   Suspected 2019 novel coronavirus infection     36-year-old female presents to the emergency department with concern over 5 to 6-day history of illness with complaints fever, chills, allergies, headache, cough, shortness of breath, loss of appetite nausea generalized abdominal pain.  She was noted to be mildly febrile upon arrival.  Physical exam findings included lungs which were clear to auscultation without wheezing rales or rhonchi.  Nonsurgical abdominal examination.  She had CBC showed WBC at 3.6, CMP was calcium of 8.4, urinalysis showed few bacteria however no other signs of infection I do not suspect pyelonephritis as source of her fever.  Chest x-ray did show mild hazy opacity in both lower lungs which could be infectious in etiology per radiology report.  Symptoms are clinically suspicious for COVID-19.  Testing was obtained and pending at time of  discharge which was ultimately positive.  Patient was instructed to self isolate at home pending test results, if positive continue to self isolate at home for 10 days.  Signs of respiratory distress, worrisome reasons to return to the ER discussed.    Disclaimer: This note consists of symbols derived from keyboarding, dictation, and/or voice recognition software. As a result, there may be errors in the script that have gone undetected.  Please consider this when interpreting information found in the chart.    10/27/2021   [facility] EMERGENCY DEPT     Maggie Kaye PA-C  10/28/21 1010

## 2021-10-27 NOTE — ED NOTES
Pt here with fatigue, no appetite, nausea, fever, and SOB since Saturday. Pt was here Sunday and found out she had a miscarriage.

## 2021-10-27 NOTE — TELEPHONE ENCOUNTER
Can't break the fever, on going since Friday. Yesterday felt a little better. Sunday at ER for something else, miscarriage. Went to see  yesterday. Got home and laid in bed and fever spiked. He gave some medication to help with the miscarriage. She wasn't able to take it because she didn't want to get up. She was already in a lot of pain and didn't want to worsen it. Can't eat or drink, nausea, no vomiting. She said she is too weak to stand or sit. She is going to call her  to have him drive her to the ER instead of calling 911. She wanted to know if they would test her for coronavirus in the ER. I told her they probably will do that.   Nithya Vergara RN  [facility]      Reason for Disposition    Pale cold skin and very weak (can't stand)    Additional Information    Negative: Difficult to awaken or acting confused (e.g., disoriented, slurred speech)    Protocols used: FEVER-A-OH

## 2021-10-27 NOTE — Clinical Note
Serina Washington was seen and treated in our emergency department on 10/27/2021.    She has testing for COVID-19 which was pending at time of her discharge from the department.  She should isolate at home while awaiting test results.  If positive she needs to continue to self isolate at home for 10 days from onset of her symptoms.     Sincerely,     [facility] Emergency Dept

## 2021-10-29 ENCOUNTER — TELEPHONE (OUTPATIENT)
Dept: FAMILY MEDICINE | Facility: CLINIC | Age: 36
End: 2021-10-29

## 2021-10-29 ENCOUNTER — MYC MEDICAL ADVICE (OUTPATIENT)
Dept: OBGYN | Facility: CLINIC | Age: 36
End: 2021-10-29

## 2021-10-29 NOTE — TELEPHONE ENCOUNTER
Received red flag call.  Patient reports covid diagnosis 10/27/21.  Patient was seen 10/26/21D4r Shailesh diagnosed miscarriage and patient was given misoprostol.  Patient states she is not having any vaginal bleeding and has not taken the medication as she is too weak to get up to the bathroom.    Patient reports dizziness when standing, needs her  to help her ambulate.  Patient's breathing is labored, pausing mid sentence during call.  Patient reports shortness of breath is worse today.  Pulse ox 92%.  Patient has not been pushing fluids, last void was last evening.  Afebrile.  Advised ED with  to transport.  Patient states she is more concerned about her miscarriage and would like to set up a D&C.  This writer explained concern with her breathing and likely dehydrated, this needs to be evaluated.  Patient agrees with plan, will forward to Dr Esquivel for review.  Nancy Molina RN

## 2021-10-29 NOTE — TELEPHONE ENCOUNTER
Reason for call:    Symptom or request:     Patient called stating she is covid + but also going through a miscarriage.           Best Time:  any    Can we leave a detailed message on this number?  YES     Elen IRELAND  Station

## 2021-10-29 NOTE — TELEPHONE ENCOUNTER
I agree that she needs evaluation and am not sure they would want to go to a surgical procedure with her current symptoms as the risk of complications would be quite high.    ED is the right choice    Gemini Esquivel DO

## 2021-10-29 NOTE — TELEPHONE ENCOUNTER
"S-(situation): Patient questioning need for D and C.     B-(background):Last seen 10/26/21. Non viable pregnancy. Given RX for Mesoprostil.  Has not taken it. Seen In Emergency Department 10/27/21.  Was told to start medication.  Diagnosed Covid +.     A-(assessment):Patient notes \"very sick\" today. Difficulty breathing.  has to help patient walk.    Patient sates she is more concerned about needing a D and C. Family practice note from telephone encounter this morning indicates not well enough for surgery and recommends the Emergency Room evaluation for Covid symptoms worsening.    R-(recommendations): Reiterated to patient the need to follow up with The ER.See [employer] messages.    Vannessa Hudson RN on 10/29/2021 at 1:29 PM      "
Vital Signs Last 24 Hrs  T(C): 37.4 (20 Mar 2020 18:21), Max: 39.3 (20 Mar 2020 11:00)  T(F): 99.3 (20 Mar 2020 18:21), Max: 102.8 (20 Mar 2020 11:00)  HR: 75 (20 Mar 2020 18:21) (68 - 90)  BP: 175/89 (20 Mar 2020 18:21) (148/85 - 181/107)  BP(mean): --  RR: 22 (20 Mar 2020 18:21) (20 - 24)  SpO2: 97% (20 Mar 2020 18:21) (91% - 97%)    GENERAL: obese, toxic appearing  HEAD:  Atraumatic, Normocephalic  EYES: EOMI, PERRLA, conjunctiva and sclera clear  ENT: Pharynx not erythematous  PULMONARY: Clear to auscultation bilaterally; No wheeze  CARDIOVASCULAR: Regular rate and rhythm; No murmurs, rubs, or gallops  ABDOMEN: Soft, Nontender, Nondistended; Bowel sounds present  EXTREMITIES:  2+ Peripheral Pulses, No clubbing, cyanosis; trace edema up to knees   MUSCULOSKELETAL: No calf tenderness  PSYCH: AAOx3, normal affects   SKIN: warm and dry, No rashes or lesions

## 2021-10-30 NOTE — PROGRESS NOTES
CC:  Follow up  from the ED for recurrent miscarriage  HPI:  Serina Washington is a 36 year old female is a   .  Patient's last menstrual period was 2021.    Her ULTRASOUND last week was notable for a decreased fetal heart rate and no significant growth   She was seen in  The ED and her repeat  ULTRASOUND showed no fetal heart activity.  Since that time her bleeding has stopped and there has been no cramping or passage of tissue.  Patients records are available and reviewed at today's visit.      Past Surgical History:   Procedure Laterality Date     ARTHROSCOPY KNEE Left       SECTION N/A 9/10/2017    Procedure:  SECTION;  Primary  Section;  Surgeon: uM Miranda MD;  Location: [facility] OR     OPERATIVE HYSTEROSCOPY N/A 2020    Procedure: HYSTEROSCOPY, THERAPEUTIC;  Surgeon: Quinn Cash MD;  Location: [facility] OR     SURGICAL HISTORY OF -       left knee surgery       Family History   Problem Relation Age of Onset     Kidney Disease Father         dialysis     Hypertension Father      Gastrointestinal Disease Father         stomach ulcer     Hypertension Mother      Thyroid Disease Mother      Other Cancer Paternal Grandfather      Other Cancer Paternal Grandmother        Allergies: Patient has no known allergies.    Current Outpatient Medications   Medication Sig Dispense Refill     ibuprofen (ADVIL/MOTRIN) 200 MG tablet Take 3 tablets (600 mg) by mouth every 6 hours as needed for other (mild and/or inflammatory pain)       misoprostol (CYTOTEC) 200 MCG tablet place 4 tablets buccally May repeat in 12 hours if no reaction 8 tablet 0     albuterol (PROAIR HFA/PROVENTIL HFA/VENTOLIN HFA) 108 (90 Base) MCG/ACT inhaler Inhale 2 puffs into the lungs 4 times daily (Patient not taking: Reported on 3/26/2021) 18 g 0     ondansetron (ZOFRAN ODT) 4 MG ODT tab Take 1 tablet (4 mg) by mouth every 8 hours as needed for nausea or vomiting 10 tablet 0     Prenatal  "Multivit-Min-Fe-FA (PRENATAL VITAMINS PO) Take 2 tablets by mouth every evening [facility]  (Patient not taking: Reported on 10/26/2021)       progesterone (PROMETRIUM) 200 MG capsule Take 1 capsule (200 mg) by mouth daily (Patient not taking: Reported on 10/26/2021) 30 capsule 1       ROS:  C: NEGATIVE for fever, chills, change in weight  I: NEGATIVE for worrisome rashes, moles or lesions  E: NEGATIVE for vision changes or irritation  E/M: NEGATIVE for ear, mouth and throat problems  R: NEGATIVE for significant cough or SOB  CV: NEGATIVE for chest pain, palpitations or peripheral edema  GI: NEGATIVE for nausea, abdominal pain, heartburn, or change in bowel habits  : NEGATIVE for frequency, dysuria, hematuria, vaginal discharge  M: NEGATIVE for significant arthralgias or myalgia  N: NEGATIVE for weakness, dizziness or paresthesias  E: NEGATIVE for temperature intolerance, skin/hair changes  P: NEGATIVE for changes in mood or affect    EXAM:  Blood pressure 104/65, pulse 93, temperature 97.7  F (36.5  C), resp. rate 18, height 1.626 m (5' 4\"), weight 66.2 kg (146 lb), last menstrual period 2021, SpO2 96 %, not currently breastfeeding.   BMI= Body mass index is 25.06 kg/m .  General - pleasant female in no acute distress.  No exam today     ASSESSMENT/PLAN:  (L72.3) spontaneous abort  (primary encounter diagnosis)  Comment: no evidence of passage   Plan: misoprostol (CYTOTEC) 200 MCG tablet                Letter will be sent to the referring provider.    Quinn Cash MD    15 of 15 minutes spent Face to face counseling relative to the issues noted above.    "

## 2021-10-31 ENCOUNTER — HOSPITAL ENCOUNTER (EMERGENCY)
Facility: CLINIC | Age: 36
Discharge: HOME | End: 2021-10-31
Attending: EMERGENCY MEDICINE | Admitting: EMERGENCY MEDICINE
Payer: COMMERCIAL

## 2021-10-31 ENCOUNTER — APPOINTMENT (OUTPATIENT)
Dept: GENERAL RADIOLOGY | Facility: CLINIC | Age: 36
End: 2021-10-31
Attending: EMERGENCY MEDICINE
Payer: COMMERCIAL

## 2021-10-31 VITALS
HEART RATE: 94 BPM | HEIGHT: 64 IN | DIASTOLIC BLOOD PRESSURE: 69 MMHG | BODY MASS INDEX: 24.92 KG/M2 | WEIGHT: 146 LBS | OXYGEN SATURATION: 96 % | RESPIRATION RATE: 24 BRPM | TEMPERATURE: 98.3 F | SYSTOLIC BLOOD PRESSURE: 97 MMHG

## 2021-10-31 DIAGNOSIS — U07.1 INFECTION DUE TO 2019 NOVEL CORONAVIRUS: ICD-10-CM

## 2021-10-31 DIAGNOSIS — O03.9 MISCARRIAGE: ICD-10-CM

## 2021-10-31 LAB
ALBUMIN SERPL-MCNC: 3.2 G/DL (ref 3.4–5)
ALBUMIN UR-MCNC: NEGATIVE MG/DL
ALP SERPL-CCNC: 53 U/L (ref 40–150)
ALT SERPL W P-5'-P-CCNC: 60 U/L (ref 0–50)
ANION GAP SERPL CALCULATED.3IONS-SCNC: 5 MMOL/L (ref 3–14)
APPEARANCE UR: CLEAR
AST SERPL W P-5'-P-CCNC: 68 U/L (ref 0–45)
BASOPHILS # BLD AUTO: 0 10E3/UL (ref 0–0.2)
BASOPHILS NFR BLD AUTO: 1 %
BILIRUB SERPL-MCNC: 0.3 MG/DL (ref 0.2–1.3)
BILIRUB UR QL STRIP: NEGATIVE
BUN SERPL-MCNC: 5 MG/DL (ref 7–30)
CALCIUM SERPL-MCNC: 8.6 MG/DL (ref 8.5–10.1)
CHLORIDE BLD-SCNC: 108 MMOL/L (ref 94–109)
CO2 SERPL-SCNC: 28 MMOL/L (ref 20–32)
COLOR UR AUTO: YELLOW
CREAT SERPL-MCNC: 0.6 MG/DL (ref 0.52–1.04)
EOSINOPHIL # BLD AUTO: 0 10E3/UL (ref 0–0.7)
EOSINOPHIL NFR BLD AUTO: 1 %
ERYTHROCYTE [DISTWIDTH] IN BLOOD BY AUTOMATED COUNT: 11.9 % (ref 10–15)
GFR SERPL CREATININE-BSD FRML MDRD: >90 ML/MIN/1.73M2
GLUCOSE BLD-MCNC: 95 MG/DL (ref 70–99)
GLUCOSE UR STRIP-MCNC: NEGATIVE MG/DL
HCT VFR BLD AUTO: 40.7 % (ref 35–47)
HGB BLD-MCNC: 13.7 G/DL (ref 11.7–15.7)
HGB UR QL STRIP: ABNORMAL
HOLD SPECIMEN: NORMAL
HOLD SPECIMEN: NORMAL
IMM GRANULOCYTES # BLD: 0 10E3/UL
IMM GRANULOCYTES NFR BLD: 0 %
KETONES UR STRIP-MCNC: NEGATIVE MG/DL
LEUKOCYTE ESTERASE UR QL STRIP: NEGATIVE
LYMPHOCYTES # BLD AUTO: 1.2 10E3/UL (ref 0.8–5.3)
LYMPHOCYTES NFR BLD AUTO: 28 %
MCH RBC QN AUTO: 30 PG (ref 26.5–33)
MCHC RBC AUTO-ENTMCNC: 33.7 G/DL (ref 31.5–36.5)
MCV RBC AUTO: 89 FL (ref 78–100)
MONOCYTES # BLD AUTO: 0.6 10E3/UL (ref 0–1.3)
MONOCYTES NFR BLD AUTO: 13 %
NEUTROPHILS # BLD AUTO: 2.5 10E3/UL (ref 1.6–8.3)
NEUTROPHILS NFR BLD AUTO: 57 %
NITRATE UR QL: NEGATIVE
NRBC # BLD AUTO: 0 10E3/UL
NRBC BLD AUTO-RTO: 0 /100
PH UR STRIP: 8 [PH] (ref 5–7)
PLAT MORPH BLD: ABNORMAL
PLATELET # BLD AUTO: 236 10E3/UL (ref 150–450)
POTASSIUM BLD-SCNC: 3.7 MMOL/L (ref 3.4–5.3)
PROT SERPL-MCNC: 7.4 G/DL (ref 6.8–8.8)
RBC # BLD AUTO: 4.56 10E6/UL (ref 3.8–5.2)
RBC MORPH BLD: ABNORMAL
RBC URINE: <1 /HPF
SODIUM SERPL-SCNC: 141 MMOL/L (ref 133–144)
SP GR UR STRIP: 1 (ref 1–1.03)
SQUAMOUS EPITHELIAL: 1 /HPF
UROBILINOGEN UR STRIP-MCNC: NORMAL MG/DL
VARIANT LYMPHS BLD QL SMEAR: PRESENT
WBC # BLD AUTO: 4.3 10E3/UL (ref 4–11)
WBC URINE: 1 /HPF

## 2021-10-31 PROCEDURE — 71045 X-RAY EXAM CHEST 1 VIEW: CPT

## 2021-10-31 PROCEDURE — 99284 EMERGENCY DEPT VISIT MOD MDM: CPT | Mod: 25 | Performed by: EMERGENCY MEDICINE

## 2021-10-31 PROCEDURE — 99284 EMERGENCY DEPT VISIT MOD MDM: CPT | Performed by: EMERGENCY MEDICINE

## 2021-10-31 PROCEDURE — 85025 COMPLETE CBC W/AUTO DIFF WBC: CPT | Performed by: EMERGENCY MEDICINE

## 2021-10-31 PROCEDURE — 80053 COMPREHEN METABOLIC PANEL: CPT | Performed by: EMERGENCY MEDICINE

## 2021-10-31 PROCEDURE — 81001 URINALYSIS AUTO W/SCOPE: CPT | Performed by: EMERGENCY MEDICINE

## 2021-10-31 PROCEDURE — 250N000013 HC RX MED GY IP 250 OP 250 PS 637: Performed by: EMERGENCY MEDICINE

## 2021-10-31 PROCEDURE — 36415 COLL VENOUS BLD VENIPUNCTURE: CPT | Performed by: EMERGENCY MEDICINE

## 2021-10-31 RX ORDER — ACETAMINOPHEN 500 MG
1000 TABLET ORAL ONCE
Status: COMPLETED | OUTPATIENT
Start: 2021-10-31 | End: 2021-10-31

## 2021-10-31 RX ADMIN — ACETAMINOPHEN 1000 MG: 500 TABLET, FILM COATED ORAL at 13:44

## 2021-10-31 ASSESSMENT — MIFFLIN-ST. JEOR: SCORE: 1337.25

## 2021-10-31 NOTE — DISCHARGE INSTRUCTIONS
"Follow-up with Dr. Baldwin regarding your miscarriage.  If you choose to delay misoprostol because you are not feeling well that is okay.  If you develop abdominal pain, or abnormal vaginal discharge, you should return to the emergency department.    Regarding your Covid infection, it is not uncommon for symptoms to the last 2 weeks or more. You may take tylenol or ibuprofen for fever and body aches.     Discharge Instructions for COVID-19 Patients  You have--or may have--COVID-19. Please follow the instructions listed below.   If you have a weakened immune system, discuss with your doctor any other actions you need to take.  How can I protect others?  If you have symptoms (fever, cough, body aches or trouble breathing):  Stay home and away from others (self-isolate) until:  Your other symptoms have resolved (gotten better). And   You've had no fever--and no medicine that reduces fever--for 1 full day (24 hours). And   At least 10 days have passed since your symptoms started. (You may need to wait 20 days. Follow the advice of your care team.)  If you don't show symptoms, but testing showed that you have COVID-19:  Stay home and away from others (self-isolate) until at least 10 days have passed since the date of your first positive COVID-19 test.  During this time  Stay in your own room, even for meals. Use your own bathroom if you can.  Stay away from others in your home. No hugging, kissing or shaking hands. No visitors.  Don't go to work, school or anywhere else.  Clean \"high touch\" surfaces often (doorknobs, counters, handles). Use household cleaning spray or wipes.  You'll find a full list of  on the EPA website: www.epa.gov/pesticide-registration/list-n-disinfectants-use-against-sars-cov-2.  Cover your mouth and nose with a mask or other face covering to avoid spreading germs.  Wash your hands and face often. Use soap and water.  Caregivers in these groups are at risk for severe illness due to " COVID-19:  People 65 years and older  People who live in a nursing home or long-term care facility  People with chronic disease (lung, heart, cancer, diabetes, kidney, liver, immunologic)  People who have a weakened immune system, including those who:  Are in cancer treatment  Take medicine that weakens the immune system, such as corticosteroids  Had a bone marrow or organ transplant  Have an immune deficiency  Have poorly controlled HIV or AIDS  Are obese (body mass index of 40 or higher)  Smoke regularly  Caregivers should wear gloves while washing dishes, handling laundry and cleaning bedrooms and bathrooms.  Use caution when washing and drying laundry: Don't shake dirty laundry and use the warmest water setting that you can.  For more tips on managing your health at home, go to www.cdc.gov/coronavirus/2019-ncov/downloads/10Things.pdf.  How can I take care of myself at home?  Get lots of rest. Drink extra fluids (unless a doctor has told you not to).  Take Tylenol (acetaminophen) for fever or pain. If you have liver or kidney problems, ask your family doctor if it's okay to take Tylenol.   Adults can take either:   650 mg (two 325 mg pills) every 4 to 6 hours, or   1,000 mg (two 500 mg pills) every 8 hours as needed.  Note: Don't take more than 3,000 mg in one day. Acetaminophen is found in many medicines (both prescribed and over-the-counter medicines). Read all labels to be sure you don't take too much.   For children, check the Tylenol bottle for the right dose. The dose is based on the child's age or weight.  If you have other health problems (like cancer, heart failure, an organ transplant or severe kidney disease): Call your specialty clinic if you don't feel better in the next 2 days.  Know when to call 911. Emergency warning signs include:  Trouble breathing or shortness of breath  Pain or pressure in the chest that doesn't go away  Feeling confused like you haven't felt before, or not being able to wake  up  Bluish-colored lips or face  Your doctor may have prescribed a blood thinner medicine. Follow their instructions.  Where can I get more information?  [facility] - About COVID-19:   [email or web address]  CDC - What to Do If You're Sick: www.cdc.gov/coronavirus/2019-ncov/about/steps-when-sick.html  CDC - Ending Home Isolation: www.cdc.gov/coronavirus/2019-ncov/hcp/disposition-in-home-patients.html  CDC - Caring for Someone: www.cdc.gov/coronavirus/2019-ncov/if-you-are-sick/care-for-someone.html  [facility] - Interim Guidance for Hospital Discharge to Home: www.[facility].[place]/diseases/coronavirus/hcp/hospdischarge.pdf  Below are the COVID-19 hotlines at the [place] Department of [facility] ([facility]). Interpreters are available.  For health questions: Call 260-097-8664 or 1-816.930.3648 (7 a.m. to 7 p.m.)  For questions about schools and childcare: Call 545-887-2218 or 1-743.940.1023 (7 a.m. to 7 p.m.)    For informational purposes only. Not to replace the advice of your health care provider. Clinically reviewed by Dr. Keny Fabian.   Copyright   2020 [facility]. All rights reserved. [employer] 159256 - REV 01/05/21.

## 2021-10-31 NOTE — ED PROVIDER NOTES
"  History     Chief Complaint   Patient presents with     Shortness of Breath     covid positive 10/27/21.      HPI  Serina Washington is a 36 year old female with history significant for Sars-Cov-2 infection with positive test on 10/27/2021 with chest pain and shortness of breath.  Chart review shows that she was evaluated in this department on 10/27/2021 (4 days ago) and at that time reported symptom onset 10/23 with chills, myalgias, fatigue.  Later developed headache, lightheadedness, cough, shortness of breath, generalized abdominal discomfort, nausea and decreased appetite.  Fever up to 102. Taking acetaminophen yesterday at 4PM.     She was also seen on 10/24 for lower abdominal cramping and vaginal bleeding in setting of positive pregnancy at 6 weeks by dates.  Had 3 previous miscarriages.  Was prescribed Methergine at that time in consultation with OB.  She followed up 2 days later with OB and was recommended misoprostol.  She was not feeling well and did not take either medication.    Today she presents for SOA, dizziness, and \"extreme fatigue\". Also has nausea. SOA is worse when compared to her symptoms on 10/27/2021.     Vaginal spotting, no pelvic pain or abnormal vaginal discharge.     No history of VTE.  Non-smoker.  No lower extremity edema or calf tenderness.  No pleuritic chest pain.    The patient's PMHx, Surgical Hx, Allergies, and Medications were all reviewed with the patient.    Allergies:  No Known Allergies    Problem List:    Patient Active Problem List    Diagnosis Date Noted     Low serum progesterone 10/19/2021     Priority: Medium     History of miscarriage, currently pregnant, unspecified trimester 2021     Priority: Medium     Endometrial polyp 10/29/2020     Priority: Medium     Added automatically from request for surgery [id number]       Menorrhagia with regular cycle 10/29/2020     Priority: Medium     Added automatically from request for surgery [id number]       S/P  " "09/10/2017     Priority: Medium        Past Medical History:    No past medical history on file.    Past Surgical History:    Past Surgical History:   Procedure Laterality Date     ARTHROSCOPY KNEE Left       SECTION N/A 9/10/2017    Procedure:  SECTION;  Primary  Section;  Surgeon: Mu Miranda MD;  Location: [facility] OR     OPERATIVE HYSTEROSCOPY N/A 2020    Procedure: HYSTEROSCOPY, THERAPEUTIC;  Surgeon: Quinn Cash MD;  Location: [facility] OR     SURGICAL HISTORY OF -       left knee surgery       Family History:    Family History   Problem Relation Age of Onset     Kidney Disease Father         dialysis     Hypertension Father      Gastrointestinal Disease Father         stomach ulcer     Hypertension Mother      Thyroid Disease Mother      Other Cancer Paternal Grandfather      Other Cancer Paternal Grandmother        Social History:  Marital Status:   [2]  Social History     Tobacco Use     Smoking status: Never Smoker     Smokeless tobacco: Never Used   Vaping Use     Vaping Use: Never used   Substance Use Topics     Alcohol use: No     Alcohol/week: 0.0 standard drinks     Drug use: No        Medications:    albuterol (PROAIR HFA/PROVENTIL HFA/VENTOLIN HFA) 108 (90 Base) MCG/ACT inhaler  ibuprofen (ADVIL/MOTRIN) 200 MG tablet  misoprostol (CYTOTEC) 200 MCG tablet  Prenatal Multivit-Min-Fe-FA (PRENATAL VITAMINS PO)  progesterone (PROMETRIUM) 200 MG capsule          Review of Systems   A complete review of systems performed and is otherwise negative except as noted in the HPI.     Physical Exam   BP: 97/69  Pulse: 100  Temp: 98.3  F (36.8  C)  Resp: 24  Height: 162.6 cm (5' 4\")  Weight: 66.2 kg (146 lb)  SpO2: 96 %    Physical Exam  GEN: Awake, alert, and cooperative.  Appears anxious and distressed.  HENT: MMM. External ears and nose normal bilaterally.  EYES: EOM intact. Conjunctiva clear. No discharge.   NECK: Symmetric, freely mobile.   CV : Regular rate and " rhythm.  Extremities warm well perfused.  PULM: Normal effort.  Speaking in full sentences.  ABD: Soft, non-tender, non-distended. No rebound or guarding.   NEURO: Normal speech. Following commands. CN II-XII grossly intact. Answering questions and interacting appropriately.   EXT: No gross deformity.  No calf edema no lower extremity edema, calf tenderness, erythema, or ecchymosis.  INT: Warm and dry       ED Course        Procedures          Critical Care time:  none               Results for orders placed or performed during the hospital encounter of 10/31/21 (from the past 24 hour(s))   Comprehensive metabolic panel   Result Value Ref Range    Sodium 141 133 - 144 mmol/L    Potassium 3.7 3.4 - 5.3 mmol/L    Chloride 108 94 - 109 mmol/L    Carbon Dioxide (CO2) 28 20 - 32 mmol/L    Anion Gap 5 3 - 14 mmol/L    Urea Nitrogen 5 (L) 7 - 30 mg/dL    Creatinine 0.60 0.52 - 1.04 mg/dL    Calcium 8.6 8.5 - 10.1 mg/dL    Glucose 95 70 - 99 mg/dL    Alkaline Phosphatase 53 40 - 150 U/L    AST 68 (H) 0 - 45 U/L    ALT 60 (H) 0 - 50 U/L    Protein Total 7.4 6.8 - 8.8 g/dL    Albumin 3.2 (L) 3.4 - 5.0 g/dL    Bilirubin Total 0.3 0.2 - 1.3 mg/dL    GFR Estimate >90 >60 mL/min/1.73m2   CBC with platelets differential    Narrative    The following orders were created for panel order CBC with platelets differential.  Procedure                               Abnormality         Status                     ---------                               -----------         ------                     CBC with platelets and d...[[id number]]                      Final result               RBC and Platelet Morphology[[id number]]  Abnormal            Final result                 Please view results for these tests on the individual orders.   CBC with platelets and differential   Result Value Ref Range    WBC Count 4.3 4.0 - 11.0 10e3/uL    RBC Count 4.56 3.80 - 5.20 10e6/uL    Hemoglobin 13.7 11.7 - 15.7 g/dL    Hematocrit 40.7 35.0 - 47.0 %    MCV  89 78 - 100 fL    MCH 30.0 26.5 - 33.0 pg    MCHC 33.7 31.5 - 36.5 g/dL    RDW 11.9 10.0 - 15.0 %    Platelet Count 236 150 - 450 10e3/uL    % Neutrophils 57 %    % Lymphocytes 28 %    % Monocytes 13 %    % Eosinophils 1 %    % Basophils 1 %    % Immature Granulocytes 0 %    NRBCs per 100 WBC 0 <1 /100    Absolute Neutrophils 2.5 1.6 - 8.3 10e3/uL    Absolute Lymphocytes 1.2 0.8 - 5.3 10e3/uL    Absolute Monocytes 0.6 0.0 - 1.3 10e3/uL    Absolute Eosinophils 0.0 0.0 - 0.7 10e3/uL    Absolute Basophils 0.0 0.0 - 0.2 10e3/uL    Absolute Immature Granulocytes 0.0 <=0.0 10e3/uL    Absolute NRBCs 0.0 10e3/uL   Extra Tube    Narrative    The following orders were created for panel order Extra Tube.  Procedure                               Abnormality         Status                     ---------                               -----------         ------                     Extra Blue Top Tube[[id number]]                              Final result               Extra Red Top Tube[[id number]]                               Final result                 Please view results for these tests on the individual orders.   Extra Blue Top Tube   Result Value Ref Range    Hold Specimen JIC    Extra Red Top Tube   Result Value Ref Range    Hold Specimen JI    RBC and Platelet Morphology   Result Value Ref Range    Platelet Assessment (A) Automated Count Confirmed. Platelet morphology is normal.     Automated Count Confirmed. Giant platelets are present.    Reactive Lymphocytes Present (A) None Seen    RBC Morphology Confirmed RBC Indices    UA with Microscopic reflex to Culture    Specimen: Urine, Clean Catch   Result Value Ref Range    Color Urine Yellow Colorless, Straw, Light Yellow, Yellow    Appearance Urine Clear Clear    Glucose Urine Negative Negative mg/dL    Bilirubin Urine Negative Negative    Ketones Urine Negative Negative mg/dL    Specific Gravity Urine 1.004 1.003 - 1.035    Blood Urine Small (A) Negative    pH Urine 8.0 (H)  5.0 - 7.0    Protein Albumin Urine Negative Negative mg/dL    Urobilinogen Urine Normal Normal, 2.0 mg/dL    Nitrite Urine Negative Negative    Leukocyte Esterase Urine Negative Negative    RBC Urine <1 <=2 /HPF    WBC Urine 1 <=5 /HPF    Squamous Epithelials Urine 1 <=1 /HPF    Narrative    Urine Culture not indicated   XR Chest Port 1 View    Narrative    EXAM: XR CHEST PORT 1 VIEW  LOCATION: [facility]  DATE/TIME: 10/31/2021 3:52 PM    INDICATION: COVID pneumonia.  COMPARISON: None.      Impression    IMPRESSION: Negative chest.       Medications   acetaminophen (TYLENOL) tablet 1,000 mg (1,000 mg Oral Given 10/31/21 1344)       Assessments & Plan (with Medical Decision Making)   36 year old female with past medical history significant for recent miscarriage and possible retained products, known SARS CoV-2 infection, who presents emergency department with worsening fatigue and dyspnea.  Patient appears distressed but nontoxic.  Oxygen saturation 98% on room air and heart rate of 94.  Afebrile while in department.  She was able to speak in full sentences.  I did have her stand up in her room and march in place for 30 seconds.  There was no drop in her oxygen saturations during physical exertion.  Symptoms been present more than 10 days.  Hopefully she will start to improve in the next few days.  She was asking about treatment options such as Regeneron or antibiotics.  Unfortunately we are not able to get Regeneron through the emergency department and with symptoms longer than 10 days she would not qualify.  I still do not see any comorbidities in her medical record to qualify her.  No hypoxia therefore no indication for dexamethasone.  Chest x-ray without acute infiltrate and have very low suspicion for bacterial pneumonia given no Covid disease and antibiotics would not be indicated.    Regarding her miscarriage, I spoke with Dr. Chase from OB/GYN and she said there is no hurry for  her to take the misoprostol.  She does have a reassuring abdominal exam, no leukocytosis, and we have cause for her fever.  No abnormal vaginal discharge.  Patient was offered pelvic exam however declined.  She should follow-up with Dr. Baldwin regarding her miscarriage and plans for future pregnancy.  Unfortunately this is her fourth miscarriage.    Follow-up and return precautions discussed with patient.    I have reviewed the nursing notes.         New Prescriptions    No medications on file       Final diagnoses:   Infection due to 2019 novel coronavirus   Miscarriage     Jaxson Romero MD    10/31/2021   [facility] EMERGENCY DEPT    Disclaimer: This note consists of words and symbols derived from keyboarding and dictation using voice recognition software.  As a result, there may be errors that have gone undetected.  Please consider this when interpreting information found in this note.             Jaxson Romero MD  10/31/21 2604

## 2021-11-01 ENCOUNTER — MYC MEDICAL ADVICE (OUTPATIENT)
Dept: FAMILY MEDICINE | Facility: CLINIC | Age: 36
End: 2021-11-01

## 2021-11-02 ENCOUNTER — MYC MEDICAL ADVICE (OUTPATIENT)
Dept: FAMILY MEDICINE | Facility: CLINIC | Age: 36
End: 2021-11-02

## 2021-11-05 ENCOUNTER — APPOINTMENT (OUTPATIENT)
Dept: ULTRASOUND IMAGING | Facility: CLINIC | Age: 36
End: 2021-11-05
Attending: EMERGENCY MEDICINE
Payer: COMMERCIAL

## 2021-11-05 ENCOUNTER — HOSPITAL ENCOUNTER (EMERGENCY)
Facility: CLINIC | Age: 36
Discharge: HOME | End: 2021-11-05
Attending: EMERGENCY MEDICINE | Admitting: EMERGENCY MEDICINE
Payer: COMMERCIAL

## 2021-11-05 VITALS
BODY MASS INDEX: 24.41 KG/M2 | RESPIRATION RATE: 20 BRPM | HEART RATE: 68 BPM | SYSTOLIC BLOOD PRESSURE: 104 MMHG | OXYGEN SATURATION: 99 % | TEMPERATURE: 97.9 F | HEIGHT: 64 IN | WEIGHT: 143 LBS | DIASTOLIC BLOOD PRESSURE: 78 MMHG

## 2021-11-05 DIAGNOSIS — O03.9 MISCARRIAGE: ICD-10-CM

## 2021-11-05 LAB
ANION GAP SERPL CALCULATED.3IONS-SCNC: 10 MMOL/L (ref 3–14)
BASOPHILS # BLD AUTO: 0 10E3/UL (ref 0–0.2)
BASOPHILS NFR BLD AUTO: 1 %
BUN SERPL-MCNC: 8 MG/DL (ref 7–30)
CALCIUM SERPL-MCNC: 9.2 MG/DL (ref 8.5–10.1)
CHLORIDE BLD-SCNC: 104 MMOL/L (ref 94–109)
CO2 SERPL-SCNC: 24 MMOL/L (ref 20–32)
CREAT SERPL-MCNC: 0.52 MG/DL (ref 0.52–1.04)
EOSINOPHIL # BLD AUTO: 0.2 10E3/UL (ref 0–0.7)
EOSINOPHIL NFR BLD AUTO: 3 %
ERYTHROCYTE [DISTWIDTH] IN BLOOD BY AUTOMATED COUNT: 11.3 % (ref 10–15)
GFR SERPL CREATININE-BSD FRML MDRD: >90 ML/MIN/1.73M2
GLUCOSE BLD-MCNC: 108 MG/DL (ref 70–99)
HCT VFR BLD AUTO: 38.2 % (ref 35–47)
HGB BLD-MCNC: 12.9 G/DL (ref 11.7–15.7)
HOLD SPECIMEN: NORMAL
IMM GRANULOCYTES # BLD: 0 10E3/UL
IMM GRANULOCYTES NFR BLD: 0 %
LYMPHOCYTES # BLD AUTO: 2.2 10E3/UL (ref 0.8–5.3)
LYMPHOCYTES NFR BLD AUTO: 34 %
MCH RBC QN AUTO: 29.5 PG (ref 26.5–33)
MCHC RBC AUTO-ENTMCNC: 33.8 G/DL (ref 31.5–36.5)
MCV RBC AUTO: 87 FL (ref 78–100)
MONOCYTES # BLD AUTO: 0.7 10E3/UL (ref 0–1.3)
MONOCYTES NFR BLD AUTO: 11 %
NEUTROPHILS # BLD AUTO: 3.3 10E3/UL (ref 1.6–8.3)
NEUTROPHILS NFR BLD AUTO: 51 %
NRBC # BLD AUTO: 0 10E3/UL
NRBC BLD AUTO-RTO: 0 /100
PLATELET # BLD AUTO: 451 10E3/UL (ref 150–450)
POTASSIUM BLD-SCNC: 3.7 MMOL/L (ref 3.4–5.3)
RBC # BLD AUTO: 4.37 10E6/UL (ref 3.8–5.2)
SODIUM SERPL-SCNC: 138 MMOL/L (ref 133–144)
WBC # BLD AUTO: 6.4 10E3/UL (ref 4–11)

## 2021-11-05 PROCEDURE — 85025 COMPLETE CBC W/AUTO DIFF WBC: CPT | Performed by: EMERGENCY MEDICINE

## 2021-11-05 PROCEDURE — 258N000003 HC RX IP 258 OP 636: Performed by: EMERGENCY MEDICINE

## 2021-11-05 PROCEDURE — 76856 US EXAM PELVIC COMPLETE: CPT

## 2021-11-05 PROCEDURE — 36415 COLL VENOUS BLD VENIPUNCTURE: CPT | Performed by: EMERGENCY MEDICINE

## 2021-11-05 PROCEDURE — 76830 TRANSVAGINAL US NON-OB: CPT

## 2021-11-05 PROCEDURE — 99284 EMERGENCY DEPT VISIT MOD MDM: CPT | Mod: 25

## 2021-11-05 PROCEDURE — 76801 OB US < 14 WKS SINGLE FETUS: CPT

## 2021-11-05 PROCEDURE — 99284 EMERGENCY DEPT VISIT MOD MDM: CPT | Performed by: EMERGENCY MEDICINE

## 2021-11-05 PROCEDURE — 96374 THER/PROPH/DIAG INJ IV PUSH: CPT | Mod: 59

## 2021-11-05 PROCEDURE — 80048 BASIC METABOLIC PNL TOTAL CA: CPT | Performed by: EMERGENCY MEDICINE

## 2021-11-05 PROCEDURE — 250N000011 HC RX IP 250 OP 636: Performed by: EMERGENCY MEDICINE

## 2021-11-05 PROCEDURE — 96361 HYDRATE IV INFUSION ADD-ON: CPT

## 2021-11-05 RX ORDER — KETOROLAC TROMETHAMINE 15 MG/ML
10 INJECTION, SOLUTION INTRAMUSCULAR; INTRAVENOUS
Status: COMPLETED | OUTPATIENT
Start: 2021-11-05 | End: 2021-11-05

## 2021-11-05 RX ORDER — OXYCODONE HYDROCHLORIDE 5 MG/1
5 TABLET ORAL EVERY 6 HOURS PRN
Qty: 7 TABLET | Refills: 0 | Status: SHIPPED | OUTPATIENT
Start: 2021-11-05 | End: 2022-04-21

## 2021-11-05 RX ADMIN — KETOROLAC TROMETHAMINE 10 MG: 15 INJECTION, SOLUTION INTRAMUSCULAR; INTRAVENOUS at 18:59

## 2021-11-05 RX ADMIN — SODIUM CHLORIDE 500 ML: 9 INJECTION, SOLUTION INTRAVENOUS at 18:59

## 2021-11-05 ASSESSMENT — ENCOUNTER SYMPTOMS
CARDIOVASCULAR NEGATIVE: 1
ALLERGIC/IMMUNOLOGIC NEGATIVE: 1
ENDOCRINE NEGATIVE: 1
NEUROLOGICAL NEGATIVE: 1
GASTROINTESTINAL NEGATIVE: 1
EYES NEGATIVE: 1
MUSCULOSKELETAL NEGATIVE: 1
HEMATOLOGIC/LYMPHATIC NEGATIVE: 1
CONSTITUTIONAL NEGATIVE: 1
PSYCHIATRIC NEGATIVE: 1
RESPIRATORY NEGATIVE: 1

## 2021-11-05 ASSESSMENT — MIFFLIN-ST. JEOR: SCORE: 1323.64

## 2021-11-05 NOTE — ED PROVIDER NOTES
History     Chief Complaint   Patient presents with     Miscarriage     HPI  Serina Washington is a 36 year old female who resents with heavy vaginal bleeding due to a miscarriage.  Patient reports that she was initially diagnosed with Miscarriage on 2021 at 8 weeks gestation.  She tested positive for COVID-19 on 2021 patient reports she suddenly began passing large amounts of clots and is filled 2 pads and 2 diapers.  This began around 5 PM this evening prior to arrival in the department.  On examination patient reports after evaluation 2021 she was prescribed Methergine but did not take this because she stopped bleeding spontaneously.  She was followed by gynecology reports she was prescribed misoprostol.  Patient reports she did not begin misoprostol because she developed COVID-19 illness and tested positive for COVID-19.  She reports she was treated with monoclonal antibody therapy 2 days ago.  Patient reports she was doing well until this evening at 5 PM when she suddenly began bleeding.  She reports small clots but passed a large volume of blood filled 2 pads in a diaper and she got concerned and presented for further care.  No fever or chills.    Allergies:  No Known Allergies    Problem List:    Patient Active Problem List    Diagnosis Date Noted     Low serum progesterone 10/19/2021     Priority: Medium     History of miscarriage, currently pregnant, unspecified trimester 2021     Priority: Medium     Endometrial polyp 10/29/2020     Priority: Medium     Added automatically from request for surgery [id number]       Menorrhagia with regular cycle 10/29/2020     Priority: Medium     Added automatically from request for surgery [id number]       S/P  09/10/2017     Priority: Medium        Past Medical History:    No past medical history on file.    Past Surgical History:    Past Surgical History:   Procedure Laterality Date     ARTHROSCOPY KNEE Left        "SECTION N/A 9/10/2017    Procedure:  SECTION;  Primary  Section;  Surgeon: Mu Miranda MD;  Location: [facility]     OPERATIVE HYSTEROSCOPY N/A 2020    Procedure: HYSTEROSCOPY, THERAPEUTIC;  Surgeon: Quinn Cash MD;  Location: [facility]     SURGICAL HISTORY OF -       left knee surgery       Family History:    Family History   Problem Relation Age of Onset     Kidney Disease Father         dialysis     Hypertension Father      Gastrointestinal Disease Father         stomach ulcer     Hypertension Mother      Thyroid Disease Mother      Other Cancer Paternal Grandfather      Other Cancer Paternal Grandmother        Social History:  Marital Status:   [2]  Social History     Tobacco Use     Smoking status: Never Smoker     Smokeless tobacco: Never Used   Vaping Use     Vaping Use: Never used   Substance Use Topics     Alcohol use: No     Alcohol/week: 0.0 standard drinks     Drug use: No        Medications:    oxyCODONE (ROXICODONE) 5 MG tablet  albuterol (PROAIR HFA/PROVENTIL HFA/VENTOLIN HFA) 108 (90 Base) MCG/ACT inhaler  ibuprofen (ADVIL/MOTRIN) 200 MG tablet  misoprostol (CYTOTEC) 200 MCG tablet  Prenatal Multivit-Min-Fe-FA (PRENATAL VITAMINS PO)  progesterone (PROMETRIUM) 200 MG capsule          Review of Systems   Constitutional: Negative.    HENT: Negative.    Eyes: Negative.    Respiratory: Negative.    Cardiovascular: Negative.    Gastrointestinal: Negative.    Endocrine: Negative.    Genitourinary: Positive for vaginal bleeding.   Musculoskeletal: Negative.    Skin: Negative.    Allergic/Immunologic: Negative.    Neurological: Negative.    Hematological: Negative.    Psychiatric/Behavioral: Negative.    All other systems reviewed and are negative.      Physical Exam   BP: 117/73  Pulse: 91  Temp: 97.9  F (36.6  C)  Resp: 20  Height: 162.6 cm (5' 4\")  Weight: 64.9 kg (143 lb)  SpO2: 100 %      Physical Exam  Constitutional:       Appearance: Normal appearance. "   HENT:      Head: Normocephalic and atraumatic.      Nose: Nose normal.      Mouth/Throat:      Mouth: Mucous membranes are moist.   Eyes:      Extraocular Movements: Extraocular movements intact.      Pupils: Pupils are equal, round, and reactive to light.   Cardiovascular:      Rate and Rhythm: Normal rate.      Pulses: Normal pulses.   Pulmonary:      Effort: Pulmonary effort is normal. No respiratory distress.      Breath sounds: Normal breath sounds. No stridor. No wheezing, rhonchi or rales.   Chest:      Chest wall: No tenderness.   Musculoskeletal:         General: No swelling, tenderness, deformity or signs of injury.      Cervical back: Normal range of motion and neck supple.      Right lower leg: No edema.      Left lower leg: No edema.   Skin:     Capillary Refill: Capillary refill takes less than 2 seconds.      Coloration: Skin is not jaundiced or pale.      Findings: No bruising, erythema, lesion or rash.   Neurological:      General: No focal deficit present.      Mental Status: She is alert and oriented to person, place, and time.   Psychiatric:         Mood and Affect: Mood normal.         Behavior: Behavior normal.         Thought Content: Thought content normal.         Judgment: Judgment normal.         ED Course        Procedures              Critical Care time:  none               ED medications:  Medications   ketorolac (TORADOL) injection 10 mg (10 mg Intravenous Given 11/5/21 1859)   0.9% sodium chloride BOLUS (0 mLs Intravenous Stopped 11/5/21 2200)         ED vitals:  Vitals:    11/05/21 1838 11/05/21 1845 11/05/21 1900 11/05/21 1904   BP:   104/78    Pulse:   68    Resp:       Temp:       TempSrc:       SpO2: 99% 96%  99%   Weight:       Height:         ED labs and imaging:  Results for orders placed or performed during the hospital encounter of 11/05/21   US OB <14 Weeks w Transvaginal Single     Status: None    Narrative    EXAM: US OB <14 WEEKS WITH TRANSVAGINAL SINGLE  LOCATION: [facility]  DATE/TIME: 2021 7:58 PM    INDICATION: Vaginal bleeding, lower abdominal cramping. . History of miscarriages. Recent diagnosis of pregnancy failure on 10/24/2021. Evaluate for retained products of conception versus other acute process. Pregnancy.  COMPARISON: None.  TECHNIQUE: Transabdominal scans were performed. Endovaginal ultrasound was performed to better visualize the embryo.    FINDINGS:  UTERUS: Intrauterine gestational sac identified. Fetal pole is seen, but no yolk sac can be seen. No detectable fetal cardiac activity. There is ill-defined hypoechoic material adjacent to the gestational sac appearing heterogeneous that may be   subchorionic hemorrhage. One region is 1.2 x 0.6 cm.  CRL: Measures 0.7 cm, equals 6 weeks, 5 days.  FETAL HEART RATE: Not detected.  AMNIOTIC FLUID: Normal.  PLACENTA: Not yet formed. No evidence for sub-chorionic hemorrhage.    RIGHT OVARY: Normal.  LEFT OVARY: Normal.      Impression    IMPRESSION:   1.  Intrauterine gestational sac suggested. However, no observable fetal cardiac activity. As such, this is worrisome for fetal demise. Recommend confirmation with serial beta hCG assessment and repeat imaging.  2.  Subchorionic hemorrhage suggested.  3.  No free pelvic fluid.       Extra Blue Top Tube     Status: None   Result Value Ref Range    Hold Specimen JIC    Extra Red Top Tube     Status: None   Result Value Ref Range    Hold Specimen JIC    Extra Green Top (Lithium Heparin) Tube     Status: None   Result Value Ref Range    Hold Specimen JIC    Extra Purple Top Tube     Status: None   Result Value Ref Range    Hold Specimen JIC    Basic metabolic panel     Status: Abnormal   Result Value Ref Range    Sodium 138 133 - 144 mmol/L    Potassium 3.7 3.4 - 5.3 mmol/L    Chloride 104 94 - 109 mmol/L    Carbon Dioxide (CO2) 24 20 - 32 mmol/L    Anion Gap 10 3 - 14 mmol/L    Urea Nitrogen 8 7 - 30 mg/dL    Creatinine 0.52 0.52 - 1.04  mg/dL    Calcium 9.2 8.5 - 10.1 mg/dL    Glucose 108 (H) 70 - 99 mg/dL    GFR Estimate >90 >60 mL/min/1.73m2   CBC with platelets and differential     Status: Abnormal   Result Value Ref Range    WBC Count 6.4 4.0 - 11.0 10e3/uL    RBC Count 4.37 3.80 - 5.20 10e6/uL    Hemoglobin 12.9 11.7 - 15.7 g/dL    Hematocrit 38.2 35.0 - 47.0 %    MCV 87 78 - 100 fL    MCH 29.5 26.5 - 33.0 pg    MCHC 33.8 31.5 - 36.5 g/dL    RDW 11.3 10.0 - 15.0 %    Platelet Count 451 (H) 150 - 450 10e3/uL    % Neutrophils 51 %    % Lymphocytes 34 %    % Monocytes 11 %    % Eosinophils 3 %    % Basophils 1 %    % Immature Granulocytes 0 %    NRBCs per 100 WBC 0 <1 /100    Absolute Neutrophils 3.3 1.6 - 8.3 10e3/uL    Absolute Lymphocytes 2.2 0.8 - 5.3 10e3/uL    Absolute Monocytes 0.7 0.0 - 1.3 10e3/uL    Absolute Eosinophils 0.2 0.0 - 0.7 10e3/uL    Absolute Basophils 0.0 0.0 - 0.2 10e3/uL    Absolute Immature Granulocytes 0.0 <=0.0 10e3/uL    Absolute NRBCs 0.0 10e3/uL   Long Bottom Draw     Status: None    Narrative    The following orders were created for panel order Long Bottom Draw.  Procedure                               Abnormality         Status                     ---------                               -----------         ------                     Extra Blue Top Tube[[id number]]                              Final result               Extra Red Top Tube[[id number]]                               Final result               Extra Green Top (Lithium...[[id number]]                      Final result               Extra Purple Top Tube[[id number]]                            Final result                 Please view results for these tests on the individual orders.   CBC with platelets differential     Status: Abnormal    Narrative    The following orders were created for panel order CBC with platelets differential.  Procedure                               Abnormality         Status                     ---------                                -----------         ------                     CBC with platelets and d...[[id number]]  Abnormal            Final result                 Please view results for these tests on the individual orders.           Assessments & Plan (with Medical Decision Making)   Assessment Summary and Clinical Impression: 36-year-old female  who presented with vaginal bleeding after recent diagnosis of a miscarriage on 2021.  She was discharged home with Methergine after evaluation on 2021-but did not use Methergine because she reports she stopped bleeding spontaneously.Patient has 3 previous miscarriages.  She also has positive for COVID-19 on 2021.  After initial evaluation on he was told to take misoprostol which she did not take during that visit 5 days earlier.  She reports she received medical antibiotic therapy for COVID-19 illness.  She presented with sudden onset of bleeding today with passing large volume of blood and some small clots.  She reported some mild abdominal cramping.  No back pain or flank pain no fever.  Chaperoned pelvic exam was limited revealing scant bleeding.  The cervical os was not identified because the exam was performed on  bedpan.  GYN bed was not available. Work-up revealed an intrauterine gestational sac with no fetal cardiac activity with subchorionic hemorrhage.  She is discharged with suspicion that she has a threatened miscarriage with plan for urgent follow-up with gynecology for planned D&C versus decision to trial misoprostol initially prescribed on 2021.  Threshold to return to the department for reevaluation.  During her ED course she had no significant bleeding.    ED course and plan:  I reviewed her evaluation on 2021-patient is Rh+.  Patient did have a comprehensive pelvic ultrasound during that visit that revealed live PE but no cardiac activity.  See additional details in the medical record.  CBC obtained  on October 31, 2021 revealed a hemoglobin of 13.7 and hematocrit of 40.7.  Type and screen from September 9, 2017 blood type is O+.  A comprehensive pelvic ultrasound was obtained for interval comparison from October 24, 2021.  Work-up today revealed a hemoglobin 12.9, normal electrolytes and creatinine.  Comprehensive pelvic ultrasound gestational sac with no fetal cardiac activity.  This is consistent with fetal demise with subchorionic hemorrhage suggested.  No free pelvic fluid. See details in the radiology report.  I reviewed her presentation with Dr ZAY Arauz- OB-GYN on-call at 9.30pm-we discussed options for further work-up given patient's follow-up ultrasound examination and history is reported today.  Dr. Menendez to advise that patient could trial misoprostol as initially prescribed on 10/26/21 or she could be scheduled for suction D&C to be coordinated within the next 72 to 96hrs. She has elected to follow-up with gynecology for D&C .  She is advised to call [phone] to confirm her procedural time in the next any 72 to 96 hours.  We reviewed worrisome symptoms including reasons to return to the department to be evaluated she expressed comfort understanding agreement plan of care.  She was given a prescription for oxycodone to use in the event that she decides to use misoprostol over the weekend if needed and pain management plan was scheduled Tylenol and ibuprofen was reviewed.        Disclaimer: This note consists of symbols derived from keyboarding, dictation and/or voice recognition software. As a result, there may be errors in the script that have gone undetected. Please consider this when interpreting information found in this chart.  I have reviewed the nursing notes.    I have reviewed the findings, diagnosis, plan and need for follow up with the patient.       Discharge Medication List as of 11/5/2021 10:01 PM      START taking these medications    Details   oxyCODONE (ROXICODONE) 5 MG tablet  Take 1 tablet (5 mg) by mouth every 6 hours as needed for severe pain, Disp-7 tablet, R-0, Local Print             Final diagnoses:   Miscarriage       11/5/2021   [facility] EMERGENCY DEPT     Shady Oropeza MD  11/05/21 0338

## 2021-11-05 NOTE — ED TRIAGE NOTES
"10/24 patient experienced miscarriage ~ 8 weeks. Tested positive COVID 10/31. Tonight, ~1700 pt started to have cramping. Began to pass large amounts of blood (2 pads and 2 diapers), large clots \"size of 2 quarters put together. Pt has been having cramping and mild bleeding until ~ 1700.  "

## 2021-11-06 ENCOUNTER — NURSE TRIAGE (OUTPATIENT)
Dept: NURSING | Facility: CLINIC | Age: 36
End: 2021-11-06

## 2021-11-06 NOTE — TELEPHONE ENCOUNTER
Pt called stating she had miscarriage and was seen at the emergency room and altarsound showed gestations sac is still in. Pt stated she was told to call Monday or Tuesday to have DNC. Pt reported she has abdominal cramping stared an hour ago and she rated her pain level 6 or 7/10. Pt stated she doesn't have blood that is gushing out like yesterday, only few drops, but she is worried.             RN paged on call provider spoke with Carolin Metcalf who advised pt to take 800 mg ibuprofen as needed for the abdominal cramps. Provider stated ER does DNC as emergency bases but is is usual schaeduled, however,  if pt has unbearable pain to come to the emergency room. Provider stated it is possible pt to pass the tisiue on her on without under going DNC even if she has covid 19.      RN called pt back and relayed provider advice and she stated she will call Monday to schedule the DNC and stated okay. Pt was advised to call back with questions or concerns.       Chase Ledesma RN  [facility]       COVID 19 Nurse Triage Plan/Patient Instructions    Please be aware that novel coronavirus (COVID-19) may be circulating in the community. If you develop symptoms such as fever, cough, or SOB or if you have concerns about the presence of another infection including coronavirus (COVID-19), please contact your health care provider or visit [email or web address].     Disposition/Instructions    Home care recommended. Follow home care protocol based instructions.    Thank you for taking steps to prevent the spread of this virus.  o Limit your contact with others.  o Wear a simple mask to cover your cough.  o Wash your hands well and often.    Resources    [facility]: About COVID-19: [email or web address]    CDC: What to Do If You're Sick: www.cdc.gov/coronavirus/2019-ncov/about/steps-when-sick.html    CDC: Ending Home Isolation:  www.cdc.gov/coronavirus/2019-ncov/hcp/disposition-in-home-patients.html     CDC: Caring for Someone: www.cdc.gov/coronavirus/2019-ncov/if-you-are-sick/care-for-someone.html     [facility]: Interim Guidance for Hospital Discharge to Home: www.health.[facility].[place]/diseases/coronavirus/hcp/hospdischarge.pdf    [facility] clinical trials (COVID-19 research studies): [email or web address]-[email or web address]     Below are the COVID-19 hotlines at the [place] Department of Health ([facility]). Interpreters are available.   o For health questions: Call 709-026-1801 or 1-175.754.3293 (7 a.m. to 7 p.m.)  o For questions about schools and childcare: Call 905-814-3364 or 1-838.754.1756 (7 a.m. to 7 p.m.)             Reason for Disposition    Patient sounds very sick or weak to the triager    Additional Information    Negative: Shock suspected (e.g., cold/pale/clammy skin, too weak to stand, low BP, rapid pulse)    Negative: Difficult to awaken or acting confused (e.g., disoriented, slurred speech)    Negative: Passed out (i.e., lost consciousness, collapsed and was not responding)    Negative: Sounds like a life-threatening emergency to the triager    Negative: [1] Threatened miscarriage (threatened ) suspected AND [2] has not been examined by a HCP    Negative: [1] Abdomen pain is main symptom and [2] NO vaginal bleeding in past 24 hours    Negative: [1] Vaginal bleeding is main symptom and [2] more than 4 weeks since medical visit    Negative: Not pregnant or pregnancy status unknown    Negative: SEVERE abdominal pain    Negative: [1] SEVERE vaginal bleeding (e.g., soaking 2 pads per hour, large blood clots) AND [2] present 2 or more hours    Negative: [1] MODERATE vaginal bleeding (i.e., soaking 1 pad / hour; clots) AND [2] present > 6 hours    Negative: Passed tissue (e.g., gray-white)    Negative: Pale skin (pallor) of new onset or worsening     Pt said she has been pale the last two weeks.    Protocols used:   - THREATENED MISCARRIAGE FOLLOW-UP CALL-A-AH

## 2021-11-06 NOTE — DISCHARGE INSTRUCTIONS
1) Your evaluation today suggest that you are having a miscarriage it is unclear if this is complete or in the process.  Ultrasound today shows that there is still products within the uterus compared to ultrasound from 10/24/2021.    2) After discussion with the gynecologist on-call there is a plan in place for you to have a suction D&C completed on 11/8/21 or 11/9/21.  Please call [phone] to confirm the timing of your procedure.    3) We have also discussed that you may choose to use misoprostol as prescribed by Dr. Baldwin on 10/26/2021 if you like to trial this process at home without having a D&C.  We discussed the risk of heavy bleeding and abdominal cramping and pain and to help manage the symptoms you given pain medication (oxycodone) that you may use if you decide to use misoprostol.  If you like not to use misoprostol you may consider using Tylenol 1000 mg every 8 hours and Motrin ibuprofen 400 to 600 mg with food to help manage crampy pain in your follow-up appointment for procedure in 72 hours.    4) we have discussed concerning symptoms including heavy bleeding such as soaking more than 1 pad per hour, lightheaded or fainting as this may be concerning for significant amount of blood loss.  He will need to return to be reevaluated.  Also discussed that fever is also concerning.  Please do not use a tampon.

## 2021-11-08 ENCOUNTER — PREP FOR PROCEDURE (OUTPATIENT)
Dept: OBGYN | Facility: CLINIC | Age: 36
End: 2021-11-08
Payer: COMMERCIAL

## 2021-11-08 ENCOUNTER — MYC MEDICAL ADVICE (OUTPATIENT)
Dept: OBGYN | Facility: CLINIC | Age: 36
End: 2021-11-08
Payer: COMMERCIAL

## 2021-11-08 ENCOUNTER — MYC MEDICAL ADVICE (OUTPATIENT)
Dept: FAMILY MEDICINE | Facility: CLINIC | Age: 36
End: 2021-11-08
Payer: COMMERCIAL

## 2021-11-08 DIAGNOSIS — Z11.59 ENCOUNTER FOR SCREENING FOR OTHER VIRAL DISEASES: ICD-10-CM

## 2021-11-08 DIAGNOSIS — O02.1 MISSED ABORTION: Primary | ICD-10-CM

## 2021-11-08 RX ORDER — DOXYCYCLINE 100 MG/1
100 CAPSULE ORAL ONCE
Status: CANCELLED | OUTPATIENT
Start: 2021-11-08 | End: 2021-11-08

## 2021-11-08 RX ORDER — ACETAMINOPHEN 325 MG/1
975 TABLET ORAL ONCE
Status: CANCELLED | OUTPATIENT
Start: 2021-11-08 | End: 2021-11-08

## 2021-11-08 NOTE — TELEPHONE ENCOUNTER
Call placed to patient.  Patient reports she does not need the covid PCR prior to procedure on Wednesday as she tested Covid + on 10/27/21. She just received a call from surgery care team.  Nancy Molina RN

## 2021-11-09 ENCOUNTER — OFFICE VISIT (OUTPATIENT)
Dept: OBGYN | Facility: CLINIC | Age: 36
End: 2021-11-09
Payer: COMMERCIAL

## 2021-11-09 ENCOUNTER — HOSPITAL ENCOUNTER (OUTPATIENT)
Facility: CLINIC | Age: 36
Discharge: HOME | End: 2021-11-09
Attending: OBSTETRICS & GYNECOLOGY | Admitting: OBSTETRICS & GYNECOLOGY
Payer: COMMERCIAL

## 2021-11-09 ENCOUNTER — ANESTHESIA EVENT (OUTPATIENT)
Dept: SURGERY | Facility: CLINIC | Age: 36
End: 2021-11-09
Payer: COMMERCIAL

## 2021-11-09 VITALS
RESPIRATION RATE: 16 BRPM | OXYGEN SATURATION: 99 % | TEMPERATURE: 98.6 F | HEIGHT: 64 IN | DIASTOLIC BLOOD PRESSURE: 66 MMHG | WEIGHT: 142 LBS | HEART RATE: 71 BPM | SYSTOLIC BLOOD PRESSURE: 102 MMHG | BODY MASS INDEX: 24.24 KG/M2

## 2021-11-09 VITALS
RESPIRATION RATE: 18 BRPM | DIASTOLIC BLOOD PRESSURE: 68 MMHG | SYSTOLIC BLOOD PRESSURE: 96 MMHG | TEMPERATURE: 97.8 F | HEART RATE: 91 BPM | WEIGHT: 142 LBS | BODY MASS INDEX: 24.24 KG/M2 | HEIGHT: 64 IN

## 2021-11-09 DIAGNOSIS — O02.1 MISSED ABORTION: ICD-10-CM

## 2021-11-09 DIAGNOSIS — Z98.890 S/P D&C (STATUS POST DILATION AND CURETTAGE): Primary | ICD-10-CM

## 2021-11-09 DIAGNOSIS — O03.4 INCOMPLETE ABORTION: Primary | ICD-10-CM

## 2021-11-09 LAB
ABO/RH(D): NORMAL
ANTIBODY SCREEN: NEGATIVE
BASOPHILS # BLD AUTO: 0.1 10E3/UL (ref 0–0.2)
BASOPHILS NFR BLD AUTO: 1 %
EOSINOPHIL # BLD AUTO: 0 10E3/UL (ref 0–0.7)
EOSINOPHIL NFR BLD AUTO: 0 %
ERYTHROCYTE [DISTWIDTH] IN BLOOD BY AUTOMATED COUNT: 11.6 % (ref 10–15)
HCT VFR BLD AUTO: 34.5 % (ref 35–47)
HGB BLD-MCNC: 11.8 G/DL (ref 11.7–15.7)
IMM GRANULOCYTES # BLD: 0 10E3/UL
IMM GRANULOCYTES NFR BLD: 0 %
LYMPHOCYTES # BLD AUTO: 2 10E3/UL (ref 0.8–5.3)
LYMPHOCYTES NFR BLD AUTO: 20 %
MCH RBC QN AUTO: 29.9 PG (ref 26.5–33)
MCHC RBC AUTO-ENTMCNC: 34.2 G/DL (ref 31.5–36.5)
MCV RBC AUTO: 88 FL (ref 78–100)
MONOCYTES # BLD AUTO: 0.7 10E3/UL (ref 0–1.3)
MONOCYTES NFR BLD AUTO: 7 %
NEUTROPHILS # BLD AUTO: 7.3 10E3/UL (ref 1.6–8.3)
NEUTROPHILS NFR BLD AUTO: 72 %
NRBC # BLD AUTO: 0 10E3/UL
NRBC BLD AUTO-RTO: 0 /100
PLATELET # BLD AUTO: 494 10E3/UL (ref 150–450)
RBC # BLD AUTO: 3.94 10E6/UL (ref 3.8–5.2)
SPECIMEN EXPIRATION DATE: NORMAL
WBC # BLD AUTO: 10.2 10E3/UL (ref 4–11)

## 2021-11-09 PROCEDURE — 370N000017 HC ANESTHESIA TECHNICAL FEE, PER MIN: Performed by: OBSTETRICS & GYNECOLOGY

## 2021-11-09 PROCEDURE — 250N000009 HC RX 250: Performed by: NURSE ANESTHETIST, CERTIFIED REGISTERED

## 2021-11-09 PROCEDURE — 710N000012 HC RECOVERY PHASE 2, PER MINUTE: Performed by: OBSTETRICS & GYNECOLOGY

## 2021-11-09 PROCEDURE — 250N000013 HC RX MED GY IP 250 OP 250 PS 637: Performed by: NURSE ANESTHETIST, CERTIFIED REGISTERED

## 2021-11-09 PROCEDURE — 85025 COMPLETE CBC W/AUTO DIFF WBC: CPT | Performed by: OBSTETRICS & GYNECOLOGY

## 2021-11-09 PROCEDURE — 76817 TRANSVAGINAL US OBSTETRIC: CPT | Performed by: OBSTETRICS & GYNECOLOGY

## 2021-11-09 PROCEDURE — 36415 COLL VENOUS BLD VENIPUNCTURE: CPT | Performed by: OBSTETRICS & GYNECOLOGY

## 2021-11-09 PROCEDURE — 360N000075 HC SURGERY LEVEL 2, PER MIN: Performed by: OBSTETRICS & GYNECOLOGY

## 2021-11-09 PROCEDURE — 999N000141 HC STATISTIC PRE-PROCEDURE NURSING ASSESSMENT: Performed by: OBSTETRICS & GYNECOLOGY

## 2021-11-09 PROCEDURE — 88233 TISSUE CULTURE SKIN/BIOPSY: CPT | Performed by: OBSTETRICS & GYNECOLOGY

## 2021-11-09 PROCEDURE — 250N000011 HC RX IP 250 OP 636: Performed by: NURSE ANESTHETIST, CERTIFIED REGISTERED

## 2021-11-09 PROCEDURE — 59812 TREATMENT OF MISCARRIAGE: CPT | Performed by: OBSTETRICS & GYNECOLOGY

## 2021-11-09 PROCEDURE — 86900 BLOOD TYPING SEROLOGIC ABO: CPT | Performed by: OBSTETRICS & GYNECOLOGY

## 2021-11-09 PROCEDURE — 258N000003 HC RX IP 258 OP 636: Performed by: NURSE ANESTHETIST, CERTIFIED REGISTERED

## 2021-11-09 PROCEDURE — 250N000009 HC RX 250: Performed by: OBSTETRICS & GYNECOLOGY

## 2021-11-09 PROCEDURE — 88291 CYTO/MOLECULAR REPORT: CPT | Performed by: MEDICAL GENETICS

## 2021-11-09 PROCEDURE — 88262 CHROMOSOME ANALYSIS 15-20: CPT | Performed by: OBSTETRICS & GYNECOLOGY

## 2021-11-09 PROCEDURE — 271N000001 HC OR GENERAL SUPPLY NON-STERILE: Performed by: OBSTETRICS & GYNECOLOGY

## 2021-11-09 PROCEDURE — 88305 TISSUE EXAM BY PATHOLOGIST: CPT | Mod: TC | Performed by: OBSTETRICS & GYNECOLOGY

## 2021-11-09 PROCEDURE — 272N000001 HC OR GENERAL SUPPLY STERILE: Performed by: OBSTETRICS & GYNECOLOGY

## 2021-11-09 PROCEDURE — 250N000013 HC RX MED GY IP 250 OP 250 PS 637: Performed by: OBSTETRICS & GYNECOLOGY

## 2021-11-09 PROCEDURE — 88305 TISSUE EXAM BY PATHOLOGIST: CPT | Mod: 26 | Performed by: PATHOLOGY

## 2021-11-09 PROCEDURE — 99213 OFFICE O/P EST LOW 20 MIN: CPT | Mod: 25 | Performed by: OBSTETRICS & GYNECOLOGY

## 2021-11-09 RX ORDER — PROPOFOL 10 MG/ML
INJECTION, EMULSION INTRAVENOUS CONTINUOUS PRN
Status: DISCONTINUED | OUTPATIENT
Start: 2021-11-09 | End: 2021-11-09

## 2021-11-09 RX ORDER — LIDOCAINE 40 MG/G
CREAM TOPICAL
Status: DISCONTINUED | OUTPATIENT
Start: 2021-11-09 | End: 2021-11-09 | Stop reason: HOSPADM

## 2021-11-09 RX ORDER — NALOXONE HYDROCHLORIDE 0.4 MG/ML
0.2 INJECTION, SOLUTION INTRAMUSCULAR; INTRAVENOUS; SUBCUTANEOUS
Status: DISCONTINUED | OUTPATIENT
Start: 2021-11-09 | End: 2021-11-09 | Stop reason: HOSPADM

## 2021-11-09 RX ORDER — LIDOCAINE HYDROCHLORIDE 10 MG/ML
INJECTION, SOLUTION INFILTRATION; PERINEURAL PRN
Status: DISCONTINUED | OUTPATIENT
Start: 2021-11-09 | End: 2021-11-09

## 2021-11-09 RX ORDER — ONDANSETRON 4 MG/1
4 TABLET, ORALLY DISINTEGRATING ORAL EVERY 30 MIN PRN
Status: DISCONTINUED | OUTPATIENT
Start: 2021-11-09 | End: 2021-11-09 | Stop reason: HOSPADM

## 2021-11-09 RX ORDER — ACETAMINOPHEN 325 MG/1
975 TABLET ORAL ONCE
Status: COMPLETED | OUTPATIENT
Start: 2021-11-09 | End: 2021-11-09

## 2021-11-09 RX ORDER — IBUPROFEN 800 MG/1
800 TABLET, FILM COATED ORAL EVERY 6 HOURS PRN
Qty: 30 TABLET | Refills: 0 | Status: SHIPPED | OUTPATIENT
Start: 2021-11-09 | End: 2022-04-21

## 2021-11-09 RX ORDER — ACETAMINOPHEN 325 MG/1
975 TABLET ORAL ONCE
Status: DISCONTINUED | OUTPATIENT
Start: 2021-11-09 | End: 2021-11-09

## 2021-11-09 RX ORDER — ONDANSETRON 2 MG/ML
INJECTION INTRAMUSCULAR; INTRAVENOUS PRN
Status: DISCONTINUED | OUTPATIENT
Start: 2021-11-09 | End: 2021-11-09

## 2021-11-09 RX ORDER — ONDANSETRON 2 MG/ML
4 INJECTION INTRAMUSCULAR; INTRAVENOUS EVERY 30 MIN PRN
Status: DISCONTINUED | OUTPATIENT
Start: 2021-11-09 | End: 2021-11-09 | Stop reason: HOSPADM

## 2021-11-09 RX ORDER — FENTANYL CITRATE 50 UG/ML
25 INJECTION, SOLUTION INTRAMUSCULAR; INTRAVENOUS
Status: DISCONTINUED | OUTPATIENT
Start: 2021-11-09 | End: 2021-11-09 | Stop reason: HOSPADM

## 2021-11-09 RX ORDER — NALOXONE HYDROCHLORIDE 0.4 MG/ML
0.4 INJECTION, SOLUTION INTRAMUSCULAR; INTRAVENOUS; SUBCUTANEOUS
Status: DISCONTINUED | OUTPATIENT
Start: 2021-11-09 | End: 2021-11-09 | Stop reason: HOSPADM

## 2021-11-09 RX ORDER — OXYCODONE HYDROCHLORIDE 5 MG/1
5 TABLET ORAL EVERY 4 HOURS PRN
Status: DISCONTINUED | OUTPATIENT
Start: 2021-11-09 | End: 2021-11-09 | Stop reason: HOSPADM

## 2021-11-09 RX ORDER — MEPERIDINE HYDROCHLORIDE 25 MG/ML
12.5 INJECTION INTRAMUSCULAR; INTRAVENOUS; SUBCUTANEOUS
Status: DISCONTINUED | OUTPATIENT
Start: 2021-11-09 | End: 2021-11-09 | Stop reason: HOSPADM

## 2021-11-09 RX ORDER — DOXYCYCLINE 100 MG/1
100 CAPSULE ORAL ONCE
Status: COMPLETED | OUTPATIENT
Start: 2021-11-09 | End: 2021-11-09

## 2021-11-09 RX ORDER — HYDROMORPHONE HCL IN WATER/PF 6 MG/30 ML
0.2 PATIENT CONTROLLED ANALGESIA SYRINGE INTRAVENOUS EVERY 5 MIN PRN
Status: DISCONTINUED | OUTPATIENT
Start: 2021-11-09 | End: 2021-11-09 | Stop reason: HOSPADM

## 2021-11-09 RX ORDER — SODIUM CHLORIDE, SODIUM LACTATE, POTASSIUM CHLORIDE, CALCIUM CHLORIDE 600; 310; 30; 20 MG/100ML; MG/100ML; MG/100ML; MG/100ML
INJECTION, SOLUTION INTRAVENOUS CONTINUOUS
Status: DISCONTINUED | OUTPATIENT
Start: 2021-11-09 | End: 2021-11-09 | Stop reason: HOSPADM

## 2021-11-09 RX ORDER — LIDOCAINE HYDROCHLORIDE 10 MG/ML
INJECTION, SOLUTION INFILTRATION; PERINEURAL PRN
Status: DISCONTINUED | OUTPATIENT
Start: 2021-11-09 | End: 2021-11-09 | Stop reason: HOSPADM

## 2021-11-09 RX ORDER — GABAPENTIN 300 MG/1
300 CAPSULE ORAL
Status: COMPLETED | OUTPATIENT
Start: 2021-11-09 | End: 2021-11-09

## 2021-11-09 RX ORDER — ACETAMINOPHEN 325 MG/1
975 TABLET ORAL ONCE
Status: DISCONTINUED | OUTPATIENT
Start: 2021-11-09 | End: 2021-11-09 | Stop reason: HOSPADM

## 2021-11-09 RX ORDER — DEXAMETHASONE SODIUM PHOSPHATE 4 MG/ML
INJECTION, SOLUTION INTRA-ARTICULAR; INTRALESIONAL; INTRAMUSCULAR; INTRAVENOUS; SOFT TISSUE PRN
Status: DISCONTINUED | OUTPATIENT
Start: 2021-11-09 | End: 2021-11-09

## 2021-11-09 RX ORDER — IBUPROFEN 400 MG/1
800 TABLET, FILM COATED ORAL ONCE
Status: DISCONTINUED | OUTPATIENT
Start: 2021-11-09 | End: 2021-11-09 | Stop reason: HOSPADM

## 2021-11-09 RX ORDER — HYDROXYZINE HYDROCHLORIDE 50 MG/1
50 TABLET, FILM COATED ORAL EVERY 6 HOURS PRN
Status: DISCONTINUED | OUTPATIENT
Start: 2021-11-09 | End: 2021-11-09 | Stop reason: HOSPADM

## 2021-11-09 RX ORDER — KETOROLAC TROMETHAMINE 30 MG/ML
INJECTION, SOLUTION INTRAMUSCULAR; INTRAVENOUS PRN
Status: DISCONTINUED | OUTPATIENT
Start: 2021-11-09 | End: 2021-11-09

## 2021-11-09 RX ORDER — HYDROXYZINE HYDROCHLORIDE 25 MG/1
25 TABLET, FILM COATED ORAL EVERY 6 HOURS PRN
Status: DISCONTINUED | OUTPATIENT
Start: 2021-11-09 | End: 2021-11-09 | Stop reason: HOSPADM

## 2021-11-09 RX ADMIN — KETOROLAC TROMETHAMINE 15 MG: 30 INJECTION, SOLUTION INTRAMUSCULAR at 14:50

## 2021-11-09 RX ADMIN — OXYCODONE HYDROCHLORIDE 5 MG: 5 TABLET ORAL at 16:01

## 2021-11-09 RX ADMIN — DEXAMETHASONE SODIUM PHOSPHATE 8 MG: 4 INJECTION, SOLUTION INTRA-ARTICULAR; INTRALESIONAL; INTRAMUSCULAR; INTRAVENOUS; SOFT TISSUE at 14:50

## 2021-11-09 RX ADMIN — DOXYCYCLINE HYCLATE 100 MG: 100 CAPSULE ORAL at 13:39

## 2021-11-09 RX ADMIN — MIDAZOLAM 2 MG: 1 INJECTION INTRAMUSCULAR; INTRAVENOUS at 14:46

## 2021-11-09 RX ADMIN — LIDOCAINE HYDROCHLORIDE 0.1 ML: 10 INJECTION, SOLUTION EPIDURAL; INFILTRATION; INTRACAUDAL; PERINEURAL at 14:09

## 2021-11-09 RX ADMIN — PROPOFOL 200 MCG/KG/MIN: 10 INJECTION, EMULSION INTRAVENOUS at 14:50

## 2021-11-09 RX ADMIN — SODIUM CHLORIDE, POTASSIUM CHLORIDE, SODIUM LACTATE AND CALCIUM CHLORIDE: 600; 310; 30; 20 INJECTION, SOLUTION INTRAVENOUS at 14:09

## 2021-11-09 RX ADMIN — ONDANSETRON 4 MG: 2 INJECTION INTRAMUSCULAR; INTRAVENOUS at 14:50

## 2021-11-09 RX ADMIN — ACETAMINOPHEN 975 MG: 325 TABLET, FILM COATED ORAL at 13:40

## 2021-11-09 RX ADMIN — LIDOCAINE HYDROCHLORIDE 100 MG: 10 INJECTION, SOLUTION INFILTRATION; PERINEURAL at 14:50

## 2021-11-09 RX ADMIN — GABAPENTIN 300 MG: 300 CAPSULE ORAL at 13:40

## 2021-11-09 ASSESSMENT — MIFFLIN-ST. JEOR
SCORE: 1319.11
SCORE: 1319.11

## 2021-11-09 NOTE — H&P (VIEW-ONLY)
[facility]    Gynecology Office Note    CC:   Chief Complaint   Patient presents with     Ultrasound     miscarriage complete?        HPI: Serina Washington is a 36 year old  who presents for miscarriage follow up. Patient was diagnosed with an incomplete  over two weeks ago. She elected for medical management at that time. Intervention was somewhat delayed due to diagnosis of COVID on 10/27/21.  She has had multiple episodes of heavy bleeding with passage of large clots. Has been in the ED on multiple occasions. Imaging has revealed continued retained products of conception. She is scheduled for a suction D&C tomorrow. Most recently had an episode last night when she had extremely painful cramping and associated passage of large clots. Since then bleeding has been like a period and pain improved. She is wondering if she has completed the miscarriage and if she still needs the procedure. Would like this to be done ASAP as it has been going on for 2-3 weeks and she doesn't want to end up back in the OR.     ROS: A 10 pt ROS was completed and found to be otherwise negative unless mentioned in the HPI.     PMH:   History reviewed. No pertinent past medical history.    PSHx:   Past Surgical History:   Procedure Laterality Date     ARTHROSCOPY KNEE Left       SECTION N/A 9/10/2017    Procedure:  SECTION;  Primary  Section;  Surgeon: Mu Miranda MD;  Location: [facility] OR     OPERATIVE HYSTEROSCOPY N/A 2020    Procedure: HYSTEROSCOPY, THERAPEUTIC;  Surgeon: Quinn Cash MD;  Location: [facility] OR     SURGICAL HISTORY OF -       left knee surgery       OBHx:   OB History    Para Term  AB Living   5 1 1 0 4 1   SAB IAB Ectopic Multiple Live Births   4 0 0 0 1      # Outcome Date GA Lbr Mack/2nd Weight Sex Delivery Anes PTL Lv   5 SAB 21 7w4d    SAB      4 SAB 19     SAB      3 Term 09/10/17 38w6d  4.06 kg (8 lb 15.2  oz) F CS-LTranv EPI, Spinal N CESILIA      Complications: Fetal Intolerance      Name: Areli Washington      Apgar1: 9  Apgar5: 9   2 SAB            1 SAB                Medications:   oxyCODONE (ROXICODONE) 5 MG tablet, Take 1 tablet (5 mg) by mouth every 6 hours as needed for severe pain  albuterol (PROAIR HFA/PROVENTIL HFA/VENTOLIN HFA) 108 (90 Base) MCG/ACT inhaler, Inhale 2 puffs into the lungs 4 times daily (Patient not taking: Reported on 2021)  ibuprofen (ADVIL/MOTRIN) 200 MG tablet, Take 3 tablets (600 mg) by mouth every 6 hours as needed for other (mild and/or inflammatory pain) (Patient not taking: Reported on 2021)  misoprostol (CYTOTEC) 200 MCG tablet, place 4 tablets buccally May repeat in 12 hours if no reaction (Patient not taking: Reported on 2021)  Multiple Vitamins-Minerals (HM MULTIVITAMIN ADULT GUMMY PO),   Prenatal Multivit-Min-Fe-FA (PRENATAL VITAMINS PO), Take 2 tablets by mouth every evening [facility] (Patient not taking: Reported on 2021)  progesterone (PROMETRIUM) 200 MG capsule, Take 1 capsule (200 mg) by mouth daily (Patient not taking: Reported on 2021)    No current facility-administered medications on file prior to visit.      Allergies:    No Known Allergies    Social History:   Social History     Socioeconomic History     Marital status:      Spouse name: Not on file     Number of children: 1     Years of education: Not on file     Highest education level: Not on file   Occupational History     Not on file   Tobacco Use     Smoking status: Never Smoker     Smokeless tobacco: Never Used   Vaping Use     Vaping Use: Never used   Substance and Sexual Activity     Alcohol use: No     Alcohol/week: 0.0 standard drinks     Drug use: No     Sexual activity: Yes     Partners: Male   Other Topics Concern     Parent/sibling w/ CABG, MI or angioplasty before 65F 55M? No   Social History Narrative     Not on file     Social Determinants of Health  "    Financial Resource Strain: Not on file   Food Insecurity: Not on file   Transportation Needs: Not on file   Physical Activity: Not on file   Stress: Not on file   Social Connections: Not on file   Intimate Partner Violence: Not on file   Housing Stability: Not on file         Family History:   Family History   Problem Relation Age of Onset     Kidney Disease Father         dialysis     Hypertension Father      Gastrointestinal Disease Father         stomach ulcer     Hypertension Mother      Thyroid Disease Mother      Other Cancer Paternal Grandfather      Other Cancer Paternal Grandmother        Physical Exam:   Vitals:    11/09/21 1123   BP: 96/68   BP Location: Left arm   Patient Position: Chair   Cuff Size: Adult Regular   Pulse: 91   Resp: 18   Temp: 97.8  F (36.6  C)   TempSrc: Tympanic   Weight: 64.4 kg (142 lb)   Height: 1.626 m (5' 4\")      Estimated body mass index is 24.37 kg/m  as calculated from the following:    Height as of this encounter: 1.626 m (5' 4\").    Weight as of this encounter: 64.4 kg (142 lb).    General appearance: well-hydrated, A&O x 3, no apparent distress  Lungs: Equal expansion bilaterally, no accessory muscle use  Heart: No heaves or thrills. No peripheral varicosities  Constitutional: See vitals  Abdomen: Soft, non-tender, non-distended. No rebound, rigidity, or guarding.  Extremities: no edema  Neuro: CN II-XII grossly intact  Genitourinary:  External genitalia: no erythema, no lesions.   Anus and Perineum: Unremarkable, no visible lesions  Vagina: light vaginal bleeding  Cervix:  no cervical motion tenderness.   Uterus: enlarge  Adnexa: no masses or tenderness bilaterally.    Bedside Ultrasound    Transvaginal ultrasound was performed. Heterogenous and thickened endometrium visualized, measuring up to 19mm. Small collapsing suspected gestational sac in the lower uterine segment. Color flow doppler demonstrated throughout endometrium, suggesting retained products of " conception.       Assessment and Plan:     Encounter Diagnosis   Name Primary?     Incomplete  Yes     Retained products of conception on US today. Patient has failed medical management of incomplete AB, would recommend surgical intervention at this point. She has been in and out of the ER on a number of occasions and continues to have episodes of heavy bleeding. Will plan to move up OR case and proceed to OR this afternoon. Patient has been NPO since 07:45. Discussed procedure with patient and expected post operative course.     Arina Sweeney, DO

## 2021-11-09 NOTE — NURSING NOTE
"Initial BP 96/68 (BP Location: Left arm, Patient Position: Chair, Cuff Size: Adult Regular)   Pulse 91   Temp 97.8  F (36.6  C) (Tympanic)   Resp 18   Ht 1.626 m (5' 4\")   Wt 64.4 kg (142 lb)   Breastfeeding No   BMI 24.37 kg/m   Estimated body mass index is 24.37 kg/m  as calculated from the following:    Height as of this encounter: 1.626 m (5' 4\").    Weight as of this encounter: 64.4 kg (142 lb). .      "

## 2021-11-09 NOTE — DISCHARGE INSTRUCTIONS
Same Day Surgery Discharge Instructions  Special Precautions After Surgery - Adult    1. It is not unusual to feel lightheaded or faint, up to 24 hours after surgery or while taking pain medication.  If you have these symptoms; sit for a few minutes before standing and have someone assist you when getting up.  2. You should rest and relax for the next 24 hours and must have someone stay with you for at least 24 hours after your discharge.  3. DO NOT DRIVE any vehicle or operate mechanical equipment for 24 hours following the end of your surgery.  DO NOT DRIVE while taking narcotic pain medications that have been prescribed by your physician.  If you had a limb operated on, you must be able to use it fully to drive.  4. DO NOT drink alcoholic beverages for 24 hours following surgery or while taking prescription pain medication.  5. Drink clear liquids (apple juice, ginger ale, broth, 7-Up, etc.).  Progress to your regular diet as you feel able.  6. Any questions call your physician and do not make important decisions for 24 hours.    __________________________________________________________________________________________________________________________________  IMPORTANT NUMBERS:    [facility] Main Number:  [phone], [phone]  Pharmacy:  [phone]  Same Day Surgery:  [phone], Monday - Friday until 8:30 p.m.  Urgent Care:  [phone]  Emergency Room:  [phone]        OB Clinic:  [phone]    Nausea and Vomiting: Nausea and vomiting can occur any time after receiving anesthesia. If you experience nausea and vomiting we encourage you to move to a clear liquid diet and advance your diet as tolerated. If nausea and vomiting do not improve within 12 hours please call the surgeon or present to the Emergency department.     Break-through Bleeding: If your experience bleeding from your surgical site apply pressure and additional dressing per nurse instruction. For simple problems  such as a saturated dressing, you may need to reinforce the dressing with more gauze and tape and put slight pressure on the site. If bleeding does not subside contact the surgeon or present to the Emergency Department.    Post-op Infection: If you develop a fever of 100.4 or greater, have pus like drainage, redness, swelling or severe pain at the surgical site not alleviated with pain medications; please contact the surgeon or present to the Emergency Department.

## 2021-11-09 NOTE — OP NOTE
"[facility] Gynecology Operative Note    Patient: Serina Washington  : 1985  MRN: [id number]    Date of Service: 2021    Pre-operative diagnosis:  - incomplete      Post-operative diagnosis:  - Same    Procedure:   -Suction dilation and curettage    Surgeon: Vicki Tracey MD  Assistants: none    Anesthesia: Local with MAC    EBL: 5 mL  Urine: Straight cath prior to the start of the case  Fluids: See anesthesia records    Specimens: Proximal of conception  Complications: none apparent     Findings: Seven week-sized anteverted uterus with no appreciable ovarian or fallopian tube enlargement. External genitalia, vagina and cervix all within normal limits to gross examination.  Gritty texture noted throughout the uterus at the end of the procedure.    Indications: Serina Washington is a 36 year old  with an incomplete  and heavy vaginal bleeding.  She was recommended to proceed with surgical management today.  She was counseled regarding the risks and alternatives of the seizure including perforation, damage to pelvic organs, bleeding and infection. The patient agreed to proceed, and signed written informed consent.     Procedure: The patient was taken to the operating room where she was placed in the dorsal lithotomy position. Sedation was administered until the patient was found to be comfortable. An exam under anesthesia was completed. The patient was then prepped and draped in the usual sterile fashion followed by a \"Time-Out\" to verify the correct patient and procedure. A speculum was inserted into the vagina and the cervix was visualized. 2cc of 1% lidocaine  was injected into the anterior cervical lip and a single-toothed tenaculum was placed at 12 o'clock position. A paracervical block with 8 cc of 1% lidocaine was performed at 4 and 8 o'clock. The cervix was dilated using Hegar dilators to 7mm until a suction curette could be passed and the products of " LOV- 08/27/20    metFORMIN (GLUCOPHAGE-XR) 500 MG 24 hr tablet 90 tablet 1 8/27/2020     Sig - Route: Take 1 tablet by mouth daily. - Oral      DENIED PATIENT HAS ENOUGH MEDICATION TO LAST HIM TILL NEXT OFFICE VISIT.    conception were removed over 3 sweeps.  A sharp banjo curet was then used on all aspects of the uterine cavity to gently confirm an empty uterus and a gritty texture was noted throughout. The buttocks of conception were sent to pathology for examination as well as for chromosomal analysis. The tenaculum was removed from the cervix and good hemostasis was noted at the tenaculum puncture sites. The speculum was removed from the vagina. The patient tolerated the procedure well and was taken to the recovery room in stable condition.     Vicki Tracey MD  11/9/2021 3:17 PM

## 2021-11-09 NOTE — PROGRESS NOTES
[facility]    Gynecology Office Note    CC:   Chief Complaint   Patient presents with     Ultrasound     miscarriage complete?        HPI: Serina Washington is a 36 year old  who presents for miscarriage follow up. Patient was diagnosed with an incomplete  over two weeks ago. She elected for medical management at that time. Intervention was somewhat delayed due to diagnosis of COVID on 10/27/21.  She has had multiple episodes of heavy bleeding with passage of large clots. Has been in the ED on multiple occasions. Imaging has revealed continued retained products of conception. She is scheduled for a suction D&C tomorrow. Most recently had an episode last night when she had extremely painful cramping and associated passage of large clots. Since then bleeding has been like a period and pain improved. She is wondering if she has completed the miscarriage and if she still needs the procedure. Would like this to be done ASAP as it has been going on for 2-3 weeks and she doesn't want to end up back in the OR.     ROS: A 10 pt ROS was completed and found to be otherwise negative unless mentioned in the HPI.     PMH:   History reviewed. No pertinent past medical history.    PSHx:   Past Surgical History:   Procedure Laterality Date     ARTHROSCOPY KNEE Left       SECTION N/A 9/10/2017    Procedure:  SECTION;  Primary  Section;  Surgeon: Mu Miranda MD;  Location: [facility] OR     OPERATIVE HYSTEROSCOPY N/A 2020    Procedure: HYSTEROSCOPY, THERAPEUTIC;  Surgeon: Quinn Cash MD;  Location: [facility] OR     SURGICAL HISTORY OF -       left knee surgery       OBHx:   OB History    Para Term  AB Living   5 1 1 0 4 1   SAB IAB Ectopic Multiple Live Births   4 0 0 0 1      # Outcome Date GA Lbr Mack/2nd Weight Sex Delivery Anes PTL Lv   5 SAB 21 7w4d    SAB      4 SAB 19     SAB      3 Term 09/10/17 38w6d  4.06 kg (8 lb 15.2  oz) F CS-LTranv EPI, Spinal N CESILIA      Complications: Fetal Intolerance      Name: Areli Washington      Apgar1: 9  Apgar5: 9   2 SAB            1 SAB                Medications:   oxyCODONE (ROXICODONE) 5 MG tablet, Take 1 tablet (5 mg) by mouth every 6 hours as needed for severe pain  albuterol (PROAIR HFA/PROVENTIL HFA/VENTOLIN HFA) 108 (90 Base) MCG/ACT inhaler, Inhale 2 puffs into the lungs 4 times daily (Patient not taking: Reported on 2021)  ibuprofen (ADVIL/MOTRIN) 200 MG tablet, Take 3 tablets (600 mg) by mouth every 6 hours as needed for other (mild and/or inflammatory pain) (Patient not taking: Reported on 2021)  misoprostol (CYTOTEC) 200 MCG tablet, place 4 tablets buccally May repeat in 12 hours if no reaction (Patient not taking: Reported on 2021)  Multiple Vitamins-Minerals (HM MULTIVITAMIN ADULT GUMMY PO),   Prenatal Multivit-Min-Fe-FA (PRENATAL VITAMINS PO), Take 2 tablets by mouth every evening [facility] (Patient not taking: Reported on 2021)  progesterone (PROMETRIUM) 200 MG capsule, Take 1 capsule (200 mg) by mouth daily (Patient not taking: Reported on 2021)    No current facility-administered medications on file prior to visit.      Allergies:    No Known Allergies    Social History:   Social History     Socioeconomic History     Marital status:      Spouse name: Not on file     Number of children: 1     Years of education: Not on file     Highest education level: Not on file   Occupational History     Not on file   Tobacco Use     Smoking status: Never Smoker     Smokeless tobacco: Never Used   Vaping Use     Vaping Use: Never used   Substance and Sexual Activity     Alcohol use: No     Alcohol/week: 0.0 standard drinks     Drug use: No     Sexual activity: Yes     Partners: Male   Other Topics Concern     Parent/sibling w/ CABG, MI or angioplasty before 65F 55M? No   Social History Narrative     Not on file     Social Determinants of Health  "    Financial Resource Strain: Not on file   Food Insecurity: Not on file   Transportation Needs: Not on file   Physical Activity: Not on file   Stress: Not on file   Social Connections: Not on file   Intimate Partner Violence: Not on file   Housing Stability: Not on file         Family History:   Family History   Problem Relation Age of Onset     Kidney Disease Father         dialysis     Hypertension Father      Gastrointestinal Disease Father         stomach ulcer     Hypertension Mother      Thyroid Disease Mother      Other Cancer Paternal Grandfather      Other Cancer Paternal Grandmother        Physical Exam:   Vitals:    11/09/21 1123   BP: 96/68   BP Location: Left arm   Patient Position: Chair   Cuff Size: Adult Regular   Pulse: 91   Resp: 18   Temp: 97.8  F (36.6  C)   TempSrc: Tympanic   Weight: 64.4 kg (142 lb)   Height: 1.626 m (5' 4\")      Estimated body mass index is 24.37 kg/m  as calculated from the following:    Height as of this encounter: 1.626 m (5' 4\").    Weight as of this encounter: 64.4 kg (142 lb).    General appearance: well-hydrated, A&O x 3, no apparent distress  Lungs: Equal expansion bilaterally, no accessory muscle use  Heart: No heaves or thrills. No peripheral varicosities  Constitutional: See vitals  Abdomen: Soft, non-tender, non-distended. No rebound, rigidity, or guarding.  Extremities: no edema  Neuro: CN II-XII grossly intact  Genitourinary:  External genitalia: no erythema, no lesions.   Anus and Perineum: Unremarkable, no visible lesions  Vagina: light vaginal bleeding  Cervix:  no cervical motion tenderness.   Uterus: enlarge  Adnexa: no masses or tenderness bilaterally.    Bedside Ultrasound    Transvaginal ultrasound was performed. Heterogenous and thickened endometrium visualized, measuring up to 19mm. Small collapsing suspected gestational sac in the lower uterine segment. Color flow doppler demonstrated throughout endometrium, suggesting retained products of " conception.       Assessment and Plan:     Encounter Diagnosis   Name Primary?     Incomplete  Yes     Retained products of conception on US today. Patient has failed medical management of incomplete AB, would recommend surgical intervention at this point. She has been in and out of the ER on a number of occasions and continues to have episodes of heavy bleeding. Will plan to move up OR case and proceed to OR this afternoon. Patient has been NPO since 07:45. Discussed procedure with patient and expected post operative course.     Arina Sweeney, DO

## 2021-11-09 NOTE — ANESTHESIA CARE TRANSFER NOTE
Patient: Serina Washington    Procedure: Procedure(s):  DILATION AND CURETTAGE, UTERUS, USING SUCTION       Diagnosis: Missed  [O02.1]  Diagnosis Additional Information: No value filed.    Anesthesia Type:   General     Note:    Oropharynx: oropharynx clear of all foreign objects  Level of Consciousness: drowsy  Oxygen Supplementation: face mask    Independent Airway: airway patency satisfactory and stable  Dentition: dentition unchanged  Vital Signs Stable: post-procedure vital signs reviewed and stable  Report to RN Given: handoff report given  Patient transferred to: Phase II    Handoff Report: Identifed the Patient, Identified the Reponsible Provider, Reviewed the pertinent medical history, Discussed the surgical course, Reviewed Intra-OP anesthesia mangement and issues during anesthesia, Set expectations for post-procedure period and Allowed opportunity for questions and acknowledgement of understanding      Vitals:  Vitals Value Taken Time   BP     Temp     Pulse     Resp     SpO2         Electronically Signed By: ISABELLA Juárez CRNA  2021  3:13 PM

## 2021-11-09 NOTE — TELEPHONE ENCOUNTER
Spoke with patient on the phone.  Patient may have passed POC last evening.  Pain and bleeding improved today.    Appointment scheduled with Dr. Sweeney this morning to further assess.  US available.    D&C possible today instead of tomorrow.  Will assess at office visit today.    Patient will refrain from food / drink .    Pt in agreement and reports understanding of plan.    Ciarra Spence   Ob/Gyn Clinic  RN

## 2021-11-09 NOTE — ANESTHESIA PREPROCEDURE EVALUATION
Anesthesia Pre-Procedure Evaluation    Patient: Serina Washington   MRN: [id number] : 1985        Preoperative Diagnosis: Missed  [O02.1]    Procedure : Procedure(s):  DILATION AND CURETTAGE, UTERUS, USING SUCTION          History reviewed. No pertinent past medical history.   Past Surgical History:   Procedure Laterality Date     ARTHROSCOPY KNEE Left       SECTION N/A 9/10/2017    Procedure:  SECTION;  Primary  Section;  Surgeon: Mu Miranda MD;  Location: [place] OR     OPERATIVE HYSTEROSCOPY N/A 2020    Procedure: HYSTEROSCOPY, THERAPEUTIC;  Surgeon: Quinn Cash MD;  Location: [place] OR     SURGICAL HISTORY OF -       left knee surgery      No Known Allergies   Social History     Tobacco Use     Smoking status: Never Smoker     Smokeless tobacco: Never Used   Substance Use Topics     Alcohol use: No     Alcohol/week: 0.0 standard drinks      Wt Readings from Last 1 Encounters:   21 64.9 kg (143 lb)        Anesthesia Evaluation   Pt has had prior anesthetic. Type: General, MAC and Regional.    No history of anesthetic complications       ROS/MED HX  ENT/Pulmonary:  - neg pulmonary ROS     Neurologic:  - neg neurologic ROS     Cardiovascular:     (+) -----Previous cardiac testing   Echo: Date: Results:    Stress Test: Date: Results:    ECG Reviewed: Date: 2020 Results:  Sinus Rhythm   WITHIN NORMAL LIMITS  Cath: Date: Results:      METS/Exercise Tolerance:     Hematologic:  - neg hematologic  ROS     Musculoskeletal:  - neg musculoskeletal ROS     GI/Hepatic:  - neg GI/hepatic ROS     Renal/Genitourinary:  - neg Renal ROS     Endo:  - neg endo ROS     Psychiatric/Substance Use:  - neg psychiatric ROS     Infectious Disease:  - neg infectious disease ROS     Malignancy:  - neg malignancy ROS     Other:  - neg other ROS          Physical Exam    Airway  airway exam normal      Mallampati: I   TM distance: > 3 FB   Neck ROM: full   Mouth opening:  > 3 cm    Respiratory Devices and Support         Dental  no notable dental history         Cardiovascular   cardiovascular exam normal          Pulmonary   pulmonary exam normal                OUTSIDE LABS:  CBC:   Lab Results   Component Value Date    WBC 6.4 11/05/2021    WBC 4.3 10/31/2021    HGB 12.9 11/05/2021    HGB 13.7 10/31/2021    HCT 38.2 11/05/2021    HCT 40.7 10/31/2021     (H) 11/05/2021     10/31/2021     BMP:   Lab Results   Component Value Date     11/05/2021     10/31/2021    POTASSIUM 3.7 11/05/2021    POTASSIUM 3.7 10/31/2021    CHLORIDE 104 11/05/2021    CHLORIDE 108 10/31/2021    CO2 24 11/05/2021    CO2 28 10/31/2021    BUN 8 11/05/2021    BUN 5 (L) 10/31/2021    CR 0.52 11/05/2021    CR 0.60 10/31/2021     (H) 11/05/2021    GLC 95 10/31/2021     COAGS: No results found for: PTT, INR, FIBR  POC:   Lab Results   Component Value Date    HCG Negative 11/16/2020    HCGS Negative 11/01/2020     HEPATIC:   Lab Results   Component Value Date    ALBUMIN 3.2 (L) 10/31/2021    PROTTOTAL 7.4 10/31/2021    ALT 60 (H) 10/31/2021    AST 68 (H) 10/31/2021    ALKPHOS 53 10/31/2021    BILITOTAL 0.3 10/31/2021     OTHER:   Lab Results   Component Value Date    A1C 5.2 05/20/2021    LING 9.2 11/05/2021    LIPASE 209 11/01/2020    TSH 0.80 05/20/2021    CRP <2.9 11/01/2020       Anesthesia Plan    ASA Status:  1   NPO Status:  NPO Appropriate    Anesthesia Type: General.     - Airway: Native airway      Maintenance: Balanced.        Consents    Anesthesia Plan(s) and associated risks, benefits, and realistic alternatives discussed. Questions answered and patient/representative(s) expressed understanding.     - Discussed with:  Patient         Postoperative Care    Pain management: IV analgesics, Oral pain medications, Multi-modal analgesia.   PONV prophylaxis: Ondansetron (or other 5HT-3), Dexamethasone or Solumedrol     Comments:                ISABELLA Juárez CRNA

## 2021-11-10 ENCOUNTER — ANESTHESIA (OUTPATIENT)
Dept: SURGERY | Facility: CLINIC | Age: 36
End: 2021-11-10
Payer: COMMERCIAL

## 2021-11-11 LAB
PATH REPORT.COMMENTS IMP SPEC: NORMAL
PATH REPORT.COMMENTS IMP SPEC: NORMAL
PATH REPORT.FINAL DX SPEC: NORMAL
PATH REPORT.GROSS SPEC: NORMAL
PATH REPORT.MICROSCOPIC SPEC OTHER STN: NORMAL
PATH REPORT.RELEVANT HX SPEC: NORMAL
PHOTO IMAGE: NORMAL

## 2021-12-02 LAB
INTERPRETATION: NORMAL
ISCN: NORMAL
METHODS: NORMAL

## 2021-12-04 ENCOUNTER — HEALTH MAINTENANCE LETTER (OUTPATIENT)
Age: 36
End: 2021-12-04

## 2022-02-01 ENCOUNTER — LAB (OUTPATIENT)
Dept: LAB | Facility: CLINIC | Age: 37
End: 2022-02-01
Payer: COMMERCIAL

## 2022-02-01 DIAGNOSIS — O09.299 HISTORY OF MISCARRIAGE, CURRENTLY PREGNANT, UNSPECIFIED TRIMESTER: ICD-10-CM

## 2022-02-01 LAB — PROGEST SERPL-MCNC: 10.3 NG/ML

## 2022-02-01 PROCEDURE — 84144 ASSAY OF PROGESTERONE: CPT

## 2022-02-01 PROCEDURE — 36415 COLL VENOUS BLD VENIPUNCTURE: CPT

## 2022-02-02 DIAGNOSIS — R79.89 LOW SERUM PROGESTERONE: Primary | ICD-10-CM

## 2022-02-02 RX ORDER — PROGESTERONE 100 MG/1
100 CAPSULE ORAL DAILY
Qty: 30 CAPSULE | Refills: 3 | Status: SHIPPED | OUTPATIENT
Start: 2022-02-02 | End: 2022-04-21

## 2022-02-02 NOTE — RESULT ENCOUNTER NOTE
Your progesterone level is low  I have prescribed Prometrium 100 mg daily by mouth to improve the progesterone level and help your pregnancy.  The prescription is in the [facility]  Quinn Cash MD

## 2022-02-05 DIAGNOSIS — R79.89 LOW SERUM PROGESTERONE: Primary | ICD-10-CM

## 2022-02-05 RX ORDER — PROGESTERONE 200 MG/1
200 CAPSULE ORAL DAILY
Qty: 30 CAPSULE | Refills: 1 | Status: SHIPPED | OUTPATIENT
Start: 2022-02-05 | End: 2022-04-21

## 2022-03-01 ENCOUNTER — MYC MEDICAL ADVICE (OUTPATIENT)
Dept: FAMILY MEDICINE | Facility: CLINIC | Age: 37
End: 2022-03-01

## 2022-03-02 ENCOUNTER — APPOINTMENT (OUTPATIENT)
Dept: GENERAL RADIOLOGY | Facility: CLINIC | Age: 37
End: 2022-03-02
Attending: EMERGENCY MEDICINE
Payer: COMMERCIAL

## 2022-03-02 ENCOUNTER — HOSPITAL ENCOUNTER (EMERGENCY)
Facility: CLINIC | Age: 37
Discharge: HOME | End: 2022-03-02
Attending: EMERGENCY MEDICINE | Admitting: EMERGENCY MEDICINE
Payer: COMMERCIAL

## 2022-03-02 VITALS
HEIGHT: 64 IN | TEMPERATURE: 97.5 F | BODY MASS INDEX: 24.75 KG/M2 | SYSTOLIC BLOOD PRESSURE: 95 MMHG | OXYGEN SATURATION: 98 % | WEIGHT: 145 LBS | HEART RATE: 67 BPM | DIASTOLIC BLOOD PRESSURE: 70 MMHG | RESPIRATION RATE: 18 BRPM

## 2022-03-02 DIAGNOSIS — M62.81 GENERALIZED MUSCLE WEAKNESS: ICD-10-CM

## 2022-03-02 DIAGNOSIS — R42 DIZZINESS: ICD-10-CM

## 2022-03-02 DIAGNOSIS — R07.9 CHEST PAIN, UNSPECIFIED TYPE: ICD-10-CM

## 2022-03-02 DIAGNOSIS — R00.2 PALPITATIONS: ICD-10-CM

## 2022-03-02 LAB
ALBUMIN SERPL-MCNC: 3.9 G/DL (ref 3.4–5)
ALBUMIN UR-MCNC: NEGATIVE MG/DL
ALP SERPL-CCNC: 40 U/L (ref 40–150)
ALT SERPL W P-5'-P-CCNC: 31 U/L (ref 0–50)
ANION GAP SERPL CALCULATED.3IONS-SCNC: 3 MMOL/L (ref 3–14)
APPEARANCE UR: CLEAR
APTT PPP: 32 SECONDS (ref 22–38)
AST SERPL W P-5'-P-CCNC: 21 U/L (ref 0–45)
BACTERIA #/AREA URNS HPF: ABNORMAL /HPF
BASOPHILS # BLD AUTO: 0.1 10E3/UL (ref 0–0.2)
BASOPHILS NFR BLD AUTO: 1 %
BILIRUB SERPL-MCNC: 0.5 MG/DL (ref 0.2–1.3)
BILIRUB UR QL STRIP: NEGATIVE
BUN SERPL-MCNC: 7 MG/DL (ref 7–30)
CALCIUM SERPL-MCNC: 9.2 MG/DL (ref 8.5–10.1)
CHLORIDE BLD-SCNC: 109 MMOL/L (ref 94–109)
CO2 SERPL-SCNC: 28 MMOL/L (ref 20–32)
COLOR UR AUTO: ABNORMAL
CREAT SERPL-MCNC: 0.66 MG/DL (ref 0.52–1.04)
D DIMER PPP FEU-MCNC: 0.29 UG/ML FEU (ref 0–0.5)
EOSINOPHIL # BLD AUTO: 0 10E3/UL (ref 0–0.7)
EOSINOPHIL NFR BLD AUTO: 0 %
ERYTHROCYTE [DISTWIDTH] IN BLOOD BY AUTOMATED COUNT: 11.6 % (ref 10–15)
FLUAV RNA SPEC QL NAA+PROBE: NEGATIVE
FLUBV RNA RESP QL NAA+PROBE: NEGATIVE
GFR SERPL CREATININE-BSD FRML MDRD: >90 ML/MIN/1.73M2
GLUCOSE BLD-MCNC: 82 MG/DL (ref 70–99)
GLUCOSE UR STRIP-MCNC: NEGATIVE MG/DL
HCG SERPL QL: NEGATIVE
HCT VFR BLD AUTO: 40.9 % (ref 35–47)
HGB BLD-MCNC: 14.1 G/DL (ref 11.7–15.7)
HGB UR QL STRIP: NEGATIVE
IMM GRANULOCYTES # BLD: 0 10E3/UL
IMM GRANULOCYTES NFR BLD: 0 %
INR PPP: 1.13 (ref 0.85–1.15)
KETONES UR STRIP-MCNC: NEGATIVE MG/DL
LACTATE SERPL-SCNC: 0.8 MMOL/L (ref 0.7–2)
LEUKOCYTE ESTERASE UR QL STRIP: NEGATIVE
LIPASE SERPL-CCNC: 184 U/L (ref 73–393)
LYMPHOCYTES # BLD AUTO: 1.5 10E3/UL (ref 0.8–5.3)
LYMPHOCYTES NFR BLD AUTO: 20 %
MCH RBC QN AUTO: 30.6 PG (ref 26.5–33)
MCHC RBC AUTO-ENTMCNC: 34.5 G/DL (ref 31.5–36.5)
MCV RBC AUTO: 89 FL (ref 78–100)
MONOCYTES # BLD AUTO: 0.6 10E3/UL (ref 0–1.3)
MONOCYTES NFR BLD AUTO: 8 %
NEUTROPHILS # BLD AUTO: 5.3 10E3/UL (ref 1.6–8.3)
NEUTROPHILS NFR BLD AUTO: 71 %
NITRATE UR QL: NEGATIVE
NRBC # BLD AUTO: 0 10E3/UL
NRBC BLD AUTO-RTO: 0 /100
PH UR STRIP: 7 [PH] (ref 5–7)
PLATELET # BLD AUTO: 338 10E3/UL (ref 150–450)
POTASSIUM BLD-SCNC: 3.9 MMOL/L (ref 3.4–5.3)
PROT SERPL-MCNC: 7.8 G/DL (ref 6.8–8.8)
RBC # BLD AUTO: 4.61 10E6/UL (ref 3.8–5.2)
RBC URINE: 1 /HPF
SARS-COV-2 RNA RESP QL NAA+PROBE: NEGATIVE
SODIUM SERPL-SCNC: 140 MMOL/L (ref 133–144)
SP GR UR STRIP: 1 (ref 1–1.03)
SQUAMOUS EPITHELIAL: <1 /HPF
TROPONIN I SERPL HS-MCNC: 14 NG/L
UROBILINOGEN UR STRIP-MCNC: NORMAL MG/DL
WBC # BLD AUTO: 7.5 10E3/UL (ref 4–11)
WBC URINE: <1 /HPF

## 2022-03-02 PROCEDURE — 85730 THROMBOPLASTIN TIME PARTIAL: CPT | Performed by: EMERGENCY MEDICINE

## 2022-03-02 PROCEDURE — C9803 HOPD COVID-19 SPEC COLLECT: HCPCS | Performed by: EMERGENCY MEDICINE

## 2022-03-02 PROCEDURE — 96361 HYDRATE IV INFUSION ADD-ON: CPT | Performed by: EMERGENCY MEDICINE

## 2022-03-02 PROCEDURE — 81001 URINALYSIS AUTO W/SCOPE: CPT | Performed by: EMERGENCY MEDICINE

## 2022-03-02 PROCEDURE — 84703 CHORIONIC GONADOTROPIN ASSAY: CPT | Performed by: EMERGENCY MEDICINE

## 2022-03-02 PROCEDURE — 250N000013 HC RX MED GY IP 250 OP 250 PS 637: Performed by: EMERGENCY MEDICINE

## 2022-03-02 PROCEDURE — 84484 ASSAY OF TROPONIN QUANT: CPT | Performed by: EMERGENCY MEDICINE

## 2022-03-02 PROCEDURE — 36415 COLL VENOUS BLD VENIPUNCTURE: CPT | Performed by: EMERGENCY MEDICINE

## 2022-03-02 PROCEDURE — 85025 COMPLETE CBC W/AUTO DIFF WBC: CPT | Performed by: EMERGENCY MEDICINE

## 2022-03-02 PROCEDURE — 85610 PROTHROMBIN TIME: CPT | Performed by: EMERGENCY MEDICINE

## 2022-03-02 PROCEDURE — 96374 THER/PROPH/DIAG INJ IV PUSH: CPT | Performed by: EMERGENCY MEDICINE

## 2022-03-02 PROCEDURE — 71046 X-RAY EXAM CHEST 2 VIEWS: CPT

## 2022-03-02 PROCEDURE — 83605 ASSAY OF LACTIC ACID: CPT | Performed by: EMERGENCY MEDICINE

## 2022-03-02 PROCEDURE — 250N000011 HC RX IP 250 OP 636: Performed by: EMERGENCY MEDICINE

## 2022-03-02 PROCEDURE — 99285 EMERGENCY DEPT VISIT HI MDM: CPT | Mod: 25 | Performed by: EMERGENCY MEDICINE

## 2022-03-02 PROCEDURE — 87636 SARSCOV2 & INF A&B AMP PRB: CPT | Performed by: EMERGENCY MEDICINE

## 2022-03-02 PROCEDURE — 80053 COMPREHEN METABOLIC PANEL: CPT | Performed by: EMERGENCY MEDICINE

## 2022-03-02 PROCEDURE — 85379 FIBRIN DEGRADATION QUANT: CPT | Performed by: EMERGENCY MEDICINE

## 2022-03-02 PROCEDURE — 93005 ELECTROCARDIOGRAM TRACING: CPT | Performed by: EMERGENCY MEDICINE

## 2022-03-02 PROCEDURE — 258N000003 HC RX IP 258 OP 636: Performed by: EMERGENCY MEDICINE

## 2022-03-02 PROCEDURE — 83690 ASSAY OF LIPASE: CPT | Performed by: EMERGENCY MEDICINE

## 2022-03-02 PROCEDURE — 93010 ELECTROCARDIOGRAM REPORT: CPT | Performed by: EMERGENCY MEDICINE

## 2022-03-02 RX ORDER — SODIUM CHLORIDE 9 MG/ML
1000 INJECTION, SOLUTION INTRAVENOUS CONTINUOUS
Status: DISCONTINUED | OUTPATIENT
Start: 2022-03-02 | End: 2022-03-02 | Stop reason: HOSPADM

## 2022-03-02 RX ORDER — MECLIZINE HCL 12.5 MG 12.5 MG/1
12.5 TABLET ORAL 4 TIMES DAILY PRN
Qty: 30 TABLET | Refills: 0 | Status: SHIPPED | OUTPATIENT
Start: 2022-03-02 | End: 2022-03-02

## 2022-03-02 RX ORDER — ONDANSETRON 2 MG/ML
4 INJECTION INTRAMUSCULAR; INTRAVENOUS ONCE
Status: COMPLETED | OUTPATIENT
Start: 2022-03-02 | End: 2022-03-02

## 2022-03-02 RX ORDER — ONDANSETRON 4 MG/1
8 TABLET, ORALLY DISINTEGRATING ORAL EVERY 8 HOURS PRN
Qty: 10 TABLET | Refills: 0 | Status: SHIPPED | OUTPATIENT
Start: 2022-03-02 | End: 2022-03-02

## 2022-03-02 RX ORDER — ONDANSETRON 4 MG/1
8 TABLET, ORALLY DISINTEGRATING ORAL EVERY 8 HOURS PRN
Qty: 10 TABLET | Refills: 0 | Status: ON HOLD | OUTPATIENT
Start: 2022-03-02 | End: 2022-12-03

## 2022-03-02 RX ORDER — MECLIZINE HYDROCHLORIDE 25 MG/1
25 TABLET ORAL ONCE
Status: COMPLETED | OUTPATIENT
Start: 2022-03-02 | End: 2022-03-02

## 2022-03-02 RX ORDER — MECLIZINE HCL 12.5 MG 12.5 MG/1
12.5 TABLET ORAL 4 TIMES DAILY PRN
Qty: 30 TABLET | Refills: 0 | Status: SHIPPED | OUTPATIENT
Start: 2022-03-02 | End: 2022-04-21

## 2022-03-02 RX ADMIN — SODIUM CHLORIDE 500 ML: 9 INJECTION, SOLUTION INTRAVENOUS at 12:42

## 2022-03-02 RX ADMIN — MECLIZINE HYDROCHLORIDE 25 MG: 25 TABLET ORAL at 12:47

## 2022-03-02 RX ADMIN — SODIUM CHLORIDE 1000 ML: 9 INJECTION, SOLUTION INTRAVENOUS at 13:53

## 2022-03-02 RX ADMIN — ONDANSETRON 4 MG: 2 INJECTION INTRAMUSCULAR; INTRAVENOUS at 12:46

## 2022-03-02 NOTE — Clinical Note
Serina Washington was seen and treated in our emergency department on 3/2/2022.  She may return to work on 03/04/2022.       If you have any questions or concerns, please don't hesitate to call.      Kaveh Lee MD

## 2022-03-02 NOTE — ED TRIAGE NOTES
"Pt here with symptoms that started on Sunday. Pt states that she had a miscarriage in early February, she was about 5 weeks pregnant, she had just had a positive test.   Pt c/o vague symptoms \"of just not feeling very well\" these last couple of days.   "

## 2022-03-02 NOTE — ED PROVIDER NOTES
History     Chief Complaint   Patient presents with     Chest Pain     intermittent left upper chest pain with palpiations and dizziness started on .       HPI  Serina Washington is a 36 year old female with past medical history significant for recent miscarriage in February who presents the emergency department complaining of lightheadedness dizziness left-sided chest pain nausea generalized body aches and weakness.  Patient states symptoms began on  feeling lightheaded almost like she was going to pass out at times has persisted throughout the weekend she developed some aching in her chest that is fairly constant and also some sharp chest pains.  She did have an miscarriage in early February and is not have any vaginal bleeding discharge or lower abdominal pain.  She has not had any headache or visual changes denies any neck pain.  She has not had a significant cough.  She denies any bowel or bladder dysfunction.  She states she just does not feel well.  She feels like her heart is racing at times.      Allergies:  No Known Allergies    Problem List:    Patient Active Problem List    Diagnosis Date Noted     Low serum progesterone 10/19/2021     Priority: Medium     History of miscarriage, currently pregnant, unspecified trimester 2021     Priority: Medium     Endometrial polyp 10/29/2020     Priority: Medium     Added automatically from request for surgery [id number]       Menorrhagia with regular cycle 10/29/2020     Priority: Medium     Added automatically from request for surgery [id number]       S/P  09/10/2017     Priority: Medium        Past Medical History:    No past medical history on file.    Past Surgical History:    Past Surgical History:   Procedure Laterality Date     ARTHROSCOPY KNEE Left       SECTION N/A 9/10/2017    Procedure:  SECTION;  Primary  Section;  Surgeon: Mu Miranda MD;  Location: [facility] OR     DILATION AND CURETTAGE SUCTION N/A  "11/9/2021    Procedure: DILATION AND CURETTAGE, UTERUS, USING SUCTION;  Surgeon: Vicki Tracey MD;  Location: [facility] OR     GYN SURGERY       OPERATIVE HYSTEROSCOPY N/A 11/16/2020    Procedure: HYSTEROSCOPY, THERAPEUTIC;  Surgeon: Quinn Cash MD;  Location: [facility] OR     SURGICAL HISTORY OF -       left knee surgery       Family History:    Family History   Problem Relation Age of Onset     Kidney Disease Father         dialysis     Hypertension Father      Gastrointestinal Disease Father         stomach ulcer     Hypertension Mother      Thyroid Disease Mother      Other Cancer Paternal Grandfather      Other Cancer Paternal Grandmother        Social History:  Marital Status:   [2]  Social History     Tobacco Use     Smoking status: Never Smoker     Smokeless tobacco: Never Used   Vaping Use     Vaping Use: Never used   Substance Use Topics     Alcohol use: No     Alcohol/week: 0.0 standard drinks     Drug use: No        Medications:    albuterol (PROAIR HFA/PROVENTIL HFA/VENTOLIN HFA) 108 (90 Base) MCG/ACT inhaler  ibuprofen (ADVIL/MOTRIN) 800 MG tablet  Multiple Vitamins-Minerals (HM MULTIVITAMIN ADULT GUMMY PO)  oxyCODONE (ROXICODONE) 5 MG tablet  Prenatal Multivit-Min-Fe-FA (PRENATAL VITAMINS PO)  progesterone (PROMETRIUM) 100 MG capsule  progesterone (PROMETRIUM) 200 MG capsule          Review of Systems  All systems reviewed and other than pertinent positives negatives in HPI all other systems are negative  Physical Exam   BP: 122/84  Pulse: 78  Temp: 97.5  F (36.4  C)  Resp: 18  Height: 162.6 cm (5' 4\")  Weight: 65.8 kg (145 lb)  SpO2: 99 %      Physical Exam  Vitals and nursing note reviewed.   Constitutional:       General: She is not in acute distress.     Appearance: She is well-developed. She is not ill-appearing, toxic-appearing or diaphoretic.   HENT:      Head: Normocephalic and atraumatic.      Nose: Nose normal.   Eyes:      Extraocular Movements: Extraocular movements intact. "      Conjunctiva/sclera: Conjunctivae normal.      Pupils: Pupils are equal, round, and reactive to light.      Comments: No significant vertigo.   Cardiovascular:      Rate and Rhythm: Normal rate and regular rhythm.      Pulses: Normal pulses.      Heart sounds: Normal heart sounds. No murmur heard.  Pulmonary:      Effort: Pulmonary effort is normal.      Breath sounds: Normal breath sounds. No wheezing or rhonchi.   Chest:      Chest wall: No tenderness.   Abdominal:      General: Abdomen is flat. Bowel sounds are normal. There is no distension.      Palpations: Abdomen is soft.      Tenderness: There is no abdominal tenderness. There is no right CVA tenderness or left CVA tenderness.   Musculoskeletal:         General: No swelling or tenderness. Normal range of motion.      Cervical back: Normal range of motion and neck supple.      Right lower leg: No edema.      Left lower leg: No edema.   Skin:     General: Skin is warm and dry.      Findings: No rash.   Neurological:      General: No focal deficit present.      Mental Status: She is alert and oriented to person, place, and time.      Cranial Nerves: No cranial nerve deficit.      Sensory: No sensory deficit.      Motor: No weakness.      Coordination: Coordination normal.      Comments: Hallpike maneuvers equivocal   Psychiatric:         Mood and Affect: Mood normal.         ED Course                 Procedures              EKG Interpretation:      Interpreted by Kaveh Lee MD  Rhythm: Sinus rhythm with rate variation.  Rate: Normal  Axis: Normal  Ectopy: none  Conduction: normal  ST Segments/ T Waves: No ST-T wave changes and No acute ischemic changes  Q Waves: none  Comparison to prior: Unchanged from 11/1/2020    Clinical Impression: Sinus rhythm with rate variation.    Critical Care time:  none               Results for orders placed or performed during the hospital encounter of 03/02/22   Chest XR,  PA & LAT    Narrative    XR CHEST 2 VW  3/2/2022 2:09 PM    HISTORY: chest pain    COMPARISON: 10/31/2021        Impression    IMPRESSION: Normal.    ROSA VILLALTA MD         SYSTEM ID:  [id number]     Labs Ordered and Resulted from Time of ED Arrival to Time of ED Departure   ROUTINE UA WITH MICROSCOPIC REFLEX TO CULTURE - Abnormal       Result Value    Color Urine Straw      Appearance Urine Clear      Glucose Urine Negative      Bilirubin Urine Negative      Ketones Urine Negative      Specific Gravity Urine 1.004      Blood Urine Negative      pH Urine 7.0      Protein Albumin Urine Negative      Urobilinogen Urine Normal      Nitrite Urine Negative      Leukocyte Esterase Urine Negative      Bacteria Urine Few (*)     RBC Urine 1      WBC Urine <1      Squamous Epithelials Urine <1     D DIMER QUANTITATIVE - Normal    D-Dimer Quantitative 0.29     INR - Normal    INR 1.13     PARTIAL THROMBOPLASTIN TIME - Normal    aPTT 32     COMPREHENSIVE METABOLIC PANEL - Normal    Sodium 140      Potassium 3.9      Chloride 109      Carbon Dioxide (CO2) 28      Anion Gap 3      Urea Nitrogen 7      Creatinine 0.66      Calcium 9.2      Glucose 82      Alkaline Phosphatase 40      AST 21      ALT 31      Protein Total 7.8      Albumin 3.9      Bilirubin Total 0.5      GFR Estimate >90     LIPASE - Normal    Lipase 184     LACTIC ACID WHOLE BLOOD - Normal    Lactic Acid 0.8     TROPONIN I - Normal    Troponin I High Sensitivity 14     HCG QUALITATIVE PREGNANCY - Normal    hCG Serum Qualitative Negative     INFLUENZA A/B & SARS-COV2 PCR MULTIPLEX - Normal    Influenza A PCR Negative      Influenza B PCR Negative      SARS CoV2 PCR Negative     CBC WITH PLATELETS AND DIFFERENTIAL    WBC Count 7.5      RBC Count 4.61      Hemoglobin 14.1      Hematocrit 40.9      MCV 89      MCH 30.6      MCHC 34.5      RDW 11.6      Platelet Count 338      % Neutrophils 71      % Lymphocytes 20      % Monocytes 8      % Eosinophils 0      % Basophils 1      % Immature Granulocytes 0       NRBCs per 100 WBC 0      Absolute Neutrophils 5.3      Absolute Lymphocytes 1.5      Absolute Monocytes 0.6      Absolute Eosinophils 0.0      Absolute Basophils 0.1      Absolute Immature Granulocytes 0.0      Absolute NRBCs 0.0         Medications   0.9% sodium chloride BOLUS (has no administration in time range)   sodium chloride 0.9% infusion (has no administration in time range)   ondansetron (ZOFRAN) injection 4 mg (has no administration in time range)   meclizine (ANTIVERT) tablet 25 mg (has no administration in time range)       Assessments & Plan (with Medical Decision Making) records were reviewed.  Labs were obtained.  EKG was unremarkable.  Patient was placed on a monitor.  Covid test was obtained.  CBC was without significant abnormality.  Lactic acid was within normal limits.  D-dimer was within normal limits.  Comprehensive metabolic panel was unremarkable and troponin was unremarkable.  Pregnancy test negative urinalysis unremarkable.  Patient did be given Zofran IV fluids and Antivert.  She is feeling a bit better after the medications.  Orthostatics she felt a bit lightheaded with standing but not any significant change.  She does have lightheadedness with positional changes of her head.  I discussed with her that this could be a viral illness.  This also could be benign positional vertigo.  No obvious source of her symptoms are found at this point.  We discussed possible CT scan of the head with she did not think it was necessary at this point.  We are going to treat her with Antivert have her drink plenty of fluids and rest.  Patient will be placed on a Holter monitor and should follow-up with her primary after this is done.  If symptoms do not improve or new symptoms develop she should return for further evaluation and care.  She should follow-up with her primary care this week or early next week for recheck.  Patient and  are in agreement with this plan.     I have reviewed the  nursing notes.    I have reviewed the findings, diagnosis, plan and need for follow up with the patient.       Discharge Medication List as of 3/2/2022  3:27 PM      START taking these medications    Details   meclizine (ANTIVERT) 12.5 MG tablet Take 1 tablet (12.5 mg) by mouth 4 times daily as needed for dizziness, Disp-30 tablet, R-0, Local Print      ondansetron (ZOFRAN ODT) 4 MG ODT tab Take 2 tablets (8 mg) by mouth every 8 hours as needed for nausea, Disp-10 tablet, R-0, Local Print             Final diagnoses:   Dizziness   Chest pain, unspecified type   Palpitations   Generalized muscle weakness       3/2/2022   [facility] EMERGENCY DEPT     Rode, Kaveh Monet MD  03/03/22 [id number]

## 2022-03-02 NOTE — DISCHARGE INSTRUCTIONS
Return if symptoms worsen or new symptoms develop.  Drink plenty of fluids.  Stand up slowly.  Take meclizine as directed.  Wear Holter monitor.  Take Zofran as needed for nausea.  If increased dizziness headache chest pain shortness of breath weakness or other symptoms present please return for further evaluation and care.

## 2022-03-02 NOTE — TELEPHONE ENCOUNTER
"Serina reports that her symptoms are the same as she reported on yesterday's MyChart note.   She reports today dizziness, weakness, constant achiness in left chest radiating to her back, nausea, reports intermitrent heart palpitations.   Denies breathing problems.  Denies fever.  Denies cough currently.  Denies headache.   Negative home covid test yesterday.  Denies arm pain; denies jaw pain.  Reports inner thigh and calf pain.  History of miscarriage of \"5 weeks\" in February 2022.    Patient advised to have someone take her to ED. She said that her  could take her.  Amilcar Roque RN      "

## 2022-03-14 NOTE — TELEPHONE ENCOUNTER
Please give pt a call and offer her one of my approval required or hospital f/u/pre-op visits for next week.    Gemini Esquivel, DO

## 2022-03-29 ENCOUNTER — OFFICE VISIT (OUTPATIENT)
Dept: FAMILY MEDICINE | Facility: CLINIC | Age: 37
End: 2022-03-29
Payer: COMMERCIAL

## 2022-03-29 VITALS
HEIGHT: 64 IN | DIASTOLIC BLOOD PRESSURE: 64 MMHG | SYSTOLIC BLOOD PRESSURE: 116 MMHG | HEART RATE: 96 BPM | BODY MASS INDEX: 25.78 KG/M2 | OXYGEN SATURATION: 99 % | WEIGHT: 151 LBS | TEMPERATURE: 98.1 F | RESPIRATION RATE: 12 BRPM

## 2022-03-29 DIAGNOSIS — F41.8 SITUATIONAL ANXIETY: Primary | ICD-10-CM

## 2022-03-29 DIAGNOSIS — Z13.21 ENCOUNTER FOR VITAMIN DEFICIENCY SCREENING: ICD-10-CM

## 2022-03-29 DIAGNOSIS — O09.299 HISTORY OF MISCARRIAGE, CURRENTLY PREGNANT, UNSPECIFIED TRIMESTER: ICD-10-CM

## 2022-03-29 DIAGNOSIS — L65.9 HAIR LOSS: ICD-10-CM

## 2022-03-29 LAB
B-HCG SERPL-ACNC: 379 IU/L (ref 0–5)
FERRITIN SERPL-MCNC: 39 NG/ML (ref 12–150)
PROGEST SERPL-MCNC: 30.6 NG/ML
TSH SERPL DL<=0.005 MIU/L-ACNC: 0.78 MU/L (ref 0.4–4)

## 2022-03-29 PROCEDURE — 84443 ASSAY THYROID STIM HORMONE: CPT | Performed by: FAMILY MEDICINE

## 2022-03-29 PROCEDURE — 36415 COLL VENOUS BLD VENIPUNCTURE: CPT | Performed by: FAMILY MEDICINE

## 2022-03-29 PROCEDURE — 84144 ASSAY OF PROGESTERONE: CPT | Performed by: FAMILY MEDICINE

## 2022-03-29 PROCEDURE — 84702 CHORIONIC GONADOTROPIN TEST: CPT | Performed by: FAMILY MEDICINE

## 2022-03-29 PROCEDURE — 82728 ASSAY OF FERRITIN: CPT | Performed by: FAMILY MEDICINE

## 2022-03-29 PROCEDURE — 99214 OFFICE O/P EST MOD 30 MIN: CPT | Performed by: FAMILY MEDICINE

## 2022-03-29 PROCEDURE — 82306 VITAMIN D 25 HYDROXY: CPT | Performed by: FAMILY MEDICINE

## 2022-03-29 ASSESSMENT — PATIENT HEALTH QUESTIONNAIRE - PHQ9
SUM OF ALL RESPONSES TO PHQ QUESTIONS 1-9: 9
10. IF YOU CHECKED OFF ANY PROBLEMS, HOW DIFFICULT HAVE THESE PROBLEMS MADE IT FOR YOU TO DO YOUR WORK, TAKE CARE OF THINGS AT HOME, OR GET ALONG WITH OTHER PEOPLE: VERY DIFFICULT
SUM OF ALL RESPONSES TO PHQ QUESTIONS 1-9: 9

## 2022-03-29 ASSESSMENT — PAIN SCALES - GENERAL: PAINLEVEL: NO PAIN (0)

## 2022-03-29 NOTE — PROGRESS NOTES
A/P:      ICD-10-CM    1. Situational anxiety  F41.8 Adult Mental Health  Referral   2. Hair loss  L65.9 TSH with free T4 reflex     Ferritin     TSH with free T4 reflex     Ferritin   3. History of miscarriage, currently pregnant, unspecified trimester  O09.299 Progesterone     HCG Quantitative Pregnancy, Blood (LDV976)     Progesterone     HCG Quantitative Pregnancy, Blood (CKL616)   4. Encounter for vitamin deficiency screening  Z13.21 Vitamin D Deficiency     Vitamin D Deficiency     Anxiety:  Significant stressors with multiple pregnancy losses.  Pt does feel anxiety is an issue.  Newly pregnant again, does not want to consider a medication now with new pregnancy.  Might consider in the future.  Is willing to visit with therapy and new referral placed.  Also reviewed with pt exploring options for EAP through her employer for potentially earlier access.      Hair loss:  Was quite ill in the fall and now noticing hair loss a few months later.  possibly telogen effluvium.  Labs as ordered.    H/o recurrent miscarriage:  Has started progesterone with newly positive pregnancy test.  Plan levels today and encouraged her to f/u with OB      Subjective   Serina is a 36 year old who presents for the following health issues     History of Present Illness       Mental Health Follow-up:                    Today's PHQ-9         PHQ-9 Total Score: 9  PHQ-9 Q9 Thoughts of better off dead/self-harm past 2 weeks :   Not at all    How difficult have these problems made it for you to do your work, take care of things at home, or get along with other people: Very difficult        Reason for visit:  Follow up after ER visit  Symptom onset:  3-4 weeks ago    She eats 2-3 servings of fruits and vegetables daily.She consumes 0 sweetened beverage(s) daily.She exercises with enough effort to increase her heart rate 30 to 60 minutes per day.  She exercises with enough effort to increase her heart rate 5 days per week.   She is  "taking medications regularly.       ED/UC Followup:    Facility:  [facility]  Date of visit: 03/02/2022  Reason for visit: Dizziness; Chest Pain; Palpitations  Current Status: Feeling okay, not having anymore chest pain or palpitations.Last week was having some nausea issues during the night and took her Zofran. Recently learned that she is pregnant, did 4 at home tests that were positive. Last test taken was yesterday 3/28. Randomly has episodes of nausea and dizziness and not sure if it is associated with pregnancy or not.        Feels symptoms started at the end of Feb/early March    Feels like she had about 5 days of intemrmittent chest pina/back pain.  Was having heart palpitations as well.  Feels like she was shaking and overwhelmed.  Would try to go for a walk to manage her anxiety but things were not improving so went to the ER.    Was having \"awful\" fatigue and some dizziness and nausea as well.      Things checked out fine.  2 days after leaving the ED symptoms improved and were resolved so she decided not to pursue the Holter.  Feels palpitations resolved.  Got her period and felt better all week.    On 3/17 began having some AM nausea again as well as multiple BM per day, combination diarrhea and normal.  Also had some dizziness but by noon would feel better/normal.    Last week continue to have off and on nausea.  On Monday (1 week ago) had worsened nausea and had trouble sleeping.  Took zofran and felt better.    Ended up taking a home pregnancy test which was positive.  Not sure if related as she has never had nausea this early in pregnancy.    No vomiting throughout this time period.  Feels BM's are mostly back to normal but sometimes will go more then once in the morning, looser the second time she goes.      Does get intermittent \"dizziness\" or light headedness as well.  Just feels like she wants to sit down.      More recently has had some mild nausea in the " "morning but seems better.  Not missing work.      Had 100mg of progesterone on hand so restarted that a 3 days ago       Has been feeling very stressed, anxious and down with her pregnancy losses.  Feels her loss in the fall when she had Covid was particularly traumatic.    Review of Systems   Constitutional, HEENT, cardiovascular, pulmonary, gi and gu systems are negative, except as otherwise noted.      Objective    /64 (BP Location: Right arm, Patient Position: Chair, Cuff Size: Adult Regular)   Pulse 96   Temp 98.1  F (36.7  C) (Tympanic)   Resp 12   Ht 1.626 m (5' 4\")   Wt 68.5 kg (151 lb)   SpO2 99%   Breastfeeding No   BMI 25.92 kg/m    Body mass index is 25.92 kg/m .  Physical Exam   PE:  VS as above   Gen:  WN/WD/WH female in NAD   Heart:  RRR without murmur, nl S1, S2, no rubs or gallops   Lungs CTA chyna without rales/ronchi/wheezes   Psych: Alert and oriented times 3; coherent speech, normal   rate and volume, able to articulate logical thoughts, able   to abstract reason, no tangential thoughts, no hallucinations   or delusions  Her affect is mildly depressed/anxious    Epic reviewed            "

## 2022-03-30 ENCOUNTER — MYC MEDICAL ADVICE (OUTPATIENT)
Dept: FAMILY MEDICINE | Facility: CLINIC | Age: 37
End: 2022-03-30

## 2022-03-30 LAB — DEPRECATED CALCIDIOL+CALCIFEROL SERPL-MC: 31 UG/L (ref 20–75)

## 2022-04-14 ENCOUNTER — OFFICE VISIT (OUTPATIENT)
Dept: OBGYN | Facility: CLINIC | Age: 37
End: 2022-04-14
Payer: COMMERCIAL

## 2022-04-14 VITALS
DIASTOLIC BLOOD PRESSURE: 73 MMHG | HEIGHT: 64 IN | SYSTOLIC BLOOD PRESSURE: 112 MMHG | BODY MASS INDEX: 24.92 KG/M2 | HEART RATE: 90 BPM | WEIGHT: 146 LBS | TEMPERATURE: 98.1 F | RESPIRATION RATE: 16 BRPM

## 2022-04-14 DIAGNOSIS — O09.299 HISTORY OF MISCARRIAGE, CURRENTLY PREGNANT, UNSPECIFIED TRIMESTER: Primary | ICD-10-CM

## 2022-04-14 LAB — PROGEST SERPL-MCNC: 22.9 NG/ML

## 2022-04-14 PROCEDURE — 36415 COLL VENOUS BLD VENIPUNCTURE: CPT | Performed by: OBSTETRICS & GYNECOLOGY

## 2022-04-14 PROCEDURE — 84144 ASSAY OF PROGESTERONE: CPT | Performed by: OBSTETRICS & GYNECOLOGY

## 2022-04-14 PROCEDURE — 76817 TRANSVAGINAL US OBSTETRIC: CPT | Performed by: OBSTETRICS & GYNECOLOGY

## 2022-04-14 NOTE — PROGRESS NOTES
First OB Ultrasound  Transvaginal US was performed.  THe uterus was visualized. There was an IUP.  The yolk sac was visualized.   The fetal pole was seen.  A walker  The CRL measured 0.572 cm and was consistent with 6weeks 2 days  EDC= 12/6/2022  Fetal heart rate was 121 bpm  The placenta appears to be  anterior.  Subchiorionic fluid is not seen.  Quinn Cash MD

## 2022-04-21 ENCOUNTER — PRENATAL OFFICE VISIT (OUTPATIENT)
Dept: OBGYN | Facility: CLINIC | Age: 37
End: 2022-04-21
Payer: COMMERCIAL

## 2022-04-21 DIAGNOSIS — Z34.80 PRENATAL CARE, SUBSEQUENT PREGNANCY: ICD-10-CM

## 2022-04-21 PROCEDURE — 99207 PR NO CHARGE NURSE ONLY: CPT | Performed by: OBSTETRICS & GYNECOLOGY

## 2022-04-21 NOTE — PROGRESS NOTES
Quick review of teaching done  Ruby Savoy OB Intake Nurse    Patient supplied answers from flow sheet for:  Prenatal OB Questionnaire.  Past Medical History  Have you ever recieved care for your mental health? : No  Have you ever been in a major accident or suffered serious trauma?: No  Within the last year, has anyone hit, slapped, kicked or otherwise hurt you?: No  In the last year, has anyone forced you to have sex when you didn't want to?: No    Past Medical History 2   Have you ever received a blood transfusion?: No  Would you accept a blood transfusion if was medically recommended?: Yes  Does anyone in your home smoke?: No   Is your blood type Rh negative?: No  Have you ever ?: (!) Yes  Have you been hospitalized for a nonsurgical reason excluding normal delivery?: (!) Yes (leg injury at age 15)  Have you ever had an abnormal pap smear?: No    Past Medical History (Continued)  Do you have a history of abnormalities of the uterus?: No  Did your mother take MILTON or any other hormones when she was pregnant with you?: No  Do you have any other problems we have not asked about which you feel may be important to this pregnancy?: No

## 2022-05-05 ENCOUNTER — PRENATAL OFFICE VISIT (OUTPATIENT)
Dept: OBGYN | Facility: CLINIC | Age: 37
End: 2022-05-05
Payer: COMMERCIAL

## 2022-05-05 VITALS
BODY MASS INDEX: 23.56 KG/M2 | DIASTOLIC BLOOD PRESSURE: 67 MMHG | RESPIRATION RATE: 18 BRPM | WEIGHT: 141.4 LBS | HEART RATE: 91 BPM | SYSTOLIC BLOOD PRESSURE: 95 MMHG | HEIGHT: 65 IN | TEMPERATURE: 98.2 F

## 2022-05-05 DIAGNOSIS — Z34.81 PRENATAL CARE, SUBSEQUENT PREGNANCY IN FIRST TRIMESTER: Primary | ICD-10-CM

## 2022-05-05 LAB
ABO/RH(D): NORMAL
ALBUMIN UR-MCNC: NEGATIVE MG/DL
ANTIBODY SCREEN: NEGATIVE
APPEARANCE UR: CLEAR
BILIRUB UR QL STRIP: NEGATIVE
COLOR UR AUTO: YELLOW
ERYTHROCYTE [DISTWIDTH] IN BLOOD BY AUTOMATED COUNT: 11.8 % (ref 10–15)
GLUCOSE UR STRIP-MCNC: NEGATIVE MG/DL
HCT VFR BLD AUTO: 38.2 % (ref 35–47)
HGB BLD-MCNC: 13 G/DL (ref 11.7–15.7)
HGB UR QL STRIP: NEGATIVE
KETONES UR STRIP-MCNC: NEGATIVE MG/DL
LEUKOCYTE ESTERASE UR QL STRIP: NEGATIVE
MCH RBC QN AUTO: 30.6 PG (ref 26.5–33)
MCHC RBC AUTO-ENTMCNC: 34 G/DL (ref 31.5–36.5)
MCV RBC AUTO: 90 FL (ref 78–100)
NITRATE UR QL: NEGATIVE
PH UR STRIP: 6 [PH] (ref 5–7)
PLATELET # BLD AUTO: 335 10E3/UL (ref 150–450)
PROGEST SERPL-MCNC: 28.7 NG/ML
RBC # BLD AUTO: 4.25 10E6/UL (ref 3.8–5.2)
RBC #/AREA URNS AUTO: NORMAL /HPF
RUBV IGG SERPL QL IA: 10.5 INDEX
RUBV IGG SERPL QL IA: POSITIVE
SP GR UR STRIP: 1.01 (ref 1–1.03)
SPECIMEN EXPIRATION DATE: NORMAL
T PALLIDUM AB SER QL: NONREACTIVE
UROBILINOGEN UR STRIP-ACNC: 0.2 E.U./DL
WBC # BLD AUTO: 9.1 10E3/UL (ref 4–11)
WBC #/AREA URNS AUTO: NORMAL /HPF

## 2022-05-05 PROCEDURE — 99207 PR FIRST OB VISIT: CPT | Performed by: OBSTETRICS & GYNECOLOGY

## 2022-05-05 PROCEDURE — 86850 RBC ANTIBODY SCREEN: CPT | Performed by: OBSTETRICS & GYNECOLOGY

## 2022-05-05 PROCEDURE — 36415 COLL VENOUS BLD VENIPUNCTURE: CPT | Performed by: OBSTETRICS & GYNECOLOGY

## 2022-05-05 PROCEDURE — 87340 HEPATITIS B SURFACE AG IA: CPT | Performed by: OBSTETRICS & GYNECOLOGY

## 2022-05-05 PROCEDURE — 87624 HPV HI-RISK TYP POOLED RSLT: CPT | Performed by: OBSTETRICS & GYNECOLOGY

## 2022-05-05 PROCEDURE — 87491 CHLMYD TRACH DNA AMP PROBE: CPT | Performed by: OBSTETRICS & GYNECOLOGY

## 2022-05-05 PROCEDURE — 87086 URINE CULTURE/COLONY COUNT: CPT | Performed by: OBSTETRICS & GYNECOLOGY

## 2022-05-05 PROCEDURE — 86780 TREPONEMA PALLIDUM: CPT | Performed by: OBSTETRICS & GYNECOLOGY

## 2022-05-05 PROCEDURE — 87389 HIV-1 AG W/HIV-1&-2 AB AG IA: CPT | Performed by: OBSTETRICS & GYNECOLOGY

## 2022-05-05 PROCEDURE — 87591 N.GONORRHOEAE DNA AMP PROB: CPT | Performed by: OBSTETRICS & GYNECOLOGY

## 2022-05-05 PROCEDURE — 86803 HEPATITIS C AB TEST: CPT | Performed by: OBSTETRICS & GYNECOLOGY

## 2022-05-05 PROCEDURE — 86901 BLOOD TYPING SEROLOGIC RH(D): CPT | Performed by: OBSTETRICS & GYNECOLOGY

## 2022-05-05 PROCEDURE — 86900 BLOOD TYPING SEROLOGIC ABO: CPT | Performed by: OBSTETRICS & GYNECOLOGY

## 2022-05-05 PROCEDURE — G0145 SCR C/V CYTO,THINLAYER,RESCR: HCPCS | Performed by: OBSTETRICS & GYNECOLOGY

## 2022-05-05 PROCEDURE — 84144 ASSAY OF PROGESTERONE: CPT | Performed by: OBSTETRICS & GYNECOLOGY

## 2022-05-05 PROCEDURE — 85027 COMPLETE CBC AUTOMATED: CPT | Performed by: OBSTETRICS & GYNECOLOGY

## 2022-05-05 PROCEDURE — 86762 RUBELLA ANTIBODY: CPT | Performed by: OBSTETRICS & GYNECOLOGY

## 2022-05-05 PROCEDURE — 81001 URINALYSIS AUTO W/SCOPE: CPT | Performed by: OBSTETRICS & GYNECOLOGY

## 2022-05-05 PROCEDURE — 76817 TRANSVAGINAL US OBSTETRIC: CPT | Performed by: OBSTETRICS & GYNECOLOGY

## 2022-05-05 RX ORDER — ONDANSETRON 4 MG/1
4 TABLET, FILM COATED ORAL EVERY 8 HOURS PRN
Qty: 6 TABLET | Refills: 1 | Status: ON HOLD | OUTPATIENT
Start: 2022-05-05 | End: 2022-12-03

## 2022-05-05 NOTE — PROGRESS NOTES
"Serina Washington is a 36 year old   who presents to the clinic for an new ob visit.    Estimated Date of Delivery: Dec 6, 2022 is calculated from Patient's last menstrual period was 2022.   She has not had bleeding since her LMP.   She has had mild nausea. Weigh loss has occurred, for a total of 5 pounds.   This was a planned pregnancy.   FOB is involved,  Rufino   OTHER CONCERNS: previous pregnancy losses  INFECTION HISTORY  HIV: no  Hepatitis B: no  Hepatitis C: no  Syphilis:  no  Tuberculosis: no   PPD- no  Herpes self: no  Herpes partner:  no  Chlamydia:  no  Gonorrhea:  no  HPV: no  BV:  no  Trichomonis:  no  Chicken Pox:  YES  ====================================================  PERSONAL/SOCIAL HISTORY  Lives lives with their family.  Employment: Full time.  Her job involves light activity .  Additional items: None  =====================================================   REVIEW OF SYSTEMS  CONSTITUTIONAL: NEGATIVE for fever, chills  INTEGUMENTARY/SKIN: NEGATIVE for worrisome rashes, moles or lesions  EYES: NEGATIVE for vision changes   ENT/MOUTH: NEGATIVE for ear, mouth and throat problems  RESP: NEGATIVE for significant cough or SOB  BREAST: NEGATIVE for masses, tenderness or discharge  CV: NEGATIVE for chest pain, palpitations   GI: NEGATIVE for nausea, abdominal pain, heartburn, or change in bowel habits  : NEGATIVE for frequency, dysuria, or hematuria  MUSCULOSKELETAL: NEGATIVE for significant arthralgias or myalgia  NEURO: NEGATIVE for weakness, dizziness or paresthesias or headache  ENDOCRINE: NEGATIVE for temperature intolerance, skin/hair changes  HEME: NEGATIVE for bleeding problems  PSYCHIATRIC: NEGATIVE for changes in mood or affect  ====================================================  PHYSICAL EXAM:  BP 95/67 (BP Location: Left arm, Patient Position: Chair, Cuff Size: Adult Regular)   Pulse 91   Temp 98.2  F (36.8  C) (Tympanic)   Resp 18   Ht 1.651 m (5' 5\")   Wt 64.1 " kg (141 lb 6.4 oz)   LMP 2022   BMI 23.53 kg/m    BMI- Body mass index is 23.53 kg/m .,     RECOMMENDED WEIGHT GAIN: 15-25 lbs.  GENERAL:  Pleasant pregnant female, alert, well groomed.  SKIN:  Warm and dry, without lesions or rashes  HEAD: Symmetrical features.  EYES:  PERRLA,   MOUTH:  Buccal mucosa pink, moist without lesions.    NECK:  Thyroid without enlargement and nodules.  Lymph nodes not palpable.   LUNGS:  Clear to auscultation.  BREAST:  Symmetrical.  No dominant, fixed or suspicious masses are noted.  No skin or nipple changes or axillary nodes.  Self exam is taught and encouraged.  Nipples everted.      HEART:  RRR without murmur.  ABDOMEN: Soft without masses , tenderness or organomegaly.  No CVA tenderness. Well healed scar from  section.   MUSCULOSKELETAL:  Full range of motion  EXTREMITIES:  No edema. No significant varicosities.   GENITALIA:  BUS WNL, no lesions noted   VAGINA:  Pink, normal rugae and discharge normal and physiologic,   CERVIX:  smooth, without discharge or CMT and nulliparous os,   firm/ closed 4 cm long.  UTERUS: Anteverted, nontender 9 weeks in size.  ADNEXA: Without masses or tenderness  PELVIS:  Arch; wide . Sacrum; deep. Spines;blunt.  Side walls; straight. Type; gynecoid  PELVIS:   Adequate, Pelvis proven to -pelvis not tested.  RECTAL:  Normal appearance.  Digital exam deferred.  WET PREP:Not done  gonorrhea  =========================================  ASSESSMENT:  (Z34.81) Prenatal care, subsequent pregnancy in first trimester  (primary encounter diagnosis)  Comment: Estimated Date of Delivery: Dec 6, 2022   Plan: US OB <14 Weeks w Transvaginal Single, ABO/Rh         type and screen, CBC with platelets, Hepatitis         B surface antigen, Rubella Antibody IgG, HIV         Antigen Antibody Combo, Chlamydia         trachomatis/Neisseria gonorrhoeae by PCR, Urine        Culture, UA with Microscopic, Hepatitis C         Screen Reflex to HCV RNA Quant and  Genotype,         Treponema Abs w Reflex to RPR and Titer, Pap         screen with HPV - recommended age 30 - 65         years, Urine Microscopic Exam, Progesterone,         ondansetron (ZOFRAN) 4 MG tablet            PREGNANCY AT RISK? unknown  ==========================================  PLAN:  Instructed on use of triage nurse line and contacting the on call CNM after hours for an urgent need such as fever, vagina bleeding, bladder or vaginal infection, rupture of membranes,  or term labor.    Discussed the indications, uses for and false positives for quad screen, nuchal translucency and fetal survey ultrasound at 18-20 weeks gestation. Will inform us at the next visit if she wished to avail herself of these screens.  Instructed on best evidence for: weight gain for her BMI for pregnancy; healthy diet and foods to avoid; exercise and activity during pregnancy;avoiding exposure to toxoplasmosis; and maintenance of a generally healthy lifestyle.   Discussed the harms, benefits, side effects and alternative therapies for current prescribed and OTC medications.  Quinn Cash MD

## 2022-05-05 NOTE — NURSING NOTE
"Initial BP 95/67 (BP Location: Left arm, Patient Position: Chair, Cuff Size: Adult Regular)   Pulse 91   Temp 98.2  F (36.8  C) (Tympanic)   Resp 18   Ht 1.651 m (5' 5\")   Wt 64.1 kg (141 lb 6.4 oz)   LMP 03/03/2022   BMI 23.53 kg/m   Estimated body mass index is 23.53 kg/m  as calculated from the following:    Height as of this encounter: 1.651 m (5' 5\").    Weight as of this encounter: 64.1 kg (141 lb 6.4 oz). .      "

## 2022-05-06 LAB
BACTERIA UR CULT: NO GROWTH
C TRACH DNA SPEC QL PROBE+SIG AMP: NEGATIVE
HBV SURFACE AG SERPL QL IA: NONREACTIVE
HCV AB SERPL QL IA: NONREACTIVE
HIV 1+2 AB+HIV1 P24 AG SERPL QL IA: NONREACTIVE
N GONORRHOEA DNA SPEC QL NAA+PROBE: NEGATIVE

## 2022-05-09 LAB
BKR LAB AP GYN ADEQUACY: NORMAL
BKR LAB AP GYN INTERPRETATION: NORMAL
BKR LAB AP HPV REFLEX: NORMAL
BKR LAB AP PREVIOUS ABNORMAL: NORMAL
PATH REPORT.COMMENTS IMP SPEC: NORMAL
PATH REPORT.COMMENTS IMP SPEC: NORMAL
PATH REPORT.RELEVANT HX SPEC: NORMAL

## 2022-05-11 LAB
HUMAN PAPILLOMA VIRUS 16 DNA: NEGATIVE
HUMAN PAPILLOMA VIRUS 18 DNA: NEGATIVE
HUMAN PAPILLOMA VIRUS FINAL DIAGNOSIS: NORMAL
HUMAN PAPILLOMA VIRUS OTHER HR: NEGATIVE

## 2022-06-01 ENCOUNTER — PRENATAL OFFICE VISIT (OUTPATIENT)
Dept: OBGYN | Facility: CLINIC | Age: 37
End: 2022-06-01
Payer: COMMERCIAL

## 2022-06-01 VITALS
WEIGHT: 143.4 LBS | TEMPERATURE: 97.4 F | RESPIRATION RATE: 16 BRPM | HEIGHT: 65 IN | DIASTOLIC BLOOD PRESSURE: 73 MMHG | SYSTOLIC BLOOD PRESSURE: 112 MMHG | HEART RATE: 103 BPM | BODY MASS INDEX: 23.89 KG/M2

## 2022-06-01 DIAGNOSIS — Z34.82 ENCOUNTER FOR SUPERVISION OF OTHER NORMAL PREGNANCY IN SECOND TRIMESTER: Primary | ICD-10-CM

## 2022-06-01 PROBLEM — N84.0 ENDOMETRIAL POLYP: Status: RESOLVED | Noted: 2020-10-29 | Resolved: 2022-06-01

## 2022-06-01 PROBLEM — N92.0 MENORRHAGIA WITH REGULAR CYCLE: Status: RESOLVED | Noted: 2020-10-29 | Resolved: 2022-06-01

## 2022-06-01 PROBLEM — R79.89 LOW SERUM PROGESTERONE: Status: RESOLVED | Noted: 2021-10-19 | Resolved: 2022-06-01

## 2022-06-01 PROBLEM — O09.299: Status: RESOLVED | Noted: 2021-02-19 | Resolved: 2022-06-01

## 2022-06-01 PROCEDURE — 99207 PR PRENATAL VISIT: CPT | Performed by: OBSTETRICS & GYNECOLOGY

## 2022-06-01 NOTE — PROGRESS NOTES
Concerns: poor appetite  Discussed dietary options  Doing well.  No concerns today.  Discussed anenatal screening.  She is uncertain about testing at this time.  Reportable signs and symptoms discussed.  Will schedule anatomy ultrasound.  RTC 4 weeks.      Quinn Cash MD

## 2022-06-28 ENCOUNTER — PRENATAL OFFICE VISIT (OUTPATIENT)
Dept: OBGYN | Facility: CLINIC | Age: 37
End: 2022-06-28
Payer: COMMERCIAL

## 2022-06-28 VITALS
HEIGHT: 65 IN | WEIGHT: 148.9 LBS | HEART RATE: 96 BPM | TEMPERATURE: 97.5 F | RESPIRATION RATE: 14 BRPM | SYSTOLIC BLOOD PRESSURE: 112 MMHG | DIASTOLIC BLOOD PRESSURE: 73 MMHG | BODY MASS INDEX: 24.81 KG/M2

## 2022-06-28 DIAGNOSIS — Z87.59 HISTORY OF MISCARRIAGE: ICD-10-CM

## 2022-06-28 DIAGNOSIS — O09.522 MULTIGRAVIDA OF ADVANCED MATERNAL AGE IN SECOND TRIMESTER: Primary | ICD-10-CM

## 2022-06-28 PROCEDURE — 99207 PR PRENATAL VISIT: CPT | Performed by: ADVANCED PRACTICE MIDWIFE

## 2022-06-28 NOTE — NURSING NOTE
"Initial /73 (BP Location: Right arm, Patient Position: Sitting, Cuff Size: Adult Regular)   Pulse 96   Temp 97.5  F (36.4  C) (Tympanic)   Resp 14   Ht 1.651 m (5' 5\")   Wt 67.5 kg (148 lb 14.4 oz)   LMP 03/03/2022   BMI 24.78 kg/m   Estimated body mass index is 24.78 kg/m  as calculated from the following:    Height as of this encounter: 1.651 m (5' 5\").    Weight as of this encounter: 67.5 kg (148 lb 14.4 oz). .      "

## 2022-06-28 NOTE — PROGRESS NOTES
"Feeling sometimes weak and faint.  Working to eat small frequent meals and drink enough water. Denies any leaking of fluid, vaginal bleeding, regular uterine contractions, or headaches or other concerns.  She thinks that she has depression and anxiety because of \"so much loss.\"  She says that she has a mental health referral.  She declines medication.  She will let us know if it gets worse or she need more resources.  I told her not to hesitate to call or come in more frequently is she is worried about the pregnancy.  She verbalized understanding and agreement.    [facility] US ordered due to AMA.    We discussed genetic counseling.  She declines at this time.    Reviewed to call for contractions, loss of fluid, vaginal bleeding, decreased fetal movement or any other questions or concerns.    RTC in 4 weeks.  Hodan Toledo, MADHU, APRN, CNM               "

## 2022-06-29 ENCOUNTER — TRANSCRIBE ORDERS (OUTPATIENT)
Dept: MATERNAL FETAL MEDICINE | Facility: HOSPITAL | Age: 37
End: 2022-06-29

## 2022-06-29 DIAGNOSIS — O26.90 PREGNANCY RELATED CONDITION, ANTEPARTUM: Primary | ICD-10-CM

## 2022-07-13 ENCOUNTER — PRE VISIT (OUTPATIENT)
Dept: MATERNAL FETAL MEDICINE | Facility: HOSPITAL | Age: 37
End: 2022-07-13

## 2022-07-15 ENCOUNTER — ANCILLARY PROCEDURE (OUTPATIENT)
Dept: ULTRASOUND IMAGING | Facility: HOSPITAL | Age: 37
End: 2022-07-15
Attending: ADVANCED PRACTICE MIDWIFE
Payer: COMMERCIAL

## 2022-07-15 ENCOUNTER — OFFICE VISIT (OUTPATIENT)
Dept: MATERNAL FETAL MEDICINE | Facility: HOSPITAL | Age: 37
End: 2022-07-15
Attending: ADVANCED PRACTICE MIDWIFE
Payer: COMMERCIAL

## 2022-07-15 DIAGNOSIS — O26.90 PREGNANCY RELATED CONDITION, ANTEPARTUM: ICD-10-CM

## 2022-07-15 DIAGNOSIS — O09.522 MULTIGRAVIDA OF ADVANCED MATERNAL AGE IN SECOND TRIMESTER: Primary | ICD-10-CM

## 2022-07-15 PROCEDURE — 76811 OB US DETAILED SNGL FETUS: CPT

## 2022-07-15 PROCEDURE — 76811 OB US DETAILED SNGL FETUS: CPT | Mod: 26 | Performed by: OBSTETRICS & GYNECOLOGY

## 2022-07-15 PROCEDURE — 99207 PR NO CHARGE LOS: CPT | Performed by: OBSTETRICS & GYNECOLOGY

## 2022-07-15 NOTE — PROGRESS NOTES
"Please see \"Imaging\" tab under \"Chart Review\" for details of today's US.    Alyson Aquino, DO    "

## 2022-07-28 ENCOUNTER — PRENATAL OFFICE VISIT (OUTPATIENT)
Dept: OBGYN | Facility: CLINIC | Age: 37
End: 2022-07-28
Payer: COMMERCIAL

## 2022-07-28 VITALS
DIASTOLIC BLOOD PRESSURE: 73 MMHG | HEIGHT: 65 IN | BODY MASS INDEX: 25.58 KG/M2 | TEMPERATURE: 98.5 F | SYSTOLIC BLOOD PRESSURE: 114 MMHG | HEART RATE: 102 BPM | RESPIRATION RATE: 16 BRPM | WEIGHT: 153.5 LBS

## 2022-07-28 DIAGNOSIS — R42 DIZZINESS: ICD-10-CM

## 2022-07-28 DIAGNOSIS — Z34.82 ENCOUNTER FOR SUPERVISION OF OTHER NORMAL PREGNANCY IN SECOND TRIMESTER: Primary | ICD-10-CM

## 2022-07-28 PROCEDURE — 99207 PR PRENATAL VISIT: CPT | Performed by: OBSTETRICS & GYNECOLOGY

## 2022-07-28 NOTE — PROGRESS NOTES
"  Concerns today: Dizzy episodes  She failed her GCT and did not feel well taking the glucola  No nausea/vomiting. No heartburn.  No vaginal bleeding, no contractions/severe cramping, no leakage of fluid.  No vaginal discharge. No dysuria  No headache, vision changes, lower extremity swelling, upper abdominal pain, chest pain, shortness of breath.   /73 (BP Location: Right arm, Patient Position: Sitting, Cuff Size: Adult Regular)   Pulse 102   Temp 98.5  F (36.9  C)   Resp 16   Ht 1.651 m (5' 5\")   Wt 69.6 kg (153 lb 8 oz)   LMP 03/03/2022   BMI 25.54 kg/m    TILTS negative  Lying blood pressure 109/64 pulse 80  Sitting blood pressure 101/67 pulse of 84  Standing blood pressure 108/72 pulse of 93  Reportable signs and symptoms discussed.    (Z34.82) Encounter for supervision of other normal pregnancy in second trimester  (primary encounter diagnosis)  Comment: Elevated 1 hour GCT with poor tolerance of the Glucola  Plan: CBC with platelets, Treponema Abs w Reflex to         RPR and Titer, AMB Adult Diabetes Educator         Referral            (R42) Dizziness  Comment: Normal tilts  Plan: Increase p.o. fluids  Monitor symptoms          Quinn Cash MD    " Patient discharged home with spouse. Transported via private vehicle.

## 2022-08-01 PROBLEM — R42 DIZZINESS: Status: ACTIVE | Noted: 2022-08-01

## 2022-08-02 ENCOUNTER — VIRTUAL VISIT (OUTPATIENT)
Dept: EDUCATION SERVICES | Facility: CLINIC | Age: 37
End: 2022-08-02
Attending: OBSTETRICS & GYNECOLOGY
Payer: COMMERCIAL

## 2022-08-02 DIAGNOSIS — Z34.82 ENCOUNTER FOR SUPERVISION OF OTHER NORMAL PREGNANCY IN SECOND TRIMESTER: ICD-10-CM

## 2022-08-02 PROCEDURE — 99207 PR NO BILLABLE SERVICE THIS VISIT: CPT | Performed by: DIETITIAN, REGISTERED

## 2022-08-02 RX ORDER — LANCETS
EACH MISCELLANEOUS
Qty: 100 EACH | Refills: 3 | Status: SHIPPED | OUTPATIENT
Start: 2022-08-02 | End: 2023-05-05

## 2022-08-02 NOTE — PROGRESS NOTES
Diabetes Self-Management Education & Support      SUBJECTIVE/OBJECTIVE:  Presents for education related to gestational diabetes.    Serina states that 5 years ago she did the oral glucose test and failed her one hour and had to do the 3 hour, passed that one. However She recalls being really sick after the OGTT and prefers not to do the test again and would prefer to test BG for 2 weeks instead.     Accompanied by: Self  Gestational weeks: 22  Previously had Gestational Diabetes: No    Cultural Influences/Ethnic Background:  Choose not to answer    Estimated Date of Delivery: Dec 6, 2022    1 hour OGTT  Lab Results   Component Value Date    GLU1 137 (H) 06/05/2017         3 hour OGTT    Fasting  No results found for: GTTGF    1 hour  No results found for: GTTG1    2 hour  No results found for: GTTG2    3 hour  No results found for: GTTG3    Lifestyle and Health Behaviors:       Healthy Coping:       Current Management:  Taking medications for gestational diabetes?: No    ASSESSMENT:  We discussed the plan to test BG for 2 weeks and evaluate to determine plan. Discussed testing 4 times/day, before meal and 1 hour after meals. Discussed BG goals, tracking BG in log. Discussed BG testing technique. She is currently 22 weeks, recommended waiting until 24 weeks to start testing BG. Scheduled follow-up for 2 weeks after she will start testing.     INTERVENTION:  Patient was instructed on blood sugar meter     Educational topics covered today:  GDM diagnosis,  Using a Blood Glucose Monitor, Blood Glucose Goals, Logging and Interpreting Glucose Results    Educational materials provided today:   [place] Understanding Gestational Diabetes  GDM Log Book  Sharps Disposal  Care After Delivery    Pt verbalized understanding of concepts discussed and recommendations provided today.     PLAN:  Check glucose 4 times daily, before breakfast and 1 hour after each meal.     Call/e-mail/[employer] message diabetes educator if 3 or more  blood sugars are above the goal in 1 week, or with questions/concerns.    BISHNU Patel [facility]  Time Spent: 19 minutes  Encounter Type: Individual    Any diabetes medication dose changes were made via the CDE Protocol and Collaborative Practice Agreement with the patient's referring provider. A copy of this encounter was shared with the provider.

## 2022-08-02 NOTE — Clinical Note
Just BRAULIO- Serina will start testing BG at 24 weeks and will test for 2 weeks.  Jennifer Davis RD LD [facility]

## 2022-08-02 NOTE — LETTER
8/2/2022         RE: Serina Washington  [address]  [facility] [address]        Dear Colleague,    Thank you for referring your patient, Serina Washington, to the [facility]. Please see a copy of my visit note below.    Diabetes Self-Management Education & Support      SUBJECTIVE/OBJECTIVE:  Presents for education related to gestational diabetes.    Serina states that 5 years ago she did the oral glucose test and failed her one hour and had to do the 3 hour, passed that one. However She recalls being really sick after the OGTT and prefers not to do the test again and would prefer to test BG for 2 weeks instead.     Accompanied by: Self  Gestational weeks: 22  Previously had Gestational Diabetes: No    Cultural Influences/Ethnic Background:  Choose not to answer    Estimated Date of Delivery: Dec 6, 2022    1 hour OGTT  Lab Results   Component Value Date    GLU1 137 (H) 06/05/2017         3 hour OGTT    Fasting  No results found for: GTTGF    1 hour  No results found for: GTTG1    2 hour  No results found for: GTTG2    3 hour  No results found for: GTTG3    Lifestyle and Health Behaviors:       Healthy Coping:       Current Management:  Taking medications for gestational diabetes?: No    ASSESSMENT:  We discussed the plan to test BG for 2 weeks and evaluate to determine plan. Discussed testing 4 times/day, before meal and 1 hour after meals. Discussed BG goals, tracking BG in log. Discussed BG testing technique. She is currently 22 weeks, recommended waiting until 24 weeks to start testing BG. Scheduled follow-up for 2 weeks after she will start testing.     INTERVENTION:  Patient was instructed on blood sugar meter     Educational topics covered today:  GDM diagnosis,  Using a Blood Glucose Monitor, Blood Glucose Goals, Logging and Interpreting Glucose Results    Educational materials provided today:   [place] Understanding Gestational Diabetes  GDM Log Book  Sharps  Disposal  Care After Delivery    Pt verbalized understanding of concepts discussed and recommendations provided today.     PLAN:  Check glucose 4 times daily, before breakfast and 1 hour after each meal.     Call/e-mail/MyChart message diabetes educator if 3 or more blood sugars are above the goal in 1 week, or with questions/concerns.    BISHNU Patel [facility]  Time Spent: 19 minutes  Encounter Type: Individual    Any diabetes medication dose changes were made via the CDE Protocol and Collaborative Practice Agreement with the patient's referring provider. A copy of this encounter was shared with the provider.

## 2022-08-16 ENCOUNTER — TELEPHONE (OUTPATIENT)
Dept: OBGYN | Facility: CLINIC | Age: 37
End: 2022-08-16

## 2022-08-16 NOTE — TELEPHONE ENCOUNTER
Reason for Call:  Other call back    Detailed comments: Pt is experiencing abdominal pain/ not sure if gas or cramping. No bleeding pt is  24 weeks preg. Pain is consistent and it is on the lower sides and below belly button. Pt states this has not happened before and pain started an hour ago.    Phone Number Patient can be reached at: Cell number on file:    Telephone Information:   Mobile [phone]       Best Time:       Can we leave a detailed message on this number? YES    Call taken on 8/16/2022 at 4:16 PM by Stephanie Ortega MA

## 2022-08-16 NOTE — TELEPHONE ENCOUNTER
Return call to patient.  Spoke with patient on the phone.    S-(situation): Cramping started about one hour go, pain would move all around abdomen, sometimes pain was higher up, sometimes lower or out to the sides. Patient then had 2 bowel movement's and laid down on left side. 30 minutes later now patient is feeling better.     Patient reports bowel movement's are pretty regular - had one this morning.    Patient denies nausea or vomiting.   Patient reports normal routine lunch.    Patient denied any bright red bleeding   Patient reports + fetal movement  Patient denies leaking of fluid, no abnormal vaginal discharge.    B-(background):  at 24 weeks today  Next OB appointment 22    A-(assessment): abdominal pain resolving    R-(recommendations): Reassurance provided.Continue to monitor. If symptoms return and patient is doubled over in pain, to ER for further evaluation. Further evaluation with bright red bleeding, any overwhelming abdominal pain/uterine cramping or leaking of fluid. Rest and take it easy tonight. May want to try smaller easily digestible foods. If feeling gassy, can try GasX.    Pt in agreement and reports understanding.    Ciarra Spence   [facility]  RN

## 2022-08-17 ENCOUNTER — HOSPITAL ENCOUNTER (OUTPATIENT)
Dept: ULTRASOUND IMAGING | Facility: CLINIC | Age: 37
Discharge: HOME | End: 2022-08-17
Attending: OBSTETRICS & GYNECOLOGY
Payer: COMMERCIAL

## 2022-08-17 ENCOUNTER — PRENATAL OFFICE VISIT (OUTPATIENT)
Dept: OBGYN | Facility: CLINIC | Age: 37
End: 2022-08-17
Payer: COMMERCIAL

## 2022-08-17 ENCOUNTER — HOSPITAL ENCOUNTER (OUTPATIENT)
Dept: MRI IMAGING | Facility: CLINIC | Age: 37
Discharge: HOME | End: 2022-08-17
Attending: OBSTETRICS & GYNECOLOGY
Payer: COMMERCIAL

## 2022-08-17 VITALS
RESPIRATION RATE: 16 BRPM | HEIGHT: 65 IN | TEMPERATURE: 97.8 F | DIASTOLIC BLOOD PRESSURE: 81 MMHG | WEIGHT: 159.1 LBS | SYSTOLIC BLOOD PRESSURE: 108 MMHG | BODY MASS INDEX: 26.51 KG/M2 | HEART RATE: 99 BPM

## 2022-08-17 DIAGNOSIS — Z34.82 ENCOUNTER FOR SUPERVISION OF OTHER NORMAL PREGNANCY IN SECOND TRIMESTER: Primary | ICD-10-CM

## 2022-08-17 DIAGNOSIS — R10.84 ABDOMINAL PAIN, GENERALIZED: ICD-10-CM

## 2022-08-17 DIAGNOSIS — R10.31 RLQ ABDOMINAL PAIN: Primary | ICD-10-CM

## 2022-08-17 DIAGNOSIS — R10.31 RLQ ABDOMINAL PAIN: ICD-10-CM

## 2022-08-17 LAB
ERYTHROCYTE [DISTWIDTH] IN BLOOD BY AUTOMATED COUNT: 12.4 % (ref 10–15)
HCT VFR BLD AUTO: 36.7 % (ref 35–47)
HGB BLD-MCNC: 12.3 G/DL (ref 11.7–15.7)
MCH RBC QN AUTO: 31.3 PG (ref 26.5–33)
MCHC RBC AUTO-ENTMCNC: 33.5 G/DL (ref 31.5–36.5)
MCV RBC AUTO: 93 FL (ref 78–100)
PLATELET # BLD AUTO: 290 10E3/UL (ref 150–450)
RBC # BLD AUTO: 3.93 10E6/UL (ref 3.8–5.2)
WBC # BLD AUTO: 9.3 10E3/UL (ref 4–11)

## 2022-08-17 PROCEDURE — 85027 COMPLETE CBC AUTOMATED: CPT | Performed by: OBSTETRICS & GYNECOLOGY

## 2022-08-17 PROCEDURE — 76705 ECHO EXAM OF ABDOMEN: CPT

## 2022-08-17 PROCEDURE — 86780 TREPONEMA PALLIDUM: CPT | Performed by: OBSTETRICS & GYNECOLOGY

## 2022-08-17 PROCEDURE — 99207 PR PRENATAL VISIT: CPT | Performed by: OBSTETRICS & GYNECOLOGY

## 2022-08-17 PROCEDURE — 36415 COLL VENOUS BLD VENIPUNCTURE: CPT | Performed by: OBSTETRICS & GYNECOLOGY

## 2022-08-17 PROCEDURE — 74181 MRI ABDOMEN W/O CONTRAST: CPT

## 2022-08-17 NOTE — PROGRESS NOTES
Concerns: 24 hours of abdominal pain that has moved from the epigastric area to the right lower quadrant. She has had anorexia, sweats overnight and has pain with heel strike. She has had no abdominal surgery. The pain is worse with fetal movement.  She has had no urinary symptoms, had 2 bowel movements last night , no diarrhea. No nausea or vomiting.. she has had no trauma.    No vaginal bleeding, no contractions, no leakage of fluid  No nausea/vomiting. No heartburn  No vaginal discharge. No dysuria.   No headache, vision changes, lower extremity swelling, chest pain, or shortness of breath  Tdap planned next visit  Discussed PTL, PROM, and when to call or come in.  Normal anatomy ultrasound.  RTC 4 weeks.  She is collecting BS data on her normal diet   labs today   (Z34.82) Encounter for supervision of other normal pregnancy in second trimester  (primary encounter diagnosis)      (R10.84) Abdominal pain, generalized  Comment: possible appendicitis  Plan: US Abdomen Complete        If inconclusive, then MRI  Dr Gonzalez and Patel have been appraised of the situation  I contacted Dr Monk of [facility] who recommended PO indocin for 48 hours if she needs to go to surgery tonight.          Quinn Cash MD

## 2022-08-18 ENCOUNTER — TELEPHONE (OUTPATIENT)
Dept: EDUCATION SERVICES | Facility: CLINIC | Age: 37
End: 2022-08-18
Payer: COMMERCIAL

## 2022-08-18 DIAGNOSIS — O24.410 DIET CONTROLLED GESTATIONAL DIABETES MELLITUS (GDM), ANTEPARTUM: Primary | ICD-10-CM

## 2022-08-18 LAB — T PALLIDUM AB SER QL: NONREACTIVE

## 2022-08-18 PROCEDURE — 98968 PH1 ASSMT&MGMT NQHP 21-30: CPT | Mod: 95 | Performed by: DIETITIAN, REGISTERED

## 2022-08-18 NOTE — TELEPHONE ENCOUNTER
Gestational Diabetes Follow-up    Subjective/Objective:    Serina Washington sent in blood glucose log for review. Last date of communication was: 8/2/22.    Gestational diabetes is being managed with diet and activity    Taking diabetes medications: no    Estimated Date of Delivery: Dec 6, 2022, 24w2d    Blood Glucose/Ketone Log:    Date Ketones Fasting Post Breakfast Post Lunch Post Supper   8/17  87  Usually banana and plain yogurt, then waits an hour with water or 2 sips of OJ  180  1/2 cup oatmeal with water, 1-2 tbsp peanut butter, 1-2 tbsp maple syrup, 1 peach 173  ww sandwich, turkey, cheese, mustard, 5 potato chips 205  Ate late at 8pm  1c-White rice, shredded beef, sauce, cheese, 1/2 tomato  Went to bed   8/18  81  Usually banana and plain yogurt, 133  1/2 cup oatmeal with water, 1-2 tbsp peanut butter, 1 peach 149  Flour tortilla, beef, cheese, 1/2 tomato, 6-8 tortilla chips with salsa          Assessment:  8/17- didn't eat from 3pm-830pm due to MRI     Post meal readings are elevated.  She is actually having 2 breakfast of 30-60g total carbohydrate, plus fruit and added sugar at breakfast.  Yesterday was stressful and she had a long gap between meals which likely caused highs.     Plan/Response:  Follow-up in 3-4 days. If elevated OR mid week if she is making progress but still getting highs.    Record foods and meals.  1st breakfast- yogurt and try pc whole wheat toast vs. Banana  2nd breakfast- limit maple syrup      Lunch- try corn vs. Flour tortillas, add vegetable sides, possibly beans  Dinner- try rice again, pair it with beans OR consider quinoa, brown rice or farro as alternative.      Alicia Marte MS, RD, LD, CDE  Total time: 24 minutes    Any diabetes medication dose changes were made via the CDE Protocol and Collaborative Practice Agreement with the patient's OB/GYN provider. A copy of this encounter was shared with the provider.

## 2022-08-30 ENCOUNTER — VIRTUAL VISIT (OUTPATIENT)
Dept: EDUCATION SERVICES | Facility: CLINIC | Age: 37
End: 2022-08-30
Payer: COMMERCIAL

## 2022-08-30 DIAGNOSIS — Z34.82 ENCOUNTER FOR SUPERVISION OF OTHER NORMAL PREGNANCY IN SECOND TRIMESTER: ICD-10-CM

## 2022-08-30 DIAGNOSIS — O24.410 DIET CONTROLLED GESTATIONAL DIABETES MELLITUS (GDM), ANTEPARTUM: Primary | ICD-10-CM

## 2022-08-30 PROCEDURE — G0108 DIAB MANAGE TRN  PER INDIV: HCPCS | Mod: 95 | Performed by: DIETITIAN, REGISTERED

## 2022-08-30 NOTE — LETTER
2022         RE: Serina Washington  [address]  [place] [facility] [address]        Dear Colleague,    Thank you for referring your patient, Serina Washington, to the [facility]. Please see a copy of my visit note below.    Diabetes Self-Management Education & Support    SUBJECTIVE/OBJECTIVE:  Presents for education related to gestational diabetes.    Type of Service: Telephone Visit    Originating Location (Patient Location): Home  Distant Location (Provider Location): Home  Mode of Communication:  Telephone    Telephone Visit Start Time: 12:00  Telephone Visit End Time (telephone visit stop time): 12:25    How would patient like to obtain AVS? [place]        Serina has not been officially diagnosed with GDM, she was testing her BG 4 times/day instead of 3 hour OGTT. Would recommend to continue to test and consider GDM diagnosis. Serina has had several elevated readings after meals. Will route to Dr. Cash for review.     Accompanied by: Self  Gestational weeks: 26 weeks  Previously had Gestational Diabetes: No    Cultural Influences/Ethnic Background:  Choose not to answer      LMP 2022       Estimated Date of Delivery: Dec 6, 2022    Blood Glucose/Ketone Log:         Lifestyle and Health Behaviors:  Exercise:: Yes  Meals include: Breakfast, Lunch, Dinner  Beverages: Water  Pre- vitamin?: Yes  Experiencing nausea?: No  Experiencing heartburn?: No    Breakfast: 1/2 cup oatmeal, peanut butter, plain yogurt, fruit (strawberries, blueberries, peach, banana)     Healthy Coping:  Stage of change: ACTION (Actively working towards change)    Current Management:  Taking medications for gestational diabetes?: No  Difficulty affording diabetes medication?: No  Difficulty affording diabetes testing supplies?: No    ASSESSMENT:  Ketones: Not testing.   Fasting blood glucoses: 100% in target.  After breakfast: 66% in target.  After lunch: 66% in target.  After dinner: 100% in  target.        INTERVENTION:  Educational topics covered today:    Serina has had some elevated readings after breakfast and lunch. We discussed washing hands with warm soapy water and using a clean towel to dry to get an accurate reading. We discussed saving fruit for later in the day and sticking to oatmeal and greek yogurt for breakfast. Discussed continuing to play around with foods/amounts.     Serina has not been diagnosed with GDM, she was testing her BG to rule out GDM instead of 3 hour OGTT. will send to Dr. Cash to review. Considering elevated readings after meals, would recommend to continue testing through rest of pregnancy.       PLAN:  Check glucose 4 times daily.  Continue with recommended physical activity.  Continue to follow recommended meal plan: 2-3 carbs at breakfast, 3-4 carbs at lunch, 3-4 carbs at supper, 1-2 carbs at snacks.  Follow consistent CHO meal plan, eat CHO and protein/fat at all meals/snacks.    Call/e-mail/[employer] message diabetes educator if 3 or more blood sugars are above the goal in 1 week or if ketones are positive.    BISHNU Patel [facility]  Time Spent: 20 minutes  Encounter Type: Individual    Any diabetes medication dose changes were made via the CDE Protocol and Collaborative Practice Agreement with the patient's referring provider. A copy of this encounter was shared with the provider.

## 2022-08-30 NOTE — PROGRESS NOTES
Diabetes Self-Management Education & Support    SUBJECTIVE/OBJECTIVE:  Presents for education related to gestational diabetes.    Type of Service: Telephone Visit    Originating Location (Patient Location): Home  Distant Location (Provider Location): Home  Mode of Communication:  Telephone    Telephone Visit Start Time: 12:00  Telephone Visit End Time (telephone visit stop time): 12:25    How would patient like to obtain AVS? Pelon Smalls has not been officially diagnosed with GDM, she was testing her BG 4 times/day instead of 3 hour OGTT. Would recommend to continue to test and consider GDM diagnosis. Serina has had several elevated readings after meals. Will route to Dr. Cash for review.     Accompanied by: Self  Gestational weeks: 26 weeks  Previously had Gestational Diabetes: No    Cultural Influences/Ethnic Background:  Choose not to answer      LMP 2022       Estimated Date of Delivery: Dec 6, 2022    Blood Glucose/Ketone Log:         Lifestyle and Health Behaviors:  Exercise:: Yes  Meals include: Breakfast, Lunch, Dinner  Beverages: Water  Pre-mitzi vitamin?: Yes  Experiencing nausea?: No  Experiencing heartburn?: No    Breakfast: 1/2 cup oatmeal, peanut butter, plain yogurt, fruit (strawberries, blueberries, peach, banana)     Healthy Coping:  Stage of change: ACTION (Actively working towards change)    Current Management:  Taking medications for gestational diabetes?: No  Difficulty affording diabetes medication?: No  Difficulty affording diabetes testing supplies?: No    ASSESSMENT:  Ketones: Not testing.   Fasting blood glucoses: 100% in target.  After breakfast: 66% in target.  After lunch: 66% in target.  After dinner: 100% in target.        INTERVENTION:  Educational topics covered today:    Serina has had some elevated readings after breakfast and lunch. We discussed washing hands with warm soapy water and using a clean towel to dry to get an accurate reading. We discussed saving  fruit for later in the day and sticking to oatmeal and greek yogurt for breakfast. Discussed continuing to play around with foods/amounts.     Serina has not been diagnosed with GDM, she was testing her BG to rule out GDM instead of 3 hour OGTT. will send to Dr. Cash to review. Considering elevated readings after meals, would recommend to continue testing through rest of pregnancy.       PLAN:  Check glucose 4 times daily.  Continue with recommended physical activity.  Continue to follow recommended meal plan: 2-3 carbs at breakfast, 3-4 carbs at lunch, 3-4 carbs at supper, 1-2 carbs at snacks.  Follow consistent CHO meal plan, eat CHO and protein/fat at all meals/snacks.    Call/e-mail/MyChart message diabetes educator if 3 or more blood sugars are above the goal in 1 week or if ketones are positive.    BISHNU Patel [facility]  Time Spent: 20 minutes  Encounter Type: Individual    Any diabetes medication dose changes were made via the CDE Protocol and Collaborative Practice Agreement with the patient's referring provider. A copy of this encounter was shared with the provider.

## 2022-09-11 ENCOUNTER — HEALTH MAINTENANCE LETTER (OUTPATIENT)
Age: 37
End: 2022-09-11

## 2022-09-22 ENCOUNTER — PRENATAL OFFICE VISIT (OUTPATIENT)
Dept: OBGYN | Facility: CLINIC | Age: 37
End: 2022-09-22
Payer: COMMERCIAL

## 2022-09-22 VITALS
RESPIRATION RATE: 16 BRPM | DIASTOLIC BLOOD PRESSURE: 67 MMHG | HEART RATE: 103 BPM | TEMPERATURE: 97.7 F | SYSTOLIC BLOOD PRESSURE: 102 MMHG | HEIGHT: 65 IN | BODY MASS INDEX: 26.87 KG/M2 | WEIGHT: 161.3 LBS

## 2022-09-22 DIAGNOSIS — O26.13 LOW WEIGHT GAIN DURING PREGNANCY IN THIRD TRIMESTER: ICD-10-CM

## 2022-09-22 DIAGNOSIS — Z34.83 ENCOUNTER FOR SUPERVISION OF OTHER NORMAL PREGNANCY IN THIRD TRIMESTER: Primary | ICD-10-CM

## 2022-09-22 PROCEDURE — 99207 PR PRENATAL VISIT: CPT | Performed by: OBSTETRICS & GYNECOLOGY

## 2022-09-22 NOTE — PROGRESS NOTES
Concerns: feeling better  Relative to the RUQ  Doing well.  No concerns today.  No vaginal bleeding, LOF.  No contractions.  Reportable signs and symptoms discussed.  Discussed kick counts and fetal movement.  Discussed PTL, PROM, and when to call or come in.  RTC 2 weeks.    Quinn Cash MD

## 2022-10-06 ENCOUNTER — TELEPHONE (OUTPATIENT)
Dept: OBGYN | Facility: CLINIC | Age: 37
End: 2022-10-06

## 2022-10-06 ENCOUNTER — PRENATAL OFFICE VISIT (OUTPATIENT)
Dept: OBGYN | Facility: CLINIC | Age: 37
End: 2022-10-06
Payer: COMMERCIAL

## 2022-10-06 VITALS
HEART RATE: 99 BPM | RESPIRATION RATE: 16 BRPM | WEIGHT: 163.6 LBS | TEMPERATURE: 98.5 F | SYSTOLIC BLOOD PRESSURE: 107 MMHG | HEIGHT: 64 IN | DIASTOLIC BLOOD PRESSURE: 67 MMHG | BODY MASS INDEX: 27.93 KG/M2

## 2022-10-06 DIAGNOSIS — O34.219 PREVIOUS CESAREAN DELIVERY, ANTEPARTUM CONDITION OR COMPLICATION: ICD-10-CM

## 2022-10-06 DIAGNOSIS — Z34.83 ENCOUNTER FOR SUPERVISION OF OTHER NORMAL PREGNANCY IN THIRD TRIMESTER: Primary | ICD-10-CM

## 2022-10-06 PROCEDURE — 99207 PR PRENATAL VISIT: CPT | Performed by: OBSTETRICS & GYNECOLOGY

## 2022-10-06 NOTE — PROGRESS NOTES
Handed patient Breast Pump Insurance Questionnaire at today's visit.  Nidhi Morton CMA on 10/6/2022 at 10:09 AM

## 2022-10-06 NOTE — NURSING NOTE
"Initial /67 (BP Location: Right arm, Patient Position: Chair, Cuff Size: Adult Regular)   Pulse 99   Temp 98.5  F (36.9  C) (Tympanic)   Resp 16   Ht 1.626 m (5' 4\")   Wt 74.2 kg (163 lb 9.6 oz)   LMP 03/03/2022   BMI 28.08 kg/m   Estimated body mass index is 28.08 kg/m  as calculated from the following:    Height as of this encounter: 1.626 m (5' 4\").    Weight as of this encounter: 74.2 kg (163 lb 9.6 oz). .      "

## 2022-10-06 NOTE — TELEPHONE ENCOUNTER
"[id number]  Serina DYANA Eagan    You are now scheduled for surgery at [facility].  Below are the details for your surgery.  Please read the \"Preparing for Your Surgery\" instructions and let us know if you have any questions.    Type of surgery:  SECTION (Bilateral)   REVISION, SCAR, TORSO (Bilateral)     Surgeon:  Quinn Cash MD  Location of surgery: [facility] OR    Date of surgery:  22    Time:  7:30 am    Arrival Time:  6:00 am    Time can change, to be confirmed a couple of days prior by pre-op surgery nurse.    Pre-Op Appt Date: Patient to schedule with a PCP or Family Practice Provider within 30 days to the surgery.  Post-Op Appt Date: To be determined by provider     *For overnight Surgery - PCR Covid-19 test from a lab required 2-4 days prior.  See \"testing for Covid-19 handout\"    *For Same Day Surgery Covid-19 test required 1-2 days prior.  See \"testing for Covid-19 handout\"    Packet sent out: Yes  Pre-cert/Authorization completed:  TBD by Financial Securing Office.   MA Sterilization/Hysterectomy Acknowledgment Consent signed: Not Applicable    [facility]  [phone]    Fax: [phone]  Same Day Surgery [phone]  Fax: [phone]  [facility] [phone]    "

## 2022-10-06 NOTE — PROGRESS NOTES
Concerns: desires repeat CS  Doing well.  No concerns today.  No vaginal bleeding, LOF.  No contractions.  Reportable signs and symptoms discussed.  Discussed kick counts and fetal movement.  Discussed PTL, PROM, and when to call or come in.  Repeat CS scheduled @ 39 weeks on 11/30/2022  RTC 2 weeks.    Quinn Cash MD

## 2022-10-20 ENCOUNTER — PRENATAL OFFICE VISIT (OUTPATIENT)
Dept: OBGYN | Facility: CLINIC | Age: 37
End: 2022-10-20
Payer: COMMERCIAL

## 2022-10-20 VITALS
SYSTOLIC BLOOD PRESSURE: 111 MMHG | DIASTOLIC BLOOD PRESSURE: 63 MMHG | RESPIRATION RATE: 16 BRPM | BODY MASS INDEX: 28.39 KG/M2 | WEIGHT: 166.3 LBS | HEIGHT: 64 IN | HEART RATE: 107 BPM

## 2022-10-20 DIAGNOSIS — Z34.83 ENCOUNTER FOR SUPERVISION OF OTHER NORMAL PREGNANCY IN THIRD TRIMESTER: Primary | ICD-10-CM

## 2022-10-20 PROCEDURE — 99207 PR PRENATAL VISIT: CPT | Performed by: OBSTETRICS & GYNECOLOGY

## 2022-10-20 NOTE — PROGRESS NOTES
Concerns: BS normal in the morning, has not been checking BS on a regular basis  Doing well.  No concerns today.  No vaginal bleeding, LOF.  No contractions.  Reportable signs and symptoms discussed.  Discussed kick counts and fetal movement.  Discussed PTL, PROM, and when to call or come in.  RTC 2 weeks.    Quinn Cash MD

## 2022-11-01 ENCOUNTER — PRENATAL OFFICE VISIT (OUTPATIENT)
Dept: OBGYN | Facility: CLINIC | Age: 37
End: 2022-11-01
Payer: COMMERCIAL

## 2022-11-01 VITALS
HEART RATE: 90 BPM | HEIGHT: 64 IN | TEMPERATURE: 98.1 F | DIASTOLIC BLOOD PRESSURE: 66 MMHG | BODY MASS INDEX: 28.63 KG/M2 | WEIGHT: 167.7 LBS | RESPIRATION RATE: 16 BRPM | SYSTOLIC BLOOD PRESSURE: 116 MMHG

## 2022-11-01 DIAGNOSIS — R10.32 ABDOMINAL PAIN, LEFT LOWER QUADRANT: ICD-10-CM

## 2022-11-01 DIAGNOSIS — Z34.83 PRENATAL CARE, SUBSEQUENT PREGNANCY IN THIRD TRIMESTER: Primary | ICD-10-CM

## 2022-11-01 PROCEDURE — 87653 STREP B DNA AMP PROBE: CPT | Performed by: OBSTETRICS & GYNECOLOGY

## 2022-11-01 PROCEDURE — 99207 PR PRENATAL VISIT: CPT | Performed by: OBSTETRICS & GYNECOLOGY

## 2022-11-01 NOTE — PROGRESS NOTES
Concerns: Left sided pain /Burning that resolved with rest/lying down and has onset around noon and worsens as the day progresses when she is on her feet.  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 The pain worsens when the baby kicks that area  She has an anterior placenta. She has had no bleeding  No vaginal bleeding, LOF.  No contractions.  Discussed kick counts and fetal movement.  Reportable signs and symptoms discussed.  Discussed kick counts and fetal movement.  Discussed PTL, PROM, and when to call or come in.  (Z34.83) Prenatal care, subsequent pregnancy in third trimester  (primary encounter diagnosis)  Comment:   Plan: Group B strep PCR            (R10.32) Abdominal pain, left lower quadrant  Comment: Left , worse as the day progresses and is resolved with rest  No trauma    Plan: Belly band applied  Monitor symptoms  RTC  as needed        RTC 2 weeks.    Quinn Cash MD

## 2022-11-02 LAB — GP B STREP DNA SPEC QL NAA+PROBE: POSITIVE

## 2022-11-03 ENCOUNTER — TELEPHONE (OUTPATIENT)
Dept: OBGYN | Facility: CLINIC | Age: 37
End: 2022-11-03

## 2022-11-03 NOTE — TELEPHONE ENCOUNTER
Paperwork completed and given to Dr. Cash to sign and approve.    Sarah Beth Ortega   Cl[facility] [place]   [facility]  [phone]

## 2022-11-03 NOTE — RESULT ENCOUNTER NOTE
Your Group B Strep was positive.  This should not change the plan for your  section.  We give antibiotics prior to the  for different reasons.  Quinn Cash MD

## 2022-11-03 NOTE — TELEPHONE ENCOUNTER
Paperwork completed and signed by Dr. Cash and  faxed to [phone] mailed to [facility] [address] [address] [place] [address]        Sarah Beth Ortega   Clinic Station    [facility]  [phone]

## 2022-11-16 ENCOUNTER — PRENATAL OFFICE VISIT (OUTPATIENT)
Dept: OBGYN | Facility: CLINIC | Age: 37
End: 2022-11-16
Payer: COMMERCIAL

## 2022-11-16 VITALS
BODY MASS INDEX: 29.16 KG/M2 | WEIGHT: 170.8 LBS | TEMPERATURE: 96.7 F | HEIGHT: 64 IN | RESPIRATION RATE: 16 BRPM | DIASTOLIC BLOOD PRESSURE: 64 MMHG | SYSTOLIC BLOOD PRESSURE: 99 MMHG | HEART RATE: 95 BPM

## 2022-11-16 DIAGNOSIS — O34.219 PREVIOUS CESAREAN DELIVERY, ANTEPARTUM CONDITION OR COMPLICATION: ICD-10-CM

## 2022-11-16 DIAGNOSIS — Z34.83 PRENATAL CARE, SUBSEQUENT PREGNANCY, THIRD TRIMESTER: Primary | ICD-10-CM

## 2022-11-16 PROCEDURE — 99207 PR PRENATAL VISIT: CPT | Performed by: OBSTETRICS & GYNECOLOGY

## 2022-11-16 RX ORDER — BREAST PUMP
1 EACH MISCELLANEOUS ONCE
Qty: 1 EACH | Refills: 0 | Status: SHIPPED | OUTPATIENT
Start: 2022-11-16 | End: 2022-11-16

## 2022-11-16 NOTE — PROGRESS NOTES
Concerns:   Doing well.  No concerns today.  No vaginal bleeding, LOF, contractions.  No HA, RUQ pain, N/V, visual changes.  Reportable signs and symptoms discussed.  Labor precautions discussed.  RTC 1 week.  Repeat CS on 11/30/2022  Quinn Cash MD

## 2022-11-21 ENCOUNTER — PRENATAL OFFICE VISIT (OUTPATIENT)
Dept: OBGYN | Facility: CLINIC | Age: 37
End: 2022-11-21
Payer: COMMERCIAL

## 2022-11-21 VITALS
WEIGHT: 169 LBS | HEIGHT: 64 IN | HEART RATE: 98 BPM | SYSTOLIC BLOOD PRESSURE: 109 MMHG | BODY MASS INDEX: 28.85 KG/M2 | TEMPERATURE: 97.2 F | RESPIRATION RATE: 18 BRPM | DIASTOLIC BLOOD PRESSURE: 71 MMHG

## 2022-11-21 DIAGNOSIS — Z34.83 PRENATAL CARE, SUBSEQUENT PREGNANCY, THIRD TRIMESTER: Primary | ICD-10-CM

## 2022-11-21 DIAGNOSIS — O09.523 MULTIGRAVIDA OF ADVANCED MATERNAL AGE IN THIRD TRIMESTER: ICD-10-CM

## 2022-11-21 DIAGNOSIS — O34.219 HISTORY OF CESAREAN DELIVERY AFFECTING PREGNANCY: ICD-10-CM

## 2022-11-21 DIAGNOSIS — B95.1 POSITIVE GBS TEST: ICD-10-CM

## 2022-11-21 PROCEDURE — 99207 PR PRENATAL VISIT: CPT | Performed by: STUDENT IN AN ORGANIZED HEALTH CARE EDUCATION/TRAINING PROGRAM

## 2022-11-21 NOTE — PROGRESS NOTES
"[facility]  Return OB Note    Subjective:  Denies vaginal bleeding, loss of fluid, or regular contractions. Noted normal fetal movement.  Complaints today: sharp shooting pain in her vagina with some fetal movement.    Objective:  /71 (BP Location: Left arm, Patient Position: Chair, Cuff Size: Adult Regular)   Pulse 98   Temp 97.2  F (36.2  C) (Tympanic)   Resp 18   Ht 1.626 m (5' 4\")   Wt 76.7 kg (169 lb)   LMP 2022   BMI 29.01 kg/m      Fundal height: 38cm  FHT: 130bpm    Assessment/Plan:   Serina Washington is a 37 year old  at 37w6d by 6w2d US, here for return OB visit.    Routine prenatal care:  - New OB labs 2022: O pos, carmen neg; Hgb 13.0, plt 335, RPR/HIV/HepB/HepC NR, Rubella immune, chlamydia/gonorrhea neg; PAP smear NILM w/ neg HPV 2022  - Dating US on 2022 at 6w2d  - Anatomy US on 7/15/2022  - Genetic screen: declined  - Third trimester labs: no GCT results observed, RPR NR; GBS pos  - Immunizations: need to offer influenza, Tdap, and COVID vaccines at next visit.     H/o CS x1: desire repeat CS, which is scheduled for   GBS pos: PCN if TOLAC  AMA: nl L2 ultraosound    RTC 1 weeks    Jessica Lopez MD  Office phone: [phone]  Obstetrics and Gynecology  [facility]   2022           "

## 2022-11-21 NOTE — NURSING NOTE
"Initial /71 (BP Location: Left arm, Patient Position: Chair, Cuff Size: Adult Regular)   Pulse 98   Temp 97.2  F (36.2  C) (Tympanic)   Resp 18   Ht 1.626 m (5' 4\")   Wt 76.7 kg (169 lb)   LMP 03/03/2022   BMI 29.01 kg/m   Estimated body mass index is 29.01 kg/m  as calculated from the following:    Height as of this encounter: 1.626 m (5' 4\").    Weight as of this encounter: 76.7 kg (169 lb). .        " Additional Notes: Discussed taking Niacinamide Detail Level: Simple

## 2022-11-28 ENCOUNTER — LAB (OUTPATIENT)
Dept: LAB | Facility: CLINIC | Age: 37
End: 2022-11-28
Payer: COMMERCIAL

## 2022-11-28 ENCOUNTER — MYC MEDICAL ADVICE (OUTPATIENT)
Dept: OBGYN | Facility: CLINIC | Age: 37
End: 2022-11-28

## 2022-11-28 DIAGNOSIS — R09.81 NASAL CONGESTION: ICD-10-CM

## 2022-11-28 DIAGNOSIS — R09.81 NASAL CONGESTION: Primary | ICD-10-CM

## 2022-11-28 LAB
FLUAV RNA SPEC QL NAA+PROBE: NEGATIVE
FLUBV RNA RESP QL NAA+PROBE: NEGATIVE
RSV RNA SPEC NAA+PROBE: NEGATIVE
SARS-COV-2 RNA RESP QL NAA+PROBE: POSITIVE

## 2022-11-28 PROCEDURE — 87637 SARSCOV2&INF A&B&RSV AMP PRB: CPT

## 2022-11-28 NOTE — TELEPHONE ENCOUNTER
Shouldn't affect delivery plan, will still need to be delivered regardless of COVID or other status. Would recommend getting tested for influenza as well as COVID, if possible. Could start Tamiflu if positive for flu.    Arina Sweeney, DO

## 2022-11-29 ENCOUNTER — VIRTUAL VISIT (OUTPATIENT)
Dept: OBGYN | Facility: CLINIC | Age: 37
End: 2022-11-29
Payer: COMMERCIAL

## 2022-11-29 ENCOUNTER — ANESTHESIA EVENT (OUTPATIENT)
Dept: SURGERY | Facility: CLINIC | Age: 37
End: 2022-11-29
Payer: COMMERCIAL

## 2022-11-29 VITALS — BODY MASS INDEX: 29.01 KG/M2 | HEIGHT: 64 IN

## 2022-11-29 DIAGNOSIS — Z34.83 PRENATAL CARE, SUBSEQUENT PREGNANCY, THIRD TRIMESTER: Primary | ICD-10-CM

## 2022-11-29 DIAGNOSIS — Z98.891 PREVIOUS CESAREAN SECTION: ICD-10-CM

## 2022-11-29 DIAGNOSIS — U07.1 COVID-19 VIRUS INFECTION: ICD-10-CM

## 2022-11-29 PROCEDURE — 99212 OFFICE O/P EST SF 10 MIN: CPT | Mod: 95 | Performed by: OBSTETRICS & GYNECOLOGY

## 2022-11-29 NOTE — PROGRESS NOTES
Serina is a 37 year old who is being evaluated via a billable telephone visit.      What phone number would you like to be contacted at? [phone]    How would you like to obtain your AVS? Pelon        Coral Smalls is a 37 year old  @ 39 weeks EGA , presenting for the following health issues:  Prenatal Care    Covid  HPI  New onset of Covid 19  She has had Covid in the past and this infection is minimal compared to the last.  Low grade fever, mild cough.  She is scheduled for Repeat CS tomorrow am          Review of Systems   Constitutional, HEENT, cardiovascular, pulmonary, gi and gu systems are negative, except as otherwise noted.      Objective           Vitals:  No vitals were obtained today due to virtual visit.    Physical Exam   healthy, alert and no distress  PSYCH: Alert and oriented times 3; coherent speech, normal   rate and volume, able to articulate logical thoughts, able   to abstract reason, no tangential thoughts, no hallucinations   or delusions  Her affect is normal  RESP: No cough, no audible wheezing, able to talk in full sentences  Remainder of exam unable to be completed due to telephone visits      (Z34.83) Prenatal care, subsequent pregnancy, third trimester  (primary encounter diagnosis)  (Z98.891) Previous  section  (U07.1) COVID-19 virus infection  Comment: planned  section in the morning  Plan: proceed with the  section    Quinn Cash MD                Phone call duration: 15 minutes

## 2022-11-29 NOTE — PROGRESS NOTES
"Initial Ht 1.626 m (5' 4\")   LMP 03/03/2022   BMI 29.01 kg/m   Estimated body mass index is 29.01 kg/m  as calculated from the following:    Height as of this encounter: 1.626 m (5' 4\").    Weight as of 11/21/22: 76.7 kg (169 lb). .      "

## 2022-11-30 ENCOUNTER — HOSPITAL ENCOUNTER (INPATIENT)
Facility: CLINIC | Age: 37
LOS: 3 days | Discharge: HOME | End: 2022-12-03
Attending: OBSTETRICS & GYNECOLOGY | Admitting: OBSTETRICS & GYNECOLOGY
Payer: COMMERCIAL

## 2022-11-30 ENCOUNTER — ANESTHESIA (OUTPATIENT)
Dept: SURGERY | Facility: CLINIC | Age: 37
End: 2022-11-30
Payer: COMMERCIAL

## 2022-11-30 DIAGNOSIS — Z98.891 S/P C-SECTION: ICD-10-CM

## 2022-11-30 PROBLEM — U07.1 INFECTION DUE TO 2019 NOVEL CORONAVIRUS: Status: ACTIVE | Noted: 2022-11-30

## 2022-11-30 LAB
ABO/RH(D): NORMAL
ANTIBODY SCREEN: NEGATIVE
BASOPHILS # BLD AUTO: 0 10E3/UL (ref 0–0.2)
BASOPHILS NFR BLD AUTO: 0 %
EOSINOPHIL # BLD AUTO: 0.1 10E3/UL (ref 0–0.7)
EOSINOPHIL NFR BLD AUTO: 1 %
ERYTHROCYTE [DISTWIDTH] IN BLOOD BY AUTOMATED COUNT: 12.8 % (ref 10–15)
HCT VFR BLD AUTO: 37.7 % (ref 35–47)
HGB BLD-MCNC: 12.5 G/DL (ref 11.7–15.7)
IMM GRANULOCYTES # BLD: 0 10E3/UL
IMM GRANULOCYTES NFR BLD: 0 %
LYMPHOCYTES # BLD AUTO: 1.4 10E3/UL (ref 0.8–5.3)
LYMPHOCYTES NFR BLD AUTO: 18 %
MCH RBC QN AUTO: 29.6 PG (ref 26.5–33)
MCHC RBC AUTO-ENTMCNC: 33.2 G/DL (ref 31.5–36.5)
MCV RBC AUTO: 89 FL (ref 78–100)
MONOCYTES # BLD AUTO: 0.8 10E3/UL (ref 0–1.3)
MONOCYTES NFR BLD AUTO: 10 %
NEUTROPHILS # BLD AUTO: 5.3 10E3/UL (ref 1.6–8.3)
NEUTROPHILS NFR BLD AUTO: 71 %
NRBC # BLD AUTO: 0 10E3/UL
NRBC BLD AUTO-RTO: 0 /100
PLATELET # BLD AUTO: 240 10E3/UL (ref 150–450)
RBC # BLD AUTO: 4.23 10E6/UL (ref 3.8–5.2)
SPECIMEN EXPIRATION DATE: NORMAL
T PALLIDUM AB SER QL: NONREACTIVE
WBC # BLD AUTO: 7.5 10E3/UL (ref 4–11)

## 2022-11-30 PROCEDURE — 250N000013 HC RX MED GY IP 250 OP 250 PS 637: Performed by: OBSTETRICS & GYNECOLOGY

## 2022-11-30 PROCEDURE — 86780 TREPONEMA PALLIDUM: CPT | Performed by: OBSTETRICS & GYNECOLOGY

## 2022-11-30 PROCEDURE — 250N000009 HC RX 250: Performed by: OBSTETRICS & GYNECOLOGY

## 2022-11-30 PROCEDURE — 370N000017 HC ANESTHESIA TECHNICAL FEE, PER MIN: Performed by: OBSTETRICS & GYNECOLOGY

## 2022-11-30 PROCEDURE — 120N000001 HC R&B MED SURG/OB

## 2022-11-30 PROCEDURE — 258N000003 HC RX IP 258 OP 636: Performed by: REGISTERED NURSE

## 2022-11-30 PROCEDURE — 59510 CESAREAN DELIVERY: CPT | Performed by: OBSTETRICS & GYNECOLOGY

## 2022-11-30 PROCEDURE — 360N000076 HC SURGERY LEVEL 3, PER MIN: Performed by: OBSTETRICS & GYNECOLOGY

## 2022-11-30 PROCEDURE — 710N000010 HC RECOVERY PHASE 1, LEVEL 2, PER MIN: Performed by: OBSTETRICS & GYNECOLOGY

## 2022-11-30 PROCEDURE — 250N000011 HC RX IP 250 OP 636: Performed by: OBSTETRICS & GYNECOLOGY

## 2022-11-30 PROCEDURE — 272N000001 HC OR GENERAL SUPPLY STERILE: Performed by: OBSTETRICS & GYNECOLOGY

## 2022-11-30 PROCEDURE — 271N000001 HC OR GENERAL SUPPLY NON-STERILE: Performed by: OBSTETRICS & GYNECOLOGY

## 2022-11-30 PROCEDURE — 250N000009 HC RX 250

## 2022-11-30 PROCEDURE — 250N000011 HC RX IP 250 OP 636

## 2022-11-30 PROCEDURE — 250N000011 HC RX IP 250 OP 636: Performed by: REGISTERED NURSE

## 2022-11-30 PROCEDURE — 86901 BLOOD TYPING SEROLOGIC RH(D): CPT | Performed by: OBSTETRICS & GYNECOLOGY

## 2022-11-30 PROCEDURE — 86850 RBC ANTIBODY SCREEN: CPT | Performed by: OBSTETRICS & GYNECOLOGY

## 2022-11-30 PROCEDURE — 59514 CESAREAN DELIVERY ONLY: CPT | Mod: AS | Performed by: PHYSICIAN ASSISTANT

## 2022-11-30 PROCEDURE — 258N000003 HC RX IP 258 OP 636: Performed by: OBSTETRICS & GYNECOLOGY

## 2022-11-30 PROCEDURE — 85004 AUTOMATED DIFF WBC COUNT: CPT | Performed by: OBSTETRICS & GYNECOLOGY

## 2022-11-30 RX ORDER — MISOPROSTOL 200 UG/1
400 TABLET ORAL
Status: DISCONTINUED | OUTPATIENT
Start: 2022-11-30 | End: 2022-12-03 | Stop reason: HOSPADM

## 2022-11-30 RX ORDER — PROCHLORPERAZINE 25 MG
25 SUPPOSITORY, RECTAL RECTAL EVERY 12 HOURS PRN
Status: DISCONTINUED | OUTPATIENT
Start: 2022-11-30 | End: 2022-12-03 | Stop reason: HOSPADM

## 2022-11-30 RX ORDER — TRANEXAMIC ACID 10 MG/ML
1 INJECTION, SOLUTION INTRAVENOUS EVERY 30 MIN PRN
Status: DISCONTINUED | OUTPATIENT
Start: 2022-11-30 | End: 2022-11-30 | Stop reason: HOSPADM

## 2022-11-30 RX ORDER — SIMETHICONE 80 MG
80 TABLET,CHEWABLE ORAL 4 TIMES DAILY PRN
Status: DISCONTINUED | OUTPATIENT
Start: 2022-11-30 | End: 2022-12-03 | Stop reason: HOSPADM

## 2022-11-30 RX ORDER — MISOPROSTOL 200 UG/1
800 TABLET ORAL
Status: DISCONTINUED | OUTPATIENT
Start: 2022-11-30 | End: 2022-11-30 | Stop reason: HOSPADM

## 2022-11-30 RX ORDER — OXYTOCIN 10 [USP'U]/ML
10 INJECTION, SOLUTION INTRAMUSCULAR; INTRAVENOUS
Status: DISCONTINUED | OUTPATIENT
Start: 2022-11-30 | End: 2022-12-03 | Stop reason: HOSPADM

## 2022-11-30 RX ORDER — BUPIVACAINE HYDROCHLORIDE 7.5 MG/ML
INJECTION, SOLUTION INTRASPINAL
Status: COMPLETED | OUTPATIENT
Start: 2022-11-30 | End: 2022-11-30

## 2022-11-30 RX ORDER — TRANEXAMIC ACID 10 MG/ML
1 INJECTION, SOLUTION INTRAVENOUS EVERY 30 MIN PRN
Status: DISCONTINUED | OUTPATIENT
Start: 2022-11-30 | End: 2022-12-03 | Stop reason: HOSPADM

## 2022-11-30 RX ORDER — AMOXICILLIN 250 MG
1 CAPSULE ORAL 2 TIMES DAILY
Status: DISCONTINUED | OUTPATIENT
Start: 2022-11-30 | End: 2022-12-03 | Stop reason: HOSPADM

## 2022-11-30 RX ORDER — OXYTOCIN 10 [USP'U]/ML
10 INJECTION, SOLUTION INTRAMUSCULAR; INTRAVENOUS
Status: DISCONTINUED | OUTPATIENT
Start: 2022-11-30 | End: 2022-11-30

## 2022-11-30 RX ORDER — DEXTROSE, SODIUM CHLORIDE, SODIUM LACTATE, POTASSIUM CHLORIDE, AND CALCIUM CHLORIDE 5; .6; .31; .03; .02 G/100ML; G/100ML; G/100ML; G/100ML; G/100ML
INJECTION, SOLUTION INTRAVENOUS CONTINUOUS
Status: DISCONTINUED | OUTPATIENT
Start: 2022-11-30 | End: 2022-12-03 | Stop reason: HOSPADM

## 2022-11-30 RX ORDER — METOCLOPRAMIDE HYDROCHLORIDE 5 MG/ML
10 INJECTION INTRAMUSCULAR; INTRAVENOUS EVERY 6 HOURS PRN
Status: DISCONTINUED | OUTPATIENT
Start: 2022-11-30 | End: 2022-12-03 | Stop reason: HOSPADM

## 2022-11-30 RX ORDER — MISOPROSTOL 200 UG/1
400 TABLET ORAL
Status: DISCONTINUED | OUTPATIENT
Start: 2022-11-30 | End: 2022-11-30 | Stop reason: HOSPADM

## 2022-11-30 RX ORDER — KETOROLAC TROMETHAMINE 30 MG/ML
INJECTION, SOLUTION INTRAMUSCULAR; INTRAVENOUS PRN
Status: DISCONTINUED | OUTPATIENT
Start: 2022-11-30 | End: 2022-11-30

## 2022-11-30 RX ORDER — ALBUTEROL SULFATE 90 UG/1
2 AEROSOL, METERED RESPIRATORY (INHALATION) 4 TIMES DAILY
Status: DISCONTINUED | OUTPATIENT
Start: 2022-11-30 | End: 2022-12-03 | Stop reason: HOSPADM

## 2022-11-30 RX ORDER — MISOPROSTOL 200 UG/1
800 TABLET ORAL
Status: DISCONTINUED | OUTPATIENT
Start: 2022-11-30 | End: 2022-12-03 | Stop reason: HOSPADM

## 2022-11-30 RX ORDER — CARBOPROST TROMETHAMINE 250 UG/ML
250 INJECTION, SOLUTION INTRAMUSCULAR
Status: DISCONTINUED | OUTPATIENT
Start: 2022-11-30 | End: 2022-11-30 | Stop reason: HOSPADM

## 2022-11-30 RX ORDER — METOCLOPRAMIDE 10 MG/1
10 TABLET ORAL EVERY 6 HOURS PRN
Status: DISCONTINUED | OUTPATIENT
Start: 2022-11-30 | End: 2022-12-03 | Stop reason: HOSPADM

## 2022-11-30 RX ORDER — NALOXONE HYDROCHLORIDE 0.4 MG/ML
0.4 INJECTION, SOLUTION INTRAMUSCULAR; INTRAVENOUS; SUBCUTANEOUS
Status: DISCONTINUED | OUTPATIENT
Start: 2022-11-30 | End: 2022-12-03 | Stop reason: HOSPADM

## 2022-11-30 RX ORDER — ACETAMINOPHEN 325 MG/1
975 TABLET ORAL ONCE
Status: COMPLETED | OUTPATIENT
Start: 2022-11-30 | End: 2022-11-30

## 2022-11-30 RX ORDER — ONDANSETRON 2 MG/ML
4 INJECTION INTRAMUSCULAR; INTRAVENOUS EVERY 6 HOURS PRN
Status: DISCONTINUED | OUTPATIENT
Start: 2022-11-30 | End: 2022-12-03 | Stop reason: HOSPADM

## 2022-11-30 RX ORDER — PROCHLORPERAZINE MALEATE 10 MG
10 TABLET ORAL EVERY 6 HOURS PRN
Status: DISCONTINUED | OUTPATIENT
Start: 2022-11-30 | End: 2022-12-03 | Stop reason: HOSPADM

## 2022-11-30 RX ORDER — AMOXICILLIN 250 MG
2 CAPSULE ORAL 2 TIMES DAILY
Status: DISCONTINUED | OUTPATIENT
Start: 2022-11-30 | End: 2022-12-03 | Stop reason: HOSPADM

## 2022-11-30 RX ORDER — PHENYLEPHRINE HYDROCHLORIDE 10 MG/ML
INJECTION INTRAVENOUS PRN
Status: DISCONTINUED | OUTPATIENT
Start: 2022-11-30 | End: 2022-11-30

## 2022-11-30 RX ORDER — METHYLERGONOVINE MALEATE 0.2 MG/ML
200 INJECTION INTRAVENOUS
Status: DISCONTINUED | OUTPATIENT
Start: 2022-11-30 | End: 2022-12-03 | Stop reason: HOSPADM

## 2022-11-30 RX ORDER — OXYCODONE HYDROCHLORIDE 5 MG/1
5 TABLET ORAL EVERY 4 HOURS PRN
Status: DISCONTINUED | OUTPATIENT
Start: 2022-11-30 | End: 2022-11-30

## 2022-11-30 RX ORDER — ONDANSETRON 4 MG/1
4 TABLET, ORALLY DISINTEGRATING ORAL EVERY 30 MIN PRN
Status: DISCONTINUED | OUTPATIENT
Start: 2022-11-30 | End: 2022-12-03 | Stop reason: HOSPADM

## 2022-11-30 RX ORDER — OXYTOCIN 10 [USP'U]/ML
10 INJECTION, SOLUTION INTRAMUSCULAR; INTRAVENOUS
Status: DISCONTINUED | OUTPATIENT
Start: 2022-11-30 | End: 2022-11-30 | Stop reason: HOSPADM

## 2022-11-30 RX ORDER — OXYTOCIN/0.9 % SODIUM CHLORIDE 30/500 ML
340 PLASTIC BAG, INJECTION (ML) INTRAVENOUS CONTINUOUS PRN
Status: DISCONTINUED | OUTPATIENT
Start: 2022-11-30 | End: 2022-11-30 | Stop reason: HOSPADM

## 2022-11-30 RX ORDER — SODIUM CHLORIDE, SODIUM LACTATE, POTASSIUM CHLORIDE, CALCIUM CHLORIDE 600; 310; 30; 20 MG/100ML; MG/100ML; MG/100ML; MG/100ML
INJECTION, SOLUTION INTRAVENOUS CONTINUOUS
Status: DISCONTINUED | OUTPATIENT
Start: 2022-11-30 | End: 2022-11-30 | Stop reason: HOSPADM

## 2022-11-30 RX ORDER — IBUPROFEN 800 MG/1
800 TABLET, FILM COATED ORAL EVERY 6 HOURS
Status: DISCONTINUED | OUTPATIENT
Start: 2022-12-01 | End: 2022-12-03 | Stop reason: HOSPADM

## 2022-11-30 RX ORDER — LIDOCAINE 40 MG/G
CREAM TOPICAL
Status: DISCONTINUED | OUTPATIENT
Start: 2022-11-30 | End: 2022-12-03 | Stop reason: HOSPADM

## 2022-11-30 RX ORDER — SODIUM CHLORIDE, SODIUM LACTATE, POTASSIUM CHLORIDE, CALCIUM CHLORIDE 600; 310; 30; 20 MG/100ML; MG/100ML; MG/100ML; MG/100ML
INJECTION, SOLUTION INTRAVENOUS CONTINUOUS
Status: DISCONTINUED | OUTPATIENT
Start: 2022-11-30 | End: 2022-12-03 | Stop reason: HOSPADM

## 2022-11-30 RX ORDER — HYDROMORPHONE HCL IN WATER/PF 6 MG/30 ML
0.4 PATIENT CONTROLLED ANALGESIA SYRINGE INTRAVENOUS EVERY 5 MIN PRN
Status: DISCONTINUED | OUTPATIENT
Start: 2022-11-30 | End: 2022-12-03

## 2022-11-30 RX ORDER — NALOXONE HYDROCHLORIDE 0.4 MG/ML
0.2 INJECTION, SOLUTION INTRAMUSCULAR; INTRAVENOUS; SUBCUTANEOUS
Status: DISCONTINUED | OUTPATIENT
Start: 2022-11-30 | End: 2022-12-03 | Stop reason: HOSPADM

## 2022-11-30 RX ORDER — MAGNESIUM HYDROXIDE 1200 MG/15ML
LIQUID ORAL PRN
Status: DISCONTINUED | OUTPATIENT
Start: 2022-11-30 | End: 2022-11-30

## 2022-11-30 RX ORDER — CITRIC ACID/SODIUM CITRATE 334-500MG
30 SOLUTION, ORAL ORAL
Status: COMPLETED | OUTPATIENT
Start: 2022-11-30 | End: 2022-11-30

## 2022-11-30 RX ORDER — OXYTOCIN/0.9 % SODIUM CHLORIDE 30/500 ML
340 PLASTIC BAG, INJECTION (ML) INTRAVENOUS CONTINUOUS PRN
Status: DISCONTINUED | OUTPATIENT
Start: 2022-11-30 | End: 2022-12-03 | Stop reason: HOSPADM

## 2022-11-30 RX ORDER — MORPHINE SULFATE 1 MG/ML
INJECTION, SOLUTION EPIDURAL; INTRATHECAL; INTRAVENOUS
Status: COMPLETED | OUTPATIENT
Start: 2022-11-30 | End: 2022-11-30

## 2022-11-30 RX ORDER — KETOROLAC TROMETHAMINE 30 MG/ML
30 INJECTION, SOLUTION INTRAMUSCULAR; INTRAVENOUS EVERY 6 HOURS
Status: COMPLETED | OUTPATIENT
Start: 2022-11-30 | End: 2022-12-01

## 2022-11-30 RX ORDER — OXYCODONE HYDROCHLORIDE 5 MG/1
10 TABLET ORAL EVERY 4 HOURS PRN
Status: DISCONTINUED | OUTPATIENT
Start: 2022-11-30 | End: 2022-12-03

## 2022-11-30 RX ORDER — OXYTOCIN/0.9 % SODIUM CHLORIDE 30/500 ML
100-340 PLASTIC BAG, INJECTION (ML) INTRAVENOUS CONTINUOUS PRN
Status: DISCONTINUED | OUTPATIENT
Start: 2022-11-30 | End: 2022-11-30

## 2022-11-30 RX ORDER — CEFAZOLIN SODIUM/WATER 2 G/20 ML
2 SYRINGE (ML) INTRAVENOUS
Status: COMPLETED | OUTPATIENT
Start: 2022-11-30 | End: 2022-11-30

## 2022-11-30 RX ORDER — ONDANSETRON 4 MG/1
4 TABLET, ORALLY DISINTEGRATING ORAL EVERY 6 HOURS PRN
Status: DISCONTINUED | OUTPATIENT
Start: 2022-11-30 | End: 2022-12-03 | Stop reason: HOSPADM

## 2022-11-30 RX ORDER — ACETAMINOPHEN 325 MG/1
975 TABLET ORAL EVERY 6 HOURS
Status: DISCONTINUED | OUTPATIENT
Start: 2022-11-30 | End: 2022-12-03 | Stop reason: HOSPADM

## 2022-11-30 RX ORDER — GLYCOPYRROLATE 0.2 MG/ML
INJECTION, SOLUTION INTRAMUSCULAR; INTRAVENOUS PRN
Status: DISCONTINUED | OUTPATIENT
Start: 2022-11-30 | End: 2022-11-30

## 2022-11-30 RX ORDER — GUAIFENESIN 200 MG/10ML
10 LIQUID ORAL EVERY 4 HOURS PRN
Status: DISCONTINUED | OUTPATIENT
Start: 2022-11-30 | End: 2022-12-03 | Stop reason: HOSPADM

## 2022-11-30 RX ORDER — FENTANYL CITRATE 50 UG/ML
50 INJECTION, SOLUTION INTRAMUSCULAR; INTRAVENOUS EVERY 5 MIN PRN
Status: DISCONTINUED | OUTPATIENT
Start: 2022-11-30 | End: 2022-12-03 | Stop reason: HOSPADM

## 2022-11-30 RX ORDER — ONDANSETRON 2 MG/ML
4 INJECTION INTRAMUSCULAR; INTRAVENOUS EVERY 30 MIN PRN
Status: DISCONTINUED | OUTPATIENT
Start: 2022-11-30 | End: 2022-12-03 | Stop reason: HOSPADM

## 2022-11-30 RX ORDER — HYDROXYZINE HYDROCHLORIDE 50 MG/1
50 TABLET, FILM COATED ORAL EVERY 6 HOURS PRN
Status: DISCONTINUED | OUTPATIENT
Start: 2022-11-30 | End: 2022-12-03 | Stop reason: HOSPADM

## 2022-11-30 RX ORDER — ONDANSETRON 2 MG/ML
INJECTION INTRAMUSCULAR; INTRAVENOUS PRN
Status: DISCONTINUED | OUTPATIENT
Start: 2022-11-30 | End: 2022-11-30

## 2022-11-30 RX ORDER — CARBOPROST TROMETHAMINE 250 UG/ML
250 INJECTION, SOLUTION INTRAMUSCULAR
Status: DISCONTINUED | OUTPATIENT
Start: 2022-11-30 | End: 2022-12-03 | Stop reason: HOSPADM

## 2022-11-30 RX ORDER — METHYLERGONOVINE MALEATE 0.2 MG/ML
200 INJECTION INTRAVENOUS
Status: DISCONTINUED | OUTPATIENT
Start: 2022-11-30 | End: 2022-11-30 | Stop reason: HOSPADM

## 2022-11-30 RX ORDER — ENOXAPARIN SODIUM 100 MG/ML
40 INJECTION SUBCUTANEOUS EVERY 24 HOURS
Status: DISCONTINUED | OUTPATIENT
Start: 2022-12-01 | End: 2022-12-03 | Stop reason: HOSPADM

## 2022-11-30 RX ORDER — MODIFIED LANOLIN
OINTMENT (GRAM) TOPICAL
Status: DISCONTINUED | OUTPATIENT
Start: 2022-11-30 | End: 2022-12-03 | Stop reason: HOSPADM

## 2022-11-30 RX ORDER — FENTANYL CITRATE 50 UG/ML
25 INJECTION, SOLUTION INTRAMUSCULAR; INTRAVENOUS EVERY 5 MIN PRN
Status: DISCONTINUED | OUTPATIENT
Start: 2022-11-30 | End: 2022-12-03 | Stop reason: HOSPADM

## 2022-11-30 RX ORDER — HYDROMORPHONE HCL IN WATER/PF 6 MG/30 ML
0.2 PATIENT CONTROLLED ANALGESIA SYRINGE INTRAVENOUS EVERY 5 MIN PRN
Status: DISCONTINUED | OUTPATIENT
Start: 2022-11-30 | End: 2022-12-03

## 2022-11-30 RX ORDER — EPINEPHRINE 1 MG/ML
INJECTION, SOLUTION, CONCENTRATE INTRAVENOUS
Status: COMPLETED | OUTPATIENT
Start: 2022-11-30 | End: 2022-11-30

## 2022-11-30 RX ORDER — HYDROXYZINE HYDROCHLORIDE 25 MG/1
25 TABLET, FILM COATED ORAL EVERY 6 HOURS PRN
Status: DISCONTINUED | OUTPATIENT
Start: 2022-11-30 | End: 2022-12-03 | Stop reason: HOSPADM

## 2022-11-30 RX ORDER — HYDROCORTISONE 25 MG/G
CREAM TOPICAL 3 TIMES DAILY PRN
Status: DISCONTINUED | OUTPATIENT
Start: 2022-11-30 | End: 2022-12-03 | Stop reason: HOSPADM

## 2022-11-30 RX ORDER — LIDOCAINE 40 MG/G
CREAM TOPICAL
Status: DISCONTINUED | OUTPATIENT
Start: 2022-11-30 | End: 2022-11-30 | Stop reason: HOSPADM

## 2022-11-30 RX ORDER — BISACODYL 10 MG
10 SUPPOSITORY, RECTAL RECTAL DAILY PRN
Status: DISCONTINUED | OUTPATIENT
Start: 2022-12-02 | End: 2022-12-03 | Stop reason: HOSPADM

## 2022-11-30 RX ORDER — PRENATAL VIT/IRON FUM/FOLIC AC 27MG-0.8MG
TABLET ORAL EVERY EVENING
Status: DISCONTINUED | OUTPATIENT
Start: 2022-11-30 | End: 2022-12-03 | Stop reason: HOSPADM

## 2022-11-30 RX ORDER — CEFAZOLIN SODIUM/WATER 2 G/20 ML
2 SYRINGE (ML) INTRAVENOUS SEE ADMIN INSTRUCTIONS
Status: DISCONTINUED | OUTPATIENT
Start: 2022-11-30 | End: 2022-11-30 | Stop reason: HOSPADM

## 2022-11-30 RX ORDER — DEXAMETHASONE SODIUM PHOSPHATE 4 MG/ML
INJECTION, SOLUTION INTRA-ARTICULAR; INTRALESIONAL; INTRAMUSCULAR; INTRAVENOUS; SOFT TISSUE PRN
Status: DISCONTINUED | OUTPATIENT
Start: 2022-11-30 | End: 2022-11-30

## 2022-11-30 RX ADMIN — MORPHINE SULFATE 0.2 MG: 1 INJECTION, SOLUTION EPIDURAL; INTRATHECAL; INTRAVENOUS at 07:44

## 2022-11-30 RX ADMIN — PHENYLEPHRINE HYDROCHLORIDE 100 MCG: 10 INJECTION INTRAVENOUS at 08:21

## 2022-11-30 RX ADMIN — GUAIFENESIN 10 ML: 200 SOLUTION ORAL at 21:29

## 2022-11-30 RX ADMIN — OXYCODONE HYDROCHLORIDE 10 MG: 5 TABLET ORAL at 17:31

## 2022-11-30 RX ADMIN — OXYCODONE HYDROCHLORIDE 10 MG: 5 TABLET ORAL at 13:34

## 2022-11-30 RX ADMIN — KETOROLAC TROMETHAMINE 30 MG: 30 INJECTION, SOLUTION INTRAMUSCULAR at 14:59

## 2022-11-30 RX ADMIN — GLYCOPYRROLATE 0.1 MG: 0.2 INJECTION, SOLUTION INTRAMUSCULAR; INTRAVENOUS at 08:17

## 2022-11-30 RX ADMIN — KETOROLAC TROMETHAMINE 30 MG: 30 INJECTION, SOLUTION INTRAMUSCULAR at 21:25

## 2022-11-30 RX ADMIN — PHENYLEPHRINE HYDROCHLORIDE 0.1 MCG/KG/MIN: 10 INJECTION INTRAVENOUS at 07:48

## 2022-11-30 RX ADMIN — Medication 2 G: at 07:35

## 2022-11-30 RX ADMIN — GUAIFENESIN 10 ML: 200 SOLUTION ORAL at 15:01

## 2022-11-30 RX ADMIN — PHENYLEPHRINE HYDROCHLORIDE 100 MCG: 10 INJECTION INTRAVENOUS at 07:52

## 2022-11-30 RX ADMIN — ACETAMINOPHEN 975 MG: 325 TABLET, FILM COATED ORAL at 18:33

## 2022-11-30 RX ADMIN — PHENYLEPHRINE HYDROCHLORIDE 100 MCG: 10 INJECTION INTRAVENOUS at 07:48

## 2022-11-30 RX ADMIN — ACETAMINOPHEN 975 MG: 325 TABLET, FILM COATED ORAL at 06:34

## 2022-11-30 RX ADMIN — PHENYLEPHRINE HYDROCHLORIDE 100 MCG: 10 INJECTION INTRAVENOUS at 08:10

## 2022-11-30 RX ADMIN — PHENYLEPHRINE HYDROCHLORIDE 100 MCG: 10 INJECTION INTRAVENOUS at 08:17

## 2022-11-30 RX ADMIN — EPINEPHRINE 0.1 MG: 1 INJECTION, SOLUTION INTRAMUSCULAR; SUBCUTANEOUS at 07:44

## 2022-11-30 RX ADMIN — KETOROLAC TROMETHAMINE 30 MG: 30 INJECTION, SOLUTION INTRAMUSCULAR at 08:30

## 2022-11-30 RX ADMIN — GLYCOPYRROLATE 0.1 MG: 0.2 INJECTION, SOLUTION INTRAMUSCULAR; INTRAVENOUS at 08:23

## 2022-11-30 RX ADMIN — DEXAMETHASONE SODIUM PHOSPHATE 4 MG: 4 INJECTION, SOLUTION INTRA-ARTICULAR; INTRALESIONAL; INTRAMUSCULAR; INTRAVENOUS; SOFT TISSUE at 08:12

## 2022-11-30 RX ADMIN — PHENYLEPHRINE HYDROCHLORIDE 100 MCG: 10 INJECTION INTRAVENOUS at 08:23

## 2022-11-30 RX ADMIN — PHENYLEPHRINE HYDROCHLORIDE 100 MCG: 10 INJECTION INTRAVENOUS at 08:14

## 2022-11-30 RX ADMIN — SENNOSIDES AND DOCUSATE SODIUM 1 TABLET: 50; 8.6 TABLET ORAL at 13:33

## 2022-11-30 RX ADMIN — SODIUM CITRATE AND CITRIC ACID MONOHYDRATE 30 ML: 500; 334 SOLUTION ORAL at 06:46

## 2022-11-30 RX ADMIN — Medication 300 ML/HR: at 08:05

## 2022-11-30 RX ADMIN — BUPIVACAINE HYDROCHLORIDE IN DEXTROSE 1.6 ML: 7.5 INJECTION, SOLUTION SUBARACHNOID at 07:44

## 2022-11-30 RX ADMIN — Medication 2 G: at 06:46

## 2022-11-30 RX ADMIN — OXYCODONE HYDROCHLORIDE 5 MG: 5 TABLET ORAL at 10:31

## 2022-11-30 RX ADMIN — ACETAMINOPHEN 975 MG: 325 TABLET, FILM COATED ORAL at 12:35

## 2022-11-30 RX ADMIN — ONDANSETRON 4 MG: 2 INJECTION INTRAMUSCULAR; INTRAVENOUS at 07:39

## 2022-11-30 RX ADMIN — SODIUM CHLORIDE, POTASSIUM CHLORIDE, SODIUM LACTATE AND CALCIUM CHLORIDE: 600; 310; 30; 20 INJECTION, SOLUTION INTRAVENOUS at 06:36

## 2022-11-30 RX ADMIN — SENNOSIDES AND DOCUSATE SODIUM 1 TABLET: 50; 8.6 TABLET ORAL at 21:28

## 2022-11-30 ASSESSMENT — ACTIVITIES OF DAILY LIVING (ADL)
ADLS_ACUITY_SCORE: 24
ADLS_ACUITY_SCORE: 24
ADLS_ACUITY_SCORE: 20
ADLS_ACUITY_SCORE: 24
ADLS_ACUITY_SCORE: 20
ADLS_ACUITY_SCORE: 20

## 2022-11-30 NOTE — PLAN OF CARE
VSS. Lochia rubra WNL, tolerating regular diet well. IV Sl. Pain control fluctuates from 2 to 10 today. Harsh cough, patient states makes her incisional pain a 10 . MD notifed, orders received for increased oxycodone dose, and PRN Robitussin. Rodriges out, patient plans to attempt OOB at 1600.    Bonding with , minimal assistance with breast feeding .

## 2022-11-30 NOTE — ANESTHESIA POSTPROCEDURE EVALUATION
Patient: Serina Washington    Procedure: Procedure(s):   SECTION  REVISION, SCAR, TORSO       Anesthesia Type:  Spinal    Note:  Disposition: Inpatient   Postop Pain Control: Uneventful            Sign Out: Well controlled pain   PONV: No   Neuro/Psych: Uneventful            Sign Out: Acceptable/Baseline neuro status   Airway/Respiratory: Uneventful            Sign Out: Acceptable/Baseline resp. status   CV/Hemodynamics: Uneventful            Sign Out: Acceptable CV status; No obvious hypovolemia; No obvious fluid overload   Other NRE:    DID A NON-ROUTINE EVENT OCCUR?            Last vitals:  Vitals Value Taken Time   BP 99/67 22 0900   Temp     Pulse 88 22 0900   Resp 18 22 0900   SpO2 98 % 22 0914   Vitals shown include unvalidated device data.    Electronically Signed By: ISABELLA Hilliard CRNA  2022  9:16 AM

## 2022-11-30 NOTE — ANESTHESIA PROCEDURE NOTES
"Intrathecal injection Procedure Note    Pre-Procedure   Staff -        Resident/Fellow: Humera Broderick       CRNA: Mralon Bunch APRN CRNA       Performed By: GISELA       Location: OR (OB OR)       Procedure Start/Stop Times: 11/30/2022 7:40 AM and 11/30/2022 7:44 AM       Pre-Anesthestic Checklist: patient identified, IV checked, risks and benefits discussed, informed consent, monitors and equipment checked, pre-op evaluation, at physician/surgeon's request and post-op pain management  Timeout:       Correct Patient: Yes        Correct Procedure: Yes        Correct Site: Yes        Correct Position: Yes   Procedure Documentation  Procedure: intrathecal injection       Patient Position: sitting       Patient Prep/Sterile Barriers: sterile gloves, mask, patient draped       Skin prep: Betadine       Insertion Site: L4-5. (midline approach).       Needle Gauge: 25.        Needle Length (Inches): 3.5        Spinal Needle Type: Pencan       Introducer used       Introducer: 20 G       # of attempts: 1 and  # of redirects:  0    Assessment/Narrative         Paresthesias: No.       CSF fluid: clear.    Medication(s) Administered   0.75% Hyperbaric Bupivacaine (Intrathecal) - Intrathecal   1.6 mL - 11/30/2022 7:44:00 AM  Morphine PF 1 mg/mL (Intrathecal) - Intrathecal   0.2 mg - 11/30/2022 7:44:00 AM  Epinephrine 1000 mcg/mL (Intrathecal) - Intrathecal   0.1 mg - 11/30/2022 7:44:00 AM  Medication Administration Time: 11/30/2022 7:40 AM      FOR [facility] ([facility]) ONLY:   Pain Team Contact information: please page the Pain Team [address] [employer]. Search \"Pain\". During daytime hours, please page the attending first. At night please page the resident first.    "

## 2022-11-30 NOTE — L&D DELIVERY NOTE
"Serina Washington is a 37 year old  @ 39+1 Weeks EGA  She presented with Covid + status for a repeat CS on 2022  Pregnancy was complicated by Recent onset of her second Covid infection in a year  GBS neg  Membrane status: Intact  Labored with:  none  Pain meds Spinal  FHR Cat I  Delivered a viable Female  @ 08:05Via repeat LST CS  Weight 9#2 oz   APGAR's 9/9  Placenta delivered @ 08:07 , was complete with a 3vessel cord  Please see the Op report for complete details    ml  \"Kj\"    Quinn Cash MD      "

## 2022-11-30 NOTE — OP NOTE
[facility] Obstetrics Brief Operative Note    Pre-operative diagnosis: Encounter for supervision of other normal pregnancy in third trimester [Z34.83]  Previous  delivery, antepartum condition or complication [O34.219]  Covid 19 infection   Post-operative diagnosis: Same   Procedure: Repeat low transverse  section  Scar revision   Surgeon: Quinn Cash MD   Assistant(s): MANDA Petty PA-C   A surgical assistant was required for this surgery for his experience with retraction, achievement of hemostasis, and wound closure     Anesthesia: Spinal anesthesia   Quantitative  blood loss: 726 ml   Total IV fluids: (See anesthesia record)  1400 ml   Blood transfusion: No transfusion was given during surgery   Total urine output: (See anesthesia record)  40 ml   Drains: Rodriges catheter   Specimens: none   Findings: Live   Female  Cephalic presentation:  right occiput transverse  APGARS: 1 minute: 9   5 minute: 9  Weight: 9# 2 oz  Placenta: nl  Tubes: nl  Uterus: nl  Ovaries: nl   Complications: None   Condition: Infant stable, transferred to general care nursery  Mother stable, transfered to post-anesthesia recovery   Comments: Serina Washington   1985  [id number]      Serina Washington  presented for the above procedure.  She has Covid 19 infection @ 39+1 week EGA  She has a keloid scar and desires a repeat  section, is s/p  section  I met with Serina  and her , Rufino and discussed the planned procedure as well as the expected post operative course including post op  Anticoagulation therapy.  Risks of complications were noted and postoperative signs to watch for outlined.  Questions were answered and consent signed.  She was taken to the OR [facility] where she was placed in the supine position. She underwent General anesthesia with endotracheal intubation.  She was then placed in the Dorso-lithotomy position in [facility].  An examination under  anesthesia was performed that showed:     She was prepped and draped.  A timeout was held confirming her identity and proposed procedures. All were in agreement.     In the room, all were gowned and masked with N95's, the air exchanger was active.  After adequate anesthesia was documented an elliptical incision was made excising the previous hypertrophic scar.  Scar was discarded.  Transverse incision was made through the fascia.  The fascia was then freed from  Rectus muscles in cephalad and caudad direction.  Midline diastases identified and exploited.  The peritoneum was entered without difficulty.  A bladder blade was placed.  A bladder flap was relatively high on the lower uterine segment.  This was freed sharply and bluntly dissected.  Low segment transverse incision was made through the myometrium down to the membranes ruptured productive of clear fluid.  The uterine incision was extended by finger fracture technique.  The fetal head was elevated into the wound and delivered at 0805 a viable vigorous female.  Delayed cord clamping was performed.  The cord was ultimately clamped and cut  handed to the nurse in attendance.  Weight was 9 pounds 2 ounces Apgars were 9 and 9.  Hampton remained in the room for the remainder of the procedure and was taken to PACU with mom at the end of the procedure.    Anterior placenta was delivered after collection of cord blood.  The uterus was exteriorized and the endometrium soft curetted with a lap sponge removing clots and debris.  Uterus was closed primarily with a running locked 0 Vicryl suture and imbricated with 0 Vicryl suture.  Hemostasis was assured with a figure-of-eight suture on the left just superior to the incision.  A hematoma was forming there at that point.    The abdomen pelvis were washed with warm normal saline.  Uterus was returned to its anatomic position.  Hemostasis was again assured.  The peritoneum was closed with 2-0 Vicryl running  suture.  Rectus muscles were  reapproximated to the midline using interrupted 2-0 Vicryl suture.  The fascia was then closed in the apices to the midline tied visually.  Subcu was closed with interrupted 0 Chromic Gut and the skin reapproximated utilizing 2 oh VueLock suture and Dermabond applied.  Sponge and needle counts were correct.  The patient did tolerate the procedure well returned to the PACU in good condition.  She was placed in a negative airflow room for the remainder of her hospital stay.    Quinn Cash MD

## 2022-11-30 NOTE — PLAN OF CARE
[facility] 0600 for scheduled C/S. VS stable. Positive fetal movement.  PIV placed.  Questions answered.  Patient prepped for surgery.    Tish Minor RN on 11/30/2022 at 6:33 AM

## 2022-11-30 NOTE — ANESTHESIA PREPROCEDURE EVALUATION
Anesthesia Pre-Procedure Evaluation    Patient: Serina Washington   MRN: [id number] : 1985        Procedure : Procedure(s):   SECTION  REVISION, SCAR, TORSO          No past medical history on file.   Past Surgical History:   Procedure Laterality Date      SECTION N/A 09/10/2017    Procedure:  SECTION;  Primary  Section;  Surgeon: Mu Miranda MD;  Location: [facility] OR     DILATION AND CURETTAGE SUCTION N/A 2021    Procedure: DILATION AND CURETTAGE, UTERUS, USING SUCTION;  Surgeon: Vicki Tracey MD;  Location: [facility] OR     OPERATIVE HYSTEROSCOPY N/A 2020    Procedure: HYSTEROSCOPY, THERAPEUTIC;  Surgeon: Quinn Cash MD;  Location: [facility] OR     SURGICAL HISTORY OF -  Left     knee surgery  and       No Known Allergies   Social History     Tobacco Use     Smoking status: Never     Smokeless tobacco: Never   Substance Use Topics     Alcohol use: Not Currently     Comment: occas-quit with pregnancy      Wt Readings from Last 1 Encounters:   22 76.7 kg (169 lb)        Anesthesia Evaluation            ROS/MED HX  ENT/Pulmonary: Comment: Current COVID +, symptoms much less severe. Previous COVID +, very symptomatic.       Neurologic:  - neg neurologic ROS     Cardiovascular:  - neg cardiovascular ROS     METS/Exercise Tolerance:     Hematologic:  - neg hematologic  ROS     Musculoskeletal:  - neg musculoskeletal ROS     GI/Hepatic:  - neg GI/hepatic ROS     Renal/Genitourinary:  - neg Renal ROS     Endo:  - neg endo ROS     Psychiatric/Substance Use:  - neg psychiatric ROS     Infectious Disease:  - neg infectious disease ROS     Malignancy:  - neg malignancy ROS     Other:      (+) Possibly pregnant, ,         Physical Exam    Airway        Mallampati: II   TM distance: > 3 FB   Neck ROM: full   Mouth opening: > 3 cm    Respiratory Devices and Support  Comment: Not on oxygen currently       Dental  no notable dental history          Cardiovascular   cardiovascular exam normal          Pulmonary   pulmonary exam normal                OUTSIDE LABS:  CBC:   Lab Results   Component Value Date    WBC 9.3 08/17/2022    WBC 9.1 05/05/2022    HGB 12.3 08/17/2022    HGB 13.0 05/05/2022    HCT 36.7 08/17/2022    HCT 38.2 05/05/2022     08/17/2022     05/05/2022     BMP:   Lab Results   Component Value Date     03/02/2022     11/05/2021    POTASSIUM 3.9 03/02/2022    POTASSIUM 3.7 11/05/2021    CHLORIDE 109 03/02/2022    CHLORIDE 104 11/05/2021    CO2 28 03/02/2022    CO2 24 11/05/2021    BUN 7 03/02/2022    BUN 8 11/05/2021    CR 0.66 03/02/2022    CR 0.52 11/05/2021    GLC 82 03/02/2022     (H) 11/05/2021     COAGS:   Lab Results   Component Value Date    PTT 32 03/02/2022    INR 1.13 03/02/2022     POC:   Lab Results   Component Value Date    HCG Negative 11/16/2020    HCGS Negative 03/02/2022     HEPATIC:   Lab Results   Component Value Date    ALBUMIN 3.9 03/02/2022    PROTTOTAL 7.8 03/02/2022    ALT 31 03/02/2022    AST 21 03/02/2022    ALKPHOS 40 03/02/2022    BILITOTAL 0.5 03/02/2022     OTHER:   Lab Results   Component Value Date    LACT 0.8 03/02/2022    A1C 5.2 05/20/2021    LING 9.2 03/02/2022    LIPASE 184 03/02/2022    TSH 0.78 03/29/2022    CRP <2.9 11/01/2020       Anesthesia Plan    ASA Status:  2      Anesthesia Type: Spinal.              Consents    Anesthesia Plan(s) and associated risks, benefits, and realistic alternatives discussed. Questions answered and patient/representative(s) expressed understanding.     - Discussed: Risks, Benefits and Alternatives for the PROCEDURE were discussed     - Discussed with:  Patient         Postoperative Care    Pain management: IV analgesics, Oral pain medications, Multi-modal analgesia, Neuraxial analgesia.   PONV prophylaxis: Ondansetron (or other 5HT-3), Dexamethasone or Solumedrol     Comments:                ISABELLA Hilliard CRNA

## 2022-11-30 NOTE — H&P
[facility] Labor and Delivery History and Physical    Serina Washington MRN# [id number]   Age: 37 year old YOB: 1985     Date of Admission:  2022    Primary care provider: Gemini Esquivel           Chief Complaint:   Serina Washington is a 37 year old female who is 39w1d pregnant and being admitted for .          Pregnancy history:   Pregnancy complicated by previous miscarriage and current (mild) Covid infection  OBSTETRIC HISTORY:    OB History    Para Term  AB Living   6 1 1 0 4 1   SAB IAB Ectopic Multiple Live Births   4 0 0 0 1      # Outcome Date GA Lbr Mack/2nd Weight Sex Delivery Anes PTL Lv   6 Current            5 SAB 2021     SAB      4 SAB 21 7w4d    SAB      3 SAB 19     SAB      2 Term 09/10/17 38w6d  4.06 kg (8 lb 15.2 oz) F CS-LTranv EPI, Spinal N CESILIA      Complications: Fetal Intolerance      Name: Areli Washington      Apgar1: 9  Apgar5: 9   1 SAB                EDC: Estimated Date of Delivery: 22    Prenatal Labs:   Lab Results   Component Value Date    ABO O 2017    RH Pos 2017    AS Negative 2022    HEPBANG Nonreactive 2022    CHPCRT  2017     Negative   Negative for C. trachomatis rRNA by transcription mediated amplification.   A negative result by transcription mediated amplification does not preclude the   presence of C. trachomatis infection because results are dependent on proper   and adequate collection, absence of inhibitors, and sufficient rRNA to be   detected.      GCPCRT  2017     Negative   Negative for N. gonorrhoeae rRNA by transcription mediated amplification.   A negative result by transcription mediated amplification does not preclude the   presence of N. gonorrhoeae infection because results are dependent on proper   and adequate collection, absence of inhibitors, and sufficient rRNA to be   detected.      TREPAB Negative 2017    HGB 12.5  2022       GBS Status:   Lab Results   Component Value Date    GBS Positive (A) 2017       Active Problem List  Patient Active Problem List   Diagnosis     S/P      Prenatal care, subsequent pregnancy     Dizziness       Medication Prior to Admission  Medications Prior to Admission   Medication Sig Dispense Refill Last Dose     Prenatal Multivit-Min-Fe-FA (PRENATAL VITAMINS PO) Take 2 tablets by mouth every evening [facility]    Past Week     albuterol (PROAIR HFA/PROVENTIL HFA/VENTOLIN HFA) 108 (90 Base) MCG/ACT inhaler Inhale 2 puffs into the lungs 4 times daily (Patient not taking: Reported on 3/29/2022) 18 g 0      blood glucose (NO BRAND SPECIFIED) test strip Use to test blood sugar 4 times daily or as directed. To accompany: Blood Glucose Monitor Brands: per insurance. (Patient not taking: Reported on 10/6/2022) 100 strip 6      blood glucose monitoring (NO BRAND SPECIFIED) meter device kit Use to test blood sugar 4 times daily or as directed. Preferred blood glucose meter per insurance (Patient not taking: Reported on 10/6/2022) 1 kit 0      doxylamine (UNISOM) 25 MG TABS tablet Take 25 mg by mouth At Bedtime (Patient not taking: Reported on 2022)        ondansetron (ZOFRAN ODT) 4 MG ODT tab Take 2 tablets (8 mg) by mouth every 8 hours as needed for nausea (Patient not taking: Reported on 2022) 10 tablet 0      ondansetron (ZOFRAN) 4 MG tablet Take 1 tablet (4 mg) by mouth every 8 hours as needed for nausea (Patient not taking: Reported on 2022) 6 tablet 1      thin (NO BRAND SPECIFIED) lancets Use to test blood sugar 4 times daily or as directed. To accompany: Blood Glucose Monitor Brands: per insurance. (Patient not taking: Reported on 10/6/2022) 100 each 3      vitamin B complex with vitamin C (STRESS TAB) tablet Take 1 tablet by mouth daily (Patient not taking: Reported on 2022)      .        Maternal Past Medical History:   No past medical history on file.                     Family History:   I have reviewed this patient's family history            Social History:   I have reviewed this patient's social history         Review of Systems:   The Review of Systems is negative other than noted in the HPI          Physical Exam:   Vitals were reviewed  Temp: 97.4  F (36.3  C) Temp src: Oral BP: 105/76 Pulse: 98   Resp: 20 SpO2: 97 % O2 Device: None (Room air)    Constitutional:   awake, alert, cooperative, no apparent distress, and appears stated age     Lungs:   No increased work of breathing, good air exchange, clear to auscultation bilaterally, no crackles or wheezing     Cardiovascular:   Normal apical impulse, regular rate and rhythm, normal S1 and S2, no S3 or S4, and no murmur noted     Abdomen:   Gravid, scars noted Pfannenstiel, normal bowel sounds, soft and non-distended     Genitounirinary:   External Genitalia:  General appearance; normal     Musculoskeletal:   There is no redness, warmth, or swelling of the joints.  Full range of motion noted.  Motor strength is 5 out of 5 all extremities bilaterally.  Tone is normal.     Neurologic:   Awake, alert, oriented to name, place and time.  Cranial nerves II-XII are grossly intact.  Motor is 5 out of 5 bilaterally.  Cerebellar finger to nose, heel to shin intact.  Sensory is intact.  Babinski down going, Romberg negative, and gait is normal.     Neuropsychiatric:   General: normal, calm and normal eye contact  Level of consciousness: alert / normal  Affect: normal  Orientation: oriented to self, place, time and situation  Memory and insight: normal, memory for past and recent events intact and thought process normal     Skin:   Mild keloid mid pfannenstiel scar, no bruising or bleeding, normal skin color, texture, turgor and no redness, warmth, or swelling      Cervix:   Membranes: intact    Presentation:Cephalic  Fetal Heart Rate Tracing: reactive and reassuring, Tier 1 (normal)  Tocometer: external monitor                        Assessment:   Serina Washington is a 39w1d pregnant female admitted with .  Covid infection        Plan:   Post op anticoagulation  Admit - see IP orders  Prepare for  section and scar reviivadim Cash MD

## 2022-11-30 NOTE — ANESTHESIA CARE TRANSFER NOTE
Patient: Serina Washington    Procedure: Procedure(s):   SECTION  REVISION, SCAR, TORSO       Diagnosis: Encounter for supervision of other normal pregnancy in third trimester [Z34.83]  Previous  delivery, antepartum condition or complication [O34.219]  Diagnosis Additional Information: No value filed.    Anesthesia Type:   Spinal     Note:    Oropharynx: oropharynx clear of all foreign objects and spontaneously breathing  Level of Consciousness: awake  Oxygen Supplementation: room air    Independent Airway: airway patency satisfactory and stable  Dentition: dentition unchanged  Vital Signs Stable: post-procedure vital signs reviewed and stable  Report to RN Given: handoff report given  Patient transferred to: Labor and Delivery    Handoff Report: Identifed the Patient, Identified the Reponsible Provider, Reviewed the pertinent medical history, Discussed the surgical course, Reviewed Intra-OP anesthesia mangement and issues during anesthesia, Set expectations for post-procedure period and Allowed opportunity for questions and acknowledgement of understanding      Vitals:  Vitals Value Taken Time   BP 99/65 22 0847   Temp     Pulse 74 22 0847   Resp     SpO2 98 % 22 0851   Vitals shown include unvalidated device data.    Electronically Signed By: Humera Broderick RN, [facility]  2022  8:52 AM

## 2022-11-30 NOTE — PROGRESS NOTES
1050 patient refused to have alegria out, requested to wait until after lunch. S: Transfer to postpartum from PACU .Bonding with . Alegria patent. Tolerating clear liquids, Eating honey crackers. Incision well approximated. Lochia rubra WNL

## 2022-12-01 LAB
CREAT SERPL-MCNC: 0.51 MG/DL (ref 0.51–0.95)
GFR SERPL CREATININE-BSD FRML MDRD: >90 ML/MIN/1.73M2
HGB BLD-MCNC: 10 G/DL (ref 11.7–15.7)
HGB BLD-MCNC: 10.3 G/DL (ref 11.7–15.7)

## 2022-12-01 PROCEDURE — 85018 HEMOGLOBIN: CPT | Performed by: OBSTETRICS & GYNECOLOGY

## 2022-12-01 PROCEDURE — 36415 COLL VENOUS BLD VENIPUNCTURE: CPT | Performed by: OBSTETRICS & GYNECOLOGY

## 2022-12-01 PROCEDURE — 120N000001 HC R&B MED SURG/OB

## 2022-12-01 PROCEDURE — 250N000011 HC RX IP 250 OP 636: Performed by: OBSTETRICS & GYNECOLOGY

## 2022-12-01 PROCEDURE — 250N000013 HC RX MED GY IP 250 OP 250 PS 637: Performed by: OBSTETRICS & GYNECOLOGY

## 2022-12-01 PROCEDURE — 82565 ASSAY OF CREATININE: CPT | Performed by: OBSTETRICS & GYNECOLOGY

## 2022-12-01 RX ORDER — KETOROLAC TROMETHAMINE 30 MG/ML
30 INJECTION, SOLUTION INTRAMUSCULAR; INTRAVENOUS EVERY 6 HOURS PRN
Status: DISCONTINUED | OUTPATIENT
Start: 2022-12-01 | End: 2022-12-03 | Stop reason: HOSPADM

## 2022-12-01 RX ORDER — DEXTROSE, SODIUM CHLORIDE, SODIUM LACTATE, POTASSIUM CHLORIDE, AND CALCIUM CHLORIDE 5; .6; .31; .03; .02 G/100ML; G/100ML; G/100ML; G/100ML; G/100ML
INJECTION, SOLUTION INTRAVENOUS CONTINUOUS
Status: DISCONTINUED | OUTPATIENT
Start: 2022-12-01 | End: 2022-12-03 | Stop reason: HOSPADM

## 2022-12-01 RX ADMIN — IBUPROFEN 800 MG: 800 TABLET ORAL at 20:59

## 2022-12-01 RX ADMIN — SODIUM CHLORIDE, SODIUM LACTATE, POTASSIUM CHLORIDE, CALCIUM CHLORIDE AND DEXTROSE MONOHYDRATE: 5; 600; 310; 30; 20 INJECTION, SOLUTION INTRAVENOUS at 17:36

## 2022-12-01 RX ADMIN — ACETAMINOPHEN 975 MG: 325 TABLET, FILM COATED ORAL at 13:50

## 2022-12-01 RX ADMIN — ACETAMINOPHEN 975 MG: 325 TABLET, FILM COATED ORAL at 07:07

## 2022-12-01 RX ADMIN — SENNOSIDES AND DOCUSATE SODIUM 2 TABLET: 50; 8.6 TABLET ORAL at 20:58

## 2022-12-01 RX ADMIN — ENOXAPARIN SODIUM 40 MG: 100 INJECTION SUBCUTANEOUS at 09:07

## 2022-12-01 RX ADMIN — Medication 1 SPRAY: at 15:27

## 2022-12-01 RX ADMIN — KETOROLAC TROMETHAMINE 30 MG: 30 INJECTION, SOLUTION INTRAMUSCULAR at 03:13

## 2022-12-01 RX ADMIN — ACETAMINOPHEN 975 MG: 325 TABLET, FILM COATED ORAL at 00:33

## 2022-12-01 RX ADMIN — SODIUM CHLORIDE, SODIUM LACTATE, POTASSIUM CHLORIDE, CALCIUM CHLORIDE AND DEXTROSE MONOHYDRATE: 5; 600; 310; 30; 20 INJECTION, SOLUTION INTRAVENOUS at 09:02

## 2022-12-01 RX ADMIN — OXYCODONE HYDROCHLORIDE 10 MG: 5 TABLET ORAL at 00:33

## 2022-12-01 RX ADMIN — ACETAMINOPHEN 975 MG: 325 TABLET, FILM COATED ORAL at 19:08

## 2022-12-01 RX ADMIN — KETOROLAC TROMETHAMINE 30 MG: 30 INJECTION, SOLUTION INTRAMUSCULAR at 15:20

## 2022-12-01 RX ADMIN — KETOROLAC TROMETHAMINE 30 MG: 30 INJECTION, SOLUTION INTRAMUSCULAR at 09:14

## 2022-12-01 RX ADMIN — GUAIFENESIN 10 ML: 200 SOLUTION ORAL at 19:10

## 2022-12-01 RX ADMIN — GUAIFENESIN 10 ML: 200 SOLUTION ORAL at 13:50

## 2022-12-01 RX ADMIN — OXYCODONE HYDROCHLORIDE 10 MG: 5 TABLET ORAL at 04:33

## 2022-12-01 RX ADMIN — ALBUTEROL SULFATE 2 PUFF: 90 AEROSOL, METERED RESPIRATORY (INHALATION) at 13:51

## 2022-12-01 ASSESSMENT — ACTIVITIES OF DAILY LIVING (ADL)
ADLS_ACUITY_SCORE: 24
ADLS_ACUITY_SCORE: 21
ADLS_ACUITY_SCORE: 24
ADLS_ACUITY_SCORE: 24
ADLS_ACUITY_SCORE: 21
ADLS_ACUITY_SCORE: 24
ADLS_ACUITY_SCORE: 21
ADLS_ACUITY_SCORE: 24

## 2022-12-01 NOTE — PLAN OF CARE
VS WNL. Pt Covid +,  PP checks WNL, fundus 2 below the U, small amount of bleeding. Pt is able to ambulate, educated on use of abdominal binder. Pt able to empty bladder independently. Incision healing well. No apparent signs of infection. Patient taking scheduled toradol/tylenol and took PRN oxy X2. Positive attachment behaviors observed with infant.  at bedside and attentive towards patient.

## 2022-12-01 NOTE — PLAN OF CARE
Patient was not feeling well this am while she was up   she felt light headed and nauseated  she was assisted to bed  blood pressure a little low  HR normal and abdomen soft  ibuprofen and senna held d/t nausea  Dr was called.  IV was restarted and toradol was restarted as well.  Patient was able to rest and looks better.  She also asked for compression stockings and they were put on  she is able to empty her bladder and is passing flatus.  Will cont to maintain IVF and give toradol for pain.

## 2022-12-01 NOTE — PLAN OF CARE
Pt reports dizziness and nausea when getting up to the bathroom,1 hour after taking oxy, instructed pt to lay back down, vital signs remained unchanged, given honey crackers and something to drink. States feels a a little better, walked to the bathroom with SBA.

## 2022-12-01 NOTE — PLAN OF CARE
Goal Outcome Evaluation:  seems to be doing better this pm.  Has been to the bathroom and is able to empty her bladder   she has been resting and feeding her baby  she now c/o feeling dry  she was given ocean spray nasal saline spray      Plan of Care Reviewed With: patient, spouse

## 2022-12-01 NOTE — PROGRESS NOTES
[facility] OB/GYN Department    Post-Partum Progress Note: PPD #1    Name: Serina Washington  Date: 12/1/2022    Subjective:   Patient seen and examined.  Pain well controlled.  Tolerating regular diet.  Ambulating and voiding.  Lochia mod.  Breast feeding. Shortly after getting oxycodone, felt light headed and dizzy this am.  Better now with IVF.  States oxycodone helps her pain for about 1-2 hours, but then gets lightheaded and dizzy and doesn't feel it is worth the pain relief.  Currently on Toradol and Tylenol for pain.    ROS:    General/Constitutional:  Denies chills or fever  Respiratory: Denies shortness of breath  Cardiovascular: Denies chest pain  Gastrointestinal:  +mild uterine cramping, no nausea or vomiting  Genitourinary: Denies difficulty urinating  Musculoskeletal: Denies  peripheral edema      Objective:     Intake/Output Summary (Last 24 hours) at 12/1/2022 1226  Last data filed at 11/30/2022 2300  Gross per 24 hour   Intake 1000 ml   Output 2500 ml   Net -1500 ml       Patient Vitals for the past 24 hrs:   BP Temp Temp src Pulse Resp SpO2   12/01/22 0905 -- -- -- -- -- 95 %   12/01/22 0900 -- -- -- -- -- 94 %   12/01/22 0855 -- -- -- -- -- 95 %   12/01/22 0850 -- -- -- -- -- 94 %   12/01/22 0845 97/60 98  F (36.7  C) Oral -- -- 96 %   12/01/22 0840 -- -- -- -- -- 96 %   12/01/22 0835 (!) 88/55 -- -- -- -- --   12/01/22 0528 95/61 -- -- 73 -- 95 %   12/01/22 0407 92/59 97.5  F (36.4  C) Oral 74 18 98 %   12/01/22 0100 92/57 97.7  F (36.5  C) Oral 76 18 96 %   11/30/22 1938 90/55 97.6  F (36.4  C) Oral 71 18 95 %   11/30/22 1500 99/53 98  F (36.7  C) Oral 71 18 96 %   11/30/22 1300 (!) 87/52 -- -- 71 -- 95 %       Recent Labs   Lab 12/01/22  0549 11/30/22  0629   HGB 10.3* 12.5       General appearance: well-hydrated, A&O x 3, no apparent distress  ENT: EOMI, sclera anicteric   Lungs: Equal expansion bilaterally, no accessory muscle use  Heart: No heaves or thrills. No peripheral  varicosities  Constitutional: See vitals  Abdomen: Soft, non-distended, no rebound or rigidity   Incision: Clead, dry, intact  Uterus: Firm 2 cm below umbilicus with non-tender fundus   Neurologic: CN II-XII grossly intact, no lateralizing defects, no gross movement abnormalities  Extremities: no edema, no calf tenderness    Assessment and Plan:    37 year old   s/p repeat  delivery  Routine postpartum cares  Mild anemia.    Episode of lightheadedness/dizziness and nausea this am. This was shortly after oxycodone dose.  Feeling better after IVF.  Will hold oxycodone. Currently on Toradol and tylenol for pain.  Will recheck a hemoglobin.  Consider alternate medications if tylenol and ibuprofen is not enough.  Positive covid on admit. Asymptomatic.  Lovenox s/p .  On isolation precautions.    Galilea Moses MD

## 2022-12-02 PROCEDURE — 250N000013 HC RX MED GY IP 250 OP 250 PS 637: Performed by: OBSTETRICS & GYNECOLOGY

## 2022-12-02 PROCEDURE — 120N000001 HC R&B MED SURG/OB

## 2022-12-02 PROCEDURE — 250N000011 HC RX IP 250 OP 636: Performed by: OBSTETRICS & GYNECOLOGY

## 2022-12-02 RX ORDER — L-DESOXYEPHEDRINE 50 MG
INHALER (EA) NASAL DAILY PRN
Status: DISCONTINUED | OUTPATIENT
Start: 2022-12-02 | End: 2022-12-03 | Stop reason: HOSPADM

## 2022-12-02 RX ADMIN — IBUPROFEN 800 MG: 800 TABLET ORAL at 15:32

## 2022-12-02 RX ADMIN — GUAIFENESIN 10 ML: 200 SOLUTION ORAL at 17:40

## 2022-12-02 RX ADMIN — GUAIFENESIN 10 ML: 200 SOLUTION ORAL at 21:49

## 2022-12-02 RX ADMIN — ACETAMINOPHEN 975 MG: 325 TABLET, FILM COATED ORAL at 15:31

## 2022-12-02 RX ADMIN — GUAIFENESIN 10 ML: 200 SOLUTION ORAL at 05:44

## 2022-12-02 RX ADMIN — PRENATAL VITAMINS-IRON FUMARATE 27 MG IRON-FOLIC ACID 0.8 MG TABLET 1 TABLET: at 17:22

## 2022-12-02 RX ADMIN — IBUPROFEN 800 MG: 800 TABLET ORAL at 21:49

## 2022-12-02 RX ADMIN — Medication 1 SPRAY: at 08:53

## 2022-12-02 RX ADMIN — SENNOSIDES AND DOCUSATE SODIUM 2 TABLET: 50; 8.6 TABLET ORAL at 08:44

## 2022-12-02 RX ADMIN — ACETAMINOPHEN 975 MG: 325 TABLET, FILM COATED ORAL at 08:51

## 2022-12-02 RX ADMIN — GUAIFENESIN 10 ML: 200 SOLUTION ORAL at 00:02

## 2022-12-02 RX ADMIN — ENOXAPARIN SODIUM 40 MG: 100 INJECTION SUBCUTANEOUS at 08:52

## 2022-12-02 RX ADMIN — ACETAMINOPHEN 975 MG: 325 TABLET, FILM COATED ORAL at 01:54

## 2022-12-02 RX ADMIN — ALBUTEROL SULFATE 2 PUFF: 90 AEROSOL, METERED RESPIRATORY (INHALATION) at 08:52

## 2022-12-02 RX ADMIN — IBUPROFEN 800 MG: 800 TABLET ORAL at 09:24

## 2022-12-02 RX ADMIN — ACETAMINOPHEN 975 MG: 325 TABLET, FILM COATED ORAL at 21:49

## 2022-12-02 RX ADMIN — IBUPROFEN 800 MG: 800 TABLET ORAL at 03:10

## 2022-12-02 ASSESSMENT — ACTIVITIES OF DAILY LIVING (ADL)
ADLS_ACUITY_SCORE: 20
ADLS_ACUITY_SCORE: 21
ADLS_ACUITY_SCORE: 20
ADLS_ACUITY_SCORE: 21

## 2022-12-02 NOTE — PLAN OF CARE
.Data: Vital signs within normal limits. Postpartum checks within normal limits - see flow record. Patient eating and drinking normally. Patient able to empty bladder independently and is up ambulating. No apparent signs of infection. Incision healing well. Patient performing self cares and is able to care for infant.  Action: Patient medicated during the shift for pain. See MAR. Patient reassessed within 1 hour after each medication and pain was improved - patient stated she was comfortable. Patient education done about rest, pain management, NB care. See flow record.  Response: Positive attachment behaviors observed with infant. Support persons  present.   Plan: Continue POC    PT reported no dizziness throughout shift, pt has frequent cough, robitussin given X2

## 2022-12-02 NOTE — PLAN OF CARE
Disappointed that she cant go home today   c/o of dryness in the hospital  has ocean spray at bedside  Dr tiara lantigua vapor rub  will try that for comfort.  Breastfeeding well  C/o headache this am  mostly relieved with tylenol and caffeine

## 2022-12-02 NOTE — PROGRESS NOTES
[facility] OB/GYN Daily Postpartum Note    S: Ms. Washington is doing ok this morning. She felt yucky on the oxycodone (dizzy and sick), so has been trying to control pain on ibuprofen and tylenol alone. Incision is burning with movement. She tolerating a regular diet without nausea or vomiting. Ambulating without difficulty. Passing flatus and BM. Had some diarrhea. Lochia is decreasing. Feels overall yucky from COVID. Coughing is hurting incision and the room is so dry.     O:   VS: Patient Vitals for the past 24 hrs:   BP Temp Temp src Pulse Resp SpO2   12/02/22 0940 110/72 97.8  F (36.6  C) Oral 81 16 --   12/01/22 2334 96/62 97.9  F (36.6  C) Oral 82 -- --   12/01/22 1800 95/63 98.6  F (37  C) Oral 97 -- 97 %   12/01/22 1400 97/67 98  F (36.7  C) Axillary 78 -- --     General: resting in bed, in NAD  CV: Reg rate, warm and well perfused  Resp: breathing comfortably on room air   Abdomen: soft, appropriately tender, nondistended  Fundus firm 3cm below the umbilicus, incision is c/d/i  Extremities: non-tender, non-edematous     Recent Labs   Lab 12/01/22  1354 12/01/22  0549 11/30/22  0629   HGB 10.0* 10.3* 12.5       A/P: 38 yo P2, POD#2 s/p repeat c/s  Complicated by COVID   Continue supportive cares through postpartum period.   Stable serial Hgbs   Likely discharge tomorrow with improved pain control. Plan 6 wks lovenox given COVID and c/s.     Vicki Tracey MD, MD  [facility]   12/2/2022 12:16 PM

## 2022-12-03 VITALS
SYSTOLIC BLOOD PRESSURE: 104 MMHG | DIASTOLIC BLOOD PRESSURE: 67 MMHG | TEMPERATURE: 97.8 F | OXYGEN SATURATION: 98 % | RESPIRATION RATE: 16 BRPM | HEART RATE: 82 BPM

## 2022-12-03 PROCEDURE — 250N000013 HC RX MED GY IP 250 OP 250 PS 637: Performed by: OBSTETRICS & GYNECOLOGY

## 2022-12-03 PROCEDURE — 250N000011 HC RX IP 250 OP 636: Performed by: OBSTETRICS & GYNECOLOGY

## 2022-12-03 RX ORDER — HYDROMORPHONE HYDROCHLORIDE 2 MG/1
2-4 TABLET ORAL
Status: DISCONTINUED | OUTPATIENT
Start: 2022-12-03 | End: 2022-12-03 | Stop reason: HOSPADM

## 2022-12-03 RX ORDER — ENOXAPARIN SODIUM 100 MG/ML
40 INJECTION SUBCUTANEOUS EVERY 24 HOURS
Qty: 15.6 ML | Refills: 0 | Status: SHIPPED | OUTPATIENT
Start: 2022-12-04 | End: 2023-01-12

## 2022-12-03 RX ORDER — HYDROMORPHONE HYDROCHLORIDE 2 MG/1
2-4 TABLET ORAL
Qty: 15 TABLET | Refills: 0 | Status: SHIPPED | OUTPATIENT
Start: 2022-12-03 | End: 2023-01-11

## 2022-12-03 RX ORDER — AMOXICILLIN 250 MG
2 CAPSULE ORAL 2 TIMES DAILY PRN
Qty: 30 TABLET | Refills: 1 | Status: SHIPPED | OUTPATIENT
Start: 2022-12-03 | End: 2023-05-05

## 2022-12-03 RX ADMIN — IBUPROFEN 800 MG: 800 TABLET ORAL at 02:55

## 2022-12-03 RX ADMIN — ACETAMINOPHEN 975 MG: 325 TABLET, FILM COATED ORAL at 09:26

## 2022-12-03 RX ADMIN — ACETAMINOPHEN 975 MG: 325 TABLET, FILM COATED ORAL at 02:55

## 2022-12-03 RX ADMIN — ENOXAPARIN SODIUM 40 MG: 100 INJECTION SUBCUTANEOUS at 09:26

## 2022-12-03 RX ADMIN — IBUPROFEN 800 MG: 800 TABLET ORAL at 09:25

## 2022-12-03 RX ADMIN — SENNOSIDES AND DOCUSATE SODIUM 1 TABLET: 50; 8.6 TABLET ORAL at 09:26

## 2022-12-03 RX ADMIN — GUAIFENESIN 10 ML: 200 SOLUTION ORAL at 02:55

## 2022-12-03 ASSESSMENT — ACTIVITIES OF DAILY LIVING (ADL)
ADLS_ACUITY_SCORE: 20

## 2022-12-03 NOTE — PLAN OF CARE
Patient discharged per wheelchair with infant in car seat. Mother verified that her band matches her infant's band by comparing the infant's  MR#.  Discharge instructions given. Encouraged to call for any problems, questions or concerns. RXs sent to [facility], [facility].

## 2022-12-03 NOTE — PLAN OF CARE
Data: Vital signs within normal limits. Postpartum checks within normal limits - see flow record. Patient  Is tolerating po intake. Patient is able to empty bladder independently. . Patient ambulating independently..   No apparent signs of infection. Incision healing well. Patient Is performing self cares and Is able to care for infant. Positive attachment behaviors are observed with infant. Support persons are present.  Action:  Pain plan was discussed. Pain meds, post  are scheduled and will be brought in when they are due. Additional narcotics will be administered prn. Patient was medicated during the shift for pain. See MAR.Patient education done about breastfeeding, formula feeding,  cares, postpartum cares, pain management/plan, and rest. See flow record.  Response:   Patient reassessed within 1 hour after each medication for pain. Patient stated that pain had improved. Patient stated that she was comfortable. .   Plan: Anticipate discharge on 12/3-.     PRN robitussin given for cough.  Scheduled tylenol and ibuprofen given at 0255 for headache/pain related to coughing. Encouraging patient to splint incision with pillow with coughing episodes.  0500 patient c/o headache/feeling exhausted, cold washcloth placed on forehead, vitals taken and were stable.  Encouraged patient to only breastfeed and have  supplement baby as patient has not slept much tonight.  At 0645 patient stated headache was improving with rest.    Heat pack applied to right breast for engorgement discomfort, encouraging patient to massage breasts while breastfeeding/pumping, states there has been a little improvement in discomfort throughout night.

## 2022-12-03 NOTE — PLAN OF CARE
Goal Outcome Evaluation:         Pt states breast engorgement pain, gave hot pack for side of breast, hydrogels for nipples. Encouraging a deeper latch, repositioning. NB is voiding and stooling in large amounts, weighing diapers. IV decreased to 6cc/hr after pre feed of BG 72. Will continue to assess and monitor

## 2022-12-03 NOTE — DISCHARGE SUMMARY
"[facility]  Delivery Discharge Summary    Admit date: 2022  Discharge date: 12/3/2022     Admit Dx:   - 37 year old y/o  at 39w1d  - History of  section, planned repeat   - COVID+, symptomatic on admission     Discharge Dx:  - Same as above  - acute, symptomatic blood loss anemia     Procedures:  - repeat low transverse  section   - spinal analgesia    Admit HPI:  Serina Washington is a 37 year old  @ 39+1 Weeks EGA  She presented with Covid + status for a repeat CS on 2022  Pregnancy was complicated by Recent onset of her second Covid infection in a year  GBS neg  Membrane status: Intact  Labored with:  none  Pain meds Spinal  FHR Cat I  Delivered a viable Female  @ 08:05Via repeat LST CS  Weight 9#2 oz   APGAR's 9/9  Placenta delivered @ 08:07 , was complete with a 3vessel cord  Please see the Op report for complete details    ml  \"Kj\"  Uncomplicated  delivery with  ml.   Please see her admit H&P for full details of her PMH, PSH, Meds, Allergies and exam on admit.    Her postoperative course was complicated by COVID+ status, for quite supportive cares were employed. She never became hypoxic during her admission and remained afebrile. She did have symptomatic blood loss anemia, for which she was monitored closely for with serial Hgb draws. Once stable, lovenox for VTE ppx was started. On POD#3, she was meeting all of her postpartum goals and deemed stable for discharge. She was voiding without difficulty, tolerating a regular diet without nausea and vomiting, her pain was well controlled on oral pain medicines and her lochia was appropriate. Her hemoglobin after delivery was 10.0. Her Rh status was POS and Rhogam was not indicated. At the time of discharge, she was breast feeding her infant.    Physical exam on the day of discharge:  Vitals:    22 0940 22 1515 22 0053 22 0426   BP: 110/72 " 108/72 98/68 107/66   BP Location:   Left arm Right arm   Patient Position:   Semi-Contreras's Semi-Contreras's   Cuff Size:    Adult Regular   Pulse: 81 79 69 69   Resp: 16  16 16   Temp: 97.8  F (36.6  C) 97.8  F (36.6  C) 97.5  F (36.4  C)    TempSrc: Oral Oral Oral    SpO2:   99% 98%       General: sitting up, alert and cooperative  Abd: soft, non-distended, non-tender. Fundus firm, nontender, 3cm below umbilicus.   Incision clean/dry/intact with dermabond in place.  Extremities: calves nontender, no edema of lower extremities bilaterally    Lab Results   Component Value Date    HGB 10.0 2022    HGB 10.3 2022    HGB 13.2 2020    HGB 13.7 10/08/2020     Blood type:   Lab Results   Component Value Date    RH Pos 2017       Discharge/Disposition:  Serina Washington was discharged to home in stable condition with the following instructions/medications:  1) Call for temperature > 100.4, foul smelling vaginal discharge, bleeding > 1 pad per hour x 2 hrs, pain not controlled by oral pain meds, severe constipation or severe nausea or vomiting.  2)  She was instructed to follow-up with her primary OB in 6 weeks for a routine postpartum visit.  3) She was instructed to continue her PNV on discharge if she wished to breast feed her infant.  4) Post-op instructions: reg diet, limit lifting to <20lbs for 6 weeks, pelvic rest (nothing in the vagina for 6 weeks)   5) She was discharged home with the following medications:      Review of your medicines      START taking      Dose / Directions   enoxaparin ANTICOAGULANT 40 MG/0.4ML syringe  Commonly known as: LOVENOX  Used for: S/P       Dose: 40 mg  Start taking on: 2022  Inject 0.4 mLs (40 mg) Subcutaneous every 24 hours for 39 days  Quantity: 15.6 mL  Refills: 0     HYDROmorphone 2 MG tablet  Commonly known as: DILAUDID  Used for: S/P       Dose: 2-4 mg  Take 1-2 tablets (2-4 mg) by mouth every 3 hours as needed for moderate pain  (4-6) or severe pain (7-10)  Quantity: 15 tablet  Refills: 0     senna-docusate 8.6-50 MG tablet  Commonly known as: SENOKOT-S/PERICOLACE  Used for: S/P       Dose: 2 tablet  Take 2 tablets by mouth 2 times daily as needed for constipation  Quantity: 30 tablet  Refills: 1        CONTINUE these medicines which have NOT CHANGED      Dose / Directions   albuterol 108 (90 Base) MCG/ACT inhaler  Commonly known as: PROAIR HFA/PROVENTIL HFA/VENTOLIN HFA  Used for: Bronchitis      Dose: 2 puff  Inhale 2 puffs into the lungs 4 times daily  Quantity: 18 g  Refills: 0     blood glucose monitoring meter device kit  Commonly known as: NO BRAND SPECIFIED  Used for: Encounter for supervision of other normal pregnancy in second trimester      Use to test blood sugar 4 times daily or as directed. Preferred blood glucose meter per insurance  Quantity: 1 kit  Refills: 0     blood glucose test strip  Commonly known as: NO BRAND SPECIFIED  Used for: Encounter for supervision of other normal pregnancy in second trimester      Use to test blood sugar 4 times daily or as directed. To accompany: Blood Glucose Monitor Brands: per insurance.  Quantity: 100 strip  Refills: 6     PRENATAL VITAMINS PO      Dose: 2 tablet  Take 2 tablets by mouth every evening [place]  Refills: 0     thin lancets  Commonly known as: NO BRAND SPECIFIED  Used for: Encounter for supervision of other normal pregnancy in second trimester      Use to test blood sugar 4 times daily or as directed. To accompany: Blood Glucose Monitor Brands: per insurance.  Quantity: 100 each  Refills: 3        STOP taking    doxylamine 25 MG Tabs tablet  Commonly known as: UNISOM        ondansetron 4 MG ODT tab  Commonly known as: Zofran ODT        ondansetron 4 MG tablet  Commonly known as: ZOFRAN        vitamin B complex with vitamin C tablet              Where to get your medicines      These medications were sent to [facility] [address] [place] [address]  [address]  [address], [facility] [address]    Phone: [phone]     enoxaparin ANTICOAGULANT 40 MG/0.4ML syringe    HYDROmorphone 2 MG tablet    senna-docusate 8.6-50 MG tablet         Vicki Tracey MD  [facility] OB/[facility]yn

## 2022-12-05 ENCOUNTER — TELEPHONE (OUTPATIENT)
Dept: OBGYN | Facility: CLINIC | Age: 37
End: 2022-12-05

## 2022-12-05 NOTE — TELEPHONE ENCOUNTER
Completed paperwork was faxed.  Will keep a copy up front for a few weeks and then send it to be scanned into chart.    -Kiara Brannon  [facility]

## 2022-12-26 ENCOUNTER — MEDICAL CORRESPONDENCE (OUTPATIENT)
Dept: HEALTH INFORMATION MANAGEMENT | Facility: CLINIC | Age: 37
End: 2022-12-26

## 2023-01-11 ENCOUNTER — PRENATAL OFFICE VISIT (OUTPATIENT)
Dept: OBGYN | Facility: CLINIC | Age: 38
End: 2023-01-11
Payer: COMMERCIAL

## 2023-01-11 VITALS
WEIGHT: 153 LBS | TEMPERATURE: 97.5 F | HEIGHT: 64 IN | RESPIRATION RATE: 16 BRPM | SYSTOLIC BLOOD PRESSURE: 105 MMHG | HEART RATE: 81 BPM | BODY MASS INDEX: 26.12 KG/M2 | DIASTOLIC BLOOD PRESSURE: 71 MMHG

## 2023-01-11 PROBLEM — U07.1 INFECTION DUE TO 2019 NOVEL CORONAVIRUS: Status: RESOLVED | Noted: 2022-11-30 | Resolved: 2023-01-11

## 2023-01-11 PROCEDURE — 99207 PR POST PARTUM EXAM: CPT | Performed by: OBSTETRICS & GYNECOLOGY

## 2023-01-11 ASSESSMENT — ANXIETY QUESTIONNAIRES
IF YOU CHECKED OFF ANY PROBLEMS ON THIS QUESTIONNAIRE, HOW DIFFICULT HAVE THESE PROBLEMS MADE IT FOR YOU TO DO YOUR WORK, TAKE CARE OF THINGS AT HOME, OR GET ALONG WITH OTHER PEOPLE: NOT DIFFICULT AT ALL
6. BECOMING EASILY ANNOYED OR IRRITABLE: NOT AT ALL
5. BEING SO RESTLESS THAT IT IS HARD TO SIT STILL: NOT AT ALL
3. WORRYING TOO MUCH ABOUT DIFFERENT THINGS: NOT AT ALL
7. FEELING AFRAID AS IF SOMETHING AWFUL MIGHT HAPPEN: NOT AT ALL
2. NOT BEING ABLE TO STOP OR CONTROL WORRYING: NOT AT ALL
1. FEELING NERVOUS, ANXIOUS, OR ON EDGE: SEVERAL DAYS
GAD7 TOTAL SCORE: 1
GAD7 TOTAL SCORE: 1

## 2023-01-11 ASSESSMENT — PATIENT HEALTH QUESTIONNAIRE - PHQ9
5. POOR APPETITE OR OVEREATING: NOT AT ALL
SUM OF ALL RESPONSES TO PHQ QUESTIONS 1-9: 4

## 2023-01-11 NOTE — PROGRESS NOTES
"SUBJECTIVE:  Serina Washington is a 37 year old female  here for a postpartum visit.  She had a  Section  on 2022 delivering a healthy baby girl weighing 9 lbs 2 oz at term.      delivery complications:  No  breast feeding:  Yes,   bladder problems:  No  bowel problems/hemorrhoids:  No  incision healed? Yes:   vaginal flow:  None  [place]:  No  contraception:  considering  emotional adjustment:  doing well and happy  back to work:      OBJECTIVE:  Blood pressure 105/71, pulse 81, temperature 97.5  F (36.4  C), resp. rate 16, height 1.626 m (5' 4\"), weight 69.4 kg (153 lb), last menstrual period 2022, currently breastfeeding.   General - pleasant female in no acute distress.  Breast - LactatingAbdomen - soft, nontender, nondistended, no hepatosplenomegaly.  Pelvic - EG: normal adult female, BUS: within normal limits, Vagina: well rugated, no discharge, Cervix: no lesions or CMT, Uterus: firm, normal sized and nontender, Adnexae: no masses or tenderness.  Rectovaginal - deferred.    ASSESSMENT:  normal postpartum exam    PLAN:  Discussed kegel exercises   May resume normal activities without restrictions  Pap smear was not  done today    The patient will consider options  for birth control. Full counseling was provided, and all questions answered. Compliance is strongly emphasized.  Return to clinic in one year for an annual, when due for a pap smear or PRN.    Quinn Cash MD    "

## 2023-03-30 ENCOUNTER — MEDICAL CORRESPONDENCE (OUTPATIENT)
Dept: HEALTH INFORMATION MANAGEMENT | Facility: CLINIC | Age: 38
End: 2023-03-30
Payer: COMMERCIAL

## 2023-05-05 ENCOUNTER — OFFICE VISIT (OUTPATIENT)
Dept: FAMILY MEDICINE | Facility: CLINIC | Age: 38
End: 2023-05-05
Payer: COMMERCIAL

## 2023-05-05 VITALS
DIASTOLIC BLOOD PRESSURE: 62 MMHG | SYSTOLIC BLOOD PRESSURE: 94 MMHG | RESPIRATION RATE: 16 BRPM | WEIGHT: 150.8 LBS | OXYGEN SATURATION: 98 % | BODY MASS INDEX: 25.12 KG/M2 | HEART RATE: 66 BPM | HEIGHT: 65 IN | TEMPERATURE: 97.1 F

## 2023-05-05 DIAGNOSIS — R42 LIGHTHEADEDNESS: ICD-10-CM

## 2023-05-05 DIAGNOSIS — Z00.00 ROUTINE GENERAL MEDICAL EXAMINATION AT A HEALTH CARE FACILITY: Primary | ICD-10-CM

## 2023-05-05 DIAGNOSIS — L65.0 TELOGEN EFFLUVIUM: ICD-10-CM

## 2023-05-05 DIAGNOSIS — D23.61 DERMATOFIBROMA OF RIGHT UPPER ARM: ICD-10-CM

## 2023-05-05 PROBLEM — Z34.80 PRENATAL CARE, SUBSEQUENT PREGNANCY: Status: RESOLVED | Noted: 2022-04-21 | Resolved: 2023-05-05

## 2023-05-05 LAB
ERYTHROCYTE [DISTWIDTH] IN BLOOD BY AUTOMATED COUNT: 12.6 % (ref 10–15)
HCT VFR BLD AUTO: 39.6 % (ref 35–47)
HGB BLD-MCNC: 13.1 G/DL (ref 11.7–15.7)
MCH RBC QN AUTO: 29.3 PG (ref 26.5–33)
MCHC RBC AUTO-ENTMCNC: 33.1 G/DL (ref 31.5–36.5)
MCV RBC AUTO: 89 FL (ref 78–100)
PLATELET # BLD AUTO: 350 10E3/UL (ref 150–450)
RBC # BLD AUTO: 4.47 10E6/UL (ref 3.8–5.2)
WBC # BLD AUTO: 6.6 10E3/UL (ref 4–11)

## 2023-05-05 PROCEDURE — 36415 COLL VENOUS BLD VENIPUNCTURE: CPT | Performed by: FAMILY MEDICINE

## 2023-05-05 PROCEDURE — 85027 COMPLETE CBC AUTOMATED: CPT | Performed by: FAMILY MEDICINE

## 2023-05-05 PROCEDURE — 99395 PREV VISIT EST AGE 18-39: CPT | Performed by: FAMILY MEDICINE

## 2023-05-05 ASSESSMENT — ENCOUNTER SYMPTOMS
WEAKNESS: 0
BREAST MASS: 0
PALPITATIONS: 0
HEADACHES: 0
SORE THROAT: 0
PARESTHESIAS: 0
NAUSEA: 0
MYALGIAS: 0
CHILLS: 0
COUGH: 0
FEVER: 0
ABDOMINAL PAIN: 0
HEARTBURN: 0
HEMATOCHEZIA: 0
CONSTIPATION: 0
JOINT SWELLING: 0
SHORTNESS OF BREATH: 0
NERVOUS/ANXIOUS: 0
DIZZINESS: 1
ARTHRALGIAS: 0
HEMATURIA: 0
FREQUENCY: 0
DYSURIA: 0
DIARRHEA: 0
EYE PAIN: 0

## 2023-05-05 NOTE — PROGRESS NOTES
SUBJECTIVE:   CC: Serina is an 37 year old who presents for preventive health visit.        View : No data to display.              Patient has been advised of split billing requirements and indicates understanding: Yes  Healthy Habits:     Getting at least 3 servings of Calcium per day:  Yes    Bi-annual eye exam:  Yes    Dental care twice a year:  Yes    Sleep apnea or symptoms of sleep apnea:  None    Diet:  Regular (no restrictions)    Frequency of exercise:  4-5 days/week    Duration of exercise:  30-45 minutes    Taking medications regularly:  Not Applicable    Medication side effects:  Not applicable    PHQ-2 Total Score: 0    Additional concerns today:  Yes    Concerns:  * hair loss  Feels like she is losing a lot of hair over the last month.  Feels like everything is covered in hair (pillows, shirts, tub).  Has been trying to minimize washing and brushing to avoid hair loss.  No patchy loss.  No scalp concerns.    * moles  Back of R upper arm that has been present for years, sometimes itchy.  Feels like it has been growing, bothersome    * dizziness  Has had intermittent dizziness for a while, throughout pregnancy and feels like she is overall low energy.    Finds that she notices it with position change at times. Just feels low energy.  Might experience this a few times per month.      Usually up a few times per night to feed her baby and wonders if that is contributing.      Has been taking her prenatal vitamins.        Today's PHQ-2 Score:       5/5/2023    11:38 AM   PHQ-2 ( 1999 [employer])   Q1: Little interest or pleasure in doing things 0   Q2: Feeling down, depressed or hopeless 0   PHQ-2 Score 0   Q1: Little interest or pleasure in doing things Not at all   Q2: Feeling down, depressed or hopeless Not at all   PHQ-2 Score 0       Have you ever done Advance Care Planning? (For example, a Health Directive, POLST, or a discussion with a medical provider or your loved ones about your wishes):     Social  History     Tobacco Use     Smoking status: Never     Smokeless tobacco: Never   Vaping Use     Vaping status: Never Used   Substance Use Topics     Alcohol use: Not Currently     Comment: occas-quit with pregnancy             2023    11:38 AM   Alcohol Use   Prescreen: >3 drinks/day or >7 drinks/week? Not Applicable     Reviewed orders with patient.  Reviewed health maintenance and updated orders accordingly - Yes      Breast Cancer Screening:  Any new diagnosis of family breast, ovarian, or bowel cancer?     FHS-7:        View : No data to display.                Patient under 40 years of age: Routine Mammogram Screening not recommended.   Pertinent mammograms are reviewed under the imaging tab.    History of abnormal Pap smear: NO - age 30-65 PAP every 5 years with negative HPV co-testing recommended      Latest Ref Rng & Units 2022     9:13 AM 2017    12:03 PM   PAP / HPV   PAP  Negative for Intraepithelial Lesion or Malignancy (NILM)      PAP (Historical)   NIL     HPV 16 DNA Negative Negative   Negative     HPV 18 DNA Negative Negative   Negative     Other HR HPV Negative Negative   Negative       Reviewed and updated as needed this visit by clinical staff   Tobacco  Allergies  Meds              Reviewed and updated as needed this visit by Provider   Tobacco  Allergies  Meds             No past medical history on file.   Past Surgical History:   Procedure Laterality Date      SECTION N/A 09/10/2017    Procedure:  SECTION;  Primary  Section;  Surgeon: Mu Miranda MD;  Location: [facility]      SECTION Bilateral 2022    Procedure:  SECTION;  Surgeon: Quinn Cash MD;  Location: [facility]     DILATION AND CURETTAGE SUCTION N/A 2021    Procedure: DILATION AND CURETTAGE, UTERUS, USING SUCTION;  Surgeon: Vicki Tracey MD;  Location: [facility]     OPERATIVE HYSTEROSCOPY N/A 2020    Procedure: HYSTEROSCOPY,  "THERAPEUTIC;  Surgeon: Quinn Cash MD;  Location: [facility]     REVISE SCAR TRUNK Bilateral 11/30/2022    Procedure: REVISION, SCAR, TORSO;  Surgeon: Quinn Cash MD;  Location: [facility]     SURGICAL HISTORY OF -  Left     knee surgery 2005 and 2020       Review of Systems   Constitutional: Negative for chills and fever.   HENT: Negative for congestion, ear pain, hearing loss and sore throat.    Eyes: Negative for pain and visual disturbance.   Respiratory: Negative for cough and shortness of breath.    Cardiovascular: Negative for chest pain, palpitations and peripheral edema.   Gastrointestinal: Negative for abdominal pain, constipation, diarrhea, heartburn, hematochezia and nausea.   Breasts:  Negative for tenderness, breast mass and discharge.   Genitourinary: Negative for dysuria, frequency, genital sores, hematuria, pelvic pain, urgency, vaginal bleeding and vaginal discharge.   Musculoskeletal: Negative for arthralgias, joint swelling and myalgias.   Skin: Negative for rash.   Neurological: Positive for dizziness. Negative for weakness, headaches and paresthesias.   Psychiatric/Behavioral: Negative for mood changes. The patient is not nervous/anxious.           OBJECTIVE:   BP 94/62   Pulse 66   Temp 97.1  F (36.2  C) (Tympanic)   Resp 16   Ht 1.638 m (5' 4.5\")   Wt 68.4 kg (150 lb 12.8 oz)   LMP 04/18/2023   SpO2 98%   Breastfeeding Yes   BMI 25.49 kg/m    Physical Exam  GENERAL: healthy, alert and no distress  EYES: Eyes grossly normal to inspection, PERRL and conjunctivae and sclerae normal  NECK: no adenopathy, no asymmetry, masses, or scars and thyroid normal to palpation  RESP: lungs clear to auscultation - no rales, rhonchi or wheezes  CV: regular rate and rhythm, normal S1 S2, no S3 or S4, no murmur, click or rub, no peripheral edema and peripheral pulses strong  ABDOMEN: soft, nontender, no hepatosplenomegaly, no masses and bowel sounds normal  MS: no gross musculoskeletal " "defects noted, no edema  SKIN: no suspicious lesions or rashes and dermatofibroma posterior R upper arm.    NEURO: Normal strength and tone, mentation intact and speech normal  PSYCH: mentation appears normal, affect normal/bright    Diagnostic Test Results:  Labs reviewed in Epic    ASSESSMENT/PLAN:       ICD-10-CM    1. Routine general medical examination at a health care facility  Z00.00       2. Dermatofibroma of right upper arm  D23.61 Adult Dermatology Referral      3. Lightheadedness  R42 CBC with platelets     CBC with platelets      4. Telogen effluvium  L65.0         Dermatofibroma:  Reviewed pathophysiology.  Pt desires removal.  Derm referral placed.    Lightheadedness:  Mild, fleeting, occasionally positional.  Likely multifactorial with fatigue contributing.  Adequate hydration, sleep.  Check Hgb today    Hair loss:  Most like telogen effluvium given timing.  Reviewed pathophysiology.  Should be slowing down.  Pt will reach out if it persists.    Patient has been advised of split billing requirements and indicates understanding: Yes      COUNSELING:  Reviewed preventive health counseling, as reflected in patient instructions      BMI:   Estimated body mass index is 25.49 kg/m  as calculated from the following:    Height as of this encounter: 1.638 m (5' 4.5\").    Weight as of this encounter: 68.4 kg (150 lb 12.8 oz).         She reports that she has never smoked. She has never used smokeless tobacco.          Gemini Esquivel DO  [facility]  "

## 2023-05-05 NOTE — NURSING NOTE
"Initial BP 94/62   Pulse 66   Temp 97.1  F (36.2  C) (Tympanic)   Resp 16   Ht 1.638 m (5' 4.5\")   Wt 68.4 kg (150 lb 12.8 oz)   LMP 04/18/2023   SpO2 98%   Breastfeeding Yes   BMI 25.49 kg/m   Estimated body mass index is 25.49 kg/m  as calculated from the following:    Height as of this encounter: 1.638 m (5' 4.5\").    Weight as of this encounter: 68.4 kg (150 lb 12.8 oz). .      "

## 2023-09-21 ENCOUNTER — OFFICE VISIT (OUTPATIENT)
Dept: DERMATOLOGY | Facility: CLINIC | Age: 38
End: 2023-09-21
Attending: FAMILY MEDICINE
Payer: COMMERCIAL

## 2023-09-21 DIAGNOSIS — B07.8 FLAT WART: Primary | ICD-10-CM

## 2023-09-21 DIAGNOSIS — D23.61 DERMATOFIBROMA OF RIGHT UPPER ARM: ICD-10-CM

## 2023-09-21 PROCEDURE — 99203 OFFICE O/P NEW LOW 30 MIN: CPT | Mod: 25 | Performed by: PHYSICIAN ASSISTANT

## 2023-09-21 PROCEDURE — 17110 DESTRUCTION B9 LES UP TO 14: CPT | Performed by: PHYSICIAN ASSISTANT

## 2023-09-21 ASSESSMENT — PAIN SCALES - GENERAL: PAINLEVEL: NO PAIN (0)

## 2023-09-21 NOTE — NURSING NOTE
Chief Complaint   Patient presents with    Derm Problem     Spot on right upper arm that is itchy, has had for a couple year and one on right thigh and bumps on fingers       There were no vitals filed for this visit.  Wt Readings from Last 1 Encounters:   05/05/23 68.4 kg (150 lb 12.8 oz)       Fara Bruno LPN .................9/21/2023     HSQ, Venodynes

## 2023-09-21 NOTE — LETTER
2023         RE: Serina Washington  [address]  [facility] [address]        Dear Colleague,    Thank you for referring your patient, Serina Washington, to the [facility]. Please see a copy of my visit note below.    Serina Washington is an extremely pleasant 38 year old year old female patient here today for bump  on right arm. Present for years. She notes she will sometimes pick at spot. She also notes spot on hands, present a few years not bothersome. Patient has no other skin complaints today.  Remainder of the HPI, Meds, PMH, Allergies, FH, and SH was reviewed in chart.    No past medical history on file.    Past Surgical History:   Procedure Laterality Date      SECTION N/A 09/10/2017    Procedure:  SECTION;  Primary  Section;  Surgeon: Mu Miranda MD;  Location: [facility]      SECTION Bilateral 2022    Procedure:  SECTION;  Surgeon: Quinn Cash MD;  Location: [facility]     DILATION AND CURETTAGE SUCTION N/A 2021    Procedure: DILATION AND CURETTAGE, UTERUS, USING SUCTION;  Surgeon: Vicki Tracey MD;  Location: [facility]     OPERATIVE HYSTEROSCOPY N/A 2020    Procedure: HYSTEROSCOPY, THERAPEUTIC;  Surgeon: Quinn Cash MD;  Location: [facility]     REVISE SCAR TRUNK Bilateral 2022    Procedure: REVISION, SCAR, TORSO;  Surgeon: Quinn Cash MD;  Location: [facility]     SURGICAL HISTORY OF -  Left     knee surgery  and         Family History   Problem Relation Age of Onset     Hypertension Mother      Thyroid Disease Mother      Kidney Disease Father         dialysis     Hypertension Father      Gastrointestinal Disease Father         stomach ulcer     Skin Cancer Maternal Grandmother      Cancer Paternal Grandmother      Cancer Paternal Grandfather        Social History     Socioeconomic History     Marital status:      Spouse name: Not on file     Number of  children: 1     Years of education: Not on file     Highest education level: Not on file   Occupational History     Not on file   Tobacco Use     Smoking status: Never     Smokeless tobacco: Never   Vaping Use     Vaping Use: Never used   Substance and Sexual Activity     Alcohol use: Not Currently     Comment: occas-quit with pregnancy     Drug use: No     Sexual activity: Yes     Partners: Male   Other Topics Concern     Parent/sibling w/ CABG, MI or angioplasty before 65F 55M? No   Social History Narrative     Not on file     Social Determinants of Health     Financial Resource Strain: Not on file   Food Insecurity: Not on file   Transportation Needs: Not on file   Physical Activity: Not on file   Stress: Not on file   Social Connections: Not on file   Interpersonal Safety: Not on file   Housing Stability: Not on file       Outpatient Encounter Medications as of 2023   Medication Sig Dispense Refill     Prenatal Multivit-Min-Fe-FA (PRENATAL VITAMINS PO) Take 2 tablets by mouth every evening [place]        No facility-administered encounter medications on file as of 2023.             O:   NAD, WDWN, Alert & Oriented, Mood & Affect wnl, Vitals stable   Here today alone   There were no vitals taken for this visit.   General appearance normal   Vitals stable   Alert, oriented and in no acute distress      Brown firm papule with positive dimple sign on right arm, right leg    Verrucous papule on right hand x 3     Eyes: Conjunctivae/lids:Normal     ENT: Lips normal    MSK:Normal    Pulm: Breathing Normal     Neuro/Psych: Orientation:Alert and Orientedx3 ; Mood/Affect:normal     A/P:  Flat warts on hand x 3  LN2:  Treated with LN2 for 5s for 1-2 cycles. Warned risks of blistering, pain, pigment change, scarring, and incomplete resolution.  Advised patient to return if lesions do not completely resolve.  Wound care sheet given.  2. Dermatofibroma  Discussed excision, does not want to do at this time.  Can schedule with Dr. Huitron if bothersome in the future.   It was a pleasure speaking to Serina Washington today.  BENIGN LESIONS DISCUSSED WITH PATIENT:  I discussed the specifics of tumor, prognosis, and genetics of benign lesions.  I explained that treatment of these lesions would be purely cosmetic and not medically neccessary.  I discussed with patient different removal options including excision, cautery and /or laser.      Nature and genetics of benign skin lesions dicussed with patient.  Signs and Symptoms of skin cancer discussed with patient.  ABCDEs of melanoma reviewed with patient.  Patient encouraged to perform monthly skin exams.  UV precautions reviewed with patient.  Risks of non-melanoma skin cancer discussed with patient   Return to clinic in one year or sooner if needed.       Again, thank you for allowing me to participate in the care of your patient.        Sincerely,        Carlota Le PA-C

## 2023-10-10 ENCOUNTER — VIRTUAL VISIT (OUTPATIENT)
Dept: OBGYN | Facility: CLINIC | Age: 38
End: 2023-10-10
Payer: COMMERCIAL

## 2023-10-10 DIAGNOSIS — Z34.80 PRENATAL CARE OF MULTIGRAVIDA, ANTEPARTUM: Primary | ICD-10-CM

## 2023-10-10 PROCEDURE — 99207 PR NO CHARGE NURSE ONLY: CPT

## 2023-10-10 NOTE — PROGRESS NOTES
Patient denied need for review of teaching and declined teaching to be sent per [place]  Patient father just passed 10/8/23    Patient supplied answers from flow sheet for:  Prenatal OB Questionnaire.  Past Medical History  Have you ever recieved care for your mental health? : No  Have you ever been in a major accident or suffered serious trauma?: No  Within the last year, has anyone hit, slapped, kicked or otherwise hurt you?: No  In the last year, has anyone forced you to have sex when you didn't want to?: No    Past Medical History 2   Have you ever received a blood transfusion?: No  Would you accept a blood transfusion if was medically recommended?: Yes  Does anyone in your home smoke?: No   Is your blood type Rh negative?: No  Have you ever ?: (!) Yes  Have you been hospitalized for a nonsurgical reason excluding normal delivery?: (!) Yes (age 15 for leg injury)  Have you ever had an abnormal pap smear?: No    Past Medical History (Continued)  Do you have a history of abnormalities of the uterus?: No  Did your mother take MILTON or any other hormones when she was pregnant with you?: No  Do you have any other problems we have not asked about which you feel may be important to this pregnancy?: No

## 2023-10-18 ENCOUNTER — PRENATAL OFFICE VISIT (OUTPATIENT)
Dept: OBGYN | Facility: CLINIC | Age: 38
End: 2023-10-18
Payer: COMMERCIAL

## 2023-10-18 VITALS
TEMPERATURE: 97.8 F | HEIGHT: 64 IN | BODY MASS INDEX: 25.45 KG/M2 | DIASTOLIC BLOOD PRESSURE: 77 MMHG | SYSTOLIC BLOOD PRESSURE: 127 MMHG | HEART RATE: 81 BPM | RESPIRATION RATE: 14 BRPM | WEIGHT: 149.1 LBS

## 2023-10-18 DIAGNOSIS — O03.9 SPONTANEOUS ABORTION: Primary | ICD-10-CM

## 2023-10-18 DIAGNOSIS — Z34.81 ENCOUNTER FOR SUPERVISION OF OTHER NORMAL PREGNANCY IN FIRST TRIMESTER: ICD-10-CM

## 2023-10-18 PROCEDURE — 99213 OFFICE O/P EST LOW 20 MIN: CPT | Performed by: OBSTETRICS & GYNECOLOGY

## 2023-10-18 NOTE — PROGRESS NOTES
"SUBJECTIVE: Serina Washington is a 38 year old female   at 8+2 weeks gestation by LMP.  Ultrasound performed here in clinic today confirms missed AB, no fetal heartrate.  Patient is seen here  and informed of the results. Pt expresses appropriate sadness at loss.  Reassured her that the loss could not have been stopped by her actions or any other persons actions.     OBJECTIVE: Blood pressure 127/77, pulse 81, temperature 97.8  F (36.6  C), temperature source Tympanic, resp. rate 14, height 1.626 m (5' 4\"), weight 67.6 kg (149 lb 1.6 oz), last menstrual period 2023, currently breastfeeding.   Physical exam is deferred.     ASSESSMENT: Incomplete AB    PLAN: Patient is currently bleeding. Told her about expectations of bleeding and options and indications for suction currettage in relation to miscarriage.  We discussed causes of miscarriage including chromosome abnormalities.  She understands that nothing can be done to prevent a miscarriage from occuring.  She was asked to call or present to the emergency room if she were to develop heavy bleeding saturating a maxi pad more frequently than every hour or passing large clots.   If she is bleeding longer than one week or it is heavy, recommended she follow-up for possible D&C.  She knows to use Ibuprofen for the cramping, up to 800mg every 8 hours. The patient should do a pregnancy test 2-3 weeks after miscarriage to ensure that all pregnancy tissue has passed and then wait one normal menses before trying to become pregnant again.    Blood type O+    (O03.9) Spontaneous   (primary encounter diagnosis)  Comment: recurrent and incomplete  Plan: watchful waiting    (Z34.81) Encounter for supervision of other normal pregnancy in first trimester  Comment: Blood type O+  Plan: as above      Quinn Cash MD          "

## 2023-10-18 NOTE — NURSING NOTE
"Initial /77 (BP Location: Right arm, Patient Position: Sitting, Cuff Size: Adult Regular)   Pulse 81   Temp 97.8  F (36.6  C) (Tympanic)   Resp 14   Ht 1.626 m (5' 4\")   Wt 67.6 kg (149 lb 1.6 oz)   LMP 08/21/2023   BMI 25.59 kg/m   Estimated body mass index is 25.59 kg/m  as calculated from the following:    Height as of this encounter: 1.626 m (5' 4\").    Weight as of this encounter: 67.6 kg (149 lb 1.6 oz). .    "

## 2023-10-18 NOTE — PATIENT INSTRUCTIONS
Learning About Pregnancy  Your Care Instructions     Your health in the early weeks of your pregnancy is particularly important for your baby's health. Take good care of yourself. Anything you do that harms your body can also harm your baby.  Make sure to go to all of your doctor appointments. Regular checkups will help keep you and your baby healthy.  How can you care for yourself at home?  Diet    Eat a balanced diet. Make sure your diet includes plenty of beans, peas, and leafy green vegetables.     Do not skip meals or go for many hours without eating. If you are nauseated, try to eat a small, healthy snack every 2 to 3 hours.     Do not eat fish that has a high level of mercury, such as shark, swordfish, or mackerel. Do not eat more than one can of tuna each week.     Drink plenty of fluids. If you have kidney, heart, or liver disease and have to limit fluids, talk with your doctor before you increase the amount of fluids you drink.     Cut down on caffeine, such as coffee, tea, and cola.     Do not drink alcohol, such as beer, wine, or hard liquor.     Take a multivitamin that contains at least 400 micrograms (mcg) of folic acid to help prevent birth defects. Fortified cereal and whole wheat bread are good additional sources of folic acid.     Increase the calcium in your diet. Try to drink a quart of skim milk each day. You may also take calcium supplements and choose foods such as cheese and yogurt.   Lifestyle    Make sure you go to your follow-up appointments.     Get plenty of rest. You may be unusually tired while you are pregnant.     Get at least 30 minutes of exercise on most days of the week. Walking is a good choice. If you have not exercised in the past, start out slowly. Take several short walks each day.     Do not smoke. If you need help quitting, talk to your doctor about stop-smoking programs. These can increase your chances of quitting for good.     Do not touch cat feces or litter boxes.  Also, wash your hands after you handle raw meat, and fully cook all meat before you eat it. Wear gloves when you work in the yard or garden, and wash your hands well when you are done. Cat feces, raw or undercooked meat, and contaminated dirt can cause an infection that may harm your baby or lead to a miscarriage.     Avoid things that can make your body too hot and may be harmful to your baby, such as a hot tub or sauna. Or talk with your doctor before doing anything that raises your body temperature. Your doctor can tell you if it's safe.     Avoid chemical fumes, paint fumes, or poisons.     Do not use illegal drugs, marijuana, or alcohol.   Medicines    Review all of your medicines with your doctor. Some of your routine medicines may need to be changed to protect your baby.     Use acetaminophen (Tylenol) to relieve minor problems, such as a mild headache or backache or a mild fever with cold symptoms. Do not use nonsteroidal anti-inflammatory drugs (NSAIDs), such as ibuprofen (Advil, Motrin) or naproxen (Aleve), unless your doctor says it is okay.     Do not take two or more pain medicines at the same time unless the doctor told you to. Many pain medicines have acetaminophen, which is Tylenol. Too much acetaminophen (Tylenol) can be harmful.     Take your medicines exactly as prescribed. Call your doctor if you think you are having a problem with your medicine.   To manage morning sickness    If you feel sick when you first wake up, try eating a small snack (such as crackers) before you get out of bed. Allow some time to digest the snack, and then get out of bed slowly.     Do not skip meals or go for long periods without eating. An empty stomach can make nausea worse.     Eat small, frequent meals instead of three large meals each day.     Drink plenty of fluids.     Eat foods that are high in protein but low in fat.     If you are taking iron supplements, ask your doctor if they are necessary. Iron can make  "nausea worse.     Avoid any smells, such as coffee, that make you feel sick.     Get lots of rest. Morning sickness may be worse when you are tired.   Follow-up care is a key part of your treatment and safety. Be sure to make and go to all appointments, and call your doctor if you are having problems. It's also a good idea to know your test results and keep a list of the medicines you take.  Where can you learn more?  Go to https://www.PredPol.net/patiented  Enter E868 in the search box to learn more about \"Learning About Pregnancy.\"  Current as of: November 9, 2022               Content Version: 13.7    0905-7088 [employer].   Care instructions adapted under license by your healthcare professional. If you have questions about a medical condition or this instruction, always ask your healthcare professional. [employer] disclaims any warranty or liability for your use of this information.      Weeks 6 to 10 of Your Pregnancy: Care Instructions  During these weeks of pregnancy, your body goes through many changes. You may start to feel different, both in your body and your emotions. Each pregnancy is different, so there's no \"right\" way to feel. These early weeks are a time to make healthy choices for you and your pregnancy.    Take a daily prenatal vitamin. Choose one with folic acid in it.   Avoid alcohol, tobacco, and drugs (including marijuana). If you need help quitting, talk to your doctor.     Drink plenty of liquids.  Be sure to drink enough water. And limit sodas, other sweetened drinks, and caffeine.     Choose foods that are good sources of calcium, iron, and folate.  You can try dairy products, dark leafy greens, fortified orange juice and cereals, almonds, broccoli, dried fruit, and beans.     Avoid foods that may be harmful.  Don't eat raw meat, deli meat, raw seafood, or raw eggs. Avoid soft cheese and unpasteurized dairy, like Brie and blue cheese. And don't eat fish that " "contains a lot of mercury, like shark and swordfish.     Don't touch raffy litter or cat poop.  They can cause an infection that could be harmful during pregnancy.     Avoid things that can make your body too hot.  For example, avoid hot tubs and saunas.     Soothe morning sickness.  Try eating 5 or 6 small meals a day, getting some fresh air, or using tran to control symptoms.     Ask your doctor about flu and COVID-19 shots.  Getting them can help protect against infection.   Follow-up care is a key part of your treatment and safety. Be sure to make and go to all appointments, and call your doctor if you are having problems. It's also a good idea to know your test results and keep a list of the medicines you take.  Where can you learn more?  Go to https://[email or web address] [email or web address].[email or web address]/patiented  Enter G112 in the search box to learn more about \"Weeks 6 to 10 of Your Pregnancy: Care Instructions.\"  Current as of: November 9, 2022               Content Version: 13.7 2006-2023 [employer].   Care instructions adapted under license by your healthcare professional. If you have questions about a medical condition or this instruction, always ask your healthcare professional. [employer] disclaims any warranty or liability for your use of this information.         Managing Morning Sickness (01:55)  Your health professional recommends that you watch this short online health video.  Learn tips for dealing with morning sickness, no matter what time of day you have it.  Purpose:  Gives tips for managing morning sickness, including eating small low-fat meals and avoiding caffeine and spicy food.  Goal:  The user will learn tips for dealing with morning sickness during pregnancy.     How to watch the video    Scan the QR code   OR Visit the website    [email or web address]/[email or web address]   Current as of: November 9, 2022               Content Version: 13.7 2006-2023 [employer].   Care instructions " adapted under license by your healthcare professional. If you have questions about a medical condition or this instruction, always ask your healthcare professional. Healthwise, [employer] disclaims any warranty or liability for your use of this information.      Pregnancy and Heartburn: Care Instructions  Overview     Heartburn is a common problem during pregnancy.  Heartburn happens when stomach acid backs up into the tube that carries food to the stomach. This tube is called the esophagus. Early in pregnancy, heartburn is caused by hormone changes that slow down digestion. Later on, it's also caused by the large uterus pushing up on the stomach.  Even though you can't fix the cause, there are things you can do to get relief. Treating heartburn during pregnancy focuses first on making lifestyle changes, like changing what and how you eat, and on taking medicines.  Heartburn usually improves or goes away after childbirth.  Follow-up care is a key part of your treatment and safety. Be sure to make and go to all appointments, and call your doctor if you are having problems. It's also a good idea to know your test results and keep a list of the medicines you take.  How can you care for yourself at home?  Eat small, frequent meals.  Avoid foods that make your symptoms worse, such as chocolate, peppermint, and spicy foods. Avoid drinks with caffeine, such as coffee, tea, and sodas.  Avoid bending over or lying down after meals.  Take a short walk after you eat.  If heartburn is a problem at night, do not eat for 2 hours before bedtime.  Take antacids like Mylanta, Maalox, Rolaids, or Tums. Do not take antacids that have sodium bicarbonate, magnesium trisilicate, or aspirin. Be careful when you take over-the-counter antacid medicines. Many of these medicines have aspirin in them. While you are pregnant, do not take aspirin or medicines that contain aspirin unless your doctor says it is okay.  If you're not getting  "relief, talk to your doctor. You may be able to take a stronger acid-reducing medicine.  When should you call for help?   Call your doctor now or seek immediate medical care if:    You have new or worse belly pain.     You are vomiting.   Watch closely for changes in your health, and be sure to contact your doctor if:    You have new or worse symptoms of reflux.     You are losing weight.     You have trouble or pain swallowing.     You do not get better as expected.   Where can you learn more?  Go to https://www.Property Owl.net/patiented  Enter U946 in the search box to learn more about \"Pregnancy and Heartburn: Care Instructions.\"  Current as of: November 9, 2022               Content Version: 13.7 2006-2023 [employer].   Care instructions adapted under license by your healthcare professional. If you have questions about a medical condition or this instruction, always ask your healthcare professional. [employer] disclaims any warranty or liability for your use of this information.      Constipation: Care Instructions  Overview     Constipation means that you have a hard time passing stools (bowel movements). People pass stools from 3 times a day to once every 3 days. What is normal for you may be different. Constipation may occur with pain in the rectum and cramping. The pain may get worse when you try to pass stools. Sometimes there are small amounts of bright red blood on toilet paper or the surface of stools. This is because of enlarged veins near the rectum (hemorrhoids).  A few changes in your diet and lifestyle may help you avoid ongoing constipation. Your doctor may also prescribe medicine to help loosen your stool.  Some medicines can cause constipation. These include pain medicines and antidepressants. Tell your doctor about all the medicines you take. Your doctor may want to make a medicine change to ease your symptoms.  Follow-up care is a key part of your treatment and " "safety. Be sure to make and go to all appointments, and call your doctor if you are having problems. It's also a good idea to know your test results and keep a list of the medicines you take.  How can you care for yourself at home?  Drink plenty of fluids. If you have kidney, heart, or liver disease and have to limit fluids, talk with your doctor before you increase the amount of fluids you drink.  Include high-fiber foods in your diet each day. These include fruits, vegetables, beans, and whole grains.  Get at least 30 minutes of exercise on most days of the week. Walking is a good choice. You also may want to do other activities, such as running, swimming, cycling, or playing tennis or team sports.  Take a fiber supplement, such as Citrucel or Metamucil, every day. Read and follow all instructions on the label.  Schedule time each day for a bowel movement. A daily routine may help. Take your time having a bowel movement, but don't sit for more than 10 minutes at a time. And don't strain too much.  Support your feet with a small step stool when you sit on the toilet. This helps flex your hips and places your pelvis in a squatting position.  Your doctor may recommend an over-the-counter laxative to relieve your constipation. Examples are Milk of Magnesia and MiraLax. Read and follow all instructions on the label. Do not use laxatives on a long-term basis.  When should you call for help?   Call your doctor now or seek immediate medical care if:    You have new or worse belly pain.     You have new or worse nausea or vomiting.     You have blood in your stools.   Watch closely for changes in your health, and be sure to contact your doctor if:    Your constipation is getting worse.     You do not get better as expected.   Where can you learn more?  Go to https://www.healthwise.net/patiented  Enter P343 in the search box to learn more about \"Constipation: Care Instructions.\"  Current as of: March 22, " "2023               Content Version: 13.7 2006-2023 [employer].   Care instructions adapted under license by your healthcare professional. If you have questions about a medical condition or this instruction, always ask your healthcare professional. [employer] disclaims any warranty or liability for your use of this information.      Learning About High-Iron Foods  What foods are high in iron?     The foods you eat contain nutrients, such as vitamins and minerals. Iron is a nutrient. Your body needs the right amount to stay healthy and work as it should. You can use the list below to help you make choices about which foods to eat.  Here are some foods that contain iron. They have 1 to 2 milligrams of iron per serving.  Fruits  Figs (dried), 5 figs  Vegetables  Asparagus (canned), 6 hart  Chapito, beet, Swiss chard, or turnip greens, 1 cup  Dried peas, cooked,   cup  Seaweed, spirulina (dried),   cup  Spinach, (cooked)   cup or (raw) 1 cup  Grains  Cereals, fortified with iron, 1 cup  Grits (instant, cooked), fortified with iron,   cup  Meats and other protein foods  Beans (kidney, lima, navy, white), canned or cooked,   cup  Beef or lamb, 3 oz  Chicken giblets, 3 oz  Chickpeas (garbanzo beans),   cup  Liver of beef, lamb, or pork, 3 oz  Oysters (cooked), 3 oz  Sardines (canned), 3 oz  Soybeans (boiled),   cup  Tofu (firm),   cup  Work with your doctor to find out how much of this nutrient you need. Depending on your health, you may need more or less of it in your diet.  Where can you learn more?  Go to https://www.healthZeis Excelsa.net/patiented  Enter R005 in the search box to learn more about \"Learning About High-Iron Foods.\"  Current as of: March 1, 2023               Content Version: 13.7 2006-2023 [employer].   Care instructions adapted under license by your healthcare professional. If you have questions about a medical condition or this instruction, always ask your " "healthcare professional. [employer], [facility] disclaims any warranty or liability for your use of this information.      Rh Antibodies Screening During Pregnancy: About This Test  What is it?     The Rh antibodies screening test is a blood test. It checks your blood for Rh antibodies. If you have Rh-negative blood and have been exposed to Rh-positive blood, your immune system may make antibodies to attack the Rh-positive blood. When a pregnant woman has these antibodies, it is called Rh sensitization.  Why is this test done?  The Rh antibodies screening test is done during pregnancy to find out if your baby is at risk for Rh disease. This can happen if you have Rh-negative blood and your baby has Rh-positive blood. If your Rh-negative blood mixes with Rh-positive blood, your immune system will make antibodies to attack the Rh-positive blood.  During pregnancy, these antibodies could attach to the baby's red blood cells. This can cause your baby to have serious health problems. The results of this test will help your doctor know how to best care for you and your baby during your pregnancy.  How do you prepare for the test?  In general, there's nothing you have to do before this test, unless your doctor tells you to.  How is the test done?  A health professional uses a needle to take a blood sample, usually from the arm.  What happens after the test?  You will probably be able to go home right away. It depends on the reason for the test.  You can go back to your usual activities right away.  Follow-up care is a key part of your treatment and safety. Be sure to make and go to all appointments, and call your doctor if you are having problems. It's also a good idea to keep a list of the medicines you take. Ask your doctor when you can expect to have your test results.  Where can you learn more?  Go to https://www.In Ovo.net/patiented  Enter P722 in the search box to learn more about \"Rh Antibodies Screening " "During Pregnancy: About This Test.\"  Current as of: 2022               Content Version: 13.7    0421-5222 [employer].   Care instructions adapted under license by your healthcare professional. If you have questions about a medical condition or this instruction, always ask your healthcare professional. [employer] disclaims any warranty or liability for your use of this information.      Learning About Preventing Rh Disease  What is Rh disease?     Rh disease can be a serious problem in pregnancy. It happens when substances called antibodies in the mother's blood cause red blood cells in her baby's blood to be destroyed. This can occur when the blood types of a mother and her baby do not match.  All blood has an Rh factor. This is what makes a blood type positive or negative. When you are Rh-negative, your baby may be Rh-negative or Rh-positive. If your baby has Rh-positive blood and it mixes with yours, your body will make antibodies. This is called Rh sensitization.  Most of the time, this is not a problem in a first pregnancy. But in future pregnancies, it could cause Rh disease.  A  with Rh disease has mild anemia and may have jaundice. In severe cases, anemia, jaundice, and swelling can be very dangerous or fatal. Some babies need to be delivered early. Some need special care in the NICU. A very sick baby will need a blood transfusion before or after birth.  Fortunately, Rh sensitization is usually easy to prevent.  That's why it's important to get your Rh status checked in your first trimester. It doesn't cause any warning signs. A blood test is the only way to know if you are Rh-sensitive or are at risk for it.  How can you prevent Rh disease?  If you are Rh-negative, your doctor gives you an Rh immune globulin shot (such as RhoGAM). It helps prevent your body from making the antibodies that attack your baby's red blood cells.  Timing is important. You need the " "shot at certain times during your pregnancy. And you need one anytime there is a chance that your baby's blood might mix with yours. That can happen with certain prenatal tests or when you have pregnancy bleeding, such as:  Right after any pregnancy loss, amniocentesis, or CVS testing.  After turning of a breech baby.  Before and maybe after childbirth. Your doctor gives you a shot around week 28. If your  is Rh-positive, you will have another shot.  Follow-up care is a key part of your treatment and safety. Be sure to make and go to all appointments, and call your doctor if you are having problems. It's also a good idea to know your test results and keep a list of the medicines you take.  Where can you learn more?  Go to https://www.CelluComp.BeMo/patiented  Enter W177 in the search box to learn more about \"Learning About Preventing Rh Disease.\"  Current as of: 2022               Content Version: 13.7    7608-3297 [employer].   Care instructions adapted under license by your healthcare professional. If you have questions about a medical condition or this instruction, always ask your healthcare professional. [employer] disclaims any warranty or liability for your use of this information.      Learning About Rh Immunoglobulin Shots  Introduction     An Rh immunoglobulin shot is given to pregnant women who have Rh-negative blood.  You may have Rh-negative blood, and your baby may have Rh-positive blood. If the two types of blood mix, your body will make antibodies. This is called Rh sensitization. Most of the time, this is not a problem the first time you're pregnant. But it could cause problems in future pregnancies.  This shot keeps your body from making the antibodies. You get the shot around 28 weeks of pregnancy. After the birth, your baby's blood is tested. If the blood is Rh positive, you will get another shot. You may also get the shot if you have vaginal bleeding " "while you are pregnant or if you have a miscarriage. These shots protect future pregnancies.  Women with Rh negative blood will need this shot each time they get pregnant.  Example  Rh immunoglobulin (HypRho-D, MICRhoGAM, and RhoGAM)  Possible side effects  Rare side effects may include:  Some mild pain where you got the shot.  A slight fever.  An allergic reaction.  You may have other side effects not listed here. Check the information that comes with your medicine.  What to know about taking this medicine  You may need more than one shot. You may need the shot again:  After amniocentesis, fetal blood sampling, or chorionic villus sampling tests.  If you have bleeding in your second or third trimester.  After turning of a breech baby.  After an injury to the belly while you are pregnant.  After a miscarriage or an .  Before or right after treatment for an ectopic or a partial molar pregnancy.  Tell your doctor if you have any allergies or have had a bad response to medicines in the past.  If you get this shot within 3 months of getting a live-virus vaccine, the vaccine may not work. Your doctor will tell you if you need more vaccine.  Check with your doctor or pharmacist before you use any other medicines. This includes over-the-counter medicines. Make sure your doctor knows all of the medicines, vitamins, herbs, and supplements you take. Taking some medicines at the same time can cause problems.  Where can you learn more?  Go to https://www.Eltechs.net/patiented  Enter V615 in the search box to learn more about \"Learning About Rh Immunoglobulin Shots.\"  Current as of: 2022               Content Version: 13.7    0957-3291 [employer].   Care instructions adapted under license by your healthcare professional. If you have questions about a medical condition or this instruction, always ask your healthcare professional. [employer] disclaims any warranty or liability for " your use of this information.      Rubella (Maldivian Measles): Care Instructions  Overview  Rubella, also called Maldivian measles or 3-day measles, is a disease caused by a virus. It spreads by coughs, sneezes, and close contact. Rubella usually is mild and does not cause long-term problems. But if you are pregnant and get it, you can give the disease to your unborn baby. This can cause serious birth defects.  While you have rubella, you may get a rash and a mild fever, and the lymph glands in your neck may swell. Older children often have a fever, eye pain, a sore throat, and body aches. You can relieve most symptoms with care at home. Avoid being around others, especially pregnant people, until your rash has been gone for at least 4 days. People who have not had this disease before or have not had the vaccine have the greatest chance of getting the virus.  Follow-up care is a key part of your treatment and safety. Be sure to make and go to all appointments, and call your doctor if you are having problems. It's also a good idea to know your test results and keep a list of the medicines you take.  How can you care for yourself at home?  Drink plenty of fluids. If you have kidney, heart, or liver disease and have to limit fluids, talk with your doctor before you increase the amount of fluids you drink.  Get plenty of rest to help your body heal.  Take an over-the-counter pain medicine, such as acetaminophen (Tylenol), ibuprofen (Advil, Motrin), or naproxen (Aleve), to reduce fever and discomfort. Read and follow all instructions on the label. Do not give aspirin to anyone younger than 20. It has been linked to Reye syndrome, a serious illness.  Do not take two or more pain medicines at the same time unless the doctor told you to. Many pain medicines have acetaminophen, which is Tylenol. Too much acetaminophen (Tylenol) can be harmful.  Try not to scratch the rash. Put cold, wet cloths on the rash to reduce itching.  Do  "not smoke. Smoking can make your symptoms worse. If you need help quitting, talk to your doctor about stop-smoking programs and medicines. These can increase your chances of quitting for good.  Avoid contact with people who have never had rubella and who have not been immunized.  When should you call for help?   Call your doctor now or seek immediate medical care if:    You have a fever with a stiff neck or a severe headache.     You are sensitive to light or feel very sleepy or confused.   Watch closely for changes in your health, and be sure to contact your doctor if:    You do not get better as expected.   Where can you learn more?  Go to https://www.[employer].net/patiented  Enter B812 in the search box to learn more about \"Rubella (Tajik Measles): Care Instructions.\"  Current as of: 2022               Content Version: 13.7    7378-5476 [employer].   Care instructions adapted under license by your healthcare professional. If you have questions about a medical condition or this instruction, always ask your healthcare professional. [employer] disclaims any warranty or liability for your use of this information.      Gonorrhea and Chlamydia: About These Tests  What is it?  These tests use a sample of urine or other body fluid to look for the bacteria that cause these sexually transmitted infections (STIs). The fluid sample can come from the cervix, vagina, rectum, throat, or eyes.  Why is this test done?  These tests may be done to:  Find out if symptoms are caused by gonorrhea or chlamydia.  Check people who are at high risk of being infected with gonorrhea or chlamydia.  Retest people several months after they have been treated for gonorrhea or chlamydia.  Check for infection in your  if you had a gonorrhea or chlamydia infection at the time of delivery.  How can you prepare for the test?  If you are going to have a urine test, do not urinate for at least 1 hour " "before the test.  If you think you may have chlamydia or gonorrhea, don't have sexual intercourse until you get your test results. And you may want to have tests for other STIs, such as HIV.  How is the test done?  For a direct sample, a swab is used to collect body fluid from the cervix, vagina, rectum, throat, or eyes. Your doctor may collect the sample. Or you may be given instructions on how to collect your own sample.  For a urine sample, you will collect the urine that comes out when you first start to urinate. Don't wipe the genital area clean before you urinate.  How long does the test take?  The test will take a few minutes.  What happens after the test?  You will be able to go home right away.  You can go back to your usual activities right away.  If you do have an infection, don't have sexual intercourse for 7 days after you start treatment. And your sex partner(s) should also be treated.  Follow-up care is a key part of your treatment and safety. Be sure to make and go to all appointments, and call your doctor if you are having problems. It's also a good idea to keep a list of the medicines you take. Ask your doctor when you can expect to have your test results.  Where can you learn more?  Go to https://www.[employer].net/patiented  Enter K976 in the search box to learn more about \"Gonorrhea and Chlamydia: About These Tests.\"  Current as of: August 2, 2022               Content Version: 13.7    3339-7378 [employer].   Care instructions adapted under license by your healthcare professional. If you have questions about a medical condition or this instruction, always ask your healthcare professional. [employer] disclaims any warranty or liability for your use of this information.      Trichomoniasis: About This Test  What is it?     This test uses a sample of urine or other body fluid to look for the tiny parasite that causes trichomoniasis (also called trich). The fluid sample " can come from the vagina, cervix, or urethra. Your doctor may choose to use one or more of many available tests.  Why is it done?  A trich test may be done to:  Find out if symptoms are caused by trich.  Check people who are at high risk for being infected with trich.  Check after treatment to make sure that the infection is gone.  How do you prepare for the test?  If you are going to have a urine test, do not urinate for at least 1 hour before the test.  How is the test done?  For a direct sample, a swab is used to collect body fluid from the cervix, vagina, or urethra. Your doctor may collect the sample. Or you may be given instructions on how to collect your own sample.  For a urine sample, you will collect the urine that comes out when you first start to urinate. Don't wipe the area clean before you urinate.  How long does the test take?  It will take a few minutes to collect a sample.  What happens after the test?  You can go home right away.  You can go back to your usual activities right away.  You may get the test results the same day or several days later. It depends on the test used.  If you do have an infection, don't have sexual intercourse for 7 days after you start treatment. Your sex partner(s) should also be treated.  Follow-up care is a key part of your treatment and safety. Be sure to make and go to all appointments, and call your doctor if you are having problems. Ask your doctor when you can expect to have your test results.  Current as of: August 2, 2022               Content Version: 13.7    9825-2805 [employer].   Care instructions adapted under license by your healthcare professional. If you have questions about a medical condition or this instruction, always ask your healthcare professional. [employer] disclaims any warranty or liability for your use of this information.      HIV Testing: Care Instructions  Overview     You can get tested for the human  "immunodeficiency virus (HIV). This test checks for HIV antibodies and antigens in your blood. If they are found, the test is positive.  If HIV antibodies or antigens are not found, you may need a repeat test to be sure the results are correct. Or your doctor may want to do a different test.  Follow-up care is a key part of your treatment and safety. Be sure to make and go to all appointments, and call your doctor if you are having problems. It's also a good idea to know your test results and keep a list of the medicines you take.  What do the results mean?  Normal result  A normal result means that no HIV antibodies or antigens were found in your blood. And if you had a test that checked for HIV RNA or DNA, none was found. Normal results are called negative.  You may need more testing to be sure the test results are correct.  Uncertain result  Test results don't clearly show whether you have an HIV infection. This is usually called an indeterminate result. This may happen before HIV antibodies or antigens develop. Or it may happen when some other type of antibody or antigen interferes with the results. If this occurs, you will probably have another test right away.  Abnormal result  An abnormal result means that you have HIV antibodies or antigens in your blood. These results are called positive.  A positive test will be confirmed by another type of test. This is because some tests can cause false-positive results. No one is considered HIV-positive until the result is confirmed by a test that shows HIV RNA or DNA in the person's blood.  If your test result is positive, your doctor will talk to you about starting treatment.  Where can you learn more?  Go to https://www.[employer].net/patiented  Enter T792 in the search box to learn more about \"HIV Testing: Care Instructions.\"  Current as of: October 31, 2022               Content Version: 13.7    8376-2334 [employer].   Care instructions adapted under " license by your healthcare professional. If you have questions about a medical condition or this instruction, always ask your healthcare professional. [employer] disclaims any warranty or liability for your use of this information.      Hepatitis C Virus Tests: About These Tests  What are they?     Hepatitis C virus tests are blood tests that check for substances in the blood that show whether you have hepatitis C now or had it in the past. The tests can also tell you what type of hepatitis C you have and how severe the disease is. This can help your doctor with treatment.  If the tests show that you have long-term hepatitis C, you need to take steps to prevent spreading the disease.  Why are these tests done?  You may need these tests if:  You have symptoms of hepatitis.  You may have been exposed to the virus. You are more likely to have been exposed to the virus if you inject drugs or are exposed to body fluids (such as if you are a health care worker).  You've had other tests that show you have liver problems.  You are 18 to 79 years old.  You have an HIV infection.  The tests also are done to help your doctor decide about your treatment and see how well it works.  How do you prepare for the test?  In general, there's nothing you have to do before this test, unless your doctor tells you to.  How is the test done?  A health professional uses a needle to take a blood sample, usually from the arm.  What happens after these tests?  You will probably be able to go home right away.  You can go back to your usual activities right away.  Follow-up care is a key part of your treatment and safety. Be sure to make and go to all appointments, and call your doctor if you are having problems. It's also a good idea to keep a list of the medicines you take. Ask your doctor when you can expect to have your test results.  Where can you learn more?  Go to https://www.[employer].net/patiented  Enter W551 in the search  "box to learn more about \"Hepatitis C Virus Tests: About These Tests.\"  Current as of: October 31, 2022               Content Version: 13.7    9437-5303 [facility].   Care instructions adapted under license by your healthcare professional. If you have questions about a medical condition or this instruction, always ask your healthcare professional. [facility] disclaims any warranty or liability for your use of this information.      Learning About Fetal Ultrasound Results  What is a fetal ultrasound?     Fetal ultrasound is a test that lets your doctor see an image of your baby. Your doctor learns information about your baby from this picture. You may find out, for example, if you are having a boy or a girl. But the main reason you have this test is to get information about your baby's health.  (You may hear your baby called a fetus. This is a common medical term for a baby that's growing in the mother's uterus.)  What kind of information can you learn from this test?  The findings of an ultrasound fall into two categories, normal and abnormal.  Normal  The fetus is the right size for its age.  The placenta is the expected size and does not cover the cervix.  There is enough amniotic fluid in the uterus.  No birth defects can be seen.  Abnormal  The fetus is small or large for its age.  The placenta covers the cervix.  There is too much or too little amniotic fluid in the uterus.  The fetus may have a birth defect.  What does an abnormal result mean?  Abnormal seems to imply that something is wrong with your baby. But what it means is that the test has shown something the doctor wants to take a closer look at.  And that's what happens next. Your doctor will talk to you about what further test or tests you may need.  What do the results mean?  Some of the things your doctor may see on an abnormal ultrasound include:  Echogenic bowel.  The bowel looks very bright on the screen. This could " mean that there's blood in the bowel. Or it could mean that something is blocking the small bowel.  Increased nuchal translucency.  The ultrasound measures the thickness at the back of the baby's neck. An increase in thickness is sometimes an early sign of Down syndrome.  Increased or decreased amniotic fluid.  The doctor will look for a reason for the level of amniotic fluid and will watch the pregnancy closely as it progresses.  Large ventricles.  Ventricles in the brain look larger than they should. Your doctor may take a closer look at the brain.  Renal pyelectasis/hydronephrosis.  The ultrasound measures the fluid around the kidney. If there is more fluid than expected, there is a chance of urinary tract or kidney problems.  Short long bones.  The ultrasound measures certain arm and leg bones. A long bone (humerus or femur) that is shorter than average could be a sign of Down syndrome.  Subchorionic hemorrhage.  An ultrasound can show bleeding under one of the membranes that surrounds the fetus. Some women don't have symptoms of bleeding. The ultrasound can find this problem when women are not bleeding from their vagina. Women who have this condition have a slightly higher chance of miscarriage.  What do you do now?  Take a deep breath, and let it out. Keep in mind that an abnormal finding on an ultrasound, after it's coupled with more information, may:  Turn out to be nothing.  Turn out to be something mild that won't affect the baby.  Turn out to be something more serious. But if this happens, early diagnosis helps you and your doctor plan treatment options sooner rather than later.  Your medical team is there for you. So are your family and friends. Ask questions, and get the help and support you need.  Follow-up care is a key part of your treatment and safety. Be sure to make and go to all appointments, and call your doctor if you are having problems. It's also a good idea to know your test results and keep  "a list of the medicines you take.  Where can you learn more?  Go to https://www.[employer].net/patiented  Enter K451 in the search box to learn more about \"Learning About Fetal Ultrasound Results.\"  Current as of: November 9, 2022               Content Version: 13.7    5120-5166 [employer].   Care instructions adapted under license by your healthcare professional. If you have questions about a medical condition or this instruction, always ask your healthcare professional. [employer] disclaims any warranty or liability for your use of this information.      Learning About Prenatal Visits  Your Care Instructions     Regular prenatal visits are very important during any pregnancy. These quick office visits may seem simple and routine. But they can help you and your baby stay healthy. Your doctor is watching for problems that can only be found by regularly checking you and your baby. The visits also give you and your doctor time to build a good relationship.  Many women have prenatal visits every 4 to 6 weeks until week 28 of pregnancy. Then the visits become more frequent. This is often every 2 to 3 weeks through week 36 of pregnancy. In the final month of pregnancy, you likely will see your doctor every week. You may have a different schedule if you have a medical problem or are a teen.  At different times in your pregnancy, you will have exams and tests. Some are routine. Others are done only when there is a chance of a problem. Everything healthy you do for your body helps your growing baby. Rest when you need it. Eat well, drink plenty of water, and exercise regularly.  What happens during a prenatal visit?  You will have blood pressure checks, along with urine tests. You also may have blood tests. If you need to go to the bathroom while waiting for the doctor, tell the nurse. He or she will give you a sample cup so your urine can be tested.  You will be weighed and have your belly " measured.  Your doctor may listen to your baby's heartbeat with a special stethoscope.  In your second trimester, your doctor will check your blood sugar (glucose tolerance test) for diabetes that can occur during pregnancy. This is gestational diabetes, which can harm your baby.  You will have tests to check for infections that could harm your . These include group B streptococcus and hepatitis B.  Your doctor may do ultrasounds to check for problems. This also checks your baby's position. An ultrasound uses sound waves to produce a picture of your baby.  You may have other tests at any time during your pregnancy.  Use your visits to discuss with your doctor any concerns you have.  How can you care for yourself at home?  Get plenty of rest.  Exercise every day, if your doctor says it is okay. If you have not exercised in the past, start out slowly. Take many short walks each day.  Eat a balanced diet. Make sure your diet includes plenty of beans, peas, and leafy green vegetables.  Drink plenty of fluids. Cut down on drinks with caffeine, such as coffee, tea, and cola. If you have kidney, heart, or liver disease and have to limit fluids, talk with your doctor before you increase the amount of fluids you drink.  Avoid chemical fumes, paint fumes, and poisons. Do not use alcohol, marijuana, or illegal drugs. Do not smoke, vape, or use tobacco. If you need help quitting, talk to your doctor about stop-smoking programs and medicines. These can increase your chances of quitting for good.  Review all of your medicines with your doctor. Some of your routine medicines may need to be changed to protect your baby. Do not stop or start taking any medicines without talking to your doctor first.  Follow-up care is a key part of your treatment and safety. Be sure to make and go to all appointments, and call your doctor if you are having problems. It's also a good idea to know your test results and keep a list of the  "medicines you take.  Where can you learn more?  Go to https://www.[email or web address]/patiented  Enter J502 in the search box to learn more about \"Learning About Prenatal Visits.\"  Current as of: November 9, 2022               Content Version: 13.7    4639-4075 [employer].   Care instructions adapted under license by your healthcare professional. If you have questions about a medical condition or this instruction, always ask your healthcare professional. [employer] disclaims any warranty or liability for your use of this information.      Domestic Abuse: Care Instructions  Overview     If you want to save this information but don't think it is safe to take it home, see if a trusted friend can keep it for you. Plan ahead. Know who you can call for help, and memorize the phone number.   Be careful online too. Your online activity may be seen by others. Do not use your personal computer or device to read about this topic. Use a safe computer such as one at work, a friend's house, or a library.    Domestic abuse is different from an argument now and then. It is a pattern of abuse that one person uses to control another person's behavior. It may start with threats and name-calling. Then, it may lead to more serious acts, like pushing and slapping. The abuse also may occur in other areas. For example, the abuser may withhold money or spend a partner's money without their knowledge.  Abuse can cause serious harm. You are more likely to have a long-term health problem from the injuries and stress of living in a violent relationship. People who are sexually abused by their partners have more sexually transmitted infections and unwanted pregnancies. Anyone can be abused in relationships. Anyone who is abused also faces emotional pain.  If you are pregnant, abuse can cause problems such as poor weight gain, infections, and bleeding. Abuse during this time may increase your baby's risk of low birth " "weight, premature birth, and death.  Follow-up care is a key part of your treatment and safety. Be sure to make and go to all appointments, and call your doctor if you are having problems. It's also a good idea to know your test results and keep a list of the medicines you take.  How can you care for yourself at home?  If you do not have a safe place to stay, discuss this with your doctor before you leave.  Have a plan for where to go, how to leave your house, and where to stay in case of an emergency. Do not tell your partner about your plan. Contact:  The National Domestic Violence Hotline toll-free at 1-684.572.8184. They can help you find resources in your area.  Your local police department, hospital, or clinic for information about shelters and safe homes near you.  Talk to a trusted friend or neighbor or a [employer] counselor. Do not feel that you have to hide what happened.  Teach your children how to call for help in an emergency.  Be alert to warning signs, such as threats, heavy alcohol use, or drug use. This can help you avoid danger.  If you can, make sure that there are no guns or other weapons in the house.  When should you call for help?   Call 911 anytime you think you may need emergency care. For example, call if:    You or someone else has just been abused.     You think you or someone else is in danger of being abused.   Watch closely for changes in your health, and be sure to contact your doctor if you have any problems.  Where can you learn more?  Go to https://www.[email or web address].net/patiented  Enter G282 in the search box to learn more about \"Domestic Abuse: Care Instructions.\"  Current as of: February 27, 2023               Content Version: 13.7    6254-9527 [employer].   Care instructions adapted under license by your healthcare professional. If you have questions about a medical condition or this instruction, always ask your healthcare professional. [employer], [employer] " disclaims any warranty or liability for your use of this information.      Domestic Violence Safety Instructions: Care Instructions  Overview     If you want to save this information but don't think it is safe to take it home, see if a trusted friend can keep it for you. Plan ahead. Know who you can call for help, and memorize the phone number.   Be careful online too. Your online activity may be seen by others. Do not use your personal computer or device to read about this topic. Use a safe computer such as one at work, a friend's house, or a library.    When you are abused by a spouse or partner, you can take actions to protect yourself and your children.  You can increase your safety whether you decide to stay with your spouse or partner or you decide to leave. You can also prepare an action plan and kit ahead of time. This will allow you to leave quickly when you decide to. Remember, you cannot stop your partner's abuse, but you can find help for you and your children. No one deserves to be abused.  Follow-up care is a key part of your treatment and safety. Be sure to make and go to all appointments, and call your doctor if you are having problems. It's also a good idea to know your test results and keep a list of the medicines you take.  How can you care for yourself at home?  Make a plan for your safety   If you decide to stay with your abusive spouse or partner, you can do the following to increase your safety:  Decide what works best to keep you safe in an emergency.  Decide who you can call to help you in an emergency.  Decide if you will call the police if you get hurt again. If you can, agree on a signal with your children or neighbor to call the police for you if you need help. You can flash lights or hang something out of a window.  Choose a place to go for a short time if you need to leave home. Memorize the address and phone number.  Learn escape routes out of your home in case you need to leave in a  hurry. Teach your children different ways to get out of the house quickly if they need to.  Take objects that can be used as weapons (guns, knives, hammers) and hide them or lock them up.  Learn the number of a domestic violence shelter. Talk to the people there about how they can help.  Find out about other community resources that can help you.  Take pictures of bruises or other injuries if you can. You can also take pictures of things your abuser has broken.  Teach your children that violence is never okay. Tell them that they do not deserve to be hurt.  Pack a bag   Prepare a kit with things you will need if you leave the house suddenly. You can try to hide this in your house, or you can leave it with a friend or relative you can trust. You should include the following items in the kit:  A set of keys to your house and car.  Emergency phone numbers and addresses. You might also want to have a map and a small flashlight in case you need to leave in the night.  Money such as cash or checks. You can also ask a trusted friend or family member to hold money for you.  Copies of legal documents such as house and car titles or rent receipts, birth certificates, Social Security card, voter registration, marriage and 's licenses, and your children's health records.  Personal items you would need for a few days, such as clothes, a toothbrush, toothpaste, and any medicines you or your children need.  A favorite toy or book for your child or children.  Diapers and bottles, if you have very young children.  Pictures that show signs of abuse and violence. You may also add pictures of your abuser.  If you leave   If you decide to leave, you can take the following steps:  Go to the emergency room at a hospital if you have been hurt.  Ask the police to be with you as you leave. They can protect you as you leave the house.  If you decide to leave secretly, remember that activities can be tracked. Your abuser may still have  "access to your cell phone, email, and credit cards. It may be possible for these to be traced. Always be aware of your surroundings.  Take the kit you have prepared. If this is an emergency, do not worry about gathering up anything. Just leave--your safety is most important.  If your abuser moves out, change the locks on the doors. If you have a security system, change the access code.  When should you call for help?   Call 911 anytime you think you may need emergency care. For example, call if:    You or someone else has just been abused.     You think you or someone else is in danger of being abused.   Watch closely for changes in your health, and be sure to contact your doctor if you have any problems.  Where can you learn more?  Go to https://www.[employer].net/patiented  Enter A752 in the search box to learn more about \"Domestic Violence Safety Instructions: Care Instructions.\"  Current as of: October 20, 2022               Content Version: 13.7    8293-6878 [employer].   Care instructions adapted under license by your healthcare professional. If you have questions about a medical condition or this instruction, always ask your healthcare professional. [employer] disclaims any warranty or liability for your use of this information.      Learning About Domestic Abuse  What is domestic abuse?  Domestic abuse is threats or violent behavior in a personal relationship. It can happen between past or current partners or spouses. It's also called domestic violence or intimate partner violence.  Domestic abuse can affect people of any ethnic group, race, or [employer]ab[employer]ism. It can affect teens, adults, or the elderly. And it can happen to people of any sexual identity or social status. But most abuse victims are women.  Abusers use fear, bullying, and threats to control their partners. They control what their partners do, where they go, or who they see. They may act jealous, controlling, or " possessive. These early signs of abuse may happen soon after the start of the relationship. Sometimes it can be hard to notice abuse at first. But after the relationship becomes more serious, the abuse may get worse.  If you are being abused in your relationship, it's important to get help. The abuse is not your fault, and you don't have to face it alone.  Be careful  It may not be safe to take home domestic abuse information like this handout. Some people ask a trusted friend to keep it for them. It's also important to plan ahead and to memorize the phone number of places you can go for help. If you are concerned about your safety, do not use your computer, smartphone, or tablet to read about domestic abuse.   What are the types of domestic abuse?  Abuse can be emotional, physical, or sexual.  Emotional abuse  Emotional abuse is a pattern of threats, insults, or controlling behavior. It includes verbal abuse. It goes beyond healthy disagreements in a relationship. It's a sign of an unhealthy relationship, and it may be against the law.  Do you feel threatened, intimidated, or controlled? Does your partner threaten your children, other family members, or pets?  Does your partner:  Use jokes meant to embarrass or shame you?  Call you names?  Tell you that you are a bad parent or threaten to take away your children?  Threaten to have you or your family members deported?  Control your access to money or other basic needs?  Control what you do, who you see or talk to, or where you go?  Another form of emotional abuse is denying that it is happening. Or the abuser may act like the abuse is no big deal or is your fault.  Sexual abuse  With sexual abuse, abusers may try to convince or force you to have sex. They may force you into sex acts you're not comfortable with. Or they may sexually assault you. Sexual abuse can happen even if you are in a committed relationship.  Physical abuse  Physical abuse means that a partner  hits, kicks, or physically hurts you. Physical abuse that starts with a slap might lead to kicking, shoving, and choking over time. The abuser may also threaten to hurt or kill you.  What problems can domestic abuse lead to?  Domestic abuse can be very dangerous. It can cause serious, repeated injury. It can even lead to death.  All forms of abuse can cause long-term health problems from the stress of a violent relationship. Verbal abuse can lead to sexual and physical abuse.  Abuse causes emotional pain, depression, anxiety, and post-traumatic stress. Sexual abuse can lead to sexually transmitted infections (including HIV/AIDS) and unplanned pregnancy.  Pregnancy can be a very dangerous time for people in abusive relationships. Pregnant people who are abused may have anemia, infections, bleeding, or poor weight gain. Abuse during this time may also increase your baby's risk of low birth weight, premature birth, and death.  It can be hard for some victims of domestic abuse to ask for help or to leave their relationship. You may feel scared, stuck, or not sure what steps to take. But it's important not to ignore abuse. Talking to someone could be the first step to ending the abuse and taking care of your own health and happiness again.  Where can you get help?  Talk to a trusted friend. Find a local advocacy group, or talk to your doctor about the abuse.  Contact the National Domestic Violence Hotline at 1-936-969-VOUV (1-235.835.5978) for more safety tips. They can guide you to groups in your area that can help. Or go to the National Coalition Against Domestic Violence website at www.thehotline.org to learn more.  Domestic violence groups or a counselor in your area can help you make a safety plan for yourself and your children.  When to call for help  Call 911 anytime you think you may need emergency care. For example, call if:  You think that you or someone you know is in danger of being abused.  You have been  "hurt and can't have someone safely take you to emergency care.  You have just been abused.  A family member has just been abused.  Where can you learn more?  Go to https://www.[employer].net/patiented  Enter S665 in the search box to learn more about \"Learning About Domestic Abuse.\"  Current as of: February 27, 2023               Content Version: 13.7    8470-2341 [employer].   Care instructions adapted under license by your healthcare professional. If you have questions about a medical condition or this instruction, always ask your healthcare professional. [employer] disclaims any warranty or liability for your use of this information.      Vaginal Bleeding During Pregnancy: Care Instructions  Overview     It's common to have some vaginal spotting when you are pregnant. In some cases, the bleeding isn't serious. And there aren't any more problems with the pregnancy.  But sometimes bleeding is a sign of a more serious problem. This is more common if the bleeding is heavy or painful. Examples of more serious problems include miscarriage, an ectopic pregnancy, and a problem with the placenta.  You may have to see your doctor again to be sure everything is okay. You may also need more tests to find the cause of the bleeding.  Home treatment may be all you need. But it depends on what is causing the bleeding. Be sure to tell your doctor if you have any new symptoms or if your symptoms get worse.  The doctor has checked you carefully, but problems can develop later. If you notice any problems or new symptoms, get medical treatment right away.  Follow-up care is a key part of your treatment and safety. Be sure to make and go to all appointments, and call your doctor if you are having problems. It's also a good idea to know your test results and keep a list of the medicines you take.  How can you care for yourself at home?  If your doctor prescribed medicines, take them exactly as directed. Call " "your doctor if you think you are having a problem with your medicine.  Do not have vaginal sex until your doctor says it's okay.  Do not put anything in your vagina until your doctor says it's okay.  Ask your doctor about other activities you can or can't do.  Get a lot of rest. Being pregnant can make you tired.  Do not use nonsteroidal anti-inflammatory drugs (NSAIDs), such as ibuprofen (Advil, Motrin), naproxen (Aleve), or aspirin, unless your doctor says it is okay.  When should you call for help?   Call 911 anytime you think you may need emergency care. For example, call if:    You passed out (lost consciousness).     You have severe vaginal bleeding. This means you are soaking through a pad each hour for 2 or more hours.     You have sudden, severe pain in your belly or pelvis.   Call your doctor now or seek immediate medical care if:    You have new or worse vaginal bleeding.     You are dizzy or lightheaded, or you feel like you may faint.     You have pain in your belly, pelvis, or lower back.     You think that you are in labor.     You have a sudden release of fluid from your vagina.     You've been having regular contractions for an hour. This means that you've had at least 8 contractions within 1 hour or at least 4 contractions within 20 minutes, even after you change your position and drink fluids.     You notice that your baby has stopped moving or is moving much less than normal.   Watch closely for changes in your health, and be sure to contact your doctor if you have any problems.  Where can you learn more?  Go to https://www."[employer].net/patiented  Enter N829 in the search box to learn more about \"Vaginal Bleeding During Pregnancy: Care Instructions.\"  Current as of: November 9, 2022               Content Version: 13.7    9694-4799 [employer].   Care instructions adapted under license by your healthcare professional. If you have questions about a medical condition or this " instruction, always ask your healthcare professional. [employer] disclaims any warranty or liability for your use of this information.

## 2023-11-06 ENCOUNTER — MYC MEDICAL ADVICE (OUTPATIENT)
Dept: OBGYN | Facility: CLINIC | Age: 38
End: 2023-11-06
Payer: COMMERCIAL

## 2023-11-06 DIAGNOSIS — O03.4 INCOMPLETE ABORTION: Primary | ICD-10-CM

## 2023-11-07 ENCOUNTER — OFFICE VISIT (OUTPATIENT)
Dept: OBGYN | Facility: CLINIC | Age: 38
End: 2023-11-07
Payer: COMMERCIAL

## 2023-11-07 VITALS
SYSTOLIC BLOOD PRESSURE: 102 MMHG | RESPIRATION RATE: 16 BRPM | BODY MASS INDEX: 25.81 KG/M2 | HEART RATE: 92 BPM | DIASTOLIC BLOOD PRESSURE: 69 MMHG | WEIGHT: 151.2 LBS | TEMPERATURE: 97.4 F | HEIGHT: 64 IN

## 2023-11-07 DIAGNOSIS — O03.9 MISCARRIAGE: Primary | ICD-10-CM

## 2023-11-07 PROCEDURE — 99213 OFFICE O/P EST LOW 20 MIN: CPT | Performed by: OBSTETRICS & GYNECOLOGY

## 2023-11-07 NOTE — PROGRESS NOTES
CC:  Follow up  from herself for ongoing discharge after a Spontaneous miscarriage   HPI:  Serina Washington is a 38 year old female is a   .  Patient's last menstrual period was 2023.    She experienced a spontaneous miscarriage 25 days ago.  This started with 5 days of heavy bleeding with 1 day of cramping and clot passage.  Since that time she has had thick brown vaginal discharge    Patients records are available and reviewed at today's visit.    Past GYN history:  No STD history       Last PAP smear:  Normal  Last TSH:   TSH   Date Value Ref Range Status   2022 0.78 0.40 - 4.00 mU/L Final   2021 0.80 0.40 - 4.00 mU/L Final     , normal?  Yes    History reviewed. No pertinent past medical history.    Past Surgical History:   Procedure Laterality Date     SECTION N/A 09/10/2017    Procedure:  SECTION;  Primary  Section;  Surgeon: Mu Miranda MD;  Location: [facility]     SECTION Bilateral 2022    Procedure:  SECTION;  Surgeon: Quinn Cash MD;  Location: [facility]    DILATION AND CURETTAGE SUCTION N/A 2021    Procedure: DILATION AND CURETTAGE, UTERUS, USING SUCTION;  Surgeon: Vicki Tracey MD;  Location: [facility]    OPERATIVE HYSTEROSCOPY N/A 2020    Procedure: HYSTEROSCOPY, THERAPEUTIC;  Surgeon: Quinn Cash MD;  Location: [facility]    REVISE SCAR TRUNK Bilateral 2022    Procedure: REVISION, SCAR, TORSO;  Surgeon: Quinn Cash MD;  Location: [facility]    SURGICAL HISTORY OF -  Left     knee surgery  and        Family History   Problem Relation Age of Onset    Hypertension Mother     Thyroid Disease Mother     Kidney Disease Father         dialysis    Hypertension Father     Gastrointestinal Disease Father         stomach ulcer    Heart Disease Father     Skin Cancer Maternal Grandmother     Asthma Maternal Grandmother     Cancer Paternal Grandmother     Cancer Paternal Grandfather   "      Allergies: Patient has no known allergies.    Current Outpatient Medications   Medication Sig Dispense Refill    Prenatal Multivit-Min-Fe-FA (PRENATAL VITAMINS PO) Take 2 tablets by mouth every evening [place]          ROS:  C: NEGATIVE for fever, chills, change in weight  I: NEGATIVE for worrisome rashes, moles or lesions  E: NEGATIVE for vision changes or irritation  E/M: NEGATIVE for ear, mouth and throat problems  R: NEGATIVE for significant cough or SOB  CV: NEGATIVE for chest pain, palpitations or peripheral edema  GI: NEGATIVE for nausea, abdominal pain, heartburn, or change in bowel habits  : NEGATIVE for frequency, dysuria, hematuria, vaginal discharge  M: NEGATIVE for significant arthralgias or myalgia  N: NEGATIVE for weakness, dizziness or paresthesias  E: NEGATIVE for temperature intolerance, skin/hair changes  P: NEGATIVE for changes in mood or affect    EXAM:  Blood pressure 102/69, pulse 92, temperature 97.4  F (36.3  C), resp. rate 16, height 1.626 m (5' 4\"), weight 68.6 kg (151 lb 3.2 oz), last menstrual period 2023, currently breastfeeding.   BMI= Body mass index is 25.95 kg/m .  General - pleasant female in no acute distress.  Neck - supple without lymphadenopathy or thyromegaly.  Lungs - clear to auscultation bilaterally.  Heart - regular rate and rhythm without murmur.  Breast - Deferred  Abdomen - soft, nontender, nondistended, no hepatosplenomegaly.  Pelvic - EG: normal adult female,   BUS: within normal limits,   Vagina: well rugated, small brown  discharge,   Cervix: no lesions or CMT, closed, no tissue in the os  Uterus: firm, normal sized and nontender,   Adnexae: no masses or tenderness.  Rectovaginal - deferred.  Musculoskeletal - no gross deformities.  Neurological - normal strength, sensation, and mental status.      ASSESSMENT/PLAN:  (O03.9) Miscarriage  (primary encounter diagnosis)  Comment: possibly incomplete  Plan: US OB < 14 Weeks Single        Consider " D&C as she has international travel in her plans next week    I described the procedures as indicated above. The types of anesthesia were reviewed. The pre and post-op expectations were noted. We discussed the risks and benefits of the above procedures. The risk of bleeding, infection and damage to other organs was reviewed. No guarantees were made.  She did express understanding and desires to proceed with surgery.      Letter will be sent to the referring provider.    Quinn Cash MD

## 2023-11-07 NOTE — PATIENT INSTRUCTIONS
Weeks 10 to 14 of Your Pregnancy: Care Instructions  It's now possible to hear the fetus's heartbeat with a special ultrasound device. And the fetus's organs are developing.    Decide about tests to check for birth defects. Think about your age, your chance of passing on a family disease, your need to know about any problems, and what you might do after you have the test results.   It's okay to exercise. Try activities such as walking or swimming. Check with your doctor before starting a new program.     You may feel more tired than usual.  Taking naps during the day may help.     You may feel emotional.  It might help to talk to someone.     You may have headaches.  Try lying down and putting a cool cloth over your forehead.     You can use acetaminophen (Tylenol) for pain relief.  Don't take any anti-inflammatory medicines (such as Advil, Motrin, Aleve), unless your doctor says it's okay.     You may feel a fullness or aching in your lower belly.  This can feel like the kind of cramps you might get before a period. A back rub may help.     You may need to urinate more.  Your growing uterus and changing hormones can affect your bladder.     You may feel sick to your stomach (morning sickness).  Try avoiding food and smells that make you feel sick.     Your breasts may feel different.  They may feel tender or get bigger. Your nipples may get darker. Try a bra that gives you good support.     Avoid alcohol, tobacco, and drugs (including marijuana).  If you need help quitting, talk to your doctor.     Take a daily prenatal vitamin.  Choose one with folic acid.   Follow-up care is a key part of your treatment and safety. Be sure to make and go to all appointments, and call your doctor if you are having problems. It's also a good idea to know your test results and keep a list of the medicines you take.  Where can you learn more?  Go to https://www.healthwise.net/patiented  Enter E090 in the search box to learn more  "about \"Weeks 10 to 14 of Your Pregnancy: Care Instructions.\"  Current as of: July 11, 2023               Content Version: 13.8    5697-1013 [employer].   Care instructions adapted under license by your healthcare professional. If you have questions about a medical condition or this instruction, always ask your healthcare professional. [employer] disclaims any warranty or liability for your use of this information.      Understanding Alpha and Beta Thalassemia  What is thalassemia?  Thalassemia [[facility]-jc-SEE-ruby-] is a lifelong blood disorder. It is caused by mutations (changes) in the genes that make hemoglobin.   Hemoglobin is a protein in red blood cells that carries oxygen. Hemoglobin is made of 4 smaller protein chains: 2 blocks of alpha chains and 2 blocks of beta chains (see Figure 1). Each block has heme (iron) in the center. Oxygen sticks to the heme, allowing your body to carry it through the bloodstream. The oxygen is delivered to your whole body.  When you have alpha thalassemia, your alpha blocks are smaller or are missing one or both alpha chains. (See Figure 2 for an example.) For beta thalassemia, the beta blocks are smaller or fewer in number.   With either disorder, your body makes smaller hemoglobin and possibly fewer red blood cells to hold hemoglobin (anemia). You may feel very tired or have other problems as a result.  How do you get thalassemia?  There are 4 genes for alpha thalassemia and 2 genes for beta thalassemia. For you to have either alpha or beta thalassemia, both of your parents must carry a gene mutation for the disease and pass it down to you.   It's possible to have more severe or less severe symptoms than your parents. If you get a mutation from only one parent, for example, you won't have symptoms of thalassemia (thalassemia trait)--but you could still pass the mutation to your children.  Types of thalassemias  Some types of thalassemia have fewer " symptoms than others. How severe the thalassemia is depends on how many mutated genes you have inherited and how many alpha or beta blocks are affected.   Alpha Thalassemia  Silent carrier (1 alpha mutation): People with this type have no symptoms and often do not know they have thalassemia.  Alpha thalassemia trait (2 alpha mutations): Some people with this trait may have mild anemia, but they often feel well.  Hemoglobin H disease (3 alpha mutations): People with this disorder have anemia more often. They sometimes need blood transfusions, but can have a normal life span.  Hemoglobin Barts (4 alpha mutations):  With this type, no alpha blocks are made. Severe problems occur during the mother's pregnancy and newborns rarely survive.  Beta Thalassemia  Beta thalassemia trait (1 beta mutation): People with this trait (also called beta thalassemia minor) have red blood cells that are small. Mild anemia can occur, but it is rare.  Beta thalassemia intermedia (2 beta mutations): In this type, both beta genes are abnormal, but these mutations may be mild. More severe types sometimes need blood transfusions to treat anemia and medicine to treat iron buildup. Patients have normal life spans.  Beta thalassemia major (2 severe beta mutations): This is the most severe form of beta thalassemia. Both beta genes are abnormal, causing little to no beta blocks to be made. Patients often need medicine to reduce iron buildup, as well as monthly blood transfusions to stay healthy. The only cure at this time is a bone marrow transplant. More options are still being studied.  Treatment and outcomes  Mild types: People with a mild type of alpha or beta thalassemia rarely need treatment. Some need folic acid to help make more red blood cells. Most have normal life spans.  Moderate to severe types: Patients with these types have a higher risk for reduced growth, early bone thinning and pregnancy problems.  Most severe types: These  patients often need regular blood transfusions, even if not sick. It is common to have iron build up in your body, so medicine may be needed to reduce the buildup. If left untreated, too much iron, especially in the liver and heart, can lead to premature death.  Outcomes for patients with alpha or beta thalassemia are usually very good in developed countries like the [place]. Survival rates higher than 90% have been reported for children.   For informational purposes only. Not to replace the advice of your health care provider. Copyright   2021 [employer] [facility]. All rights reserved. Clinically reviewed by Giovanni Nolasco MD, Hematology. [employer] 814770 - REV 08/21.    Nutrition During Pregnancy: Care Instructions  Overview     Healthy eating when you are pregnant is important for you and your baby. It can help you feel well and have a successful pregnancy and delivery. During pregnancy your nutrition needs increase. Even if you have excellent eating habits, your doctor may recommend a multivitamin to make sure you get enough iron and folic acid.  You may wonder how much weight you should gain. In general, if you were at a healthy weight before you became pregnant, then you should gain between 25 and 35 pounds. If you were overweight before pregnancy, then you'll likely be advised to gain 15 to 25 pounds. If you were underweight before pregnancy, then you'll probably be advised to gain 28 to 40 pounds. Your doctor will work with you to set a weight goal that is right for you. Gaining a healthy amount of weight helps you have a healthy baby.  Follow-up care is a key part of your treatment and safety. Be sure to make and go to all appointments, and call your doctor if you are having problems. It's also a good idea to know your test results and keep a list of the medicines you take.  How can you care for yourself at home?  Eat plenty of fruits and vegetables. Include a variety of orange, yellow,  and leafy dark-green vegetables every day.  Choose whole-grain bread, cereal, and pasta. Good choices include whole wheat bread, whole wheat pasta, brown rice, and oatmeal.  Get 4 or more servings of milk and milk products each day. Good choices include nonfat or low-fat milk, yogurt, and cheese. If you cannot eat milk products, you can get calcium from calcium-fortified products such as orange juice, soy milk, and tofu. Other non-milk sources of calcium include leafy green vegetables, such as broccoli, kale, mustard greens, turnip greens, bok dayana, and brussels sprouts.  If you eat meat, pick lower-fat types. Good choices include lean cuts of meat and chicken or turkey without the skin.  Do not eat shark, swordfish, becca mackerel, or tilefish. They have high levels of mercury, which is dangerous to your baby. You can eat up to 12 ounces a week of fish or shellfish that have low mercury levels. Good choices include shrimp, wild salmon, pollock, and catfish. Limit some other types of fish, such as white (albacore) tuna, to 4 oz (0.1 kg) a week.  Heat lunch meats (such as turkey, ham, or bologna) to 165 F before you eat them. This reduces your risk of getting sick from a kind of bacteria that can be found in lunch meats.  Do not eat unpasteurized soft cheeses, such as brie, feta, fresh mozzarella, and blue cheese. They have a bacteria that could harm your baby.  Limit caffeine. If you drink coffee or tea, have no more than 1 cup a day. Caffeine is also found in liv.  Do not drink any alcohol. No amount of alcohol has been found to be safe during pregnancy.  Do not diet or try to lose weight. For example, do not follow a low-carbohydrate diet. If you are overweight at the start of your pregnancy, your doctor will work with you to manage your weight gain.  Tell your doctor about all vitamins and supplements you take.  When should you call for help?  Watch closely for changes in your health, and be sure to contact your  "doctor if you have any problems.  Where can you learn more?  Go to https://www.weezim.com.net/patiented  Enter Y785 in the search box to learn more about \"Nutrition During Pregnancy: Care Instructions.\"  Current as of: February 28, 2023               Content Version: 13.8    5964-4267 [employer].   Care instructions adapted under license by your healthcare professional. If you have questions about a medical condition or this instruction, always ask your healthcare professional. [employer] disclaims any warranty or liability for your use of this information.      Exercise During Pregnancy: Care Instructions  Overview     Exercise is good for you during a healthy pregnancy. It can relieve back pain, swelling, and other discomforts. It also prepares your muscles for childbirth. And exercise can improve your energy level and help you sleep better.  If your doctor advises it, get more exercise. For example, walking is a good choice. Bit by bit, increase the amount you walk every day. Try for at least 30 minutes on most days of the week. You could also try a prenatal exercise class. But if you do not already exercise, be sure to talk with your doctor before you start a new exercise program. Doctors do not recommend contact sports during pregnancy.  Follow-up care is a key part of your treatment and safety. Be sure to make and go to all appointments, and call your doctor if you are having problems. It's also a good idea to know your test results and keep a list of the medicines you take.  How can you care for yourself at home?  Talk with your doctor about the right kind of exercise for each stage of pregnancy.  Listen to your body to know if your exercise is at a safe level.  Do not become overheated while you exercise. High body temperature can be harmful. Avoid activities that can make your body too hot.  If you feel tired, take it easy. You might walk instead of run.  If you are used to " "strenuous exercise, ask your doctor how to know when it's time to slow down.  If you exercised before getting pregnant, you should be able to keep up your routine early in your pregnancy. Later in your pregnancy, you may want to switch to more gentle activities.  Drink plenty of fluids before, during, and after exercise.  Avoid contact sports, such as soccer and basketball. Also avoid risky activities. These include scuba diving, horseback riding, and exercising at a high altitude (above 6,000 feet). If you live in a place with a high altitude, talk to your doctor about how you can exercise safely.  Do not get overtired while you exercise. You should be able to talk while you work out.  After your fourth month of pregnancy, avoid exercises that require you to lie flat on your back on a hard surface. These include sit-ups and some yoga poses.  Get plenty of rest. You may be very tired while you are pregnant.  Where can you learn more?  Go to https://www.Conj[employer].net/patiented  Enter S801 in the search box to learn more about \"Exercise During Pregnancy: Care Instructions.\"  Current as of: July 11, 2023               Content Version: 13.8    1503-8886 [employer].   Care instructions adapted under license by your healthcare professional. If you have questions about a medical condition or this instruction, always ask your healthcare professional. [employer] disclaims any warranty or liability for your use of this information.      Learning About Pregnancy and Obesity  How does your weight affect your pregnancy?     The basics of prenatal care are the same for everyone, regardless of size. You'll get what you need to have a healthy baby.  But your size can make a difference in a few things. You and your doctor will have to watch your pregnancy weight. Your weight may affect your labor and delivery.  You may have some extra doctor visits and tests. And you may have some tests earlier in your " "pregnancy. You'll need to pay close attention to things like blood pressure and the chance of getting gestational diabetes. (This is a type of diabetes that sometimes happens during pregnancy.) And close attention will be given to your developing baby.  Work with your doctor to get the care you need. Go to all your doctor visits, and follow your doctor's advice about what to do and what to avoid during pregnancy.  How much weight gain is healthy?  If you are very overweight (obese), experts recommend that you gain between 11 and 20 pounds. Your doctor will work with you to set a weight goal that's right for you. In some cases, your doctor may recommend that you not gain any weight.  How much extra food do you need to eat?  Although you may joke that you're \"eating for two\" during pregnancy, you don't need to eat twice as much food. How much you can eat depends on:  Your height.  How much you weigh when you get pregnant.  How active you are.  If you're carrying more than one fetus (multiple pregnancy).  In the first trimester, you'll probably need the same amount of calories as you did before you were pregnant. In general, in your second trimester, you need to eat about 340 extra calories a day. In your third trimester, you need to eat about 450 extra calories a day.  What can you do to have a healthy pregnancy?  The best things you can do for you and your baby are to eat healthy foods, get regular exercise, avoid alcohol and smoking, and go to your doctor visits.  Eat a variety of foods from all the food groups. Make sure to get enough calcium and folic acid.  You may want to work with a dietitian to help you plan healthy meals to get the right amount of calories for you.  If you didn't exercise much before you got pregnant, talk to your doctor about how you can slowly get more active. Your doctor may want to set up an exercise program with you.  Where can you learn more?  Go to " "https://www.EVRGR.net/patiented  Enter B644 in the search box to learn more about \"Learning About Pregnancy and Obesity.\"  Current as of: July 11, 2023               Content Version: 13.8    4105-1373 [employer].   Care instructions adapted under license by your healthcare professional. If you have questions about a medical condition or this instruction, always ask your healthcare professional. [employer] disclaims any warranty or liability for your use of this information.      You have been provided the CDC Warning Signs in Pregnancy document.    Additional copies can be found here: www.[employer].com/871016.pdf  "

## 2023-11-08 ENCOUNTER — ANCILLARY PROCEDURE (OUTPATIENT)
Dept: ULTRASOUND IMAGING | Facility: CLINIC | Age: 38
End: 2023-11-08
Attending: OBSTETRICS & GYNECOLOGY
Payer: COMMERCIAL

## 2023-11-08 DIAGNOSIS — O03.9 MISCARRIAGE: ICD-10-CM

## 2023-11-08 PROCEDURE — 76817 TRANSVAGINAL US OBSTETRIC: CPT | Mod: TC | Performed by: RADIOLOGY

## 2023-11-08 PROCEDURE — 76801 OB US < 14 WKS SINGLE FETUS: CPT | Mod: TC | Performed by: RADIOLOGY

## 2023-11-09 ENCOUNTER — TELEPHONE (OUTPATIENT)
Dept: OBGYN | Facility: CLINIC | Age: 38
End: 2023-11-09
Payer: COMMERCIAL

## 2023-11-09 ENCOUNTER — ANESTHESIA EVENT (OUTPATIENT)
Dept: SURGERY | Facility: CLINIC | Age: 38
End: 2023-11-09
Payer: COMMERCIAL

## 2023-11-09 NOTE — TELEPHONE ENCOUNTER
"[id number]  Serina Washington    You are now scheduled for surgery at [facility].  Below are the details for your surgery.  Please read the \"Preparing for Your Surgery\" instructions and let us know if you have any questions.    Type of surgery: DILATION AND CURETTAGE, UTERUS, USING SUCTION   Surgeon:  Galilea Moses MD  Location of surgery: [facility] OR    Date of surgery: 11/10/23    Time: 11:15am    Arrival Time: 10:15am    Time can change, to be confirmed a couple of days prior by pre-op surgery nurse.    Pre-Op Appt Date: Update AM of surgery  Post-Op Appt Date:    Time:     Packet sent out: Not Applicable  Pre-cert/Authorization completed:  TBD by [employer].   MA Sterilization/Hysterectomy Acknowledgment Consent signed: Not Applicable    [facility]  [phone]    Fax: [phone]  Same Day Surgery [phone]  Fax: [phone]  [facility] [phone]    "

## 2023-11-09 NOTE — TELEPHONE ENCOUNTER
Called pt to discuss surgery options for tomorrow.  She states she has sent Dr. Cash a Meet Yout message to see if there are any other options?  States she is breast feeding, she is supposed to travel internationally next Tuesday.  Really doesn't want the procedure if at all possible.    -Kiara Brannon  [facility] [place]

## 2023-11-09 NOTE — ANESTHESIA PREPROCEDURE EVALUATION
Anesthesia Pre-Procedure Evaluation    Patient: Serina Washington   MRN: [id number] : 1985        Procedure : Procedure(s):  DILATION AND CURETTAGE, UTERUS, USING SUCTION          No past medical history on file.   Past Surgical History:   Procedure Laterality Date     SECTION N/A 09/10/2017    Procedure:  SECTION;  Primary  Section;  Surgeon: Mu Miranda MD;  Location: [facility] OR     SECTION Bilateral 2022    Procedure:  SECTION;  Surgeon: Quinn Cash MD;  Location: [facility] OR    DILATION AND CURETTAGE SUCTION N/A 2021    Procedure: DILATION AND CURETTAGE, UTERUS, USING SUCTION;  Surgeon: Vicki Tracey MD;  Location: [facility] OR    OPERATIVE HYSTEROSCOPY N/A 2020    Procedure: HYSTEROSCOPY, THERAPEUTIC;  Surgeon: Quinn Cash MD;  Location: [facility] OR    REVISE SCAR TRUNK Bilateral 2022    Procedure: REVISION, SCAR, TORSO;  Surgeon: Quinn Cash MD;  Location: [facility] OR    SURGICAL HISTORY OF -  Left     knee surgery  and       No Known Allergies   Social History     Tobacco Use    Smoking status: Never    Smokeless tobacco: Never   Substance Use Topics    Alcohol use: Not Currently      Wt Readings from Last 1 Encounters:   23 68.6 kg (151 lb 3.2 oz)        Anesthesia Evaluation   Pt has had prior anesthetic.         ROS/MED HX  ENT/Pulmonary: Comment: Current COVID +, symptoms much less severe. Previous COVID +, very symptomatic.       Neurologic:  - neg neurologic ROS     Cardiovascular:  - neg cardiovascular ROS     METS/Exercise Tolerance:     Hematologic:  - neg hematologic  ROS     Musculoskeletal:  - neg musculoskeletal ROS     GI/Hepatic:  - neg GI/hepatic ROS     Renal/Genitourinary:  - neg Renal ROS     Endo:  - neg endo ROS     Psychiatric/Substance Use:  - neg psychiatric ROS     Infectious Disease:  - neg infectious disease ROS     Malignancy:  - neg malignancy ROS     Other:      (+)  Possibly pregnant, , ,         Physical Exam    Airway  airway exam normal           Respiratory Devices and Support         Dental       (+) Minor Abnormalities - some fillings, tiny chips      Cardiovascular   cardiovascular exam normal          Pulmonary   pulmonary exam normal                OUTSIDE LABS:  CBC:   Lab Results   Component Value Date    WBC 6.6 05/05/2023    WBC 7.5 11/30/2022    HGB 13.1 05/05/2023    HGB 10.0 (L) 12/01/2022    HCT 39.6 05/05/2023    HCT 37.7 11/30/2022     05/05/2023     11/30/2022     BMP:   Lab Results   Component Value Date     03/02/2022     11/05/2021    POTASSIUM 3.9 03/02/2022    POTASSIUM 3.7 11/05/2021    CHLORIDE 109 03/02/2022    CHLORIDE 104 11/05/2021    CO2 28 03/02/2022    CO2 24 11/05/2021    BUN 7 03/02/2022    BUN 8 11/05/2021    CR 0.51 12/01/2022    CR 0.66 03/02/2022    GLC 82 03/02/2022     (H) 11/05/2021     COAGS:   Lab Results   Component Value Date    PTT 32 03/02/2022    INR 1.13 03/02/2022     POC:   Lab Results   Component Value Date    HCG Negative 11/16/2020    HCGS Negative 03/02/2022     HEPATIC:   Lab Results   Component Value Date    ALBUMIN 3.9 03/02/2022    PROTTOTAL 7.8 03/02/2022    ALT 31 03/02/2022    AST 21 03/02/2022    ALKPHOS 40 03/02/2022    BILITOTAL 0.5 03/02/2022     OTHER:   Lab Results   Component Value Date    LACT 0.8 03/02/2022    A1C 5.2 05/20/2021    LING 9.2 03/02/2022    LIPASE 184 03/02/2022    TSH 0.78 03/29/2022    CRP <2.9 11/01/2020       Anesthesia Plan    ASA Status:  2    NPO Status:  NPO Appropriate    Anesthesia Type: General.      Maintenance: Balanced.        Consents    Anesthesia Plan(s) and associated risks, benefits, and realistic alternatives discussed. Questions answered and patient/representative(s) expressed understanding.     - Discussed:     - Discussed with:  Patient            Postoperative Care            Comments:                ISABELLA Saini CRNA

## 2023-11-10 ENCOUNTER — ANESTHESIA (OUTPATIENT)
Dept: SURGERY | Facility: CLINIC | Age: 38
End: 2023-11-10
Payer: COMMERCIAL

## 2023-11-10 ENCOUNTER — HOSPITAL ENCOUNTER (OUTPATIENT)
Facility: CLINIC | Age: 38
Discharge: HOME | End: 2023-11-10
Attending: OBSTETRICS & GYNECOLOGY | Admitting: OBSTETRICS & GYNECOLOGY
Payer: COMMERCIAL

## 2023-11-10 VITALS
WEIGHT: 151 LBS | DIASTOLIC BLOOD PRESSURE: 71 MMHG | OXYGEN SATURATION: 100 % | HEIGHT: 64 IN | TEMPERATURE: 98 F | BODY MASS INDEX: 25.78 KG/M2 | SYSTOLIC BLOOD PRESSURE: 102 MMHG | RESPIRATION RATE: 16 BRPM | HEART RATE: 85 BPM

## 2023-11-10 DIAGNOSIS — Z98.890 POSTOPERATIVE STATE: Primary | ICD-10-CM

## 2023-11-10 LAB — HCG INTACT+B SERPL-ACNC: 105 MIU/ML

## 2023-11-10 PROCEDURE — 250N000013 HC RX MED GY IP 250 OP 250 PS 637: Performed by: OBSTETRICS & GYNECOLOGY

## 2023-11-10 PROCEDURE — 88305 TISSUE EXAM BY PATHOLOGIST: CPT | Mod: TC | Performed by: OBSTETRICS & GYNECOLOGY

## 2023-11-10 PROCEDURE — 250N000009 HC RX 250: Performed by: OBSTETRICS & GYNECOLOGY

## 2023-11-10 PROCEDURE — 710N000012 HC RECOVERY PHASE 2, PER MINUTE: Performed by: OBSTETRICS & GYNECOLOGY

## 2023-11-10 PROCEDURE — 360N000075 HC SURGERY LEVEL 2, PER MIN: Performed by: OBSTETRICS & GYNECOLOGY

## 2023-11-10 PROCEDURE — 271N000001 HC OR GENERAL SUPPLY NON-STERILE: Performed by: OBSTETRICS & GYNECOLOGY

## 2023-11-10 PROCEDURE — 250N000011 HC RX IP 250 OP 636: Performed by: NURSE ANESTHETIST, CERTIFIED REGISTERED

## 2023-11-10 PROCEDURE — 59812 TREATMENT OF MISCARRIAGE: CPT | Performed by: OBSTETRICS & GYNECOLOGY

## 2023-11-10 PROCEDURE — 84702 CHORIONIC GONADOTROPIN TEST: CPT | Performed by: OBSTETRICS & GYNECOLOGY

## 2023-11-10 PROCEDURE — 370N000017 HC ANESTHESIA TECHNICAL FEE, PER MIN: Performed by: OBSTETRICS & GYNECOLOGY

## 2023-11-10 PROCEDURE — 999N000141 HC STATISTIC PRE-PROCEDURE NURSING ASSESSMENT: Performed by: OBSTETRICS & GYNECOLOGY

## 2023-11-10 PROCEDURE — 88305 TISSUE EXAM BY PATHOLOGIST: CPT | Mod: 26 | Performed by: PATHOLOGY

## 2023-11-10 PROCEDURE — 272N000001 HC OR GENERAL SUPPLY STERILE: Performed by: OBSTETRICS & GYNECOLOGY

## 2023-11-10 PROCEDURE — 250N000013 HC RX MED GY IP 250 OP 250 PS 637: Performed by: NURSE ANESTHETIST, CERTIFIED REGISTERED

## 2023-11-10 PROCEDURE — 250N000009 HC RX 250: Performed by: NURSE ANESTHETIST, CERTIFIED REGISTERED

## 2023-11-10 PROCEDURE — 36415 COLL VENOUS BLD VENIPUNCTURE: CPT | Performed by: OBSTETRICS & GYNECOLOGY

## 2023-11-10 PROCEDURE — 258N000003 HC RX IP 258 OP 636: Performed by: NURSE ANESTHETIST, CERTIFIED REGISTERED

## 2023-11-10 RX ORDER — FENTANYL CITRATE 50 UG/ML
INJECTION, SOLUTION INTRAMUSCULAR; INTRAVENOUS PRN
Status: DISCONTINUED | OUTPATIENT
Start: 2023-11-10 | End: 2023-11-10

## 2023-11-10 RX ORDER — LIDOCAINE 40 MG/G
CREAM TOPICAL
Status: DISCONTINUED | OUTPATIENT
Start: 2023-11-10 | End: 2023-11-10 | Stop reason: HOSPADM

## 2023-11-10 RX ORDER — SODIUM CHLORIDE, SODIUM LACTATE, POTASSIUM CHLORIDE, CALCIUM CHLORIDE 600; 310; 30; 20 MG/100ML; MG/100ML; MG/100ML; MG/100ML
INJECTION, SOLUTION INTRAVENOUS CONTINUOUS
Status: DISCONTINUED | OUTPATIENT
Start: 2023-11-10 | End: 2023-11-10 | Stop reason: HOSPADM

## 2023-11-10 RX ORDER — PROPOFOL 10 MG/ML
INJECTION, EMULSION INTRAVENOUS CONTINUOUS PRN
Status: DISCONTINUED | OUTPATIENT
Start: 2023-11-10 | End: 2023-11-10

## 2023-11-10 RX ORDER — OXYCODONE HYDROCHLORIDE 5 MG/1
10 TABLET ORAL
Status: DISCONTINUED | OUTPATIENT
Start: 2023-11-10 | End: 2023-11-10 | Stop reason: HOSPADM

## 2023-11-10 RX ORDER — GABAPENTIN 300 MG/1
300 CAPSULE ORAL
Status: DISCONTINUED | OUTPATIENT
Start: 2023-11-10 | End: 2023-11-10 | Stop reason: HOSPADM

## 2023-11-10 RX ORDER — GLYCOPYRROLATE 0.2 MG/ML
INJECTION, SOLUTION INTRAMUSCULAR; INTRAVENOUS PRN
Status: DISCONTINUED | OUTPATIENT
Start: 2023-11-10 | End: 2023-11-10

## 2023-11-10 RX ORDER — ACETAMINOPHEN 325 MG/1
975 TABLET ORAL ONCE
Status: COMPLETED | OUTPATIENT
Start: 2023-11-10 | End: 2023-11-10

## 2023-11-10 RX ORDER — DOXYCYCLINE 100 MG/1
200 CAPSULE ORAL ONCE
Status: COMPLETED | OUTPATIENT
Start: 2023-11-10 | End: 2023-11-10

## 2023-11-10 RX ORDER — IBUPROFEN 400 MG/1
800 TABLET, FILM COATED ORAL ONCE
Status: DISCONTINUED | OUTPATIENT
Start: 2023-11-10 | End: 2023-11-10 | Stop reason: HOSPADM

## 2023-11-10 RX ORDER — FENTANYL CITRATE 50 UG/ML
25 INJECTION, SOLUTION INTRAMUSCULAR; INTRAVENOUS EVERY 5 MIN PRN
Status: DISCONTINUED | OUTPATIENT
Start: 2023-11-10 | End: 2023-11-10 | Stop reason: HOSPADM

## 2023-11-10 RX ORDER — OXYCODONE HYDROCHLORIDE 5 MG/1
5 TABLET ORAL
Status: DISCONTINUED | OUTPATIENT
Start: 2023-11-10 | End: 2023-11-10 | Stop reason: HOSPADM

## 2023-11-10 RX ORDER — ONDANSETRON 2 MG/ML
INJECTION INTRAMUSCULAR; INTRAVENOUS PRN
Status: DISCONTINUED | OUTPATIENT
Start: 2023-11-10 | End: 2023-11-10

## 2023-11-10 RX ORDER — FENTANYL CITRATE 50 UG/ML
50 INJECTION, SOLUTION INTRAMUSCULAR; INTRAVENOUS EVERY 5 MIN PRN
Status: DISCONTINUED | OUTPATIENT
Start: 2023-11-10 | End: 2023-11-10 | Stop reason: HOSPADM

## 2023-11-10 RX ORDER — FENTANYL CITRATE 50 UG/ML
25 INJECTION, SOLUTION INTRAMUSCULAR; INTRAVENOUS
Status: DISCONTINUED | OUTPATIENT
Start: 2023-11-10 | End: 2023-11-10 | Stop reason: HOSPADM

## 2023-11-10 RX ORDER — ACETAMINOPHEN 325 MG/1
975 TABLET ORAL ONCE
Status: DISCONTINUED | OUTPATIENT
Start: 2023-11-10 | End: 2023-11-10 | Stop reason: HOSPADM

## 2023-11-10 RX ORDER — NALOXONE HYDROCHLORIDE 0.4 MG/ML
0.2 INJECTION, SOLUTION INTRAMUSCULAR; INTRAVENOUS; SUBCUTANEOUS
Status: DISCONTINUED | OUTPATIENT
Start: 2023-11-10 | End: 2023-11-10 | Stop reason: HOSPADM

## 2023-11-10 RX ORDER — LIDOCAINE HYDROCHLORIDE AND EPINEPHRINE 10; 10 MG/ML; UG/ML
INJECTION, SOLUTION INFILTRATION; PERINEURAL PRN
Status: DISCONTINUED | OUTPATIENT
Start: 2023-11-10 | End: 2023-11-10 | Stop reason: HOSPADM

## 2023-11-10 RX ORDER — NALOXONE HYDROCHLORIDE 0.4 MG/ML
0.4 INJECTION, SOLUTION INTRAMUSCULAR; INTRAVENOUS; SUBCUTANEOUS
Status: DISCONTINUED | OUTPATIENT
Start: 2023-11-10 | End: 2023-11-10 | Stop reason: HOSPADM

## 2023-11-10 RX ORDER — ONDANSETRON 4 MG/1
4 TABLET, ORALLY DISINTEGRATING ORAL EVERY 8 HOURS PRN
Qty: 4 TABLET | Refills: 0 | Status: SHIPPED | OUTPATIENT
Start: 2023-11-10 | End: 2024-04-25

## 2023-11-10 RX ORDER — IBUPROFEN 800 MG/1
800 TABLET, FILM COATED ORAL EVERY 8 HOURS PRN
Qty: 30 TABLET | Refills: 0 | Status: SHIPPED | OUTPATIENT
Start: 2023-11-10 | End: 2024-04-25

## 2023-11-10 RX ORDER — ONDANSETRON 4 MG/1
4 TABLET, ORALLY DISINTEGRATING ORAL EVERY 30 MIN PRN
Status: DISCONTINUED | OUTPATIENT
Start: 2023-11-10 | End: 2023-11-10 | Stop reason: HOSPADM

## 2023-11-10 RX ORDER — LIDOCAINE HYDROCHLORIDE 20 MG/ML
INJECTION, SOLUTION INFILTRATION; PERINEURAL PRN
Status: DISCONTINUED | OUTPATIENT
Start: 2023-11-10 | End: 2023-11-10

## 2023-11-10 RX ORDER — DEXAMETHASONE SODIUM PHOSPHATE 4 MG/ML
INJECTION, SOLUTION INTRA-ARTICULAR; INTRALESIONAL; INTRAMUSCULAR; INTRAVENOUS; SOFT TISSUE PRN
Status: DISCONTINUED | OUTPATIENT
Start: 2023-11-10 | End: 2023-11-10

## 2023-11-10 RX ORDER — ONDANSETRON 2 MG/ML
4 INJECTION INTRAMUSCULAR; INTRAVENOUS EVERY 30 MIN PRN
Status: DISCONTINUED | OUTPATIENT
Start: 2023-11-10 | End: 2023-11-10 | Stop reason: HOSPADM

## 2023-11-10 RX ORDER — ACETAMINOPHEN 325 MG/1
975 TABLET ORAL EVERY 6 HOURS PRN
Qty: 50 TABLET | Refills: 0 | Status: SHIPPED | OUTPATIENT
Start: 2023-11-10 | End: 2024-05-30

## 2023-11-10 RX ORDER — HYDROMORPHONE HCL IN WATER/PF 6 MG/30 ML
0.4 PATIENT CONTROLLED ANALGESIA SYRINGE INTRAVENOUS EVERY 5 MIN PRN
Status: DISCONTINUED | OUTPATIENT
Start: 2023-11-10 | End: 2023-11-10 | Stop reason: HOSPADM

## 2023-11-10 RX ORDER — HYDROMORPHONE HCL IN WATER/PF 6 MG/30 ML
0.2 PATIENT CONTROLLED ANALGESIA SYRINGE INTRAVENOUS EVERY 5 MIN PRN
Status: DISCONTINUED | OUTPATIENT
Start: 2023-11-10 | End: 2023-11-10 | Stop reason: HOSPADM

## 2023-11-10 RX ADMIN — PROPOFOL 50 MCG/KG/MIN: 10 INJECTION, EMULSION INTRAVENOUS at 11:13

## 2023-11-10 RX ADMIN — MIDAZOLAM HYDROCHLORIDE 2 MG: 1 INJECTION, SOLUTION INTRAMUSCULAR; INTRAVENOUS at 11:08

## 2023-11-10 RX ADMIN — LIDOCAINE HYDROCHLORIDE 100 MG: 20 INJECTION, SOLUTION INFILTRATION; PERINEURAL at 11:08

## 2023-11-10 RX ADMIN — FENTANYL CITRATE 50 MCG: 50 INJECTION INTRAMUSCULAR; INTRAVENOUS at 11:12

## 2023-11-10 RX ADMIN — ONDANSETRON 4 MG: 2 INJECTION INTRAMUSCULAR; INTRAVENOUS at 11:13

## 2023-11-10 RX ADMIN — MIDAZOLAM HYDROCHLORIDE 2 MG: 1 INJECTION, SOLUTION INTRAMUSCULAR; INTRAVENOUS at 11:11

## 2023-11-10 RX ADMIN — DOXYCYCLINE HYCLATE 200 MG: 100 CAPSULE ORAL at 10:51

## 2023-11-10 RX ADMIN — DEXAMETHASONE SODIUM PHOSPHATE 8 MG: 4 INJECTION, SOLUTION INTRA-ARTICULAR; INTRALESIONAL; INTRAMUSCULAR; INTRAVENOUS; SOFT TISSUE at 11:13

## 2023-11-10 RX ADMIN — ACETAMINOPHEN 975 MG: 325 TABLET, FILM COATED ORAL at 10:51

## 2023-11-10 RX ADMIN — SODIUM CHLORIDE, POTASSIUM CHLORIDE, SODIUM LACTATE AND CALCIUM CHLORIDE: 600; 310; 30; 20 INJECTION, SOLUTION INTRAVENOUS at 11:08

## 2023-11-10 RX ADMIN — GLYCOPYRROLATE 0.3 MG: 0.2 INJECTION, SOLUTION INTRAMUSCULAR; INTRAVENOUS at 11:13

## 2023-11-10 RX ADMIN — FENTANYL CITRATE 50 MCG: 50 INJECTION INTRAMUSCULAR; INTRAVENOUS at 11:14

## 2023-11-10 RX ADMIN — MIDAZOLAM HYDROCHLORIDE 1 MG: 1 INJECTION, SOLUTION INTRAMUSCULAR; INTRAVENOUS at 11:16

## 2023-11-10 ASSESSMENT — ACTIVITIES OF DAILY LIVING (ADL): ADLS_ACUITY_SCORE: 35

## 2023-11-10 NOTE — PROCEDURES
Operative Report    DOS: 11/10/2023  Preoperative Diagnosis:  incomplete miscarriage with bleeding for 25 days after miscarriage  Postoperative Diagnosis:  Same.  Procedure:  Suction D&C.  Surgeon:  Galilea Moses MD  Anesthesia:  Sedation.  Findings:  Normal cervix. Small amount of tissue evacuated from the uterus.  EBL: 10 ccs  Specimen:  Products of conception.  Complications: none    Prior to the procedure, we discussed risks, benefits and alternatives.  Discussed risks of infection, bleeding, uterine perforation, injury to adjacent organs.  Discussed risks of blood transfusions.  Discussed risks of death.      Procedure:  The patient was taken the operating room.  Sedation was initiated without difficulty.  She was then prepped and draped in the normal sterile fashion in the dorsal lithotomy position with Yang stirrups.  Her bladder was drained of all urine.   A speculum was placed in the vagina and the cervix was grasped with an Allis clamp. The cervix was easily dilated to a 9 mm diameter with Hegar dilators. An 8 mm curved suction curette was placed in the uterus and products of conception were then removed. Then, gentle sharp curette was undertaken until a good Ifrah was noted throughout the entire uterus. A pass with the suction curette was clear of debris.  There was minimal bleeding noted at the end of the procedure.    Rh +, rhogam not needed.    The counts were correct times two. The Allis was removed from the cervix.  Hemostasis was assured.  The patient tolerated the procedure well. Sponge, lap, needle and instrument counts were correct x2.  She was awakened and taken to the recovery room in stable condition.    Galilea Lopez MD ....................  11/10/2023     11:53 AM

## 2023-11-10 NOTE — ANESTHESIA POSTPROCEDURE EVALUATION
Patient: Serina Washington    Procedure: Procedure(s):  DILATION AND CURETTAGE, UTERUS, USING SUCTION       Anesthesia Type:  General    Note:  Disposition: Outpatient   Postop Pain Control: Uneventful            Sign Out: Well controlled pain   PONV: No   Neuro/Psych: Uneventful            Sign Out: Acceptable/Baseline neuro status   Airway/Respiratory: Uneventful            Sign Out: Acceptable/Baseline resp. status   CV/Hemodynamics: Uneventful            Sign Out: Acceptable CV status; No obvious hypovolemia; No obvious fluid overload   Other NRE: NONE   DID A NON-ROUTINE EVENT OCCUR? No           Last vitals:  Vitals:    11/10/23 1018   BP: 101/75   Pulse: 80   Resp: 16   Temp: 36.9  C (98.5  F)   SpO2: 99%       Electronically Signed By: Ayaka Parr CRNA, ISABELLA CLAY  November 10, 2023  11:36 AM

## 2023-11-10 NOTE — ANESTHESIA CARE TRANSFER NOTE
Patient: Serina Washington    Procedure: Procedure(s):  DILATION AND CURETTAGE, UTERUS, USING SUCTION       Diagnosis: Incomplete  [O03.4]  Diagnosis Additional Information: No value filed.    Anesthesia Type:   General     Note:    Oropharynx: oropharynx clear of all foreign objects and spontaneously breathing  Level of Consciousness: awake and drowsy  Oxygen Supplementation: face mask  Level of Supplemental Oxygen (L/min / FiO2): 8  Independent Airway: airway patency satisfactory and stable  Dentition: dentition unchanged  Vital Signs Stable: post-procedure vital signs reviewed and stable  Report to RN Given: handoff report given  Patient transferred to: Phase II    Handoff Report: Identifed the Patient, Identified the Reponsible Provider, Reviewed the pertinent medical history, Discussed the surgical course, Reviewed Intra-OP anesthesia mangement and issues during anesthesia, Set expectations for post-procedure period and Allowed opportunity for questions and acknowledgement of understanding      Vitals:  Vitals Value Taken Time   BP     Temp     Pulse     Resp     SpO2         Electronically Signed By: Ayaka Parr CRNA, ISABELLA CLAY  November 10, 2023  11:36 AM

## 2024-04-05 ENCOUNTER — PATIENT OUTREACH (OUTPATIENT)
Dept: CARE COORDINATION | Facility: CLINIC | Age: 39
End: 2024-04-05
Payer: COMMERCIAL

## 2024-04-19 ENCOUNTER — PATIENT OUTREACH (OUTPATIENT)
Dept: CARE COORDINATION | Facility: CLINIC | Age: 39
End: 2024-04-19
Payer: COMMERCIAL

## 2024-04-25 ENCOUNTER — VIRTUAL VISIT (OUTPATIENT)
Dept: OBGYN | Facility: CLINIC | Age: 39
End: 2024-04-25
Payer: COMMERCIAL

## 2024-04-25 DIAGNOSIS — Z34.80 PRENATAL CARE OF MULTIGRAVIDA, ANTEPARTUM: Primary | ICD-10-CM

## 2024-04-25 PROCEDURE — 99207 PR NO CHARGE NURSE ONLY: CPT | Mod: 93

## 2024-04-25 NOTE — PROGRESS NOTES
Denied need for review of teaching  [facility] OB Intake Nurse    Patient supplied answers from flow sheet for:  Prenatal OB Questionnaire.  Past Medical History  Have you ever recieved care for your mental health? : No  Have you ever been in a major accident or suffered serious trauma?: No  Within the last year, has anyone hit, slapped, kicked or otherwise hurt you?: No  In the last year, has anyone forced you to have sex when you didn't want to?: No    Past Medical History 2   Have you ever received a blood transfusion?: No  Would you accept a blood transfusion if was medically recommended?: Yes  Does anyone in your home smoke?: No   Is your blood type Rh negative?: No  Have you ever ?: (!) Yes  Have you been hospitalized for a nonsurgical reason excluding normal delivery?: (!) Yes (age 15 for leg injury)  Have you ever had an abnormal pap smear?: No    Past Medical History (Continued)  Do you have a history of abnormalities of the uterus?: No  Did your mother take MILTON or any other hormones when she was pregnant with you?: No  Do you have any other problems we have not asked about which you feel may be important to this pregnancy?: No

## 2024-05-01 LAB
ABO/RH(D): NORMAL
ANTIBODY SCREEN: NEGATIVE
SPECIMEN EXPIRATION DATE: NORMAL

## 2024-05-01 ASSESSMENT — PATIENT HEALTH QUESTIONNAIRE - PHQ9
10. IF YOU CHECKED OFF ANY PROBLEMS, HOW DIFFICULT HAVE THESE PROBLEMS MADE IT FOR YOU TO DO YOUR WORK, TAKE CARE OF THINGS AT HOME, OR GET ALONG WITH OTHER PEOPLE: VERY DIFFICULT
SUM OF ALL RESPONSES TO PHQ QUESTIONS 1-9: 10
SUM OF ALL RESPONSES TO PHQ QUESTIONS 1-9: 10

## 2024-05-02 ENCOUNTER — PRENATAL OFFICE VISIT (OUTPATIENT)
Dept: OBGYN | Facility: CLINIC | Age: 39
End: 2024-05-02
Payer: COMMERCIAL

## 2024-05-02 VITALS
RESPIRATION RATE: 18 BRPM | DIASTOLIC BLOOD PRESSURE: 70 MMHG | SYSTOLIC BLOOD PRESSURE: 109 MMHG | BODY MASS INDEX: 25.78 KG/M2 | TEMPERATURE: 98.3 F | HEIGHT: 64 IN | HEART RATE: 89 BPM | WEIGHT: 151 LBS

## 2024-05-02 DIAGNOSIS — Z3A.09 9 WEEKS GESTATION OF PREGNANCY: Primary | ICD-10-CM

## 2024-05-02 DIAGNOSIS — O26.20 HIGH RISK PREGNANCY DUE TO RECURRENT MISCARRIAGE: ICD-10-CM

## 2024-05-02 LAB
ALBUMIN UR-MCNC: NEGATIVE MG/DL
APPEARANCE UR: CLEAR
BILIRUB UR QL STRIP: NEGATIVE
COLOR UR AUTO: YELLOW
ERYTHROCYTE [DISTWIDTH] IN BLOOD BY AUTOMATED COUNT: 11.9 % (ref 10–15)
GLUCOSE UR STRIP-MCNC: NEGATIVE MG/DL
HBA1C MFR BLD: 5.3 % (ref 0–5.6)
HCT VFR BLD AUTO: 40.1 % (ref 35–47)
HGB BLD-MCNC: 13.7 G/DL (ref 11.7–15.7)
HGB UR QL STRIP: NEGATIVE
KETONES UR STRIP-MCNC: NEGATIVE MG/DL
LEUKOCYTE ESTERASE UR QL STRIP: NEGATIVE
MCH RBC QN AUTO: 29.7 PG (ref 26.5–33)
MCHC RBC AUTO-ENTMCNC: 34.2 G/DL (ref 31.5–36.5)
MCV RBC AUTO: 87 FL (ref 78–100)
NITRATE UR QL: NEGATIVE
PH UR STRIP: 6.5 [PH] (ref 5–7)
PLATELET # BLD AUTO: 357 10E3/UL (ref 150–450)
RBC # BLD AUTO: 4.62 10E6/UL (ref 3.8–5.2)
RBC #/AREA URNS AUTO: NORMAL /HPF
SP GR UR STRIP: <=1.005 (ref 1–1.03)
TSH SERPL DL<=0.005 MIU/L-ACNC: 0.43 UIU/ML (ref 0.3–4.2)
UROBILINOGEN UR STRIP-ACNC: 0.2 E.U./DL
WBC # BLD AUTO: 9.5 10E3/UL (ref 4–11)
WBC #/AREA URNS AUTO: NORMAL /HPF

## 2024-05-02 PROCEDURE — 87340 HEPATITIS B SURFACE AG IA: CPT | Performed by: OBSTETRICS & GYNECOLOGY

## 2024-05-02 PROCEDURE — 86900 BLOOD TYPING SEROLOGIC ABO: CPT | Performed by: OBSTETRICS & GYNECOLOGY

## 2024-05-02 PROCEDURE — 84443 ASSAY THYROID STIM HORMONE: CPT | Performed by: OBSTETRICS & GYNECOLOGY

## 2024-05-02 PROCEDURE — 86704 HEP B CORE ANTIBODY TOTAL: CPT | Performed by: OBSTETRICS & GYNECOLOGY

## 2024-05-02 PROCEDURE — 99213 OFFICE O/P EST LOW 20 MIN: CPT | Performed by: OBSTETRICS & GYNECOLOGY

## 2024-05-02 PROCEDURE — 84144 ASSAY OF PROGESTERONE: CPT | Performed by: OBSTETRICS & GYNECOLOGY

## 2024-05-02 PROCEDURE — 99459 PELVIC EXAMINATION: CPT | Performed by: OBSTETRICS & GYNECOLOGY

## 2024-05-02 PROCEDURE — 86803 HEPATITIS C AB TEST: CPT | Performed by: OBSTETRICS & GYNECOLOGY

## 2024-05-02 PROCEDURE — 87389 HIV-1 AG W/HIV-1&-2 AB AG IA: CPT | Performed by: OBSTETRICS & GYNECOLOGY

## 2024-05-02 PROCEDURE — 87491 CHLMYD TRACH DNA AMP PROBE: CPT | Performed by: OBSTETRICS & GYNECOLOGY

## 2024-05-02 PROCEDURE — 86901 BLOOD TYPING SEROLOGIC RH(D): CPT | Performed by: OBSTETRICS & GYNECOLOGY

## 2024-05-02 PROCEDURE — 85027 COMPLETE CBC AUTOMATED: CPT | Performed by: OBSTETRICS & GYNECOLOGY

## 2024-05-02 PROCEDURE — 83036 HEMOGLOBIN GLYCOSYLATED A1C: CPT | Performed by: OBSTETRICS & GYNECOLOGY

## 2024-05-02 PROCEDURE — 86762 RUBELLA ANTIBODY: CPT | Performed by: OBSTETRICS & GYNECOLOGY

## 2024-05-02 PROCEDURE — 87086 URINE CULTURE/COLONY COUNT: CPT | Performed by: OBSTETRICS & GYNECOLOGY

## 2024-05-02 PROCEDURE — 36415 COLL VENOUS BLD VENIPUNCTURE: CPT | Performed by: OBSTETRICS & GYNECOLOGY

## 2024-05-02 PROCEDURE — 86850 RBC ANTIBODY SCREEN: CPT | Performed by: OBSTETRICS & GYNECOLOGY

## 2024-05-02 PROCEDURE — 87591 N.GONORRHOEAE DNA AMP PROB: CPT | Performed by: OBSTETRICS & GYNECOLOGY

## 2024-05-02 PROCEDURE — 76817 TRANSVAGINAL US OBSTETRIC: CPT | Performed by: OBSTETRICS & GYNECOLOGY

## 2024-05-02 PROCEDURE — 86706 HEP B SURFACE ANTIBODY: CPT | Performed by: OBSTETRICS & GYNECOLOGY

## 2024-05-02 PROCEDURE — 81001 URINALYSIS AUTO W/SCOPE: CPT | Performed by: OBSTETRICS & GYNECOLOGY

## 2024-05-02 PROCEDURE — 86780 TREPONEMA PALLIDUM: CPT | Performed by: OBSTETRICS & GYNECOLOGY

## 2024-05-02 NOTE — PROGRESS NOTES
[facility]    New OB Visit Note    CC: New OB     Subjective:    Serina is a 38 year old  at 7w6d by Patient's last menstrual period was 2024.  who presents for her initial OB visit. This is a planned pregnancy and her and her partner Rufino are excited. She reports feeling well. Denies any uterine cramping, abdominal pain or vaginal bleeding. Pos nausea and vomiting. Doesn't think she needs medication.      Pt reports that she feels safe at home and her mood is good.   Concerns: feels tired.  Multiple miscarriages.  In prior pregnancies, she had a progesterone level checked and if it was normal, did not take progesterone.  She was prescribed it when her progesterone was low, but miscarried the next day anyway and had chromosome testing that was abnormal.    Past OB History:see below      Delivery Complications:  section for large babies.    OB History    Para Term  AB Living   9 2 2 0 6 2   SAB IAB Ectopic Multiple Live Births   5 0 0 0 2      # Outcome Date GA Lbr Mack/2nd Weight Sex Type Anes PTL Lv   9 Current            8 SAB 11/10/23     SAB      7 Term 22 39w1d  4.14 kg (9 lb 2 oz) F CS-LTranv Spinal  CESILIA      Name: Kj Saldivar      Apgar1: 9  Apgar5: 9   6 SAB 2021     SAB      5 SAB 21 7w4d    SAB      4 SAB 19     SAB      3 Term 09/10/17 38w6d  4.06 kg (8 lb 15.2 oz) F CS-LTranv EPI, Spinal N CESILIA      Complications: Fetal Intolerance      Name: Jenniferbrianna Jimena Padmini      Apgar1: 9  Apgar5: 9   2 AB      SAB      1 SAB                History reviewed. No pertinent past medical history.    Past Surgical History:   Procedure Laterality Date     SECTION N/A 09/10/2017    Procedure:  SECTION;  Primary  Section;  Surgeon: Mu Miranda MD;  Location: [facility]     SECTION Bilateral 2022    Procedure:  SECTION;  Surgeon: Quinn Cash MD;  Location: [facility]    DILATION  "AND CURETTAGE SUCTION N/A 2021    Procedure: DILATION AND CURETTAGE, UTERUS, USING SUCTION;  Surgeon: Vicki Tracey MD;  Location: [facility]    DILATION AND CURETTAGE SUCTION N/A 11/10/2023    Procedure: DILATION AND CURETTAGE, UTERUS, USING SUCTION;  Surgeon: Galilea Moses MD;  Location: [facility]    OPERATIVE HYSTEROSCOPY N/A 2020    Procedure: HYSTEROSCOPY, THERAPEUTIC;  Surgeon: Quinn Cash MD;  Location: [facility]    REVISE SCAR TRUNK Bilateral 2022    Procedure: REVISION, SCAR, TORSO;  Surgeon: Quinn Cash MD;  Location: [facility]    SURGICAL HISTORY OF -  Left     knee surgery  and        Current Outpatient Medications   Medication Sig Dispense Refill    Prenatal Multivit-Min-Fe-FA (PRENATAL VITAMINS PO) Take 2 tablets by mouth every evening [place]      acetaminophen (TYLENOL) 325 MG tablet Take 3 tablets (975 mg) by mouth every 6 hours as needed for mild pain 50 tablet 0       Family History   Problem Relation Age of Onset    Hypertension Mother     Thyroid Disease Mother     Diabetes Mother     Kidney Disease Father         dialysis    Hypertension Father     Gastrointestinal Disease Father         stomach ulcer    Heart Disease Father     Skin Cancer Maternal Grandmother     Asthma Maternal Grandmother     Cancer Paternal Grandmother     Cancer Paternal Grandfather        Social History     Tobacco Use    Smoking status: Never    Smokeless tobacco: Never   Substance Use Topics    Alcohol use: Not Currently       ROS: A 10 pt ROS was completed and found to be negative unless mentioned in the HPI.     Objective:    /70 (BP Location: Left arm, Patient Position: Chair, Cuff Size: Adult Regular)   Pulse 89   Temp 98.3  F (36.8  C) (Tympanic)   Resp 18   Ht 1.626 m (5' 4\")   Wt 68.5 kg (151 lb)   LMP 2024   BMI 25.92 kg/m      Estimated body mass index is 25.92 kg/m  as calculated from the following:    Height as of this " "encounter: 1.626 m (5' 4\").    Weight as of this encounter: 68.5 kg (151 lb).    General appearance: well-hydrated, A&O x 3, no apparent distress  Lungs: Equal expansion bilaterally, no accessory muscle use  Heart: No heaves or thrills. No peripheral varicosities  Constitutional: See vitals  Abdomen: Soft, non-tender, non-distended. No rebound, rigidity, or guarding.  Extremities: neg edema  Genitourinary:  External genitalia: no erythema, no lesions.   Urethral meatus appropriate location without lesions or prolapse  Urethra: No masses, tenderness, or scarring  Bladder no fullness, masses, or tenderness.  Anus and Perineum: Unremarkable, no visible lesions  Vagina: Normal, healthy pink mucosa without any lesions. Physiologic vaginal discharge.   Cervix: normal appearance, no cervical motion tenderness.   Uterus: gravid  Adnexa: no masses or tenderness bilaterally.    Bedside Ultrasound    Transabdominal ultrasound was performed. A single, viable intrauterine pregnancy was seen.      CRL= 1.83 cm   FHT= 166 bpm  EGA= 8 weeks 2 days  EDC based on US = 12/10/24  EDC by LMP= 24  FINAL EDC= 24            Assessment and Plan:     Encounter Diagnoses   Name Primary?    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     1) Plan to draw new OB lab today   2) Discussed routine prenatal care, group practice model at [facility], tertiary support and frequency of visits. Also discussed options for genetic screening tests. Patient considering NIPT for genetic testing.   3) Dating/viability US performed today   4) Concerns: AMA  AMA, considering NIPT and L2  - Consider low-dose aspirin (usually 81 mg daily) started in the early second trimester for preeclampsia prevention if additional risk-factors exist.    5) PMH/OBHx problems: prior  x 2, planning repeat  6) Medication review: no " changes, continue prenatal vitamin   7) Reviewed miscarriage precautions (present to ED or call clinic with abdominal pain, vaginal bleeding or fever)   8) Weight gain: discussed weight gain expectations (BMI <18.5: 28-40lbs, BMI 18.5-25: 25-35lbs, BMI 25-30: 15-25lbs, BMI >30: 11-20lbs)   9) PAP smear: not due  10) Delivery plan: continue to discuss route of delivery, breastfeeding, pp contraception, pain management in labor  11) Immunizations: Tdap at 28wks  12) POS increased risk for gestational diabetes, plan for screen at 28wks (The ADA and ACOG define patients at increased risk of type 2 diabetes based on: body mass index (BMI) ?25 kg/m2 (?23 kg/m2 in  Americans) plus one or more of the following [2,28]:  GDM in a previous pregnancy.  Glycated hemoglobin ?5.7 percent (39 mmol/mol), impaired glucose tolerance, or impaired fasting glucose on previous testing.  First-degree relative with diabetes.  High-risk race/ethnicity (eg, , , ,  American, ).  History of cardiovascular disease.  Hypertension (?140/90 mmHg) or on therapy for hypertension.  High-density lipoprotein cholesterol level <35 mg/dL (0.90 mmol/L) and/or a triglyceride level >250 mg/dL (2.82 mmol/L).  Polycystic ovary syndrome (PCOS).  Physical inactivity.  Other clinical condition associated with insulin resistance (eg, severe obesity, acanthosis nigricans).  -In addition, we would include older age and use age ?35 years as the threshold.  13) low risk for preeclampsia.     14) recurrent miscarriages.  Discussed that progesterone is not proven to decrease miscarriages, but is not harmful. She would like to check a progesterone level today. If it is low, she would consider progesterone. Her prior successful pregnancies had normal progesterone levels and were not supplemented.    Return to clinic in 4 weeks or prn.

## 2024-05-02 NOTE — NURSING NOTE
"Initial /70 (BP Location: Left arm, Patient Position: Chair, Cuff Size: Adult Regular)   Pulse 89   Temp 98.3  F (36.8  C) (Tympanic)   Resp 18   Ht 1.626 m (5' 4\")   Wt 68.5 kg (151 lb)   LMP 03/08/2024   BMI 25.92 kg/m   Estimated body mass index is 25.92 kg/m  as calculated from the following:    Height as of this encounter: 1.626 m (5' 4\").    Weight as of this encounter: 68.5 kg (151 lb). .      "

## 2024-05-03 LAB
BACTERIA UR CULT: NO GROWTH
C TRACH DNA SPEC QL PROBE+SIG AMP: NEGATIVE
HBV CORE AB SERPL QL IA: NONREACTIVE
HBV SURFACE AB SERPL IA-ACNC: <3.5 M[IU]/ML
HBV SURFACE AB SERPL IA-ACNC: NONREACTIVE M[IU]/ML
HBV SURFACE AG SERPL QL IA: NONREACTIVE
HCV AB SERPL QL IA: NONREACTIVE
HIV 1+2 AB+HIV1 P24 AG SERPL QL IA: NONREACTIVE
N GONORRHOEA DNA SPEC QL NAA+PROBE: NEGATIVE
PROGEST SERPL-MCNC: 12.9 NG/ML
RUBV IGG SERPL QL IA: 10.4 INDEX
RUBV IGG SERPL QL IA: POSITIVE
T PALLIDUM AB SER QL: NONREACTIVE

## 2024-05-30 ENCOUNTER — PRENATAL OFFICE VISIT (OUTPATIENT)
Dept: OBGYN | Facility: CLINIC | Age: 39
End: 2024-05-30
Payer: COMMERCIAL

## 2024-05-30 ENCOUNTER — TRANSCRIBE ORDERS (OUTPATIENT)
Dept: MATERNAL FETAL MEDICINE | Facility: HOSPITAL | Age: 39
End: 2024-05-30

## 2024-05-30 VITALS
DIASTOLIC BLOOD PRESSURE: 69 MMHG | HEART RATE: 86 BPM | HEIGHT: 64 IN | SYSTOLIC BLOOD PRESSURE: 102 MMHG | WEIGHT: 151.4 LBS | TEMPERATURE: 97.4 F | BODY MASS INDEX: 25.85 KG/M2 | RESPIRATION RATE: 16 BRPM

## 2024-05-30 DIAGNOSIS — O26.20 HIGH RISK PREGNANCY DUE TO RECURRENT MISCARRIAGE: ICD-10-CM

## 2024-05-30 DIAGNOSIS — O09.521 MULTIGRAVIDA OF ADVANCED MATERNAL AGE IN FIRST TRIMESTER: ICD-10-CM

## 2024-05-30 DIAGNOSIS — Z3A.11 11 WEEKS GESTATION OF PREGNANCY: Primary | ICD-10-CM

## 2024-05-30 DIAGNOSIS — O26.90 PREGNANCY RELATED CONDITION, ANTEPARTUM: Primary | ICD-10-CM

## 2024-05-30 PROCEDURE — 99207 PR PRENATAL VISIT: CPT | Performed by: OBSTETRICS & GYNECOLOGY

## 2024-05-30 NOTE — PATIENT INSTRUCTIONS
(The incidence of preeclampsia is estimated to be at least 8 percent for pregnant women with any one of these high risk factors:  ?Previous pregnancy with preeclampsia, especially early onset and with an adverse outcome.  ?Type 1 or 2 diabetes mellitus.  ?Chronic hypertension.  ?Multifetal gestation.  ?Kidney disease.  ?Autoimmune disease with potential vascular complications (antiphospholipid syndrome, systemic lupus erythematosus).      or two or more of the following moderate risk factors [22]:  ?Nulliparity.  ?Obesity (body mass index >30 kg/m2).  ?Family history of preeclampsia in mother or sister.  ?Age ?35 years.  ?Sociodemographic characteristics (Black persons, lower income level [recognizing that these are not biological factors]).  ?Personal risk factors (eg, previous pregnancy with low birth weight or small for gestational age infant, previous adverse pregnancy outcome [eg, stillbirth], interval >10 years between pregnancies).  ?In vitro conception

## 2024-05-30 NOTE — NURSING NOTE
"Initial /69 (BP Location: Left arm, Patient Position: Chair, Cuff Size: Adult Regular)   Pulse 86   Temp 97.4  F (36.3  C) (Tympanic)   Resp 16   Ht 1.626 m (5' 4\")   Wt 68.7 kg (151 lb 6.4 oz)   LMP 03/08/2024   BMI 25.99 kg/m   Estimated body mass index is 25.99 kg/m  as calculated from the following:    Height as of this encounter: 1.626 m (5' 4\").    Weight as of this encounter: 68.7 kg (151 lb 6.4 oz). .    Fara Ortez, JOANN    "

## 2024-05-30 NOTE — PROGRESS NOTES
"[facility]    Return OB Note    CC: Return OB     Subjective:  Serina is a 38 year old  at 11w6d   Denies vaginal bleeding, loss of fluid, or pelvic cramping.    Complaints today: fatigue that has improved some    Objective:  /69 (BP Location: Left arm, Patient Position: Chair, Cuff Size: Adult Regular)   Pulse 86   Temp 97.4  F (36.3  C) (Tympanic)   Resp 16   Ht 1.626 m (5' 4\")   Wt 68.7 kg (151 lb 6.4 oz)   LMP 2024   BMI 25.99 kg/m      See flowsheet    Assessment/Plan:     38 year old  at 11w6d   Encounter Diagnoses   Name Primary?    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  -AMA, declined NIPT as she feels it will cause her too much anxiety.  Will plan L2 ultrasound.  - Consider low-dose aspirin (usually 81 mg daily) started in the early second trimester for preeclampsia prevention if additional risk-factors exist. At this point, she doesn't have additional risk factors   -prior  x 2, planning repeat      RTC 4 weeks    Galilea Moses MD   "

## 2024-06-24 NOTE — PATIENT INSTRUCTIONS
"Weeks 14 to 18 of Your Pregnancy: Care Instructions  Around this time, you may start to look pregnant. Your baby is now able to pass urine. And the first stool (meconium) is starting to collect in your baby's intestines. Hair is starting to grow on your baby's head.    You may notice some skin changes, such as itchy spots on your palms or acne on your face.    At your next doctor visit, you may have an ultrasound. So you might think about whether you want to know the sex of your baby. Also ask your doctor about flu and COVID-19 shots.     How to reduce stress   Ask for help when you need it.  Try to avoid things that cause you stress.  Seek out things that relieve stress, such as breathing exercises or yoga.     How to get exercise   If you don't usually exercise, start slowly. Short walks may be a good choice.  Try to be active 30 minutes a day, at least 5 days a week.  Avoid activities where you're more likely to fall.  Use light weights to reduce stress on your joints.     How to stay at a healthy weight for you   Talk to your doctor or midwife about how much weight you should gain.  It's generally best to gain:  About 28 to 40 pounds if you're underweight.  About 25 to 35 pounds if you're at a healthy weight.  About 15 to 25 pounds if you're overweight.  About 11 to 20 pounds if you're very overweight (obese).  Follow-up care is a key part of your treatment and safety. Be sure to make and go to all appointments, and call your doctor if you are having problems. It's also a good idea to know your test results and keep a list of the medicines you take.  Where can you learn more?  Go to https://www."[employer].net/patiented  Enter I453 in the search box to learn more about \"Weeks 14 to 18 of Your Pregnancy: Care Instructions.\"  Current as of: July 10, 2023               Content Version: 14.0    3469-7550 [employer].   Care instructions adapted under license by your healthcare professional. If you have " questions about a medical condition or this instruction, always ask your healthcare professional. [employer]wise, [facility] disclaims any warranty or liability for your use of this information.      Second-Trimester Fetal Ultrasound: About This Test  What is it?     Fetal ultrasound is a test that uses sound waves to make pictures of your baby (fetus) and placenta inside the uterus. The test is the safest way to find out the age, size, and position of your baby. You also may be able to find out the sex of your baby. (But the test isn't done just to find out a baby's sex.)  No known risks to the mother or the baby are linked to fetal ultrasound. But you may feel anxious if the test reveals a problem with your pregnancy or baby.  Why is this test done?  In the second trimester, a fetal ultrasound is done to:  Estimate the number of weeks and days a fetus has developed since the beginning of the pregnancy. This is called the gestational age.  Look at the size and position of the fetus, the placenta, and the fluid that surrounds the fetus.  Find major birth defects, such as heart problems or problems with the brain and spinal cord (neural tube defects). But the test may not be able to find many minor defects and some major birth defects.  How do you prepare for the test?  In general, there's nothing you have to do before this test, unless your doctor tells you to.  How is the test done?  You may be able to leave your clothes on, or you will be given a gown to wear.  You will lie on your back on a padded examination table.  A gel will be spread on your belly. It will be removed after the test.  A small, handheld device called a transducer will be pressed against the gel on your skin and moved across your belly several times.  You may watch the monitor to see the picture of your baby during the test.  What happens after the test?  You will probably be able to go home right away.  You most likely will be able to go back to  "your usual activities right away.  Follow-up care is a key part of your treatment and safety. Be sure to make and go to all appointments, and call your doctor if you are having problems. It's also a good idea to keep a list of the medicines you take. Ask your doctor when you can expect to have your test results.  Where can you learn more?  Go to https://www.[employer]/patiented  Enter Y671 in the search box to learn more about \"Second-Trimester Fetal Ultrasound: About This Test.\"  Current as of: July 10, 2023               Content Version: 14.0    9402-6139 [employer].   Care instructions adapted under license by your healthcare professional. If you have questions about a medical condition or this instruction, always ask your healthcare professional. [employer] disclaims any warranty or liability for your use of this information.      During Pregnancy: Exercises  Introduction  Here are some examples of exercises to do during your pregnancy. Start each exercise slowly. Ease off the exercise if you start to have pain.  Talk to your doctor about when you can start these exercises and which ones will work best for you.  How to do the exercises  Neck rotation    Sit up straight in a firm chair, or stand up straight. If you're standing, keep your feet about hip-width apart.  Keeping your chin level, turn your head to the right and hold for 15 to 30 seconds.  Turn your head to the left and hold for 15 to 30 seconds.  Repeat 2 to 4 times.  Neck stretch to the front    Sit up straight in a firm chair, or stand up straight. Look straight ahead. If you're standing, keep your feet about hip-width apart.  Slowly bend your head forward without moving your shoulders.  Hold for 15 to 30 seconds, then return to your starting position.  Repeat 2 to 4 times.  Back press    Stand with your back 10 to 12 inches away from a wall.  Lean into the wall until your back is against it. Press your lower back " against the wall by pulling in your stomach muscles.  Slowly slide down until your knees are slightly bent, pressing your lower back against the wall.  Hold for at least 6 seconds, then slide back up the wall.  Repeat 8 to 12 times.  Over time, work up to holding this position for as much as 1 minute.  Trunk twist    Sit on the floor with your legs crossed. If that's not comfortable, you can sit on a folded blanket so your bottom is a few inches off the floor. Or you can sit on a chair with your knees hip-width apart and your feet flat on the floor.  Reach your left hand toward your right knee. You can place your right hand at your side for support.  Slowly twist your body (trunk) to your right.  Relax and return to your starting position.  Repeat 2 to 4 times.  Switch your hands and twist to your left.  Repeat 2 to 4 times.  Pelvic rocking on hands and knees    Start on your hands and knees. Place your wrists directly below your shoulders and your knees below your hips.  Breathe in slowly. Tuck your head downward and round your back up, making a curve with your back in the shape of the letter C. Hold this position for about 6 seconds.  Breathe out slowly and bring your head back up. Relax, keeping your back straight. (Don't allow it to curve toward the floor.) Hold for about 6 seconds.  Repeat 8 to 12 times, gently rocking your pelvis.  Pelvic tilt    Lie on your back with your knees bent and your feet flat on the floor.  Tighten your belly muscles by pulling your belly button in toward your spine. Press your lower back to the floor. You should feel your hips and pelvis rock back.  Hold for 6 seconds while breathing smoothly, and then relax.  Repeat 8 to 12 times.  Do this exercise only during the first 4 months of pregnancy. After this point, lying on your back is not recommended, because it can cause blood flow problems for you and your baby.  Backward stretch    Start on your hands and knees with your knees 8 to  10 inches apart, hands directly below your shoulders, and arms and back straight.  Keeping your arms straight, slowly lower your buttocks toward your heels and tuck your head toward your knees. Hold for 15 to 30 seconds.  Slowly return to the starting position.  Repeat 2 to 4 times.  Forward bend    Sit comfortably in a chair, with your arms relaxed.  Slowly bend forward, allowing your arms to hang down. Lean only as far as you can without feeling discomfort or pressure on your belly.  Hold for 15 to 30 seconds and then slowly sit up straight.  Repeat 2 to 4 times or to your comfort level.  Donkey kick    Start on your hands and knees. Place your hands directly below your shoulders, and keep your arms straight.  Tighten your belly muscles by pulling your belly button in toward your spine. Keep breathing normally, and don't hold your breath.  Lift one knee and bring it toward your elbow.  Slowly extend that leg behind you without completely straightening it. Be careful not to let your hip drop down. Avoid arching your back.  Hold your leg behind you for about 6 seconds.  Return to your starting position.  Repeat 8 to 12 times for each leg.  Tailor sitting    Sit on the floor.  Bring your feet close to your body while crossing your ankles.  Keep your back straight. Relax your legs and let your knees drop toward the floor.  Hold this position for as long as you are comfortable.  Toe reach    Sit on the floor with your back straight, legs about 12 inches apart, and feet relaxed outward.  Stretch your hands forward toward your right foot, then sit up.  Stretch your hands straight forward, then sit up.  Stretch your hands forward toward your left foot, then sit up.  Hold each stretch for 15 to 30 seconds.  Repeat 2 to 4 times.  Follow-up care is a key part of your treatment and safety. Be sure to make and go to all appointments, and call your doctor if you are having problems. It's also a good idea to know your test  results and keep a list of the medicines you take.  Current as of: July 10, 2023               Content Version: 14.0    5558-0922 [employer].   Care instructions adapted under license by your healthcare professional. If you have questions about a medical condition or this instruction, always ask your healthcare professional. [employer] disclaims any warranty or liability for your use of this information.

## 2024-06-27 ENCOUNTER — PRENATAL OFFICE VISIT (OUTPATIENT)
Dept: OBGYN | Facility: CLINIC | Age: 39
End: 2024-06-27
Payer: COMMERCIAL

## 2024-06-27 VITALS
DIASTOLIC BLOOD PRESSURE: 71 MMHG | RESPIRATION RATE: 18 BRPM | HEIGHT: 64 IN | TEMPERATURE: 97.7 F | HEART RATE: 81 BPM | WEIGHT: 154 LBS | SYSTOLIC BLOOD PRESSURE: 107 MMHG | BODY MASS INDEX: 26.29 KG/M2

## 2024-06-27 DIAGNOSIS — Z98.891 HISTORY OF CESAREAN SECTION: ICD-10-CM

## 2024-06-27 DIAGNOSIS — Z34.82 PRENATAL CARE, SUBSEQUENT PREGNANCY IN SECOND TRIMESTER: Primary | ICD-10-CM

## 2024-06-27 DIAGNOSIS — O09.522 MULTIGRAVIDA OF ADVANCED MATERNAL AGE IN SECOND TRIMESTER: ICD-10-CM

## 2024-06-27 PROCEDURE — 99207 PR PRENATAL VISIT: CPT | Performed by: OBSTETRICS & GYNECOLOGY

## 2024-06-27 NOTE — NURSING NOTE
"Initial /71 (BP Location: Left arm, Patient Position: Chair, Cuff Size: Adult Regular)   Pulse 81   Temp 97.7  F (36.5  C) (Tympanic)   Resp 18   Ht 1.626 m (5' 4\")   Wt 69.9 kg (154 lb)   LMP 03/08/2024   BMI 26.43 kg/m   Estimated body mass index is 26.43 kg/m  as calculated from the following:    Height as of this encounter: 1.626 m (5' 4\").    Weight as of this encounter: 69.9 kg (154 lb). .    "

## 2024-06-27 NOTE — PROGRESS NOTES
"[facility]    Return OB Note    CC: Return OB     Subjective:  Serina is a 38 year old  at 15w6d   Denies vaginal bleeding, loss of fluid, or pelvic cramping. Not feeling any fetal movement yet.  Complaints today: having some anxiety for fetal wellbeing as she hasn't felt movement yet. Encouragement given.    Objective:  /71 (BP Location: Left arm, Patient Position: Chair, Cuff Size: Adult Regular)   Pulse 81   Temp 97.7  F (36.5  C) (Tympanic)   Resp 18   Ht 1.626 m (5' 4\")   Wt 69.9 kg (154 lb)   LMP 2024   BMI 26.43 kg/m      FHT: 145bpm    Assessment/Plan:   Encounter Diagnoses   Name Primary?    Prenatal care, subsequent pregnancy in second trimester Yes    History of  section     Multigravida of advanced maternal age in second trimester        IUP at 15w6d  -NOB labs WNL  -Prenatal screening: declined genetic screening, declined AFP  -Anatomy US: L2 is scheduled    Co-Morbidities/Complications/Concerns:   -AMA: declined genetic screening, L2 planned  -Hx C section x2: planning repeat. Thinks this will be her last child, discussed option of tubal ligation at time of delivery if desired, she will consider.       RTC 4 weeks    Arina Sweeney DO    "

## 2024-07-13 ENCOUNTER — HEALTH MAINTENANCE LETTER (OUTPATIENT)
Age: 39
End: 2024-07-13

## 2024-07-17 ENCOUNTER — PRE VISIT (OUTPATIENT)
Dept: MATERNAL FETAL MEDICINE | Facility: HOSPITAL | Age: 39
End: 2024-07-17
Payer: COMMERCIAL

## 2024-07-19 ENCOUNTER — OFFICE VISIT (OUTPATIENT)
Dept: MATERNAL FETAL MEDICINE | Facility: HOSPITAL | Age: 39
End: 2024-07-19
Attending: STUDENT IN AN ORGANIZED HEALTH CARE EDUCATION/TRAINING PROGRAM
Payer: COMMERCIAL

## 2024-07-19 ENCOUNTER — ANCILLARY PROCEDURE (OUTPATIENT)
Dept: ULTRASOUND IMAGING | Facility: HOSPITAL | Age: 39
End: 2024-07-19
Attending: STUDENT IN AN ORGANIZED HEALTH CARE EDUCATION/TRAINING PROGRAM
Payer: COMMERCIAL

## 2024-07-19 DIAGNOSIS — O09.522 MULTIGRAVIDA OF ADVANCED MATERNAL AGE IN SECOND TRIMESTER: ICD-10-CM

## 2024-07-19 DIAGNOSIS — Z36.89 ENCOUNTER FOR FETAL ANATOMIC SURVEY: ICD-10-CM

## 2024-07-19 DIAGNOSIS — O44.02 PLACENTA PREVIA IN SECOND TRIMESTER: Primary | ICD-10-CM

## 2024-07-19 DIAGNOSIS — O26.90 PREGNANCY RELATED CONDITION, ANTEPARTUM: ICD-10-CM

## 2024-07-19 PROCEDURE — 76811 OB US DETAILED SNGL FETUS: CPT

## 2024-07-19 PROCEDURE — 76817 TRANSVAGINAL US OBSTETRIC: CPT | Mod: 26 | Performed by: STUDENT IN AN ORGANIZED HEALTH CARE EDUCATION/TRAINING PROGRAM

## 2024-07-19 PROCEDURE — 76817 TRANSVAGINAL US OBSTETRIC: CPT

## 2024-07-19 PROCEDURE — 99215 OFFICE O/P EST HI 40 MIN: CPT | Mod: 25 | Performed by: STUDENT IN AN ORGANIZED HEALTH CARE EDUCATION/TRAINING PROGRAM

## 2024-07-19 PROCEDURE — 76811 OB US DETAILED SNGL FETUS: CPT | Mod: 26 | Performed by: STUDENT IN AN ORGANIZED HEALTH CARE EDUCATION/TRAINING PROGRAM

## 2024-07-19 NOTE — NURSING NOTE
Serina Washington is a  at 19w0d who presents to [facility] for L2 ultrasound. Declining GC today. Pt reports positive fetal movement. Pt denies bldg/lof/change in discharge, contractions, headache, vision changes, chest pain/SOB or edema. SBAR given to Dr. ZAY Ivey, see note in Epic.

## 2024-07-19 NOTE — PROGRESS NOTES
The patient was seen for an ultrasound in the [facility] today.  For a detailed report of the ultrasound examination, please see the ultrasound report which can be found under the imaging tab.     If you have questions regarding today's evaluation or if we can be of further service, please contact the [facility].    Jayne Ivey MD  Maternal Fetal Medicine

## 2024-07-26 ENCOUNTER — PRENATAL OFFICE VISIT (OUTPATIENT)
Dept: OBGYN | Facility: CLINIC | Age: 39
End: 2024-07-26
Payer: COMMERCIAL

## 2024-07-26 VITALS
RESPIRATION RATE: 18 BRPM | BODY MASS INDEX: 27.14 KG/M2 | HEIGHT: 64 IN | SYSTOLIC BLOOD PRESSURE: 114 MMHG | DIASTOLIC BLOOD PRESSURE: 71 MMHG | TEMPERATURE: 97.9 F | WEIGHT: 159 LBS | HEART RATE: 96 BPM

## 2024-07-26 DIAGNOSIS — Z34.82 PRENATAL CARE, SUBSEQUENT PREGNANCY IN SECOND TRIMESTER: Primary | ICD-10-CM

## 2024-07-26 PROCEDURE — 99207 PR PRENATAL VISIT: CPT | Performed by: OBSTETRICS & GYNECOLOGY

## 2024-07-26 NOTE — PROGRESS NOTES
"[facility]     Return OB Note     CC: Return OB      Subjective:  Serina is a 38 year old  at 20w0d   Denies vaginal bleeding, loss of fluid, or pelvic cramping. +FM.   Complaints today: very worried about her previa, risks of accreta     Objective:  /71 (BP Location: Right arm, Patient Position: Chair, Cuff Size: Adult Regular)   Pulse 96   Temp 97.9  F (36.6  C) (Tympanic)   Resp 18   Ht 1.626 m (5' 4\")   Wt 72.1 kg (159 lb)   LMP 2024   BMI 27.29 kg/m       Assessment/Plan:      IUP at 20w0d  -NOB labs WNL  -Prenatal screening: declined genetic screening, declined AFP  -Anatomy US: s/p L2      Co-Morbidities/Complications/Concerns:   -AMA: declined genetic screening, L2 planned  -Hx C section x2: planning repeat. Knows this is her last child, discussed option of tubal ligation at time of delivery if desired, she will consider.   -placenta previa, high risk for accreta, MFM following - pt very worried, normalized this, encouraged her to take this pregnancy one day at a time, assured her of our MFM colleagues expertise, continue seeking support        RTC 4 weeks, labor and bleeding precautions reviewed.      Vicki Tracey MD  OB/GYN  "

## 2024-07-26 NOTE — NURSING NOTE
"Initial /71 (BP Location: Right arm, Patient Position: Chair, Cuff Size: Adult Regular)   Pulse 96   Temp 97.9  F (36.6  C) (Tympanic)   Resp 18   Ht 1.626 m (5' 4\")   Wt 72.1 kg (159 lb)   LMP 03/08/2024   BMI 27.29 kg/m   Estimated body mass index is 27.29 kg/m  as calculated from the following:    Height as of this encounter: 1.626 m (5' 4\").    Weight as of this encounter: 72.1 kg (159 lb). .    "

## 2024-08-16 ENCOUNTER — OFFICE VISIT (OUTPATIENT)
Dept: MATERNAL FETAL MEDICINE | Facility: HOSPITAL | Age: 39
End: 2024-08-16
Attending: STUDENT IN AN ORGANIZED HEALTH CARE EDUCATION/TRAINING PROGRAM
Payer: COMMERCIAL

## 2024-08-16 ENCOUNTER — ANCILLARY PROCEDURE (OUTPATIENT)
Dept: ULTRASOUND IMAGING | Facility: HOSPITAL | Age: 39
End: 2024-08-16
Attending: STUDENT IN AN ORGANIZED HEALTH CARE EDUCATION/TRAINING PROGRAM
Payer: COMMERCIAL

## 2024-08-16 DIAGNOSIS — O44.02 PLACENTA PREVIA IN SECOND TRIMESTER: Primary | ICD-10-CM

## 2024-08-16 DIAGNOSIS — O44.02 PLACENTA PREVIA IN SECOND TRIMESTER: ICD-10-CM

## 2024-08-16 PROCEDURE — 99213 OFFICE O/P EST LOW 20 MIN: CPT | Mod: 25 | Performed by: STUDENT IN AN ORGANIZED HEALTH CARE EDUCATION/TRAINING PROGRAM

## 2024-08-16 PROCEDURE — 76817 TRANSVAGINAL US OBSTETRIC: CPT | Mod: 26 | Performed by: STUDENT IN AN ORGANIZED HEALTH CARE EDUCATION/TRAINING PROGRAM

## 2024-08-16 PROCEDURE — 76816 OB US FOLLOW-UP PER FETUS: CPT

## 2024-08-16 PROCEDURE — 76816 OB US FOLLOW-UP PER FETUS: CPT | Mod: 26 | Performed by: STUDENT IN AN ORGANIZED HEALTH CARE EDUCATION/TRAINING PROGRAM

## 2024-08-16 NOTE — NURSING NOTE
Serina Washington is a  at 23w0d who presents to [facility] for a follow-up ultrasound. Pt reports positive fetal movement. Pt denies bldg/lof/change in discharge, contractions, headache, vision changes, chest pain/SOB or edema. SBAR given to Dr. ZAY Ivey, see note in Epic.      Nadine Daly RN

## 2024-08-16 NOTE — PROGRESS NOTES
The patient was seen for an ultrasound in the [facility] today.  For a detailed report of the ultrasound examination, please see the ultrasound report which can be found under the imaging tab.     If you have questions regarding today's evaluation or if we can be of further service, please contact the [facility].    Jayne Ivey MD  Maternal Fetal Medicine

## 2024-08-23 NOTE — PATIENT INSTRUCTIONS
"Weeks 18 to 22 of Your Pregnancy: Care Instructions  At this stage you may find that your nausea and fatigue are gone. You may feel better overall and have more energy. But you might now also have some new discomforts, like sleep problems or leg cramps.    You may start to feel your baby move. These movements can feel like butterflies or bubbles.    Babies at this stage can now suck their thumbs.    Get some exercise every day.  And avoid caffeine late in the day.     Take a warm shower or bath before bed.  Try relaxation exercises to calm your mind and body.     Use extra pillows.  They can help you get comfortable.     Don't use sleeping pills or alcohol.  They could harm your baby.     For leg cramps, stretch and apply heat.  A warm bath, leg warmers, a heating pad, or a hot water bottle can help with muscle aches.   Stretches for leg cramps    Straighten your leg and bend your foot (flex your ankle) slowly upward, toward your knee. Bend your toes up and down.    Stand on a flat surface. Stretch your toes upward. For balance, hold on to the wall or something stable. If it feels okay, take small steps walking on your heels.  Follow-up care is a key part of your treatment and safety. Be sure to make and go to all appointments, and call your doctor if you are having problems. It's also a good idea to know your test results and keep a list of the medicines you take.  Where can you learn more?  Go to https://www.okay.com.net/patiented  Enter W603 in the search box to learn more about \"Weeks 18 to 22 of Your Pregnancy: Care Instructions.\"  Current as of: July 10, 2023               Content Version: 14.0    1298-5186 [employer].   Care instructions adapted under license by your healthcare professional. If you have questions about a medical condition or this instruction, always ask your healthcare professional. [employer] disclaims any warranty or liability for your use of this " "information.      Weeks 22 to 26 of Your Pregnancy: Care Instructions  Your baby's lungs are getting ready for breathing. Your baby may respond to your voice. Your baby likely turns less, and kicks or jerks more. Jerking may mean that your baby has hiccups.    Think about taking childbirth classes. And start to think about whether you want to have pain medicine during labor.    At your next doctor visit, you may be tested for anemia and for high blood sugar that first occurs during pregnancy (gestational diabetes). These conditions can cause problems for you and your baby.    To ease discomfort, such as back pain    Change your position often. Try not to sit or stand for too long.  Get some exercise. Things like walking or stretching may help.  Try using a heating pad or cold pack.    To ease or reduce swelling in your feet, ankles, hands, and fingers    Take off your rings.  Avoid high-sodium foods, such as potato chips.  Prop up your feet, and sleep with pillows under your feet.  Try to avoid standing for long periods of time.  Do not wear tight shoes.  Wear support stockings.  Kegel exercises to prevent urine from leaking    Squeeze your muscles as if you were trying not to pass gas. Your belly, legs, and buttocks shouldn't move. Hold the squeeze for 3 seconds, then relax for 5 to 10 seconds.    Add 1 second each week until you can squeeze for 10 seconds. Repeat the exercise 10 times a session. Do 3 to 8 sessions a day. If these exercises cause you pain, stop doing them and talk with your doctor.  Follow-up care is a key part of your treatment and safety. Be sure to make and go to all appointments, and call your doctor if you are having problems. It's also a good idea to know your test results and keep a list of the medicines you take.  Where can you learn more?  Go to https://www.healthwise.net/patiented  Enter G264 in the search box to learn more about \"Weeks 22 to 26 of Your Pregnancy: Care " "Instructions.\"  Current as of: July 10, 2023               Content Version: 14.0    6770-2153 [employer].   Care instructions adapted under license by your healthcare professional. If you have questions about a medical condition or this instruction, always ask your healthcare professional. [employer] disclaims any warranty or liability for your use of this information.      Round Ligament Pain: Care Instructions  Overview     Round ligament pain is a common pain during pregnancy. You may feel a sharp brief pain on one or both sides of your belly. It may go down into your groin. It's usually felt for the first time during the second trimester. This pain is a normal part of pregnancy.  Your uterus is supported by two ligaments that go from the top and sides of the uterus to the bones of the pelvis. These are the round ligaments. As your uterus grows, these ligaments stretch and tighten with your movements. This may be the cause of the pain. You may find that certain activities seem to cause pain. If you can, avoid those activities.  Your doctor can usually diagnose round ligament pain from your symptoms and an exam. If you have bleeding or other symptoms, your doctor may also do an imaging test, such as an ultrasound. Your doctor may suggest some things that can help the pain, such as rest and strengthening exercises.  Follow-up care is a key part of your treatment and safety. Be sure to make and go to all appointments, and call your doctor if you are having problems. It's also a good idea to know your test results and keep a list of the medicines you take.  How can you care for yourself at home?  If certain movements seem to trigger belly pain, see if you can avoid them or try moving more slowly so the ligaments don't stretch quickly.  Stay active. If your doctor says it's okay, try moderate exercise. You might try things like swimming, walking, or stretching. Ask your doctor about " "strengthening and stretching exercises that may help.  Try a heating pad or cold pack on the area. A warm bath or shower may also help.  Rest when you can.  Ask your doctor about taking acetaminophen for pain. Be safe with medicines. Read and follow all instructions on the label.  Try a belly support band. Some people find that these can help.  When should you call for help?   Call your doctor now or seek immediate medical care if:    You think you might be in labor.     You have new or worse pain.   Watch closely for changes in your health, and be sure to contact your doctor if you have any problems.  Where can you learn more?  Go to https://www.I and love and you.net/patiented  Enter R110 in the search box to learn more about \"Round Ligament Pain: Care Instructions.\"  Current as of: July 10, 2023  Content Version: 14.1    7466-7216 [employer].   Care instructions adapted under license by your healthcare professional. If you have questions about a medical condition or this instruction, always ask your healthcare professional. [employer] disclaims any warranty or liability for your use of this information.    Leg and Ankle Edema: Care Instructions  Your Care Instructions  Swelling in the legs, ankles, and feet is called edema. It is common after you sit or stand for a while. Long plane flights or car rides often cause swelling in the legs and feet. You may also have swelling if you have to stand for long periods of time at your job. Problems with the veins in the legs (varicose veins) and changes in hormones can also cause swelling. Sometimes the swelling in the ankles and feet is caused by a more serious problem, such as heart failure, infection, blood clots, or liver or kidney disease.  Follow-up care is a key part of your treatment and safety. Be sure to make and go to all appointments, and call your doctor if you are having problems. It's also a good idea to know your test results and keep a " "list of the medicines you take.  How can you care for yourself at home?  If your doctor gave you medicine, take it as prescribed. Call your doctor if you think you are having a problem with your medicine.  Whenever you are resting, raise your legs up. Try to keep the swollen area higher than the level of your heart.  Take breaks from standing or sitting in one position.  Walk around to increase the blood flow in your lower legs.  Move your feet and ankles often while you stand, or tighten and relax your leg muscles.  Wear support stockings. Put them on in the morning, before swelling gets worse.  Eat a balanced diet. Lose weight if you need to.  Limit the amount of salt (sodium) in your diet. Salt holds fluid in the body and may increase swelling.  When should you call for help?   Call 911 anytime you think you may need emergency care. For example, call if:    You have symptoms of a blood clot in your lung (called a pulmonary embolism). These may include:  Sudden chest pain.  Trouble breathing.  Coughing up blood.   Call your doctor now or seek immediate medical care if:    You have signs of a blood clot, such as:  Pain in your calf, back of the knee, thigh, or groin.  Redness and swelling in your leg or groin.     You have symptoms of infection, such as:  Increased pain, swelling, warmth, or redness.  Red streaks or pus.  A fever.   Watch closely for changes in your health, and be sure to contact your doctor if:    Your swelling is getting worse.     You have new or worsening pain in your legs.     You do not get better as expected.   Where can you learn more?  Go to https://www.Cemmerce.net/patiented  Enter N696 in the search box to learn more about \"Leg and Ankle Edema: Care Instructions.\"  Current as of: August 6, 2023               Content Version: 14.0    9662-0274 [employer].   Care instructions adapted under license by your healthcare professional. If you have questions about a medical " condition or this instruction, always ask your healthcare professional. [employer] disclaims any warranty or liability for your use of this information.      Back Pain During Pregnancy: Care Instructions  Overview     Back pain has many possible causes. It is often caused by problems with muscles and ligaments in your back. The extra weight during pregnancy can put stress on your back. Moving, lifting, standing, sitting, or sleeping in an awkward way also can strain your back. Back pain can also be a sign of labor. Although it may hurt a lot, back pain often improves on its own. Use good home treatment, and take care not to stress your back.  Follow-up care is a key part of your treatment and safety. Be sure to make and go to all appointments, and call your doctor if you are having problems. It's also a good idea to know your test results and keep a list of the medicines you take.  How can you care for yourself at home?  Ask your doctor about taking acetaminophen (Tylenol) for pain. Do not take aspirin, ibuprofen (Advil, Motrin), or naproxen (Aleve).  Do not take two or more pain medicines at the same time unless the doctor told you to. Many pain medicines have acetaminophen, which is Tylenol. Too much acetaminophen (Tylenol) can be harmful.  Try heat or ice, whichever feels better. Apply it for 10 to 20 minutes at a time, several times a day. Put a thin cloth between the heat or ice and your skin. A warm bath or shower may also help.  Lie on your left side with your knees and hips bent and a pillow between your legs. This reduces stress on your back.  Try to avoid standing or sitting for too long or heavy lifting. If your job requires lots of standing, sitting, or heavy lifting, ask your employer if you can take short breaks or adjust your work activity. You can ask your doctor to write a note requesting these breaks or other adjustments.  Wear supportive, low-heeled shoes. Avoid flat or high-heeled  "shoes.  Try a belly support band. Supporting your belly can take the strain off your back.  Ask your doctor about how much exercise you can do. Regular exercise such as swimming, water aerobics, walking, or stretching can help with back pain.  Ask your doctor about exercises to stretch, strengthen, and relax your muscles. Your doctor may recommend physical therapy.  When should you call for help?   Call your doctor now or seek immediate medical care if:    You've been having regular contractions for an hour. This means that you've had at least 6 contractions within 1 hour, even after you change your position and drink fluids.     You have new numbness in your buttocks, genital or rectal areas, or legs.     You have a new loss of bowel or bladder control.     You have symptoms of a urinary tract infection. These may include:  Pain or burning when you urinate.  A frequent need to urinate without being able to pass much urine.  Pain in the flank, which is just below the rib cage and above the waist on either side of the back.  Blood in your urine.   Watch closely for changes in your health, and be sure to contact your doctor if:    You do not get better as expected.   Where can you learn more?  Go to https://www.[email or web address].net/patiented  Enter C696 in the search box to learn more about \"Back Pain During Pregnancy: Care Instructions.\"  Current as of: July 10, 2023               Content Version: 14.0    7374-9190 [employer].   Care instructions adapted under license by your healthcare professional. If you have questions about a medical condition or this instruction, always ask your healthcare professional. [employer] disclaims any warranty or liability for your use of this information.       Labor: Care Instructions  Overview      labor is the start of labor between 20 and 36 weeks of pregnancy. Most babies are born at 37 to 42 weeks of pregnancy. In labor, the uterus contracts " to open the cervix. This is the first stage of childbirth.  labor can be caused by a problem with the baby, the mother, or both. Often the cause is not known.  In some cases, doctors use medicines to try to delay labor until 34 or more weeks of pregnancy. By this time, a baby has grown enough so that problems are not likely. In some cases--such as with a serious infection--it is healthier for the baby to be born early. Your treatment will depend on how far along you are in your pregnancy and on your health and your baby's health.  Follow-up care is a key part of your treatment and safety. Be sure to make and go to all appointments, and call your doctor if you are having problems. It's also a good idea to know your test results and keep a list of the medicines you take.  How can you care for yourself at home?  If your doctor prescribed medicines, take them exactly as directed. Call your doctor if you think you are having a problem with your medicine.  Rest until your doctor advises you about activity.  Do not have sexual intercourse unless your doctor says it is safe.  Use sanitary pads if you have vaginal bleeding. Using pads makes it easier to monitor your bleeding.  Do not smoke or allow others to smoke around you. If you need help quitting, talk to your doctor about stop-smoking programs and medicines. These can increase your chances of quitting for good.  When should you call for help?   Call 911  anytime you think you may need emergency care. For example, call if:    You passed out (lost consciousness).     You have a seizure.     You have severe vaginal bleeding.     You have severe pain in your belly or pelvis that doesn't get better between contractions.     You have had fluid gushing or leaking from your vagina and you know or think the umbilical cord is bulging into your vagina. If this happens, immediately get down on your knees so your rear end (buttocks) is higher than your head. This will  "decrease the pressure on the cord until help arrives.   Call your doctor now or seek immediate medical care if:    You have signs of preeclampsia, such as:  Sudden swelling of your face, hands, or feet.  New vision problems (such as dimness, blurring, or seeing spots).  A severe headache.     You have any vaginal bleeding.     You have belly pain or cramping.     You have a fever.     You have had regular contractions (with or without pain) for an hour. This means that you have 6 or more within 1 hour after you change your position and drink fluids.     You have a sudden release of fluid from the vagina.     You have low back pain or pelvic pressure that does not go away.     You notice that your baby has stopped moving or is moving much less than normal.   Watch closely for changes in your health, and be sure to contact your doctor if you have any problems.  Where can you learn more?  Go to https://www.BuysideFX/patiented  Enter Q400 in the search box to learn more about \" Labor: Care Instructions.\"  Current as of: July 10, 2023               Content Version: 14.0    5420-8734 [employer].   Care instructions adapted under license by your healthcare professional. If you have questions about a medical condition or this instruction, always ask your healthcare professional. [employer] disclaims any warranty or liability for your use of this information.      Weeks 22 to 26 of Your Pregnancy: Care Instructions  Your baby's lungs are getting ready for breathing. Your baby may respond to your voice. Your baby likely turns less, and kicks or jerks more. Jerking may mean that your baby has hiccups.    Think about taking childbirth classes. And start to think about whether you want to have pain medicine during labor.    At your next doctor visit, you may be tested for anemia and for high blood sugar that first occurs during pregnancy (gestational diabetes). These conditions can cause " "problems for you and your baby.    To ease discomfort, such as back pain    Change your position often. Try not to sit or stand for too long.  Get some exercise. Things like walking or stretching may help.  Try using a heating pad or cold pack.    To ease or reduce swelling in your feet, ankles, hands, and fingers    Take off your rings.  Avoid high-sodium foods, such as potato chips.  Prop up your feet, and sleep with pillows under your feet.  Try to avoid standing for long periods of time.  Do not wear tight shoes.  Wear support stockings.  Kegel exercises to prevent urine from leaking    Squeeze your muscles as if you were trying not to pass gas. Your belly, legs, and buttocks shouldn't move. Hold the squeeze for 3 seconds, then relax for 5 to 10 seconds.    Add 1 second each week until you can squeeze for 10 seconds. Repeat the exercise 10 times a session. Do 3 to 8 sessions a day. If these exercises cause you pain, stop doing them and talk with your doctor.  Follow-up care is a key part of your treatment and safety. Be sure to make and go to all appointments, and call your doctor if you are having problems. It's also a good idea to know your test results and keep a list of the medicines you take.  Where can you learn more?  Go to https://www.Cyphort.net/patiented  Enter G264 in the search box to learn more about \"Weeks 22 to 26 of Your Pregnancy: Care Instructions.\"  Current as of: July 10, 2023               Content Version: 14.0    3569-8952 [employer].   Care instructions adapted under license by your healthcare professional. If you have questions about a medical condition or this instruction, always ask your healthcare professional. [employer] disclaims any warranty or liability for your use of this information.      Learning About Screening for Gestational Diabetes  What is gestational diabetes screening?     Screening for gestational diabetes is a way to look for high blood " sugar during pregnancy. You drink some very sweet liquid. Then you have a blood test to see how your body uses sugar (glucose).  How is gestational diabetes screening done?  Screening for gestational diabetes may be done in a couple of ways.  Two-part screening.  Part one (glucose challenge test): A blood sample is taken after you drink a liquid that contains sugar (glucose). You don't need to stop eating or drinking before this test. If the test shows that you don't have a lot of sugar in your blood, you don't have gestational diabetes.  Part two (oral glucose tolerance test, or OGTT): If the first test shows a lot of sugar in your blood, then you may have an OGTT. You can't eat or drink for at least 8 hours before this test. A blood sample is taken, then you drink a sweet liquid. You have more blood tests after 1 to 3 hours. If the OGTT shows that you have a lot of sugar in your blood, you may have gestational diabetes.  One-part screening.  Sometimes doctors use the OGTT on its own. If the test shows that you don't have a lot of sugar in your blood, you don't have gestational diabetes. If you do have a lot of sugar in your blood, you may have the condition.  What are the risks of screening?  Your blood glucose level may drop very low toward the end of the test. If this happens, you may feel weak, hungry, and restless. Tell your doctor if you have these symptoms. The test usually will be stopped.  You may vomit after drinking the sweet liquid. If this happens, you may need to take the test at a later time.  Your doctor may do more glucose tests at other times during your pregnancy.  Follow-up care is a key part of your treatment and safety. Be sure to make and go to all appointments, and call your doctor if you are having problems. It's also a good idea to know your test results and keep a list of the medicines you take.  Where can you learn more?  Go to https://www.healthwise.net/patiented  Enter A472 in the  "search box to learn more about \"Learning About Screening for Gestational Diabetes.\"  Current as of: October 2, 2023               Content Version: 14.0    7084-4530 [employer].   Care instructions adapted under license by your healthcare professional. If you have questions about a medical condition or this instruction, always ask your healthcare professional. [employer] disclaims any warranty or liability for your use of this information.      You have been provided the My Labor and Birth Wishes document.  Please review at home and bring to your next prenatal visit. Bring this sheet to the hospital for your birth. Give copies to your care team members and support person.   Additional copies can be found here:  [email or web address]  "

## 2024-08-28 ENCOUNTER — PRENATAL OFFICE VISIT (OUTPATIENT)
Dept: OBGYN | Facility: CLINIC | Age: 39
End: 2024-08-28
Payer: COMMERCIAL

## 2024-08-28 VITALS
BODY MASS INDEX: 28.49 KG/M2 | HEART RATE: 94 BPM | DIASTOLIC BLOOD PRESSURE: 69 MMHG | RESPIRATION RATE: 16 BRPM | SYSTOLIC BLOOD PRESSURE: 99 MMHG | WEIGHT: 166.9 LBS | HEIGHT: 64 IN | TEMPERATURE: 97.5 F

## 2024-08-28 DIAGNOSIS — R10.2 PELVIC PAIN IN PREGNANCY, ANTEPARTUM, SECOND TRIMESTER: ICD-10-CM

## 2024-08-28 DIAGNOSIS — O26.892 PELVIC PAIN IN PREGNANCY, ANTEPARTUM, SECOND TRIMESTER: ICD-10-CM

## 2024-08-28 DIAGNOSIS — Z34.82 PRENATAL CARE, SUBSEQUENT PREGNANCY IN SECOND TRIMESTER: Primary | ICD-10-CM

## 2024-08-28 LAB
ERYTHROCYTE [DISTWIDTH] IN BLOOD BY AUTOMATED COUNT: 12.4 % (ref 10–15)
HCT VFR BLD AUTO: 38.8 % (ref 35–47)
HGB BLD-MCNC: 12.7 G/DL (ref 11.7–15.7)
MCH RBC QN AUTO: 30 PG (ref 26.5–33)
MCHC RBC AUTO-ENTMCNC: 32.7 G/DL (ref 31.5–36.5)
MCV RBC AUTO: 92 FL (ref 78–100)
PLATELET # BLD AUTO: 281 10E3/UL (ref 150–450)
RBC # BLD AUTO: 4.23 10E6/UL (ref 3.8–5.2)
T PALLIDUM AB SER QL: NONREACTIVE
WBC # BLD AUTO: 9.3 10E3/UL (ref 4–11)

## 2024-08-28 PROCEDURE — 99207 PR PRENATAL VISIT: CPT | Performed by: OBSTETRICS & GYNECOLOGY

## 2024-08-28 PROCEDURE — 85027 COMPLETE CBC AUTOMATED: CPT | Performed by: OBSTETRICS & GYNECOLOGY

## 2024-08-28 PROCEDURE — 86780 TREPONEMA PALLIDUM: CPT | Performed by: OBSTETRICS & GYNECOLOGY

## 2024-08-28 PROCEDURE — 36415 COLL VENOUS BLD VENIPUNCTURE: CPT | Performed by: OBSTETRICS & GYNECOLOGY

## 2024-08-28 NOTE — PROGRESS NOTES
"[facility]     Return OB Note     CC: Return OB      Subjective:  Serina is a 38 year old  at 24w5d   Denies vaginal bleeding, loss of fluid, or pelvic cramping. +FM.   Having issues with nausea yesterday and today. Declines the GCT today as she really struggled with this in the past. Is willing to check blood sugars on her glucometer she has at home.      Objective:  BP 99/69 (BP Location: Right arm, Patient Position: Sitting, Cuff Size: Adult Regular)   Pulse 94   Temp 97.5  F (36.4  C)   Resp 16   Ht 1.626 m (5' 4\")   Wt 75.7 kg (166 lb 14.4 oz)   LMP 2024   BMI 28.65 kg/m         Assessment/Plan:      IUP at 24w5d  -NOB labs WNL  -Prenatal screening: declined genetic screening, declined AFP  -Anatomy US: s/p L2      Co-Morbidities/Complications/Concerns:   -AMA: declined genetic screening, L2 planned  -Hx C section x2: planning repeat. Knows this is her last child, discussed option of tubal ligation at time of delivery if desired, she will consider.   -placenta previa, high risk for accreta, MFM following, no evidence for PAS. Planning repeat c/s 36-37wks if previa persists, 39wks if movement.   -declines GCT and wants to check blood sugars, has supplies at home and will check 4x a day and bring her log to her next appt.   - having pelvic pain and sciatica pain, maternity belt with walks really helped this in the past.   - CBC and RPR today.      RTC ~3 weeks, labor and bleeding precautions reviewed.      Vicki Tracey MD  OB/GYN  "

## 2024-09-24 ENCOUNTER — ANCILLARY PROCEDURE (OUTPATIENT)
Dept: ULTRASOUND IMAGING | Facility: HOSPITAL | Age: 39
End: 2024-09-24
Attending: STUDENT IN AN ORGANIZED HEALTH CARE EDUCATION/TRAINING PROGRAM
Payer: COMMERCIAL

## 2024-09-24 ENCOUNTER — OFFICE VISIT (OUTPATIENT)
Dept: MATERNAL FETAL MEDICINE | Facility: HOSPITAL | Age: 39
End: 2024-09-24
Attending: STUDENT IN AN ORGANIZED HEALTH CARE EDUCATION/TRAINING PROGRAM
Payer: COMMERCIAL

## 2024-09-24 DIAGNOSIS — O44.03 PLACENTA PREVIA IN THIRD TRIMESTER: ICD-10-CM

## 2024-09-24 DIAGNOSIS — O09.523 MULTIGRAVIDA OF ADVANCED MATERNAL AGE IN THIRD TRIMESTER: ICD-10-CM

## 2024-09-24 DIAGNOSIS — O44.02 PLACENTA PREVIA IN SECOND TRIMESTER: ICD-10-CM

## 2024-09-24 DIAGNOSIS — Z36.2 ENCOUNTER FOR FOLLOW-UP ULTRASOUND OF FETAL ANATOMY: Primary | ICD-10-CM

## 2024-09-24 PROCEDURE — 76816 OB US FOLLOW-UP PER FETUS: CPT | Mod: 26 | Performed by: STUDENT IN AN ORGANIZED HEALTH CARE EDUCATION/TRAINING PROGRAM

## 2024-09-24 PROCEDURE — 76816 OB US FOLLOW-UP PER FETUS: CPT

## 2024-09-24 PROCEDURE — 99214 OFFICE O/P EST MOD 30 MIN: CPT | Mod: 25 | Performed by: STUDENT IN AN ORGANIZED HEALTH CARE EDUCATION/TRAINING PROGRAM

## 2024-09-24 PROCEDURE — 76817 TRANSVAGINAL US OBSTETRIC: CPT | Mod: 26 | Performed by: STUDENT IN AN ORGANIZED HEALTH CARE EDUCATION/TRAINING PROGRAM

## 2024-09-24 NOTE — PROGRESS NOTES
The patient was seen for an ultrasound in the [facility] today.  For a detailed report of the ultrasound examination, please see the ultrasound report which can be found under the imaging tab.     If you have questions regarding today's evaluation or if we can be of further service, please contact the [facility].    Jayne Ivey MD  Maternal Fetal Medicine

## 2024-10-01 ENCOUNTER — PRENATAL OFFICE VISIT (OUTPATIENT)
Dept: OBGYN | Facility: CLINIC | Age: 39
End: 2024-10-01
Payer: COMMERCIAL

## 2024-10-01 ENCOUNTER — PREP FOR PROCEDURE (OUTPATIENT)
Dept: OBGYN | Facility: CLINIC | Age: 39
End: 2024-10-01

## 2024-10-01 ENCOUNTER — TELEPHONE (OUTPATIENT)
Dept: OBGYN | Facility: CLINIC | Age: 39
End: 2024-10-01

## 2024-10-01 VITALS
WEIGHT: 170.3 LBS | TEMPERATURE: 98.1 F | DIASTOLIC BLOOD PRESSURE: 68 MMHG | BODY MASS INDEX: 29.08 KG/M2 | HEIGHT: 64 IN | OXYGEN SATURATION: 98 % | SYSTOLIC BLOOD PRESSURE: 109 MMHG | RESPIRATION RATE: 16 BRPM | HEART RATE: 95 BPM

## 2024-10-01 DIAGNOSIS — O24.410 DIET CONTROLLED GESTATIONAL DIABETES MELLITUS (GDM) IN FIRST TRIMESTER: Primary | ICD-10-CM

## 2024-10-01 DIAGNOSIS — O24.410 DIET CONTROLLED GESTATIONAL DIABETES MELLITUS (GDM) IN THIRD TRIMESTER: ICD-10-CM

## 2024-10-01 DIAGNOSIS — O34.219 HISTORY OF CESAREAN DELIVERY, CURRENTLY PREGNANT: Primary | ICD-10-CM

## 2024-10-01 PROCEDURE — 99207 PR PRENATAL VISIT: CPT | Performed by: OBSTETRICS & GYNECOLOGY

## 2024-10-01 NOTE — PROGRESS NOTES
"[facility]     Return OB Note     CC: Return OB      Subjective:  Serina is a 38 year old  at 29w4d   Denies vaginal bleeding, loss of fluid, or pelvic cramping. +FM. Having some tightenings, occasionally painful. Resolves with laying down.   Checking blood sugars; 4x a day in Sept for three weeks. 6x had >140 blood sugars with 1hr pp checks, if it was higher she would check two hours and they were all normal.   Fastings were always in the 80s, so stopped checking fastings.   Having woozy spells. Lightheaded for 20 min to a few hours. No chest pain or shortness of breath.      Objective:  /68 (BP Location: Left arm, Patient Position: Chair, Cuff Size: Adult Regular)   Pulse 95   Temp 98.1  F (36.7  C) (Tympanic)   Resp 16   Ht 1.626 m (5' 4\")   Wt 77.2 kg (170 lb 4.8 oz)   LMP 2024   SpO2 98%   Breastfeeding No   BMI 29.23 kg/m      See OB flowsheet       Assessment/Plan:      IUP at 29w4d  -NOB labs WNL  -Prenatal screening: declined genetic screening, declined AFP  -Anatomy US: s/p L2      Co-Morbidities/Complications/Concerns:   -AMA: declined genetic screening, L2 planned  -Hx C section x2: planning repeat. Knows this is her last child, discussed option of tubal ligation at time of delivery if desired, she will consider.   -placenta previa, high risk for accreta, MFM following, no evidence for PAS. Planning repeat c/s 36-37wks if previa persists, 39wks if movement.   -did not do GCT, checked blood sugars, but has had elevated blood sugars, post-prandials elevated so will call [facility]-1 and send a referral to the diabetes educator   -having pelvic pain and sciatica pain, maternity belt with walks really helped this in the past.   -other 3rd tri labs nl   -lightheaded spells - no red flag symptoms, recommended fluids, small frequent meal and compression stockings      RTC q2 weeks, labor and bleeding precautions reviewed.      Vicki Tracey MD  OB/GYN  "

## 2024-10-01 NOTE — TELEPHONE ENCOUNTER
"[id number]  Serina Washington    You are now scheduled for surgery at [facility].  Below are the details for your surgery.  Please read the \"Preparing for Your Surgery\" instructions and let us know if you have any questions.    Type of surgery: Repeat  SECTION   Surgeon:  Vicki Tracey MD  Location of surgery: [facility] OR    Date of surgery: 24    Time: 7:30am   Arrival Time: 6:00am    Time can change, to be confirmed a couple of days prior by pre-op surgery nurse.    Pre-Op Appt Date: Last OB visit  Post-Op Appt Date: 6 weeks   Time:     Packet sent out: Yes  Pre-cert/Authorization completed:  TBD by Financial Securing Office.   MA Sterilization/Hysterectomy Acknowledgment Consent signed: Not Applicable    [facility]  [phone]    Fax: [phone]  Same Day Surgery [phone]  Fax: [phone]  [facility] [phone]    "

## 2024-10-03 ENCOUNTER — VIRTUAL VISIT (OUTPATIENT)
Dept: EDUCATION SERVICES | Facility: CLINIC | Age: 39
End: 2024-10-03
Attending: OBSTETRICS & GYNECOLOGY
Payer: COMMERCIAL

## 2024-10-03 DIAGNOSIS — O24.410 DIET CONTROLLED GESTATIONAL DIABETES MELLITUS (GDM) IN THIRD TRIMESTER: ICD-10-CM

## 2024-10-03 PROCEDURE — G0109 DIAB MANAGE TRN IND/GROUP: HCPCS | Mod: 95

## 2024-10-03 RX ORDER — LANCETS
EACH MISCELLANEOUS
Qty: 200 EACH | Refills: 3 | Status: SHIPPED | OUTPATIENT
Start: 2024-10-03

## 2024-10-03 NOTE — PROGRESS NOTES
Serina Washington is an extremely pleasant 38 year old year old female patient here today for bump  on right arm. Present for years. She notes she will sometimes pick at spot. She also notes spot on hands, present a few years not bothersome. Patient has no other skin complaints today.  Remainder of the HPI, Meds, PMH, Allergies, FH, and SH was reviewed in chart.    No past medical history on file.    Past Surgical History:   Procedure Laterality Date     SECTION N/A 09/10/2017    Procedure:  SECTION;  Primary  Section;  Surgeon: Mu Miranda MD;  Location: [facility] OR     SECTION Bilateral 2022    Procedure:  SECTION;  Surgeon: Quinn Cash MD;  Location: [facility] OR    DILATION AND CURETTAGE SUCTION N/A 2021    Procedure: DILATION AND CURETTAGE, UTERUS, USING SUCTION;  Surgeon: Vicki Tracey MD;  Location: [facility] OR    OPERATIVE HYSTEROSCOPY N/A 2020    Procedure: HYSTEROSCOPY, THERAPEUTIC;  Surgeon: Quinn Cash MD;  Location: [facility] OR    REVISE SCAR TRUNK Bilateral 2022    Procedure: REVISION, SCAR, TORSO;  Surgeon: Quinn Cash MD;  Location: [facility] OR    SURGICAL HISTORY OF -  Left     knee surgery  and         Family History   Problem Relation Age of Onset    Hypertension Mother     Thyroid Disease Mother     Kidney Disease Father         dialysis    Hypertension Father     Gastrointestinal Disease Father         stomach ulcer    Skin Cancer Maternal Grandmother     Cancer Paternal Grandmother     Cancer Paternal Grandfather        Social History     Socioeconomic History    Marital status:      Spouse name: Not on file    Number of children: 1    Years of education: Not on file    Highest education level: Not on file   Occupational History    Not on file   Tobacco Use    Smoking status: Never    Smokeless tobacco: Never   Vaping Use    Vaping Use: Never used   Substance and Sexual Activity    Alcohol  use: Not Currently     Comment: occas-quit with pregnancy    Drug use: No    Sexual activity: Yes     Partners: Male   Other Topics Concern    Parent/sibling w/ CABG, MI or angioplasty before 65F 55M? No   Social History Narrative    Not on file     Social Determinants of Health     Financial Resource Strain: Not on file   Food Insecurity: Not on file   Transportation Needs: Not on file   Physical Activity: Not on file   Stress: Not on file   Social Connections: Not on file   Interpersonal Safety: Not on file   Housing Stability: Not on file       Outpatient Encounter Medications as of 2023   Medication Sig Dispense Refill    Prenatal Multivit-Min-Fe-FA (PRENATAL VITAMINS PO) Take 2 tablets by mouth every evening [place]        No facility-administered encounter medications on file as of 2023.             O:   NAD, WDWN, Alert & Oriented, Mood & Affect wnl, Vitals stable   Here today alone   There were no vitals taken for this visit.   General appearance normal   Vitals stable   Alert, oriented and in no acute distress      Brown firm papule with positive dimple sign on right arm, right leg    Verrucous papule on right hand x 3     Eyes: Conjunctivae/lids:Normal     ENT: Lips normal    MSK:Normal    Pulm: Breathing Normal     Neuro/Psych: Orientation:Alert and Orientedx3 ; Mood/Affect:normal     A/P:  Flat warts on hand x 3  LN2:  Treated with LN2 for 5s for 1-2 cycles. Warned risks of blistering, pain, pigment change, scarring, and incomplete resolution.  Advised patient to return if lesions do not completely resolve.  Wound care sheet given.  2. Dermatofibroma  Discussed excision, does not want to do at this time. Can schedule with Dr. Huitron if bothersome in the future.   It was a pleasure speaking to Serina Washington today.  BENIGN LESIONS DISCUSSED WITH PATIENT:  I discussed the specifics of tumor, prognosis, and genetics of benign lesions.  I explained that treatment of these lesions would  be purely cosmetic and not medically neccessary.  I discussed with patient different removal options including excision, cautery and /or laser.      Nature and genetics of benign skin lesions dicussed with patient.  Signs and Symptoms of skin cancer discussed with patient.  ABCDEs of melanoma reviewed with patient.  Patient encouraged to perform monthly skin exams.  UV precautions reviewed with patient.  Risks of non-melanoma skin cancer discussed with patient   Return to clinic in one year or sooner if needed.      family

## 2024-10-03 NOTE — LETTER
"    10/3/2024         RE: Serina Washington  [address] [address]        Dear Colleague,    Thank you for referring your patient, Serina Washington, to the [facility]. Please see a copy of my visit note below.    Diabetes Self-Management Education & Support  Type of service:  Video Visit    If the video visit is dropped, the video visit invitation should be resent by: Send to e-mail at: [email or web address]    Originating Location (pt. Location): Home  Distant Location (provider location): [facility]  Mode of Communication:  Video Conference via "[employer]    Video Start Time:  9:00 AM  Video End Time (time video stopped): 10:13 AM    How would patient like to obtain AVS? MyChart    SUBJECTIVE/OBJECTIVE:  Presents for education related to gestational diabetes.         Cultural Influences/Ethnic Background:  Choose not to answer      Estimated Date of Delivery: Dec 13, 2024    1 hour OGTT  Lab Results   Component Value Date    GLU1 137 (H) 06/05/2017         3 hour OGTT    Fasting  No results found for: \"GTTGF\"    1 hour  No results found for: \"GTTG1\"    2 hour  No results found for: \"GTTG2\"    3 hour  No results found for: \"GTTG3\"    Lifestyle and Health Behaviors:       Healthy Coping:       Current Management:       ASSESSMENT:  Met with Serina via video for her education for Gestational Diabetes.    INTERVENTION:  Patient was instructed on generic meter.    Educational topics covered today:  GDM diagnosis, pathophysiology, Risks and Complications of GDM, Means of controlling GDM, Using a Blood Glucose Monitor, Blood Glucose Goals, Logging and Interpreting Glucose Results, Ketone Testing, When to Call a Diabetes Educator or OB Provider, Healthy Eating During Pregnancy, Counting Carbohydrates, Meal Planning for GDM, and Physical Activity    Educational materials provided today:   Madalyn Understanding Gestational " Diabetes  GDM Log Book  Sharps Disposal  Care After Delivery  generic meter kit    Pt verbalized understanding of concepts discussed and recommendations provided today.     PLAN:  Check glucose 4 times daily, before breakfast and 1 hour after each meal.     Check Ketones daily for one week, if negative, reduce testing to once a week.     Physical activity recommended: yes.    Meal plan: 30-45 carbs at breakfast, 45-60 carbs at lunch, 45-60 carbs at supper, 15-30 carbs at 3 snacks a day.  Follow consistent CHO meal plan, eat CHO and protein/fat at all meals/snacks.    Call/e-mail/MyChart message diabetes educator if 3 or more blood sugars are above the goal in 1 week, if ketones are positive, or with questions/concerns.     The service provided today was under the supervising provider, Francisca Rodriguez, who was available if needed.     Time Spent: 60 minutes  Encounter Type: Group class    Any diabetes medication dose changes were made via the CDE Protocol and Collaborative Practice Agreement with the patient's referring provider. A copy of this encounter was shared with the provider.

## 2024-10-08 NOTE — PROGRESS NOTES
"Diabetes Self-Management Education & Support  Type of service:  Video Visit    If the video visit is dropped, the video visit invitation should be resent by: Send to e-mail at: [email or web address]    Originating Location (pt. Location): Home  Distant Location (provider location): [facility]  Mode of Communication:  Video Conference via [employer]    Video Start Time:  9:00 AM  Video End Time (time video stopped): 10:13 AM    How would patient like to obtain AVS? MyChart    SUBJECTIVE/OBJECTIVE:  Presents for education related to gestational diabetes.         Cultural Influences/Ethnic Background:  Choose not to answer      Estimated Date of Delivery: Dec 13, 2024    1 hour OGTT  Lab Results   Component Value Date    GLU1 137 (H) 06/05/2017         3 hour OGTT    Fasting  No results found for: \"GTTGF\"    1 hour  No results found for: \"GTTG1\"    2 hour  No results found for: \"GTTG2\"    3 hour  No results found for: \"GTTG3\"    Lifestyle and Health Behaviors:       Healthy Coping:       Current Management:       ASSESSMENT:  Met with Serina via video for her education for Gestational Diabetes.    INTERVENTION:  Patient was instructed on generic meter.    Educational topics covered today:  GDM diagnosis, pathophysiology, Risks and Complications of GDM, Means of controlling GDM, Using a Blood Glucose Monitor, Blood Glucose Goals, Logging and Interpreting Glucose Results, Ketone Testing, When to Call a Diabetes Educator or OB Provider, Healthy Eating During Pregnancy, Counting Carbohydrates, Meal Planning for GDM, and Physical Activity    Educational materials provided today:   Berry Understanding Gestational Diabetes  GDM Log Book  Sharps Disposal  Care After Delivery  generic meter kit    Pt verbalized understanding of concepts discussed and recommendations provided today.     PLAN:  Check glucose 4 times daily, before breakfast and 1 hour after each meal.     Check Ketones " daily for one week, if negative, reduce testing to once a week.     Physical activity recommended: yes.    Meal plan: 30-45 carbs at breakfast, 45-60 carbs at lunch, 45-60 carbs at supper, 15-30 carbs at 3 snacks a day.  Follow consistent CHO meal plan, eat CHO and protein/fat at all meals/snacks.    Call/e-mail/MyChart message diabetes educator if 3 or more blood sugars are above the goal in 1 week, if ketones are positive, or with questions/concerns.     The service provided today was under the supervising provider, Francisca Rodriguez, who was available if needed.     Time Spent: 60 minutes  Encounter Type: Group class    Any diabetes medication dose changes were made via the CDE Protocol and Collaborative Practice Agreement with the patient's referring provider. A copy of this encounter was shared with the provider.

## 2024-10-10 ENCOUNTER — VIRTUAL VISIT (OUTPATIENT)
Dept: EDUCATION SERVICES | Facility: CLINIC | Age: 39
End: 2024-10-10
Payer: COMMERCIAL

## 2024-10-10 DIAGNOSIS — O24.410 DIET CONTROLLED GESTATIONAL DIABETES MELLITUS (GDM) IN THIRD TRIMESTER: Primary | ICD-10-CM

## 2024-10-10 PROCEDURE — G0108 DIAB MANAGE TRN  PER INDIV: HCPCS | Mod: 95 | Performed by: NUTRITIONIST

## 2024-10-10 NOTE — LETTER
10/10/2024         RE: Serina Washington  [address]  [place] [address]        Dear Colleague,    Thank you for referring your patient, Serina Washington, to the [facility]. Please see a copy of my visit note below.    Diabetes Self-Management Education & Support  Type of service:  Video Visit    If the video visit is dropped, the video visit invitation should be resent by: Text to cell phone: [phone]    Originating Location (pt. Location): Home  Distant Location (provider location): [facility]  Mode of Communication:  Video Conference via [employer]    Video Start Time:  1:59 PM  Video End Time (time video stopped): 2:36 PM    How would patient like to obtain AVS? MyChart    SUBJECTIVE/OBJECTIVE:  Presents for education related to gestational diabetes.    Accompanied by: Self  Gestational weeks: 30w 6d  Next OB Visit Date: 10/17/24  Number of previous pregnancies: 8  Had any babies over 9 lbs: Yes  Previously had Gestational Diabetes: No  Have you ever had thyroid problems or taken thyroid medication?: No  Heart disease, mitral valve prolapse or rheumatic fever?: No  Hypertension : No  High Cholesterol: No  High Triglycerides: No  Do you use tobacco products?: No  Do you drink beer, wine or hard liquor?: No    Cultural Influences/Ethnic Background:  Choose not to answer    LMP 03/08/2024     Weight gain 21 lbs at 29 weeks gestation.    Estimated Date of Delivery: Dec 13, 2024    Blood Glucose/Ketone Log:   Date Ketones Fasting Post Breakfast Post Lunch Post Supper   10/4 neg 89 114 134 159   10/5 neg 87 104 139 107*   10/6 neg 85 - 155 not feeling well 114   10/7 small 84 109 139 109   10/8 neg 89 150 108 124   10/9 trace 83 117 123 121   10/10 trace 92 118 - -   *2 hr pp    Lifestyle and Health Behaviors:  Pre-pregnancy weight (lbs): 149  Exercise:: Yes  Days per week of moderate to strenuous exercise (like a brisk walk):  5  On average, minutes per day of exercise at this level: 30  How intense was your typical exercise? : Moderate (like brisk walking)  Exercise Minutes per Week: 150  Barrier to exercise: None  Cultural/Spiritism diet restrictions?: No  Meal planning/habits: None  How many times a week on average do you eat food made away from home (restaurant/take-out)?: 1  Meals include: Breakfast, Lunch, Dinner, Evening Snack  Breakfast: 1 slice ww toast with peanut butter OR eggs and feta/veggies and toast  Lunch: beef stew OR beef/barley soup OR spaghetti squash OR brussel sprouts roasted  Dinner: spaghetti squash with meat sauce  Snacks: ww toast with peanut butter or apples or nuts  Beverages: Water, Milk, Coffee  Biggest challenges to healthy eating: Other (low energy in the morning)  Pre-mitzi vitamin?: Yes  Supplements?: No  Experiencing nausea?: No  Experiencing heartburn?: No    Healthy Coping:  Emotional response to diabetes: Acceptance  Informal Support system:: Spouse  Stage of change: ACTION (Actively working towards change)    Current Management:  Taking medications for gestational diabetes?: No  Difficulty affording diabetes medication?: No  Difficulty affording diabetes testing supplies?: No    ASSESSMENT:  Ketones: neg x4, trace x2, small x1.   Fasting blood glucoses: 100% in target.  After breakfast: 83% in target.  After lunch: 83% in target.  After dinner: 83% in target.    Serina is monitoring daily, ketones have been fluctuating.  Review of diet shows that on some days she is not eating any carbohydrate at dinner meal or lunch meals and keeping breakfast carbohydrate intake low.  She reports not feeling satisfied with current eating pattern.  She is agreeable to try to consume the lower recommendations for carbohydrate at all meals and have HS snack with carbohydrate/protein.  She will send weekly readings in to diabetes education through [employer] weekly until delivery.      INTERVENTION:  Educational topics  covered today:  What to expect after delivery, Future testing for Type 2 diabetes (2 hour OGTT at 6 week post-partum check-up and annual fasting blood glucose level), Risk of GDM and planning ahead for future pregnancies, Recommended lifestyle interventions for reducing the risk of Type 2 Diabetes, When to Call a Diabetes Educator or OB Provider    Educational Materials provided today:  Madalyn Preventing Diabetes    PLAN:  Check glucose 4 times daily.  Check ketones once a week when readings are consistently negative.  Continue with recommended physical activity. (Walking on treadmill >30 minutes/day)  Continue to follow recommended meal plan: 2-3 carbs at breakfast, 3-4 carbs at lunch, 3-4 carbs at supper, 1-2 carbs at snacks.  Follow consistent CHO meal plan, eat CHO and protein/fat at all meals/snacks.  Send weekly reading through [employer]    Call/e-mail/[employer] message diabetes educator if 3 or more blood sugars are above the goal in 1 week or if ketones are positive.    Stephanie Nevarez RDN, LDN, [place]   Time Spent: 30 minutes  Encounter Type: Individual    Any diabetes medication dose changes were made via the CDE Protocol and Collaborative Practice Agreement with the patient's referring provider and OB/GYN provider. A copy of this encounter was shared with the provider.

## 2024-10-10 NOTE — PROGRESS NOTES
Diabetes Self-Management Education & Support  Type of service:  Video Visit    If the video visit is dropped, the video visit invitation should be resent by: Text to cell phone: [phone]    Originating Location (pt. Location): Home  Distant Location (provider location): [facility]  Mode of Communication:  Video Conference via [employer]    Video Start Time:  1:59 PM  Video End Time (time video stopped): 2:36 PM    How would patient like to obtain AVS? MyChart    SUBJECTIVE/OBJECTIVE:  Presents for education related to gestational diabetes.    Accompanied by: Self  Gestational weeks: 30w 6d  Next OB Visit Date: 10/17/24  Number of previous pregnancies: 8  Had any babies over 9 lbs: Yes  Previously had Gestational Diabetes: No  Have you ever had thyroid problems or taken thyroid medication?: No  Heart disease, mitral valve prolapse or rheumatic fever?: No  Hypertension : No  High Cholesterol: No  High Triglycerides: No  Do you use tobacco products?: No  Do you drink beer, wine or hard liquor?: No    Cultural Influences/Ethnic Background:  Choose not to answer    LMP 03/08/2024     Weight gain 21 lbs at 29 weeks gestation.    Estimated Date of Delivery: Dec 13, 2024    Blood Glucose/Ketone Log:   Date Ketones Fasting Post Breakfast Post Lunch Post Supper   10/4 neg 89 114 134 159   10/5 neg 87 104 139 107*   10/6 neg 85 - 155 not feeling well 114   10/7 small 84 109 139 109   10/8 neg 89 150 108 124   10/9 trace 83 117 123 121   10/10 trace 92 118 - -   *2 hr pp    Lifestyle and Health Behaviors:  Pre-pregnancy weight (lbs): 149  Exercise:: Yes  Days per week of moderate to strenuous exercise (like a brisk walk): 5  On average, minutes per day of exercise at this level: 30  How intense was your typical exercise? : Moderate (like brisk walking)  Exercise Minutes per Week: 150  Barrier to exercise: None  Cultural/Congregational diet restrictions?: No  Meal planning/habits: None  How many  times a week on average do you eat food made away from home (restaurant/take-out)?: 1  Meals include: Breakfast, Lunch, Dinner, Evening Snack  Breakfast: 1 slice ww toast with peanut butter OR eggs and feta/veggies and toast  Lunch: beef stew OR beef/barley soup OR spaghetti squash OR brussel sprouts roasted  Dinner: spaghetti squash with meat sauce  Snacks: ww toast with peanut butter or apples or nuts  Beverages: Water, Milk, Coffee  Biggest challenges to healthy eating: Other (low energy in the morning)  Pre- vitamin?: Yes  Supplements?: No  Experiencing nausea?: No  Experiencing heartburn?: No    Healthy Coping:  Emotional response to diabetes: Acceptance  Informal Support system:: Spouse  Stage of change: ACTION (Actively working towards change)    Current Management:  Taking medications for gestational diabetes?: No  Difficulty affording diabetes medication?: No  Difficulty affording diabetes testing supplies?: No    ASSESSMENT:  Ketones: neg x4, trace x2, small x1.   Fasting blood glucoses: 100% in target.  After breakfast: 83% in target.  After lunch: 83% in target.  After dinner: 83% in target.    Serina is monitoring daily, ketones have been fluctuating.  Review of diet shows that on some days she is not eating any carbohydrate at dinner meal or lunch meals and keeping breakfast carbohydrate intake low.  She reports not feeling satisfied with current eating pattern.  She is agreeable to try to consume the lower recommendations for carbohydrate at all meals and have HS snack with carbohydrate/protein.  She will send weekly readings in to diabetes education through [facility] weekly until delivery.      INTERVENTION:  Educational topics covered today:  What to expect after delivery, Future testing for Type 2 diabetes (2 hour OGTT at 6 week post-partum check-up and annual fasting blood glucose level), Risk of GDM and planning ahead for future pregnancies, Recommended lifestyle interventions for reducing the  risk of Type 2 Diabetes, When to Call a Diabetes Educator or OB Provider    Educational Materials provided today:  Madalyn Preventing Diabetes    PLAN:  Check glucose 4 times daily.  Check ketones once a week when readings are consistently negative.  Continue with recommended physical activity. (Walking on treadmill >30 minutes/day)  Continue to follow recommended meal plan: 2-3 carbs at breakfast, 3-4 carbs at lunch, 3-4 carbs at supper, 1-2 carbs at snacks.  Follow consistent CHO meal plan, eat CHO and protein/fat at all meals/snacks.  Send weekly reading through [employer]    Call/e-mail/[employer] message diabetes educator if 3 or more blood sugars are above the goal in 1 week or if ketones are positive.    Stephanie Nevarez RDN, LDN, [place]   Time Spent: 30 minutes  Encounter Type: Individual    Any diabetes medication dose changes were made via the CDE Protocol and Collaborative Practice Agreement with the patient's referring provider and OB/GYN provider. A copy of this encounter was shared with the provider.

## 2024-10-10 NOTE — PATIENT INSTRUCTIONS
Neel Smalls,    It was great meeting with you today. Keep up your good work!    Please Send a my chart message with pictures of your blood sugar numbers next week.     Plan to share your glucose information with diabetes education once a week, unless otherwise directed.     1. Check glucose 4 times daily, before breakfast daily and 1 hour after each meal, as recommended.    2. Continue with recommended physical activity.    3. Continue to follow recommended meal plan: 30-45 grams carbohydrate at breakfast, 45-60 grams carbohydrate at lunch, 45-60 grams carbohydrate at supper, 15-30 grams carbohydrare at snacks.      **Follow consistent carbohydrate meal plan, eat carbohydrate and protein and healthy fats at all meals/snacks.    4. Follow-up with OB doctor as recommended.    5. Call or MyChart message your diabetes educator if 3 or more blood sugars are above the goal in 1 week or if ketones are elevated (trace or above).       AFTER YOU DELIVER:    The first weeks after delivery:   Check blood sugar 4 - 6 times per week to be sure that the numbers are in target after baby is born. Check blood sugar before breakfast or 2 hours after the start of a meal.     Blood sugar goals are different when you are not pregnant:  Before breakfast: Less than 100  2 hours after a meal: Less than 140    If you have elevated numbers, contact your OB/GYN or primary care provider.    - Continue with healthy eating and physical activity to get back to your pre-pregnancy weight.   - Have a follow-up 2-hour Glucose Tolerance Test at your 6-week post-partum check-up.   - Have your fasting blood sugar checked once a year.  - Plan ahead for future pregnancies - eat healthy, keep active, work with your doctor to check for gestational diabetes early on in the pregnancy and check blood sugars as recommended by your doctor.      Stephanie Nevarez,  RD, LD, [facility]  Diabetes Education Triage Line: [phone]  Diabetes Education Appointment Scheduling:  [phone]

## 2024-10-14 NOTE — PATIENT INSTRUCTIONS
"Weeks 32 to 34 of Your Pregnancy: Care Instructions    Decide whether you want to bank or donate your baby's umbilical cord blood. If you want to save this blood, you have to arrange for it ahead of time.   Decide about circumcision. Personal, Mu-ism, or cultural beliefs may play a role in your decision. You get to decide what you want for your baby.         Learn how to ease hemorrhoids.   Get more liquids, fruits, vegetables, and fiber in your diet.  Avoid sitting for too long.  Clean yourself with moist toilet paper. Or try witch hazel pads.  Try ice packs or warm sitz baths for discomfort.  Use hydrocortisone cream for pain or itching.  Ask your doctor about stool softeners.        Consider the benefits of breastfeeding.   It reduces your baby's risk of sudden infant death syndrome (SIDS).   babies are less likely to get certain infections. And they're less likely to be obese or get diabetes later in life.  It can lower your risk of breast and ovarian cancers and osteoporosis.  It saves you money.  Follow-up care is a key part of your treatment and safety. Be sure to make and go to all appointments, and call your doctor if you are having problems. It's also a good idea to know your test results and keep a list of the medicines you take.  Where can you learn more?  Go to https://www.Safety Hound.net/patiented  Enter X711 in the search box to learn more about \"Weeks 32 to 34 of Your Pregnancy: Care Instructions.\"  Current as of: July 10, 2023  Content Version: 14.2 2024 [facility].   Care instructions adapted under license by your healthcare professional. If you have questions about a medical condition or this instruction, always ask your healthcare professional. Healthwise, Incorporated disclaims any warranty or liability for your use of this information.    You have been provided the Any Day Now: Early Labor at Home document.    Additional copies can be found here:  " [email or web address]  You have been provided the What I'd Wish I'd Known About Giving Birth document.    Additional copies can be found here:  [email or web address]

## 2024-10-17 ENCOUNTER — TELEPHONE (OUTPATIENT)
Dept: OBGYN | Facility: CLINIC | Age: 39
End: 2024-10-17

## 2024-10-17 ENCOUNTER — PRENATAL OFFICE VISIT (OUTPATIENT)
Dept: OBGYN | Facility: CLINIC | Age: 39
End: 2024-10-17
Payer: COMMERCIAL

## 2024-10-17 VITALS
DIASTOLIC BLOOD PRESSURE: 71 MMHG | HEIGHT: 64 IN | HEART RATE: 84 BPM | TEMPERATURE: 97.4 F | SYSTOLIC BLOOD PRESSURE: 105 MMHG | WEIGHT: 172 LBS | BODY MASS INDEX: 29.37 KG/M2

## 2024-10-17 DIAGNOSIS — Z34.83 PRENATAL CARE, SUBSEQUENT PREGNANCY, THIRD TRIMESTER: Primary | ICD-10-CM

## 2024-10-17 PROCEDURE — 99207 PR PRENATAL VISIT: CPT | Performed by: OBSTETRICS & GYNECOLOGY

## 2024-10-17 NOTE — PROGRESS NOTES
"[facility]     Return OB Note     CC: Return OB      Subjective:  Serina is a 38 year old  at 31w6d   Denies vaginal bleeding, loss of fluid, or pelvic cramping. +FM. Having some tightenings, occasionally painful. Resolves with laying down and sleeping. Leg cramps.   Checking blood sugars; some elevations - number sent to diabetes educator.      Objective:  /71 (BP Location: Left arm, Patient Position: Sitting, Cuff Size: Adult Regular)   Pulse 84   Temp 97.4  F (36.3  C) (Tympanic)   Ht 1.626 m (5' 4\")   Wt 78 kg (172 lb)   LMP 2024   BMI 29.52 kg/m      See OB flowsheet       Assessment/Plan:      IUP at 31w6d  -NOB labs WNL  -Prenatal screening: declined genetic screening, declined AFP  -Anatomy US: s/p L2      Co-Morbidities/Complications/Concerns:   -AMA: declined genetic screening, L2 s/p   -Hx C section x2: planning repeat. Knows this is her last child, discussed option of tubal ligation at time of delivery if desired, she will consider. Scheduled 2024.   -placenta previa - resolved.   -GDMA-1; normal fastings, 3x elevated after eating - diabetes educator following   -other 3rd tri labs nl   -lightheaded spells - no red flag symptoms, recommended fluids, small frequent meal and compression stockings   - declined tdap and flu      RTC q2 weeks, labor and bleeding precautions reviewed. OB triage #.      Vicki Tracey MD  OB/GYN  "

## 2024-10-18 ENCOUNTER — MYC MEDICAL ADVICE (OUTPATIENT)
Dept: EDUCATION SERVICES | Facility: CLINIC | Age: 39
End: 2024-10-18
Payer: COMMERCIAL

## 2024-10-18 NOTE — TELEPHONE ENCOUNTER
Completed paperwork was faxed and mailed to the [place] per pt request.  Will keep a copy up front for a few weeks and then send it to be scanned into chart.    -Kiara Brannon  [facility]

## 2024-10-18 NOTE — TELEPHONE ENCOUNTER
Gestational Diabetes Follow-up    Subjective/Objective:    Serina DYANA Padmini sent in blood glucose log for review. Last date of communication was: 10/10/24.    Gestational diabetes is being managed with diet and activity    Taking diabetes medications: no    Estimated Date of Delivery: Dec 13, 2024 32w0d      BG/Food Log:       Assessment:  Post meal elevations at breakfast and dinner.  Frequently trying oatmeal, honey OR fruit all of which can cause highs.  These are all much higher than the previous week.  Recommend switching to whole grains, and limiting fruit and honey at breakfast to see if we can get more consistent lower readings.     Ketones: negative.   Fasting blood glucoses: 100% in target.  After breakfast: 43% in target.  Before lunch: -% in target.  After lunch: 100% in target.  Before dinner: -% in target.  After dinner: 57% in target.    Plan/Response:  Food changes recommended, follow up Wednesday.    Alicia Marte MS, RD, LD, CDE      Any diabetes medication dose changes were made via the CDE Protocol and Collaborative Practice Agreement with the patient's OB/GYN provider. A copy of this encounter was shared with the provider.

## 2024-10-25 ENCOUNTER — MYC MEDICAL ADVICE (OUTPATIENT)
Dept: EDUCATION SERVICES | Facility: CLINIC | Age: 39
End: 2024-10-25
Payer: COMMERCIAL

## 2024-10-25 NOTE — CONFIDENTIAL NOTE
Gestational Diabetes Follow-up    Subjective/Objective:    Serina Washington sent in blood glucose log for review. Last date of communication was: 10-18-24.    Gestational diabetes is being managed with diet and activity    Taking diabetes medications: no    Estimated Date of Delivery: Dec 13, 2024    BG/Food Log:       Assessment:    Ketones: Negative-trace.   Fasting blood glucoses: 100% in target.  After breakfast: 83% in target.  Before lunch: x% in target.  After lunch: 57% in target.  Before dinner: x% in target.  After dinner: 86% in target.    After meal BG's have improved since last week. Continue to monitor closely.    Plan/Response:  No changes in the patient's current treatment plan.  Follow-up in 1 week.  MyChart response sent.    Nikki Duran RN, [facility]      Any diabetes medication dose changes were made via the CDE Protocol and Collaborative Practice Agreement with the patient's referring provider and OB/GYN provider. A copy of this encounter was shared with the provider.

## 2024-10-28 ENCOUNTER — PRENATAL OFFICE VISIT (OUTPATIENT)
Dept: OBGYN | Facility: CLINIC | Age: 39
End: 2024-10-28
Payer: COMMERCIAL

## 2024-10-28 VITALS
TEMPERATURE: 97.4 F | HEART RATE: 90 BPM | WEIGHT: 173.9 LBS | DIASTOLIC BLOOD PRESSURE: 72 MMHG | RESPIRATION RATE: 18 BRPM | BODY MASS INDEX: 29.69 KG/M2 | SYSTOLIC BLOOD PRESSURE: 113 MMHG | HEIGHT: 64 IN

## 2024-10-28 DIAGNOSIS — Z34.83 PRENATAL CARE, SUBSEQUENT PREGNANCY IN THIRD TRIMESTER: Primary | ICD-10-CM

## 2024-10-28 PROCEDURE — 99207 PR PRENATAL VISIT: CPT | Performed by: OBSTETRICS & GYNECOLOGY

## 2024-10-28 NOTE — PROGRESS NOTES
"[facility]     Return OB Note     CC: Return OB      Subjective:  Serina is a 38 year old  at 33w3d   Denies vaginal bleeding, loss of fluid, or pelvic cramping. +FM. Having a lot of lower uterus cramping. Better with laying down.   Better with a BM.   No dysuria. No hematuria.   Checking blood sugars; some elevations - number sent to diabetes educator.      Objective:  /72 (BP Location: Left arm, Patient Position: Chair, Cuff Size: Adult Regular)   Pulse 90   Temp 97.4  F (36.3  C) (Tympanic)   Resp 18   Ht 1.626 m (5' 4\")   Wt 78.9 kg (173 lb 14.4 oz)   LMP 2024   BMI 29.85 kg/m       See OB flowsheet       Assessment/Plan:      IUP at 33w3d  -NOB labs WNL  -Prenatal screening: declined genetic screening, declined AFP  -Anatomy US: s/p L2      Co-Morbidities/Complications/Concerns:   -AMA: declined genetic screening, L2 s/p   -Hx C section x2: planning repeat. Knows this is her last child, discussed option of tubal ligation at time of delivery if desired, she will consider. Scheduled 2024.   -placenta previa - resolved.   -GDMA-1; normal fastings, some elevations with meals - diabetes educator following   -other 3rd tri labs nl   -lightheaded spells - no red flag symptoms, recommended fluids, small frequent meal and compression stockings   - declined tdap and flu   - transverse presentation      RTC q2 weeks, labor and bleeding precautions reviewed. OB triage #.      Vicki Tracey MD  OB/GYN  "

## 2024-10-28 NOTE — PROGRESS NOTES
"Initial /72 (BP Location: Left arm, Patient Position: Chair, Cuff Size: Adult Regular)   Pulse 90   Temp 97.4  F (36.3  C) (Tympanic)   Resp 18   Ht 1.626 m (5' 4\")   Wt 78.9 kg (173 lb 14.4 oz)   LMP 03/08/2024   BMI 29.85 kg/m   Estimated body mass index is 29.85 kg/m  as calculated from the following:    Height as of this encounter: 1.626 m (5' 4\").    Weight as of this encounter: 78.9 kg (173 lb 14.4 oz). .  "

## 2024-10-28 NOTE — PATIENT INSTRUCTIONS
Weeks 34 to 36 of Your Pregnancy: Care Instructions  Your belly has grown quite large. It's almost time to give birth! Your baby's lungs are almost ready to breathe air. The skull bones are firm enough to protect your baby's head. But they're soft enough to move down through the birth canal.    You might be wondering what to expect during labor. Because each birth is different, there's no way to know exactly what childbirth will be like for you. Talk to your doctor or midwife about any concerns you have.   You'll probably have a test for group B streptococcus (GBS). GBS is bacteria that can live in the vagina and rectum. GBS can make your baby sick after birth. If you test positive, you'll get antibiotics during labor.     Choose what type of pain relief you would like during labor.  You can choose from a few types, including medicine and non-medicine options. You may want to use several types of pain relief.     Know how medicines can help with pain during labor.  Some medicines lower anxiety and help with some of the pain. Others make your lower body numb so that you will feel less pain.     Tell your doctor about your pain medicine choice.  Do this before you start labor or very early in your labor. You may be able to change your mind during labor.     Learn about the stages of labor.    The first stage includes the early (latent) and active phases of labor. Contractions start in early labor. During active labor, contractions get stronger, last longer, and happen more often. And the cervix opens more rapidly.  The second stage starts when you're ready to push. During this stage, your baby is born.  During the third stage, you'll usually have a few more contractions to push out the placenta.   Follow-up care is a key part of your treatment and safety. Be sure to make and go to all appointments, and call your doctor if you are having problems. It's also a good idea to know your test results and keep a list of the  "medicines you take.  Where can you learn more?  Go to https://www.[employer]ian Corporation.net/patiented  Enter B912 in the search box to learn more about \"Weeks 34 to 36 of Your Pregnancy: Care Instructions.\"  Current as of: July 10, 2023  Content Version: 2024 [employer].   Care instructions adapted under license by your healthcare professional. If you have questions about a medical condition or this instruction, always ask your healthcare professional. Healthwise, Incorporated disclaims any warranty or liability for your use of this information.    Group B Strep During Pregnancy: Care Instructions  Overview     Group B strep infection is caused by a type of bacteria. It's a different kind of bacteria than the kind that causes strep throat.  You may have this kind of bacteria in your body. Sometimes it may cause an infection, but most of the time it doesn't make you sick or cause symptoms. But if you pass the bacteria to your baby during the birth, it can cause serious health problems for your baby.  If you have this bacteria in your body, you will get antibiotics when you are in labor. Antibiotics help prevent problems for a  baby.  After birth, doctors will watch and may test your baby. If your baby tests positive for Group B strep, your baby will get antibiotics.  If you plan to breastfeed your baby, don't worry. It will be safe to breastfeed.  Follow-up care is a key part of your treatment and safety. Be sure to make and go to all appointments, and call your doctor if you are having problems. It's also a good idea to know your test results and keep a list of the medicines you take.  How can you care for yourself at home?  If your doctor has prescribed antibiotics, take them as directed. Do not stop taking them just because you feel better. You need to take the full course of antibiotics.  Tell your doctor if you are allergic to any antibiotic.  If you go into labor, or your water breaks, go to the " "hospital. Your doctor will give you antibiotics to help protect your baby from infection.  Tell the doctors and nurses if you have an allergy to penicillin.  Tell the doctors and nurses at the hospital that you tested positive for group B strep.  When should you call for help?   Call your doctor now or seek immediate medical care if:    You have symptoms of a urinary tract infection. These may include:  Pain or burning when you urinate.  A frequent need to urinate without being able to pass much urine.  Pain in the flank, which is just below the rib cage and above the waist on either side of the back.  Blood in your urine.  A fever.     You think you are in labor or your water has broken.     You have pain in your belly or pelvis.   Watch closely for changes in your health, and be sure to contact your doctor if you have any problems.  Where can you learn more?  Go to https://www.WritePath.net/patiented  Enter M001 in the search box to learn more about \"Group B Strep During Pregnancy: Care Instructions.\"  Current as of: June 12, 2023  Content Version: 14.2 2024 [employer].   Care instructions adapted under license by your healthcare professional. If you have questions about a medical condition or this instruction, always ask your healthcare professional. Healthwise, Incorporated disclaims any warranty or liability for your use of this information.    Circumcision in Infants: What to Expect at Home  Your Child's Recovery  After circumcision, your baby's penis may look red and swollen. It may have petroleum jelly and gauze on it. The gauze will likely come off when your baby urinates. Follow your doctor's directions about whether to put clean gauze back on your baby's penis or to leave the gauze off. If you need to remove gauze from the penis, use warm water to soak the gauze and gently loosen it.  The doctor may have used a Plastibell device to do the circumcision. If so, your baby will have a plastic " ring around the head of the penis. The ring should fall off by itself in 10 to 12 days.  A thin, yellow film may form over the area the day after the procedure. This is part of the normal healing process. It should go away in a few days.  Your baby may seem fussy while the area heals. It may hurt for your baby to urinate. This pain often gets better in 3 or 4 days. But it may last for up to 2 weeks.  Even though your baby's penis will likely start to feel better after 3 or 4 days, it may look worse. The penis often starts to look like it's getting better after about 7 to 10 days.  This care sheet gives you a general idea about how long it will take for your child to recover. But each child recovers at a different pace. Follow the steps below to help your child get better as quickly as possible.  How can you care for your child at home?  Activity    Let your baby rest as much as possible. Sleeping will help with recovery.     You can give your baby a sponge bath the day after surgery. Ask your doctor when it is okay to give your baby a bath.   Medicines    Your doctor will tell you if and when your child can restart any medicines. The doctor will also give you instructions about your child taking any new medicines.     Your doctor may recommend giving your baby acetaminophen (Tylenol) to help with pain after the procedure. Be safe with medicines. Give your child medicines exactly as prescribed. Call your doctor if you think your child is having a problem with a medicine.     Do not give your child two or more pain medicines at the same time unless the doctor told you to. Many pain medicines have acetaminophen, which is Tylenol. Too much acetaminophen (Tylenol) can be harmful.   Circumcision care    Always wash your hands before and after touching the circumcision area.     Gently wash your baby's penis with plain, warm water after each diaper change, and pat it dry. Do not use soap. Don't use hydrogen peroxide or  "alcohol. They can slow healing.     Do not try to remove the film that forms on the penis. The film will go away on its own.     Put plenty of petroleum jelly (such as Vaseline) on the circumcision area during each diaper change. This will prevent your baby's penis from sticking to the diaper while it heals.     Fasten your baby's diapers loosely so that there is less pressure on the penis while it heals.   Follow-up care is a key part of your child's treatment and safety. Be sure to make and go to all appointments, and call your doctor if your child is having problems. It's also a good idea to know your child's test results and keep a list of the medicines your child takes.  When should you call for help?   Call your doctor now or seek immediate medical care if:    Your baby has a fever over 100.4 F.     Your baby is extremely fussy or irritable, has a high-pitched cry, or refuses to eat.     Your baby does not have a wet diaper within 12 hours after the circumcision.     You find a spot of bleeding larger than a 2-inch [place] from the incision.     Your baby has signs of infection. Signs may include severe swelling; redness; a red streak on the shaft of the penis; or a thick, yellow discharge.   Watch closely for changes in your child's health, and be sure to contact your doctor if:    A Plastibell device was used for the circumcision and the ring has not fallen off after 10 to 12 days.   Where can you learn more?  Go to https://www.Procura.net/patiented  Enter S255 in the search box to learn more about \"Circumcision in Infants: What to Expect at Home.\"  Current as of: October 24, 2023  Content Version: 14.2 2024 [employer].   Care instructions adapted under license by your healthcare professional. If you have questions about a medical condition or this instruction, always ask your healthcare professional. Healthwise, Incorporated disclaims any warranty or liability for your use of this " information.

## 2024-11-08 ENCOUNTER — MYC MEDICAL ADVICE (OUTPATIENT)
Dept: EDUCATION SERVICES | Facility: CLINIC | Age: 39
End: 2024-11-08
Payer: COMMERCIAL

## 2024-11-08 NOTE — TELEPHONE ENCOUNTER
Gestational Diabetes Follow-up    Subjective/Objective:    Serina Soodman sent in blood glucose log for review. Last date of communication was: 10/25/24.    Gestational diabetes is being managed with diet and activity    Taking diabetes medications: no    Estimated Date of Delivery: Dec 13, 2024    BG/Food Log:         Assessment:  Blood sugars in target.  Occasional post meal highs.     Ketones: negative-trace.   Fasting blood glucoses: 100% in target.  After breakfast: 88% in target.  Before lunch: -% in target.  After lunch: 71% in target.  Before dinner: -% in target.  After dinner: 73% in target.    Plan/Response:  No changes in the patient's current treatment plan.  Follow-up in 1 week.    Alicia Marte MS, RD, LD, CDE      Any diabetes medication dose changes were made via the CDE Protocol and Collaborative Practice Agreement with the patient's OB/GYN provider. A copy of this encounter was shared with the provider.

## 2024-11-15 ENCOUNTER — PRENATAL OFFICE VISIT (OUTPATIENT)
Dept: OBGYN | Facility: CLINIC | Age: 39
End: 2024-11-15
Payer: COMMERCIAL

## 2024-11-15 VITALS
TEMPERATURE: 97.6 F | BODY MASS INDEX: 30.22 KG/M2 | RESPIRATION RATE: 16 BRPM | HEIGHT: 64 IN | WEIGHT: 177 LBS | SYSTOLIC BLOOD PRESSURE: 107 MMHG | DIASTOLIC BLOOD PRESSURE: 76 MMHG | HEART RATE: 99 BPM

## 2024-11-15 DIAGNOSIS — Z34.83 PRENATAL CARE, SUBSEQUENT PREGNANCY IN THIRD TRIMESTER: Primary | ICD-10-CM

## 2024-11-15 PROCEDURE — 87653 STREP B DNA AMP PROBE: CPT | Performed by: OBSTETRICS & GYNECOLOGY

## 2024-11-15 PROCEDURE — 99207 PR PRENATAL VISIT: CPT | Performed by: OBSTETRICS & GYNECOLOGY

## 2024-11-15 NOTE — PROGRESS NOTES
"[facility]     Return OB Note     CC: Return OB      Subjective:  Serina is a 38 year old  at 36w0d   Denies vaginal bleeding, loss of fluid, or pelvic cramping. +FM.   Checking blood sugars; some elevations - number sent to diabetes educator.      Objective:  /76 (BP Location: Left arm, Patient Position: Chair, Cuff Size: Adult Small)   Pulse 99   Temp 97.6  F (36.4  C) (Tympanic)   Resp 16   Ht 1.626 m (5' 4\")   Wt 80.3 kg (177 lb)   LMP 2024   BMI 30.38 kg/m       See OB flowsheet       Assessment/Plan:      IUP at 36w0d  -NOB labs WNL. GBS collected.   -Prenatal screening: declined genetic screening, declined AFP  -Anatomy US: s/p L2      Co-Morbidities/Complications/Concerns:   -AMA: declined genetic screening, L2 s/p   -Hx C section x2: planning repeat. Knows this is her last child, discussed option of tubal ligation at time of delivery if desired, she will consider. Scheduled 2024.   -placenta previa - resolved.   -GDMA-1; normal fastings, some elevations with meals - diabetes educator following   -other 3rd tri labs nl   -lightheaded spells - no red flag symptoms, recommended fluids, small frequent meal and compression stockings   - declined tdap and flu   - transverse presentation      RTC q1 weeks, labor and bleeding precautions reviewed. OB triage #.      Vicki Tracey MD  OB/GYN  "

## 2024-11-15 NOTE — PROGRESS NOTES
"Initial /76 (BP Location: Left arm, Patient Position: Chair, Cuff Size: Adult Small)   Pulse 99   Temp 97.6  F (36.4  C) (Tympanic)   Resp 16   Ht 1.626 m (5' 4\")   Wt 80.3 kg (177 lb)   LMP 03/08/2024   BMI 30.38 kg/m   Estimated body mass index is 30.38 kg/m  as calculated from the following:    Height as of this encounter: 1.626 m (5' 4\").    Weight as of this encounter: 80.3 kg (177 lb). .  "

## 2024-11-16 LAB — GP B STREP DNA SPEC QL NAA+PROBE: POSITIVE

## 2024-11-21 ENCOUNTER — MYC MEDICAL ADVICE (OUTPATIENT)
Dept: EDUCATION SERVICES | Facility: CLINIC | Age: 39
End: 2024-11-21
Payer: COMMERCIAL

## 2024-11-21 NOTE — TELEPHONE ENCOUNTER
Gestational Diabetes Follow-up    Subjective/Objective:    Serina Soodman sent in blood glucose log for review. Last date of communication was: 11/8/24.    Gestational diabetes is being managed with diet and activity    Taking diabetes medications: no    Estimated Date of Delivery: Dec 13, 2024 36w6d      BG/Food Log:       Assessment:  Review meals that caused post meal highs.     Ketones: none reported.   Fasting blood glucoses: 100% in target.  After breakfast: 67% in target.  Before lunch: -% in target.  After lunch: 50% in target.  Before dinner: -% in target.  After dinner: 70% in target.    Plan/Response:  Follow-up in 1 week.    Alicia Marte MS, RD, LD, CDE      Any diabetes medication dose changes were made via the CDE Protocol and Collaborative Practice Agreement with the patient's OB/GYN provider. A copy of this encounter was shared with the provider.

## 2024-12-02 ENCOUNTER — PRENATAL OFFICE VISIT (OUTPATIENT)
Dept: OBGYN | Facility: CLINIC | Age: 39
End: 2024-12-02
Payer: COMMERCIAL

## 2024-12-02 VITALS
SYSTOLIC BLOOD PRESSURE: 115 MMHG | HEART RATE: 91 BPM | DIASTOLIC BLOOD PRESSURE: 80 MMHG | WEIGHT: 178 LBS | HEIGHT: 64 IN | TEMPERATURE: 98.3 F | BODY MASS INDEX: 30.39 KG/M2 | RESPIRATION RATE: 18 BRPM | OXYGEN SATURATION: 98 %

## 2024-12-02 DIAGNOSIS — Z34.83 PRENATAL CARE, SUBSEQUENT PREGNANCY, THIRD TRIMESTER: Primary | ICD-10-CM

## 2024-12-02 PROCEDURE — 99207 PR PRENATAL VISIT: CPT | Performed by: OBSTETRICS & GYNECOLOGY

## 2024-12-02 NOTE — PROGRESS NOTES
"Initial /80 (BP Location: Left arm, Patient Position: Chair, Cuff Size: Adult Regular)   Pulse 91   Temp 98.3  F (36.8  C) (Tympanic)   Resp 18   Ht 1.626 m (5' 4\")   Wt 80.7 kg (178 lb)   LMP 03/08/2024   SpO2 98%   BMI 30.55 kg/m   Estimated body mass index is 30.55 kg/m  as calculated from the following:    Height as of this encounter: 1.626 m (5' 4\").    Weight as of this encounter: 80.7 kg (178 lb). .  "

## 2024-12-02 NOTE — PROGRESS NOTES
"[facility]     Return OB Note     CC: Return OB      Subjective:  Serina is a 38 year old  at 38w3d   Denies vaginal bleeding, loss of fluid, or pelvic cramping. +FM. Feeling lightheaded, nauseous. Can't eat a full meal, but feels like she getting enough. Has to lay down and elevate feet and slow breathing. Has to lay down for an hour. Feels like she is weak.   No chest pain.   Checking blood sugars; some elevations - number sent to diabetes educator.   Still going back and forth about tubal ligation.      Objective:  /80 (BP Location: Left arm, Patient Position: Chair, Cuff Size: Adult Regular)   Pulse 91   Temp 98.3  F (36.8  C) (Tympanic)   Resp 18   Ht 1.626 m (5' 4\")   Wt 80.7 kg (178 lb)   LMP 2024   SpO2 98%   BMI 30.55 kg/m       See OB flowsheet       Assessment/Plan:      IUP at 38w3d  -NOB labs WNL. GBS POS.   -Prenatal screening: declined genetic screening, declined AFP  -Anatomy US: s/p L2      Co-Morbidities/Complications/Concerns:   -AMA: declined genetic screening, L2 s/p   -Hx C section x2: planning repeat. Knows this is her last child, discussed option of tubal ligation at time of delivery if desired, she will consider. Scheduled 2024.   -placenta previa - resolved.   -GDMA-1; normal fastings, some elevations with meals - diabetes educator following   -other 3rd tri labs nl   -lightheaded spells - no red flag symptoms, recommended fluids, small frequent meal and compression stockings   - declined tdap and flu   - transverse presentation - cephalic now!     RTC q1 weeks, labor and bleeding precautions reviewed. OB triage #.      Vicki Tracey MD  OB/GYN  "

## 2024-12-04 ENCOUNTER — MYC MEDICAL ADVICE (OUTPATIENT)
Dept: EDUCATION SERVICES | Facility: CLINIC | Age: 39
End: 2024-12-04
Payer: COMMERCIAL

## 2024-12-04 RX ORDER — LIDOCAINE 40 MG/G
CREAM TOPICAL
Status: CANCELLED | OUTPATIENT
Start: 2024-12-04

## 2024-12-04 RX ORDER — SODIUM CHLORIDE, SODIUM LACTATE, POTASSIUM CHLORIDE, CALCIUM CHLORIDE 600; 310; 30; 20 MG/100ML; MG/100ML; MG/100ML; MG/100ML
INJECTION, SOLUTION INTRAVENOUS CONTINUOUS
Status: CANCELLED | OUTPATIENT
Start: 2024-12-04

## 2024-12-04 NOTE — TELEPHONE ENCOUNTER
Gestational Diabetes Follow-up    Subjective/Objective:    Serina Washington sent in blood glucose log for review. Last date of communication was: .    Gestational diabetes is being managed with diet and activity    Taking diabetes medications: no    Estimated Date of Delivery: Dec 13, 2024    BG/Food Log:       Assessment: Less documentation data d/t pt not feeling well. Her post-lunch numbers are elevated and would require insulin however will not start insulin given she is having  on Friday.     Ketones: n/a.   Fasting blood glucoses: 100% in target.  After breakfast: -% in target- no data  Before lunch: -% in target.  After lunch: 17% in target.  Before dinner: -% in target.  After dinner: 83% in target.    Plan/Response:  No further follow-up needed    Zoey Hanna RD, GUEVARA, DIAMONDES      Any diabetes medication dose changes were made via the CDE Protocol and Collaborative Practice Agreement with the patient's OB/GYN provider. A copy of this encounter was shared with the provider.

## 2024-12-05 ENCOUNTER — TELEPHONE (OUTPATIENT)
Dept: OBGYN | Facility: CLINIC | Age: 39
End: 2024-12-05
Payer: COMMERCIAL

## 2024-12-06 ENCOUNTER — HOSPITAL ENCOUNTER (INPATIENT)
Facility: CLINIC | Age: 39
LOS: 3 days | Discharge: HOME | End: 2024-12-09
Attending: OBSTETRICS & GYNECOLOGY | Admitting: OBSTETRICS & GYNECOLOGY
Payer: COMMERCIAL

## 2024-12-06 DIAGNOSIS — Z98.891 HISTORY OF CESAREAN SECTION: ICD-10-CM

## 2024-12-06 PROBLEM — Z98.890 POSTOPERATIVE STATE: Status: ACTIVE | Noted: 2024-12-06

## 2024-12-06 LAB
ABO + RH BLD: NORMAL
BLD GP AB SCN SERPL QL: NEGATIVE
ERYTHROCYTE [DISTWIDTH] IN BLOOD BY AUTOMATED COUNT: 12.5 % (ref 10–15)
HCT VFR BLD AUTO: 37.2 % (ref 35–47)
HGB BLD-MCNC: 12.3 G/DL (ref 11.7–15.7)
HOLD SPECIMEN: NORMAL
MCH RBC QN AUTO: 29.5 PG (ref 26.5–33)
MCHC RBC AUTO-ENTMCNC: 33.1 G/DL (ref 31.5–36.5)
MCV RBC AUTO: 89 FL (ref 78–100)
PLATELET # BLD AUTO: 261 10E3/UL (ref 150–450)
RBC # BLD AUTO: 4.17 10E6/UL (ref 3.8–5.2)
SPECIMEN EXP DATE BLD: NORMAL
T PALLIDUM AB SER QL: NONREACTIVE
WBC # BLD AUTO: 9.2 10E3/UL (ref 4–11)

## 2024-12-06 PROCEDURE — 58611 LIGATE OVIDUCT(S) ADD-ON: CPT | Mod: AS | Performed by: NURSE PRACTITIONER

## 2024-12-06 PROCEDURE — 59514 CESAREAN DELIVERY ONLY: CPT | Mod: AS | Performed by: NURSE PRACTITIONER

## 2024-12-06 PROCEDURE — 86780 TREPONEMA PALLIDUM: CPT | Performed by: OBSTETRICS & GYNECOLOGY

## 2024-12-06 PROCEDURE — 360N000076 HC SURGERY LEVEL 3, PER MIN: Performed by: OBSTETRICS & GYNECOLOGY

## 2024-12-06 PROCEDURE — 271N000001 HC OR GENERAL SUPPLY NON-STERILE: Performed by: OBSTETRICS & GYNECOLOGY

## 2024-12-06 PROCEDURE — 85018 HEMOGLOBIN: CPT | Performed by: OBSTETRICS & GYNECOLOGY

## 2024-12-06 PROCEDURE — 85041 AUTOMATED RBC COUNT: CPT | Performed by: OBSTETRICS & GYNECOLOGY

## 2024-12-06 PROCEDURE — 250N000013 HC RX MED GY IP 250 OP 250 PS 637: Performed by: OBSTETRICS & GYNECOLOGY

## 2024-12-06 PROCEDURE — 250N000011 HC RX IP 250 OP 636: Performed by: NURSE ANESTHETIST, CERTIFIED REGISTERED

## 2024-12-06 PROCEDURE — 272N000001 HC OR GENERAL SUPPLY STERILE: Performed by: OBSTETRICS & GYNECOLOGY

## 2024-12-06 PROCEDURE — 250N000011 HC RX IP 250 OP 636: Performed by: OBSTETRICS & GYNECOLOGY

## 2024-12-06 PROCEDURE — 710N000010 HC RECOVERY PHASE 1, LEVEL 2, PER MIN: Performed by: OBSTETRICS & GYNECOLOGY

## 2024-12-06 PROCEDURE — 59510 CESAREAN DELIVERY: CPT | Performed by: OBSTETRICS & GYNECOLOGY

## 2024-12-06 PROCEDURE — 88302 TISSUE EXAM BY PATHOLOGIST: CPT | Mod: 26 | Performed by: PATHOLOGY

## 2024-12-06 PROCEDURE — 370N000017 HC ANESTHESIA TECHNICAL FEE, PER MIN: Performed by: OBSTETRICS & GYNECOLOGY

## 2024-12-06 PROCEDURE — 88302 TISSUE EXAM BY PATHOLOGIST: CPT | Mod: TC | Performed by: OBSTETRICS & GYNECOLOGY

## 2024-12-06 PROCEDURE — 86900 BLOOD TYPING SEROLOGIC ABO: CPT | Performed by: OBSTETRICS & GYNECOLOGY

## 2024-12-06 PROCEDURE — 258N000003 HC RX IP 258 OP 636: Performed by: OBSTETRICS & GYNECOLOGY

## 2024-12-06 PROCEDURE — 0UT70ZZ RESECTION OF BILATERAL FALLOPIAN TUBES, OPEN APPROACH: ICD-10-PCS | Performed by: OBSTETRICS & GYNECOLOGY

## 2024-12-06 PROCEDURE — 120N000001 HC R&B MED SURG/OB

## 2024-12-06 PROCEDURE — 58611 LIGATE OVIDUCT(S) ADD-ON: CPT | Performed by: OBSTETRICS & GYNECOLOGY

## 2024-12-06 RX ORDER — NICOTINE POLACRILEX 4 MG
15-30 LOZENGE BUCCAL
Status: DISCONTINUED | OUTPATIENT
Start: 2024-12-06 | End: 2024-12-09 | Stop reason: HOSPADM

## 2024-12-06 RX ORDER — MISOPROSTOL 200 UG/1
800 TABLET ORAL
Status: DISCONTINUED | OUTPATIENT
Start: 2024-12-06 | End: 2024-12-09 | Stop reason: HOSPADM

## 2024-12-06 RX ORDER — HYDROXYZINE HYDROCHLORIDE 25 MG/1
25 TABLET, FILM COATED ORAL EVERY 6 HOURS PRN
Status: CANCELLED | OUTPATIENT
Start: 2024-12-06

## 2024-12-06 RX ORDER — OXYCODONE HYDROCHLORIDE 5 MG/1
5 TABLET ORAL EVERY 4 HOURS PRN
Status: CANCELLED | OUTPATIENT
Start: 2024-12-06

## 2024-12-06 RX ORDER — LOPERAMIDE HYDROCHLORIDE 2 MG/1
2 CAPSULE ORAL
Status: DISCONTINUED | OUTPATIENT
Start: 2024-12-06 | End: 2024-12-09 | Stop reason: HOSPADM

## 2024-12-06 RX ORDER — OXYCODONE HYDROCHLORIDE 5 MG/1
5 TABLET ORAL EVERY 4 HOURS PRN
Status: DISCONTINUED | OUTPATIENT
Start: 2024-12-06 | End: 2024-12-09 | Stop reason: HOSPADM

## 2024-12-06 RX ORDER — LOPERAMIDE HYDROCHLORIDE 2 MG/1
2 CAPSULE ORAL
Status: DISCONTINUED | OUTPATIENT
Start: 2024-12-06 | End: 2024-12-06 | Stop reason: HOSPADM

## 2024-12-06 RX ORDER — METOCLOPRAMIDE 10 MG/1
10 TABLET ORAL EVERY 6 HOURS PRN
Status: DISCONTINUED | OUTPATIENT
Start: 2024-12-06 | End: 2024-12-09 | Stop reason: HOSPADM

## 2024-12-06 RX ORDER — NALOXONE HYDROCHLORIDE 0.4 MG/ML
0.4 INJECTION, SOLUTION INTRAMUSCULAR; INTRAVENOUS; SUBCUTANEOUS
Status: DISCONTINUED | OUTPATIENT
Start: 2024-12-06 | End: 2024-12-07

## 2024-12-06 RX ORDER — BISACODYL 10 MG
10 SUPPOSITORY, RECTAL RECTAL DAILY PRN
Status: DISCONTINUED | OUTPATIENT
Start: 2024-12-08 | End: 2024-12-09 | Stop reason: HOSPADM

## 2024-12-06 RX ORDER — FENTANYL CITRATE-0.9 % NACL/PF 10 MCG/ML
100 PLASTIC BAG, INJECTION (ML) INTRAVENOUS EVERY 5 MIN PRN
Status: DISCONTINUED | OUTPATIENT
Start: 2024-12-06 | End: 2024-12-06 | Stop reason: HOSPADM

## 2024-12-06 RX ORDER — SODIUM CHLORIDE, SODIUM LACTATE, POTASSIUM CHLORIDE, CALCIUM CHLORIDE 600; 310; 30; 20 MG/100ML; MG/100ML; MG/100ML; MG/100ML
INJECTION, SOLUTION INTRAVENOUS CONTINUOUS
Status: DISCONTINUED | OUTPATIENT
Start: 2024-12-06 | End: 2024-12-06 | Stop reason: HOSPADM

## 2024-12-06 RX ORDER — METOCLOPRAMIDE 10 MG/1
10 TABLET ORAL EVERY 6 HOURS PRN
Status: DISCONTINUED | OUTPATIENT
Start: 2024-12-06 | End: 2024-12-06 | Stop reason: HOSPADM

## 2024-12-06 RX ORDER — METOCLOPRAMIDE HYDROCHLORIDE 5 MG/ML
10 INJECTION INTRAMUSCULAR; INTRAVENOUS EVERY 6 HOURS PRN
Status: DISCONTINUED | OUTPATIENT
Start: 2024-12-06 | End: 2024-12-09 | Stop reason: HOSPADM

## 2024-12-06 RX ORDER — CARBOPROST TROMETHAMINE 250 UG/ML
250 INJECTION, SOLUTION INTRAMUSCULAR
Status: DISCONTINUED | OUTPATIENT
Start: 2024-12-06 | End: 2024-12-06 | Stop reason: HOSPADM

## 2024-12-06 RX ORDER — MODIFIED LANOLIN
OINTMENT (GRAM) TOPICAL
Status: DISCONTINUED | OUTPATIENT
Start: 2024-12-06 | End: 2024-12-09 | Stop reason: HOSPADM

## 2024-12-06 RX ORDER — NALOXONE HYDROCHLORIDE 0.4 MG/ML
0.1 INJECTION, SOLUTION INTRAMUSCULAR; INTRAVENOUS; SUBCUTANEOUS
Status: CANCELLED | OUTPATIENT
Start: 2024-12-06

## 2024-12-06 RX ORDER — OXYTOCIN 10 [USP'U]/ML
10 INJECTION, SOLUTION INTRAMUSCULAR; INTRAVENOUS
Status: DISCONTINUED | OUTPATIENT
Start: 2024-12-06 | End: 2024-12-09 | Stop reason: HOSPADM

## 2024-12-06 RX ORDER — SODIUM PHOSPHATE,MONO-DIBASIC 19G-7G/118
1 ENEMA (ML) RECTAL DAILY PRN
Status: DISCONTINUED | OUTPATIENT
Start: 2024-12-08 | End: 2024-12-09 | Stop reason: HOSPADM

## 2024-12-06 RX ORDER — IBUPROFEN 800 MG/1
800 TABLET, FILM COATED ORAL EVERY 6 HOURS
Status: DISCONTINUED | OUTPATIENT
Start: 2024-12-07 | End: 2024-12-09 | Stop reason: HOSPADM

## 2024-12-06 RX ORDER — OXYTOCIN 10 [USP'U]/ML
10 INJECTION, SOLUTION INTRAMUSCULAR; INTRAVENOUS
Status: DISCONTINUED | OUTPATIENT
Start: 2024-12-06 | End: 2024-12-06 | Stop reason: HOSPADM

## 2024-12-06 RX ORDER — ONDANSETRON 2 MG/ML
4 INJECTION INTRAMUSCULAR; INTRAVENOUS EVERY 30 MIN PRN
Status: CANCELLED | OUTPATIENT
Start: 2024-12-06

## 2024-12-06 RX ORDER — CEFAZOLIN SODIUM/WATER 2 G/20 ML
2 SYRINGE (ML) INTRAVENOUS SEE ADMIN INSTRUCTIONS
Status: DISCONTINUED | OUTPATIENT
Start: 2024-12-06 | End: 2024-12-06 | Stop reason: HOSPADM

## 2024-12-06 RX ORDER — ONDANSETRON 2 MG/ML
4 INJECTION INTRAMUSCULAR; INTRAVENOUS EVERY 6 HOURS PRN
Status: DISCONTINUED | OUTPATIENT
Start: 2024-12-06 | End: 2024-12-06 | Stop reason: HOSPADM

## 2024-12-06 RX ORDER — DEXTROSE, SODIUM CHLORIDE, SODIUM LACTATE, POTASSIUM CHLORIDE, AND CALCIUM CHLORIDE 5; .6; .31; .03; .02 G/100ML; G/100ML; G/100ML; G/100ML; G/100ML
INJECTION, SOLUTION INTRAVENOUS CONTINUOUS
Status: DISCONTINUED | OUTPATIENT
Start: 2024-12-06 | End: 2024-12-09 | Stop reason: HOSPADM

## 2024-12-06 RX ORDER — ONDANSETRON 4 MG/1
4 TABLET, ORALLY DISINTEGRATING ORAL EVERY 30 MIN PRN
Status: CANCELLED | OUTPATIENT
Start: 2024-12-06

## 2024-12-06 RX ORDER — NALBUPHINE HYDROCHLORIDE 10 MG/ML
2.5-5 INJECTION INTRAMUSCULAR; INTRAVENOUS; SUBCUTANEOUS EVERY 6 HOURS PRN
Status: DISCONTINUED | OUTPATIENT
Start: 2024-12-06 | End: 2024-12-07

## 2024-12-06 RX ORDER — PROCHLORPERAZINE MALEATE 5 MG/1
10 TABLET ORAL EVERY 6 HOURS PRN
Status: DISCONTINUED | OUTPATIENT
Start: 2024-12-06 | End: 2024-12-09 | Stop reason: HOSPADM

## 2024-12-06 RX ORDER — ACETAMINOPHEN 325 MG/1
975 TABLET ORAL ONCE
Status: COMPLETED | OUTPATIENT
Start: 2024-12-06 | End: 2024-12-06

## 2024-12-06 RX ORDER — AMOXICILLIN 250 MG
2 CAPSULE ORAL 2 TIMES DAILY
Status: DISCONTINUED | OUTPATIENT
Start: 2024-12-06 | End: 2024-12-09 | Stop reason: HOSPADM

## 2024-12-06 RX ORDER — CEFAZOLIN SODIUM/WATER 2 G/20 ML
2 SYRINGE (ML) INTRAVENOUS
Status: COMPLETED | OUTPATIENT
Start: 2024-12-06 | End: 2024-12-06

## 2024-12-06 RX ORDER — OXYTOCIN 10 [USP'U]/ML
10 INJECTION, SOLUTION INTRAMUSCULAR; INTRAVENOUS
Status: DISCONTINUED | OUTPATIENT
Start: 2024-12-06 | End: 2024-12-07

## 2024-12-06 RX ORDER — NALOXONE HYDROCHLORIDE 0.4 MG/ML
0.2 INJECTION, SOLUTION INTRAMUSCULAR; INTRAVENOUS; SUBCUTANEOUS
Status: DISCONTINUED | OUTPATIENT
Start: 2024-12-06 | End: 2024-12-07

## 2024-12-06 RX ORDER — ONDANSETRON 4 MG/1
4 TABLET, ORALLY DISINTEGRATING ORAL EVERY 6 HOURS PRN
Status: DISCONTINUED | OUTPATIENT
Start: 2024-12-06 | End: 2024-12-09 | Stop reason: HOSPADM

## 2024-12-06 RX ORDER — LOPERAMIDE HYDROCHLORIDE 2 MG/1
4 CAPSULE ORAL
Status: DISCONTINUED | OUTPATIENT
Start: 2024-12-06 | End: 2024-12-09 | Stop reason: HOSPADM

## 2024-12-06 RX ORDER — DEXTROSE MONOHYDRATE 25 G/50ML
25-50 INJECTION, SOLUTION INTRAVENOUS
Status: DISCONTINUED | OUTPATIENT
Start: 2024-12-06 | End: 2024-12-09 | Stop reason: HOSPADM

## 2024-12-06 RX ORDER — LOPERAMIDE HYDROCHLORIDE 2 MG/1
4 CAPSULE ORAL
Status: DISCONTINUED | OUTPATIENT
Start: 2024-12-06 | End: 2024-12-06 | Stop reason: HOSPADM

## 2024-12-06 RX ORDER — CITRIC ACID/SODIUM CITRATE 334-500MG
30 SOLUTION, ORAL ORAL
Status: COMPLETED | OUTPATIENT
Start: 2024-12-06 | End: 2024-12-06

## 2024-12-06 RX ORDER — CARBOPROST TROMETHAMINE 250 UG/ML
250 INJECTION, SOLUTION INTRAMUSCULAR
Status: DISCONTINUED | OUTPATIENT
Start: 2024-12-06 | End: 2024-12-09 | Stop reason: HOSPADM

## 2024-12-06 RX ORDER — LIDOCAINE 40 MG/G
CREAM TOPICAL
Status: DISCONTINUED | OUTPATIENT
Start: 2024-12-06 | End: 2024-12-09 | Stop reason: HOSPADM

## 2024-12-06 RX ORDER — MISOPROSTOL 200 UG/1
800 TABLET ORAL
Status: DISCONTINUED | OUTPATIENT
Start: 2024-12-06 | End: 2024-12-06 | Stop reason: HOSPADM

## 2024-12-06 RX ORDER — OXYCODONE HYDROCHLORIDE 5 MG/1
10 TABLET ORAL EVERY 4 HOURS PRN
Status: CANCELLED | OUTPATIENT
Start: 2024-12-06

## 2024-12-06 RX ORDER — ONDANSETRON 4 MG/1
4 TABLET, ORALLY DISINTEGRATING ORAL EVERY 6 HOURS PRN
Status: DISCONTINUED | OUTPATIENT
Start: 2024-12-06 | End: 2024-12-06 | Stop reason: HOSPADM

## 2024-12-06 RX ORDER — TRANEXAMIC ACID 10 MG/ML
1 INJECTION, SOLUTION INTRAVENOUS EVERY 30 MIN PRN
Status: DISCONTINUED | OUTPATIENT
Start: 2024-12-06 | End: 2024-12-09 | Stop reason: HOSPADM

## 2024-12-06 RX ORDER — HYDROMORPHONE HCL IN WATER/PF 6 MG/30 ML
0.4 PATIENT CONTROLLED ANALGESIA SYRINGE INTRAVENOUS EVERY 5 MIN PRN
Status: CANCELLED | OUTPATIENT
Start: 2024-12-06

## 2024-12-06 RX ORDER — FENTANYL CITRATE 50 UG/ML
50 INJECTION, SOLUTION INTRAMUSCULAR; INTRAVENOUS ONCE
Status: DISCONTINUED | OUTPATIENT
Start: 2024-12-06 | End: 2024-12-06 | Stop reason: HOSPADM

## 2024-12-06 RX ORDER — OXYTOCIN/0.9 % SODIUM CHLORIDE 30/500 ML
100-340 PLASTIC BAG, INJECTION (ML) INTRAVENOUS CONTINUOUS PRN
Status: DISCONTINUED | OUTPATIENT
Start: 2024-12-06 | End: 2024-12-07

## 2024-12-06 RX ORDER — HYDROCORTISONE 25 MG/G
CREAM TOPICAL 3 TIMES DAILY PRN
Status: DISCONTINUED | OUTPATIENT
Start: 2024-12-06 | End: 2024-12-09 | Stop reason: HOSPADM

## 2024-12-06 RX ORDER — OXYTOCIN/0.9 % SODIUM CHLORIDE 30/500 ML
340 PLASTIC BAG, INJECTION (ML) INTRAVENOUS CONTINUOUS PRN
Status: DISCONTINUED | OUTPATIENT
Start: 2024-12-06 | End: 2024-12-09 | Stop reason: HOSPADM

## 2024-12-06 RX ORDER — METHYLERGONOVINE MALEATE 0.2 MG/ML
200 INJECTION INTRAVENOUS
Status: DISCONTINUED | OUTPATIENT
Start: 2024-12-06 | End: 2024-12-06 | Stop reason: HOSPADM

## 2024-12-06 RX ORDER — PROCHLORPERAZINE MALEATE 5 MG/1
10 TABLET ORAL EVERY 6 HOURS PRN
Status: DISCONTINUED | OUTPATIENT
Start: 2024-12-06 | End: 2024-12-06 | Stop reason: HOSPADM

## 2024-12-06 RX ORDER — OXYTOCIN/0.9 % SODIUM CHLORIDE 30/500 ML
340 PLASTIC BAG, INJECTION (ML) INTRAVENOUS CONTINUOUS PRN
Status: DISCONTINUED | OUTPATIENT
Start: 2024-12-06 | End: 2024-12-06 | Stop reason: HOSPADM

## 2024-12-06 RX ORDER — METOCLOPRAMIDE HYDROCHLORIDE 5 MG/ML
10 INJECTION INTRAMUSCULAR; INTRAVENOUS EVERY 6 HOURS PRN
Status: DISCONTINUED | OUTPATIENT
Start: 2024-12-06 | End: 2024-12-06 | Stop reason: HOSPADM

## 2024-12-06 RX ORDER — HYDROMORPHONE HCL IN WATER/PF 6 MG/30 ML
0.2 PATIENT CONTROLLED ANALGESIA SYRINGE INTRAVENOUS EVERY 5 MIN PRN
Status: CANCELLED | OUTPATIENT
Start: 2024-12-06

## 2024-12-06 RX ORDER — SODIUM CHLORIDE, SODIUM LACTATE, POTASSIUM CHLORIDE, CALCIUM CHLORIDE 600; 310; 30; 20 MG/100ML; MG/100ML; MG/100ML; MG/100ML
INJECTION, SOLUTION INTRAVENOUS CONTINUOUS
Status: CANCELLED | OUTPATIENT
Start: 2024-12-06

## 2024-12-06 RX ORDER — ACETAMINOPHEN 325 MG/1
975 TABLET ORAL EVERY 6 HOURS
Status: DISCONTINUED | OUTPATIENT
Start: 2024-12-06 | End: 2024-12-09 | Stop reason: HOSPADM

## 2024-12-06 RX ORDER — METHYLERGONOVINE MALEATE 0.2 MG/ML
200 INJECTION INTRAVENOUS
Status: DISCONTINUED | OUTPATIENT
Start: 2024-12-06 | End: 2024-12-09 | Stop reason: HOSPADM

## 2024-12-06 RX ORDER — AMOXICILLIN 250 MG
1 CAPSULE ORAL 2 TIMES DAILY
Status: DISCONTINUED | OUTPATIENT
Start: 2024-12-06 | End: 2024-12-09 | Stop reason: HOSPADM

## 2024-12-06 RX ORDER — ONDANSETRON 2 MG/ML
4 INJECTION INTRAMUSCULAR; INTRAVENOUS EVERY 6 HOURS PRN
Status: DISCONTINUED | OUTPATIENT
Start: 2024-12-06 | End: 2024-12-09 | Stop reason: HOSPADM

## 2024-12-06 RX ORDER — MISOPROSTOL 200 UG/1
400 TABLET ORAL
Status: DISCONTINUED | OUTPATIENT
Start: 2024-12-06 | End: 2024-12-09 | Stop reason: HOSPADM

## 2024-12-06 RX ORDER — LIDOCAINE 40 MG/G
CREAM TOPICAL
Status: DISCONTINUED | OUTPATIENT
Start: 2024-12-06 | End: 2024-12-06 | Stop reason: HOSPADM

## 2024-12-06 RX ORDER — TRANEXAMIC ACID 10 MG/ML
1 INJECTION, SOLUTION INTRAVENOUS EVERY 30 MIN PRN
Status: DISCONTINUED | OUTPATIENT
Start: 2024-12-06 | End: 2024-12-06 | Stop reason: HOSPADM

## 2024-12-06 RX ORDER — KETOROLAC TROMETHAMINE 30 MG/ML
30 INJECTION, SOLUTION INTRAMUSCULAR; INTRAVENOUS EVERY 6 HOURS
Status: COMPLETED | OUTPATIENT
Start: 2024-12-06 | End: 2024-12-07

## 2024-12-06 RX ORDER — FENTANYL CITRATE-0.9 % NACL/PF 10 MCG/ML
200 PLASTIC BAG, INJECTION (ML) INTRAVENOUS ONCE
Status: COMPLETED | OUTPATIENT
Start: 2024-12-06 | End: 2024-12-06

## 2024-12-06 RX ORDER — SIMETHICONE 80 MG
80 TABLET,CHEWABLE ORAL 4 TIMES DAILY PRN
Status: DISCONTINUED | OUTPATIENT
Start: 2024-12-06 | End: 2024-12-09 | Stop reason: HOSPADM

## 2024-12-06 RX ORDER — MISOPROSTOL 200 UG/1
400 TABLET ORAL
Status: DISCONTINUED | OUTPATIENT
Start: 2024-12-06 | End: 2024-12-06 | Stop reason: HOSPADM

## 2024-12-06 RX ADMIN — KETOROLAC TROMETHAMINE 30 MG: 30 INJECTION INTRAMUSCULAR; INTRAVENOUS at 14:32

## 2024-12-06 RX ADMIN — ACETAMINOPHEN 975 MG: 325 TABLET ORAL at 20:08

## 2024-12-06 RX ADMIN — SENNOSIDES AND DOCUSATE SODIUM 1 TABLET: 8.6; 5 TABLET ORAL at 20:07

## 2024-12-06 RX ADMIN — SODIUM CITRATE AND CITRIC ACID MONOHYDRATE 30 ML: 500; 334 SOLUTION ORAL at 07:36

## 2024-12-06 RX ADMIN — KETOROLAC TROMETHAMINE 30 MG: 30 INJECTION INTRAMUSCULAR; INTRAVENOUS at 20:08

## 2024-12-06 RX ADMIN — SODIUM CHLORIDE, POTASSIUM CHLORIDE, SODIUM LACTATE AND CALCIUM CHLORIDE 500 ML: 600; 310; 30; 20 INJECTION, SOLUTION INTRAVENOUS at 07:38

## 2024-12-06 RX ADMIN — Medication 200 MCG: at 09:35

## 2024-12-06 RX ADMIN — ACETAMINOPHEN 975 MG: 325 TABLET ORAL at 14:32

## 2024-12-06 RX ADMIN — ACETAMINOPHEN 975 MG: 325 TABLET ORAL at 07:36

## 2024-12-06 ASSESSMENT — ACTIVITIES OF DAILY LIVING (ADL)
ADLS_ACUITY_SCORE: 36
ADLS_ACUITY_SCORE: 25
ADLS_ACUITY_SCORE: 35
ADLS_ACUITY_SCORE: 35
ADLS_ACUITY_SCORE: 36
ADLS_ACUITY_SCORE: 35
ADLS_ACUITY_SCORE: 29
ADLS_ACUITY_SCORE: 35
ADLS_ACUITY_SCORE: 33
ADLS_ACUITY_SCORE: 36
ADLS_ACUITY_SCORE: 29
ADLS_ACUITY_SCORE: 33
ADLS_ACUITY_SCORE: 29
ADLS_ACUITY_SCORE: 33
ADLS_ACUITY_SCORE: 33
ADLS_ACUITY_SCORE: 35
ADLS_ACUITY_SCORE: 33

## 2024-12-06 NOTE — OP NOTE
[facility]  Section Operative Note    Patient Name: Serina Washington  MRN:[id number]  : 1985  Date of Surgery: 24   Pre-operative diagnosis:  1. IUP at 39w0d  2. Hx of prior c/s x2, planned repeat  3. Desires sterilization    Post-operative diagnosis:  Same   Procedure:  Repeat  section via pfannenstiel skin incision with double layer uterine closure, bilateral salpingectomy    Surgeon:  Dr. Vicki Tracey (OB/GYN Attending Staff)    Assistant(s):  Tammy Delacrzu NP - Her assistance was required for retraction, instrument and suture handling as well as fundal pressure for the delivery of the baby.    Anesthesia:  Spinal    Quantative blood loss:  353 mL    Total IV fluids:  See anesthesia record   Drains:  Rodriges catheter   Specimens:  Fallopian tubes   Findings:  No abdominal wall adhesions. No intraabdominal adhesions. Clear fluid with amniotomy. Liveborn, vigorous female infant born vertex, MILANA. APGARS 9 and 9. Weight: 9lbs 7oz. Placenta appeared intact with a three vessel cord and no apparent gross pathology. Uterus appears normal and clear of any retained product. Normal bilateral fallopian tubes and ovaries. Hemostatic surgical site at the end of the case.    Complications:  None apparent    Condition:  Stable, transferred to PACU    Indication: Serina Washington is a 39 year old  at 39w0d by LMP c/w 8w2d US who presents for scheduled repeat  section and bilateral salpingectomy. I reviewed the steps of the procedure as well as the risks of bleeding, infection and intraabdominal injury. Written informed consent was signed and she is agreeable to a blood transfusion if indicated. She is certain she never wants to be pregnant again and has private health insurance, so FTP not indicated   Procedure:  After obtaining informed consent, the patient was taken to the operating room. She received 2 grams of ancef prior to the skin incision. She was placed in the  dorsal supine position with a leftward tilt and prepped and draped in the usual sterile fashion. Following test of adequate spinal anesthesia, the abdomen was entered through a transverse skin incision through her previous scar. The skin incision was made sharply and carried through the subcutaneous tissue to the fascia.  Fascia was incised in the midline and extended laterally with the Johnson scissors. The superior margin of the fascial incision was grasped with Kocher clamps and dissected from the underlying muscle sharp and blunt dissecton, which was then repeated at the lower margin of the fascial incision.  The muscle was  in the midline. The peritoneum was entered bluntly and the opening extended by digital dissection with care to avoid the bladder. An angelica-o retractor was placed. The lower segment of the uterus was opened sharply in a transverse fashion and extended with digital pressure. The infant's head was elevated to the level of the hysterotomy and was delivered atraumatically. The cord was doubly clamped and cut and the infant was handed off to the waiting nursing staff after 1 minute of delayed cord clamping. A segment of the cord was cut and set aside for cord gases if needed. The placenta was extracted via cord traction. The uterus was cleared of all clots and debris. Oxytocin was given through the running IV. With vigorous massage as well as administration of oxytocin, good uterine tone was achieved. The hysterotomy was repaired with 0-vicryl suture in a running locked fashion. A 2nd layer of 0-monocryl was used to imbricate the incision and good hemostasis was achieved. The hysterotomy was irrigated, inspected and found to have no active sites of bleeding.     Attention was turned to the fallopian tubes which were individually cauterized and excised along the mesosalpinx to the cornua.     The abdominal wall was examined and found to have no active sites of bleeding. The fascia was closed  with a running suture of 0-vicryl. Subcutaneous tissue was irrigated. Areas that were oozing were controlled with cautery. The subcutaneous tissue was closed with interrupted plain gut. The skin was closed with 4-0 monocryl. The patient tolerated the procedure well and was taken to the recovery room in stable condition. All sponge, needle and instrument counts were correct x2.      Vicki Tracey MD   OB/GYN

## 2024-12-06 NOTE — PLAN OF CARE
"Patient arrived to unit for scheduled  section. VSS. Denies pain. 18g IV placed in left forearm, labs drawn. LR fluid bolus started. Nozin utilized. SCDs placed. FHTs 145, moderate variability, accels present. Needs chlorhexidine wipes, scheduled meds, and consent signed. Questions answered.    Problem: Adult Inpatient Plan of Care  Goal: Plan of Care Review  Description: The Plan of Care Review/Shift note should be completed every shift.  The Outcome Evaluation is a brief statement about your assessment that the patient is improving, declining, or no change.  This information will be displayed automatically on your shift  note.  Outcome: Progressing  Goal: Patient-Specific Goal (Individualized)  Description: You can add care plan individualizations to a care plan. Examples of Individualization might be:  \"Parent requests to be called daily at 9am for status\", \"I have a hard time hearing out of my right ear\", or \"Do not touch me to wake me up as it startles  me\".  Outcome: Progressing  Goal: Absence of Hospital-Acquired Illness or Injury  Outcome: Progressing  Goal: Optimal Comfort and Wellbeing  Outcome: Progressing  Goal: Readiness for Transition of Care  Outcome: Progressing  Intervention: Mutually Develop Transition Plan  Recent Flowsheet Documentation  Taken 2024 by Sedrick Aguilar, RN  Equipment Currently Used at Home: none     Problem: Postpartum ( Delivery)  Goal: Successful Parent Role Transition  Outcome: Progressing  Goal: Hemostasis  Outcome: Progressing  Goal: Effective Bowel Elimination  Outcome: Progressing  Goal: Fluid and Electrolyte Balance  Outcome: Progressing  Goal: Absence of Infection Signs and Symptoms  Outcome: Progressing  Goal: Anesthesia/Sedation Recovery  Outcome: Progressing  Goal: Optimal Pain Control and Function  Outcome: Progressing  Goal: Nausea and Vomiting Relief  Outcome: Progressing  Goal: Effective Urinary Elimination  Outcome: Progressing  Goal: " Effective Oxygenation and Ventilation  Outcome: Progressing   Goal Outcome Evaluation:

## 2024-12-06 NOTE — PROGRESS NOTES
RN assisted patient to dangle at bedside at 1425 and patient reported that she was light headed. RN sat with patient and she reported that it continued to get worse. RN assisted patient back into bed. Vitals were taken and were within normal limits. RN gave patient a a cold wash cloth and put her bed flat. Patient reported relief at that time.     Pancho Harmon RN on 12/6/2024 at 3:23 PM

## 2024-12-06 NOTE — H&P
[facility] Labor and Delivery H&P  2024  Serina Washington  [id number]      HPI: Serina Washington is a 39 year old  at 39w0d by LMP c/w 8w2d US who presents for scheduled repeat  section and bilateral salpingectomy.     She states that she is feeling well today.  + FM, no ctx, VB, or LOF.      Pregnancy notable for:  --AMA; nl L2 and NIPT  --recurrent miscarriage  --resolved posterior placenta previa   --desires sterilization   --GBS+     OBHX:   OB History    Para Term  AB Living   9 2 2 0 6 2   SAB IAB Ectopic Multiple Live Births   5 0 0 0 2      # Outcome Date GA Lbr Mack/2nd Weight Sex Type Anes PTL Lv   9 Current            8 SAB 11/10/23     SAB      7 Term 22 39w1d  4.14 kg (9 lb 2 oz) F CS-LTranv Spinal  CESILIA      Name: Kj Saldivar      Apgar1: 9  Apgar5: 9   6 SAB 2021     SAB      5 SAB 21 7w4d    SAB      4 SAB 19     SAB      3 Term 09/10/17 38w6d  4.06 kg (8 lb 15.2 oz) F CS-LTranv EPI, Spinal N CESILIA      Complications: Fetal Intolerance      Name: Areli Washington      Apgar1: 9  Apgar5: 9   2 AB      SAB      1 SAB                MedicalHX:   No past medical history on file.    SurgicalHX:   Past Surgical History:   Procedure Laterality Date     SECTION N/A 09/10/2017    Procedure:  SECTION;  Primary  Section;  Surgeon: Mu Miranda MD;  Location: [facility] OR     SECTION Bilateral 2022    Procedure:  SECTION;  Surgeon: Quinn Cash MD;  Location: [facility] OR    DILATION AND CURETTAGE SUCTION N/A 2021    Procedure: DILATION AND CURETTAGE, UTERUS, USING SUCTION;  Surgeon: Vicki Tracey MD;  Location: [facility] OR    DILATION AND CURETTAGE SUCTION N/A 11/10/2023    Procedure: DILATION AND CURETTAGE, UTERUS, USING SUCTION;  Surgeon: Galilea Moses MD;  Location: [facility] OR    OPERATIVE HYSTEROSCOPY N/A 2020    Procedure: HYSTEROSCOPY, THERAPEUTIC;  Surgeon:  Quinn Cash MD;  Location: [facility] OR    REVISE SCAR TRUNK Bilateral 11/30/2022    Procedure: REVISION, SCAR, TORSO;  Surgeon: Quinn Cash MD;  Location: [facility] OR    SURGICAL HISTORY OF -  Left     knee surgery 2005 and 2020       Medications:   Current Facility-Administered Medications   Medication Dose Route Frequency Provider Last Rate Last Admin    acetaminophen (TYLENOL) tablet 975 mg  975 mg Oral Once Vicki Tracey MD        carboprost (HEMABATE) injection 250 mcg  250 mcg Intramuscular Q15 Min PRN Vicki Tracey MD        And    loperamide (IMODIUM) capsule 4 mg  4 mg Oral Once PRN Vicki Tracey MD        ceFAZolin Sodium (ANCEF) injection 2 g  2 g Intravenous Pre-Op/Pre-procedure x 1 dose Vicki Tracey MD        ceFAZolin Sodium (ANCEF) injection 2 g  2 g Intravenous See Admin Instructions Vicki Tracey MD        lactated ringers BOLUS 500 mL  500 mL Intravenous Once Vicki Tracey MD        lactated ringers infusion   Intravenous Continuous Vicki Tracey MD        lidocaine (LMX4) kit   Topical Q1H PRN Vicki Tracey MD        lidocaine 1 % 0.1-1 mL  0.1-1 mL Other Q1H PRN Vicki Tracey MD        loperamide (IMODIUM) capsule 2 mg  2 mg Oral Q2H PRN Vicki Tracey MD        methylergonovine (METHERGINE) injection 200 mcg  200 mcg Intramuscular Q2H PRN Vicki Tracey MD        misoprostol (CYTOTEC) tablet 400 mcg  400 mcg Oral ONCE PRN REPEAT PER INSTRUCTIONS Vicki Tracey MD        Or    misoprostol (CYTOTEC) tablet 800 mcg  800 mcg Rectal ONCE PRN REPEAT PER INSTRUCTIONS Vicki Tracey MD        oxytocin (PITOCIN) 30 units in 500 mL 0.9% NaCl infusion  100-340 mL/hr Intravenous Continuous PRN Vicki Tracey MD        oxytocin (PITOCIN) 30 units in 500 mL 0.9% NaCl infusion  340 mL/hr Intravenous Continuous PRN Alexy  Vicki Villanueva MD        oxytocin (PITOCIN) injection 10 Units  10 Units Intramuscular Once PRN Vicki Tracey MD        oxytocin (PITOCIN) injection 10 Units  10 Units Intramuscular Once PRN Vicki Tracey MD        sodium chloride (PF) 0.9% PF flush 3 mL  3 mL Intracatheter Q8H Vicki Tracey MD        sodium chloride (PF) 0.9% PF flush 3 mL  3 mL Intracatheter q1 min prn Vicki Tracey MD        sodium citrate-citric acid (BICITRA) solution 30 mL  30 mL Oral Pre-Op/Pre-procedure x 1 dose Vicki Tracey MD        tranexamic acid 1 g in 100 mL NS IV bag (premix)  1 g Intravenous Q30 Min PRN Vicki Tracey MD           Allergies:  No Known Allergies    FamilyHX:    Family History   Problem Relation Age of Onset    Hypertension Mother     Thyroid Disease Mother     Diabetes Mother     Kidney Disease Father         dialysis    Hypertension Father     Gastrointestinal Disease Father         stomach ulcer    Heart Disease Father     Skin Cancer Maternal Grandmother     Asthma Maternal Grandmother     Cancer Paternal Grandmother     Cancer Paternal Grandfather        SocialHX:   Social History     Socioeconomic History    Marital status:      Spouse name: Rufino    Number of children: 2   Tobacco Use    Smoking status: Never    Smokeless tobacco: Never   Vaping Use    Vaping status: Never Used   Substance and Sexual Activity    Alcohol use: Not Currently    Drug use: No    Sexual activity: Yes     Partners: Male   Other Topics Concern    Parent/sibling w/ CABG, MI or angioplasty before 65F 55M? No     Social Drivers of Health     Financial Resource Strain: Low Risk  (12/6/2024)    Financial Resource Strain     Within the past 12 months, have you or your family members you live with been unable to get utilities (heat, electricity) when it was really needed?: No   Food Insecurity: Low Risk  (12/6/2024)    Food Insecurity     Within the past 12  months, did you worry that your food would run out before you got money to buy more?: No     Within the past 12 months, did the food you bought just not last and you didn t have money to get more?: No   Transportation Needs: Low Risk  (12/6/2024)    Transportation Needs     Within the past 12 months, has lack of transportation kept you from medical appointments, getting your medicines, non-medical meetings or appointments, work, or from getting things that you need?: No   Interpersonal Safety: Low Risk  (12/6/2024)    Interpersonal Safety     Do you feel physically and emotionally safe where you currently live?: Yes     Within the past 12 months, have you been hit, slapped, kicked or otherwise physically hurt by someone?: No     Within the past 12 months, have you been humiliated or emotionally abused in other ways by your partner or ex-partner?: No   Housing Stability: Low Risk  (12/6/2024)    Housing Stability     Do you have housing? : Yes     Are you worried about losing your housing?: No       ROS: 10-point ROS negative except as in HPI     Physical Exam:  Vitals:    12/06/24 0618   BP: 103/67   BP Location: Left arm   Patient Position: Fowlers   Cuff Size: Adult Regular   Pulse: 89   Resp: 18   Temp: 97.8  F (36.6  C)   TempSrc: Oral     GEN: resting comfortably in bed, NAD   CV: RRR, no murmurs  PULM: CTAB, no increased work of breathing, no cough/wheeze   ABD: soft, gravid, non-tender, non-distended  EXT: no edema, non-tender to palpation  CVX: deferred  Presentation: cephalic by leopold's  EFW: 8.5 lbs  Membranes: intact    NST:  FHT: baseline nl, mod variability, + accels, no decels  TOCO: none    Labs:      Lab Results   Component Value Date    ABO O 09/09/2017    RH Pos 09/09/2017    AS Negative 05/02/2024    HEPBANG Nonreactive 05/02/2024    CHPCRT  01/27/2017     Negative   Negative for C. trachomatis rRNA by transcription mediated amplification.   A negative result by transcription mediated  amplification does not preclude the   presence of C. trachomatis infection because results are dependent on proper   and adequate collection, absence of inhibitors, and sufficient rRNA to be   detected.      GCPCRT  2017     Negative   Negative for N. gonorrhoeae rRNA by transcription mediated amplification.   A negative result by transcription mediated amplification does not preclude the   presence of N. gonorrhoeae infection because results are dependent on proper   and adequate collection, absence of inhibitors, and sufficient rRNA to be   detected.      TREPAB Negative 2017    HGB 12.3 2024       GBS Status:   Lab Results   Component Value Date    GBS Positive (A) 2017       Lab Results   Component Value Date    PAP NIL 2017       A/P: Serina Washington is a 39 year old  at 39w0d by LMP c/w 8w2d US who presents for scheduled repeat  section and bilateral salpingectomy. I reviewed the steps of the procedure as well as the risks of bleeding, infection and intraabdominal injury. Written informed consent was signed and she is agreeable to a blood transfusion if indicated. She is certain she never wants to be pregnant again and has private health insurance.   Admit to L&D. Place PIV. Draw labs: T&S, CBC, RPR.   FWB: Category 1 FHT NST  PNC: Rh POS, Rubella immune, GBS positive     Vicki Tracey MD  OB/GYN

## 2024-12-06 NOTE — PLAN OF CARE
Goal Outcome Evaluation:      Plan of Care Reviewed With: patient    Overall Patient Progress: improvingOverall Patient Progress: improving       Data: Vital signs within normal limits. Postpartum checks within normal limits - see flow record. Patient  Is tolerating po intake. Patient had alegria catheter removed at 1445, but has not been able to void on her own yet. Per protocol patient is due to empty her bladder on her own by 1845. Patient has not yet ambulated. Patient dangled at bedside at 1445 and reported nausea and light headed at that time.  No apparent signs of infection. Incision healing well. Patient is performing self cares and Is able to care for infant. Positive attachment behaviors are observed with infant. Support persons are present.  Action:  Pain plan was discussed. Pain meds, post  are scheduled and will be brought in when they are due. Additional narcotics will be administered prn. Patient was medicated during the shift for pain. See MAR.Patient education done about hand hygiene, breastfeeding,  cares, postpartum cares, pain management/plan, fall risk,  safety, rest, and discharge from hospital. See flow record.  Response:   Patient reassessed within 1 hour after each medication for pain. Patient stated that pain had improved. Patient stated that she was comfortable. .   Plan: Anticipate discharge on 24.    Pancho Harmon RN on 2024 at 5:30 PM

## 2024-12-06 NOTE — PLAN OF CARE
Patient to OR for scheduled  at 0745. Bicitra, tylenol and IV antibiotics given. Abdominal scrub done. SCD's on BLE.

## 2024-12-06 NOTE — L&D DELIVERY NOTE
"Delivery Summary    Serina Washington MRN# [id number]   Age: 39 year old YOB: 1985     ASSESSMENT & PLAN: Ms. Washington is a 38 yo  who [facility] 39w0d by LMP c/w 8wk US for scheduled repeat  section. I reviewed the steps of the procedure as well as the risks of bleeding, infection and intraabdominal injury. Written informed consent was signed and she is agreeable to a blood transfusion if indicated. She is certain she never wants to be pregnant again and has private health insurance, so FTP not indicated.   Uncomplicated repeat  section and bilateral salpingectomy.   QBL 353cc.   APGARs 9 and 9. Female infant. 9lbs 7oz.        Froilan Washington-Serina [[id number]]      Labor Length      3rd Stage (hrs): 0 (min): 2        Augmentation: None       Delivery/Placenta Date and Time      Delivery Date: 24 Delivery Time:  8:28 AM   Placenta Date/Time: 2024  8:30 AM  Oxytocin given at the time of delivery: after delivery of baby  Delivering clinician: Vicki Tracey MD   Other personnel present at delivery:  Provider Role   Ava Dodge, RN Delivery Nurse   Pancho Harmon RN Delivery Nurse   Tammy Marshall, CNP Nurse Practitioner             Apgars    Living status: Living   1 Minute 5 Minute 10 Minute 15 Minute 20 Minute   Skin color: 1  1       Heart rate: 2  2       Reflex irritability: 2  2       Muscle tone: 2  2       Respiratory effort: 2  2       Total: 9  9       Apgars assigned by: AVA DODGE RN       Cord      Vessels: 3 Vessels    Cord Complications: None               Cord Blood Disposition: Lab    Gases Sent?: No    Delayed cord clamping?: Yes    Cord Clamping Delay (seconds): 31-60 seconds    Stem cell collection?: No            Resuscitation    Methods: None  Output in Delivery Room: Stool       [place] Measurements      Weight: 9 lb 7 oz Length: 1' 8.5\"     Head circumference: 35.6 cm    Output in delivery room: " Stool       Skin to Skin and Feeding Plan      Skin to skin initiation date/time: 1841    Skin to skin with: Mother  Skin to skin end date/time:            Labor Events and Shoulder Dystocia    Fetal Tracing Prior to Delivery: Category 1  Shoulder dystocia present?: Neg       Delivery (Maternal) (Provider to Complete) ([id number])    Episiotomy: None  Perineal lacerations: None    Genital tract inspection done: Pos       Blood Loss  Mother: Serina Washington #[id number]     Start of Mother's Information      Delivery Blood Loss   Intrapartum & Postpartum: 24 0823 - 24 1013    Delivery Admission: 24 0606 - 24 1013         Intrapartum & Postpartum Delivery Admission    Total Surgical QBL Blood Loss (mL) Hospital Encounter 353 mL 353 mL    Total  353 mL 353 mL               End of Mother's Information  Mother: Serina Washington #[id number]                Delivery - Provider to Complete ([id number])    Delivering clinician: Vicki Tracey MD  Delivery Type (Choose the 1 that will go to the Birth History): , Low Transverse                          Priority: Scheduled      Specifics: Repeat     Indications for : Planned repeat     Other personnel:  Provider Role   Ava Dodge, RN Delivery Nurse   Pancho Harmon, RN Delivery Nurse   Tammy Marshall, CNP Nurse Practitioner                    Placenta    Date/Time: 2024  8:30 AM  Removal: Spontaneous  Disposition: Hospital disposal             Anesthesia    Method: Spinal                    Presentation and Position    Presentation: Vertex    Position: Left Occiput Anterior                     Vicki Tracey MD

## 2024-12-06 NOTE — PROGRESS NOTES
S:Delivery  B:No Labor,39w0d    Lab Results   Component Value Date    GBS Positive (A) 2017    with antibiotic treatment not indicated 4 hours prior to delivery.  A: Patient delivered Repeat C/S at 0828 with Dr. MAUREEN Tracey in attendance and baby placed on mother's abdomen for delayed cord clamping. Baby received from surgeon. Baby to warmer for assessment/resusitative efforts. and warmer for drying and wrapped in blanket.. Placed skin to skin @ 0840. Apgars 9/9. Delivery .  IV infusion of Oxytocin  infused. Placenta removal manual. MD does not want placenta sent to pathology.  See Flowsheet for VS and PP checks.  .  Labor care plan goals met, transition now to postpartum care. Postpartum QBLpending  R: Expect routine postpartum care. Anticipate first feeding within the hour or whenever infant displays feeding cues. Continue skin to skin. Prior discussion with mother indicates that feeding plan is Breast feeding . Educated mother on importance of exclusive breastfeeding, expected feeding readiness cues and encouraged her to observe for these cues while rooming in. Informed her that breastfeeding assistance would be provided.

## 2024-12-07 LAB
GLUCOSE BLDC GLUCOMTR-MCNC: 96 MG/DL (ref 70–99)
HGB BLD-MCNC: 9.9 G/DL (ref 11.7–15.7)

## 2024-12-07 PROCEDURE — 250N000011 HC RX IP 250 OP 636: Performed by: OBSTETRICS & GYNECOLOGY

## 2024-12-07 PROCEDURE — 36415 COLL VENOUS BLD VENIPUNCTURE: CPT | Performed by: OBSTETRICS & GYNECOLOGY

## 2024-12-07 PROCEDURE — 250N000013 HC RX MED GY IP 250 OP 250 PS 637: Performed by: OBSTETRICS & GYNECOLOGY

## 2024-12-07 PROCEDURE — 120N000001 HC R&B MED SURG/OB

## 2024-12-07 PROCEDURE — 85018 HEMOGLOBIN: CPT | Performed by: OBSTETRICS & GYNECOLOGY

## 2024-12-07 RX ADMIN — IBUPROFEN 800 MG: 800 TABLET, FILM COATED ORAL at 14:09

## 2024-12-07 RX ADMIN — SIMETHICONE 80 MG: 80 TABLET, CHEWABLE ORAL at 05:41

## 2024-12-07 RX ADMIN — IBUPROFEN 800 MG: 800 TABLET, FILM COATED ORAL at 08:26

## 2024-12-07 RX ADMIN — SENNOSIDES AND DOCUSATE SODIUM 2 TABLET: 8.6; 5 TABLET ORAL at 19:30

## 2024-12-07 RX ADMIN — OXYCODONE 5 MG: 5 TABLET ORAL at 23:32

## 2024-12-07 RX ADMIN — ACETAMINOPHEN 975 MG: 325 TABLET ORAL at 02:23

## 2024-12-07 RX ADMIN — SIMETHICONE 80 MG: 80 TABLET, CHEWABLE ORAL at 14:09

## 2024-12-07 RX ADMIN — KETOROLAC TROMETHAMINE 30 MG: 30 INJECTION INTRAMUSCULAR; INTRAVENOUS at 02:25

## 2024-12-07 RX ADMIN — ACETAMINOPHEN 975 MG: 325 TABLET ORAL at 21:34

## 2024-12-07 RX ADMIN — ACETAMINOPHEN 975 MG: 325 TABLET ORAL at 08:26

## 2024-12-07 RX ADMIN — ACETAMINOPHEN 975 MG: 325 TABLET ORAL at 14:09

## 2024-12-07 RX ADMIN — SENNOSIDES AND DOCUSATE SODIUM 1 TABLET: 8.6; 5 TABLET ORAL at 08:26

## 2024-12-07 RX ADMIN — IBUPROFEN 800 MG: 800 TABLET, FILM COATED ORAL at 19:30

## 2024-12-07 ASSESSMENT — ACTIVITIES OF DAILY LIVING (ADL)
ADLS_ACUITY_SCORE: 34
ADLS_ACUITY_SCORE: 35
ADLS_ACUITY_SCORE: 34
ADLS_ACUITY_SCORE: 35
ADLS_ACUITY_SCORE: 34

## 2024-12-07 NOTE — MEDICATION SCRIBE - ADMISSION MEDICATION HISTORY
Medication Scribe Admission Medication History    Admission medication history is complete. The information provided in this note is only as accurate as the sources available at the time of the update.    Information Source(s): Patient and CareEverywhere/SureScripts via with patient by room phone.    Pertinent Information: None.    Changes made to PTA medication list:  Added: None  Deleted: None  Changed: Prenatal from 2 tabs to 1 tab.    Allergies reviewed with patient and updates made in EHR: yes, no allergies.    Medication History Completed By: Araceli Cameron 2024 4:15 PM    PTA Med List   Medication Sig Last Dose/Taking    acetone urine (KETOSTIX) test strip Use once daily in the morning to check urine for ketones. Past Month    blood glucose (ACCU-CHEK GUIDE) test strip Use to test blood sugar 4 times daily or as directed. Past Week    blood glucose monitoring (SOFTCLIX) lancets Use to test blood sugar 4 times daily or as directed. Past Week    Prenatal Multivit-Min-Fe-FA (PRENATAL VITAMINS PO) Take 1 tablet by mouth every evening. [place]  Past Week Evening

## 2024-12-07 NOTE — PROGRESS NOTES
[facility] Daily Postpartum Note    S: Serina is feeling well this morning. She denies any complaints other than lack of sleep. Her pain is well-controlled on oral pain medications. She tolerating a regular diet without nausea or vomiting. Ambulating without difficulty. Passing flatus. Lochia is decreasing. Breastfeeding without questions or concerns.     O:   VS: Patient Vitals for the past 24 hrs:   BP Temp Temp src Pulse Resp SpO2   24 0730 97/62 98.5  F (36.9  C) Oral 70 16 --   24 0500 100/64 98.1  F (36.7  C) Oral 81 16 97 %   24 0032 93/60 97.7  F (36.5  C) Oral 75 16 97 %   24 92/59 98.1  F (36.7  C) Oral 75 16 96 %   24 1615 102/62 -- -- 79 16 99 %   24 1516 100/62 -- -- 79 -- 99 %   24 1500 112/66 97.6  F (36.4  C) Oral 70 16 98 %   24 1406 100/53 -- -- 77 16 98 %   24 1300 98/54 -- -- 95 -- 97 %   24 1246 107/58 -- -- 95 -- 96 %   24 1201 107/61 -- -- 86 16 99 %   24 1115 103/64 -- -- 87 16 100 %   24 1100 99/63 -- -- 80 16 98 %   24 1045 97/56 -- -- 75 -- 97 %   24 1030 93/57 -- -- 80 -- 96 %   24 1015 100/65 97.6  F (36.4  C) Oral 74 -- 97 %   24 1000 93/60 -- -- 69 -- 96 %       I/O last 3 completed shifts:  In: 3340 [P.O.:1340; I.V.:2000]  Out: 2768 [Urine:2400; Blood:368]    General: resting in bed, in NAD  CV: reg rate, well perfused  Resp: no increased work of breathing  Abdomen: soft, appropriately tender, nondistended  Fundus firm below the umbilicus  Extremities: non-tender, non-edematous     Recent Labs   Lab 24  0542 24  0643   HGB 9.9* 12.3       A: 39 year old now , PoD #1 s/p repeat  section. Pregnancy c/b history of  section, GDMA1, AMA, GBS+. Uncomplicated  delivery. Asymptomatic anemia    P:  Continue routine pp cares  Anemia: continue to monitor for symptoms.   GI: tolerating regular diet  Feeding:  breastt  Contraception: BTL completed  Rh positive, Rubella Immune  Disposition: routine PP cares, anticipate d/c sharla Escoto,   OB/GYN   12/7/2024 9:57 AM

## 2024-12-07 NOTE — PLAN OF CARE
Data: Vital signs within normal limits. Postpartum checks within normal limits - see flow record. Patient  Is tolerating po intake. Patient is able to empty bladder independently. . Patient ambulating independently..   No apparent signs of infection. Incision healing well. Patient Is performing self cares and Is able to care for infant. Positive attachment behaviors are observed with infant. Support persons are present.  Action:  Pain plan was discussed. Pain meds, post  are scheduled and will be brought in when they are due. Additional narcotics will be administered prn. Patient was medicated during the shift for pain and cramping. See MAR.Patient education done about breastfeeding, formula feeding,  cares, postpartum cares, and pain management/plan. See flow record.  Response:   Patient reassessed within 1 hour after each medication for pain. Patient stated that pain had improved. Patient stated that she was comfortable. .   Plan: Anticipate discharge on .

## 2024-12-08 PROCEDURE — 120N000001 HC R&B MED SURG/OB

## 2024-12-08 PROCEDURE — 250N000013 HC RX MED GY IP 250 OP 250 PS 637: Performed by: OBSTETRICS & GYNECOLOGY

## 2024-12-08 RX ORDER — LIDOCAINE HYDROCHLORIDE 10 MG/ML
INJECTION, SOLUTION EPIDURAL; INFILTRATION; INTRACAUDAL; PERINEURAL
Status: DISCONTINUED
Start: 2024-12-08 | End: 2024-12-08 | Stop reason: WASHOUT

## 2024-12-08 RX ADMIN — IBUPROFEN 800 MG: 800 TABLET, FILM COATED ORAL at 14:54

## 2024-12-08 RX ADMIN — SENNOSIDES AND DOCUSATE SODIUM 1 TABLET: 8.6; 5 TABLET ORAL at 09:00

## 2024-12-08 RX ADMIN — IBUPROFEN 800 MG: 800 TABLET, FILM COATED ORAL at 09:00

## 2024-12-08 RX ADMIN — ACETAMINOPHEN 975 MG: 325 TABLET ORAL at 21:35

## 2024-12-08 RX ADMIN — SENNOSIDES AND DOCUSATE SODIUM 2 TABLET: 8.6; 5 TABLET ORAL at 20:33

## 2024-12-08 RX ADMIN — BISACODYL 10 MG: 10 SUPPOSITORY RECTAL at 12:10

## 2024-12-08 RX ADMIN — ACETAMINOPHEN 975 MG: 325 TABLET ORAL at 14:54

## 2024-12-08 RX ADMIN — ACETAMINOPHEN 975 MG: 325 TABLET ORAL at 09:00

## 2024-12-08 RX ADMIN — IBUPROFEN 800 MG: 800 TABLET, FILM COATED ORAL at 20:33

## 2024-12-08 RX ADMIN — ACETAMINOPHEN 975 MG: 325 TABLET ORAL at 02:26

## 2024-12-08 RX ADMIN — IBUPROFEN 800 MG: 800 TABLET, FILM COATED ORAL at 02:26

## 2024-12-08 ASSESSMENT — ACTIVITIES OF DAILY LIVING (ADL)
ADLS_ACUITY_SCORE: 34

## 2024-12-08 NOTE — PROGRESS NOTES
[facility] Daily Postpartum Note    S: Serina is feeling well this morning. Her pain is well-controlled on oral pain medications when taken. States pain is much improved with oxycodone. She tolerating a regular diet without nausea or vomiting. Ambulating without difficulty. Passing flatus although she feels constipated. Lochia is decreasing. Breastfeeding without questions or concerns.     O:   VS: Patient Vitals for the past 24 hrs:   BP Temp Temp src Pulse Resp SpO2   24 0716 102/60 98.7  F (37.1  C) Oral 71 16 --   24 0249 103/66 97.8  F (36.6  C) Oral 59 16 97 %   24 1922 108/65 97.8  F (36.6  C) Oral 73 16 99 %   24 1515 100/59 98.5  F (36.9  C) Oral 70 16 --       General: resting in bed, in NAD  CV: reg rate, well perfused  Resp: no increased work of breathing  Abdomen: soft, appropriately tender, mildly tympanic  Fundus firm below the umbilicus  Extremities: non-tender, non-edematous     Recent Labs   Lab 24  0542 24  0643   HGB 9.9* 12.3       A: 39 year old now , PoD #2 s/p repeat  section. Pregnancy c/b history of  section, GDMA1, AMA, GBS+. Uncomplicated  delivery. Asymptomatic anemia    P:  Continue routine pp cares  Anemia: continue to monitor for symptoms.   GI: tolerating regular diet. Plan suppository to assist with BM  Feeding: breastt  Contraception: BTL completed  Rh positive, Rubella Immune  Disposition: routine PP cares, anticipate d/c today if infant can be discharged; otherwise discharge to boarding status or remain inpatient    Craig Escoto DO  OB/GYN   2024 9:57 AM

## 2024-12-08 NOTE — PROGRESS NOTES
Pt vss, afebrile.  Small amt vaginal bleeding.  Abd inc CDI. Breastfeeding,&  pumping.  Taking Ibup & tylenol for discomfort.  Indep with self & infant cares. Pt stable.

## 2024-12-08 NOTE — PLAN OF CARE
Data: Vital signs within normal limits. Postpartum checks within normal limits - see flow record. Patient  Is tolerating po intake. Patient is able to empty bladder independently. . Patient ambulating independently..   No apparent signs of infection. Incision healing well. Patient Is performing self cares and Is able to care for infant. Positive attachment behaviors are observed with infant. Support persons are present.  Action:  Pain plan was discussed. Pain meds, post  are scheduled and will be brought in when they are due. Additional narcotics will be administered prn. Patient was medicated during the shift for pain and cramping. See MAR.Patient education done about breastfeeding, postpartum cares, and pain management/plan. See flow record.  Response:   Patient reassessed within 1 hour after each medication for pain. Patient stated that pain had improved. Patient stated that she was comfortable. .   Plan: Anticipate discharge on .

## 2024-12-08 NOTE — PROGRESS NOTES
Pt vss, afebrile.  Small amt vaginal bleeding.  Abd inc CDI.  Taking Ibup & Tylenol for discomfort.  Indep with self & infant cares.  Pt stable.

## 2024-12-09 VITALS
BODY MASS INDEX: 29.47 KG/M2 | HEART RATE: 70 BPM | WEIGHT: 171.7 LBS | TEMPERATURE: 98.1 F | OXYGEN SATURATION: 97 % | SYSTOLIC BLOOD PRESSURE: 111 MMHG | DIASTOLIC BLOOD PRESSURE: 62 MMHG | RESPIRATION RATE: 20 BRPM

## 2024-12-09 PROCEDURE — 250N000013 HC RX MED GY IP 250 OP 250 PS 637: Performed by: OBSTETRICS & GYNECOLOGY

## 2024-12-09 RX ORDER — OXYCODONE HYDROCHLORIDE 5 MG/1
5 TABLET ORAL EVERY 6 HOURS PRN
Qty: 12 TABLET | Refills: 0 | Status: SHIPPED | OUTPATIENT
Start: 2024-12-09 | End: 2024-12-12

## 2024-12-09 RX ORDER — IBUPROFEN 600 MG/1
600 TABLET, FILM COATED ORAL EVERY 6 HOURS PRN
Qty: 30 TABLET | Refills: 0 | Status: SHIPPED | OUTPATIENT
Start: 2024-12-09

## 2024-12-09 RX ORDER — ACETAMINOPHEN 325 MG/1
325-650 TABLET ORAL EVERY 6 HOURS PRN
Qty: 30 TABLET | Refills: 0 | Status: SHIPPED | OUTPATIENT
Start: 2024-12-09

## 2024-12-09 RX ORDER — FERROUS SULFATE 325(65) MG
325 TABLET, DELAYED RELEASE (ENTERIC COATED) ORAL DAILY
Qty: 60 TABLET | Refills: 2 | Status: SHIPPED | OUTPATIENT
Start: 2024-12-09

## 2024-12-09 RX ORDER — SENNA AND DOCUSATE SODIUM 50; 8.6 MG/1; MG/1
1-2 TABLET, FILM COATED ORAL 2 TIMES DAILY PRN
Qty: 30 TABLET | Refills: 0 | Status: SHIPPED | OUTPATIENT
Start: 2024-12-09

## 2024-12-09 RX ADMIN — IBUPROFEN 800 MG: 800 TABLET, FILM COATED ORAL at 04:17

## 2024-12-09 RX ADMIN — ACETAMINOPHEN 975 MG: 325 TABLET ORAL at 11:05

## 2024-12-09 RX ADMIN — ACETAMINOPHEN 975 MG: 325 TABLET ORAL at 04:17

## 2024-12-09 RX ADMIN — SENNOSIDES AND DOCUSATE SODIUM 1 TABLET: 8.6; 5 TABLET ORAL at 09:15

## 2024-12-09 RX ADMIN — IBUPROFEN 800 MG: 800 TABLET, FILM COATED ORAL at 11:05

## 2024-12-09 ASSESSMENT — ACTIVITIES OF DAILY LIVING (ADL)
ADLS_ACUITY_SCORE: 34

## 2024-12-09 NOTE — PLAN OF CARE
Patient discharged per ambulatory with infant in car seat. Mother verified that her band matches her infant's band by comparing the infant's  MR#.  Discharge instructions given. Encouraged to call for any problems, questions or concerns. RXs to be picked up at d/c.      Plan of Care Reviewed With: patient, spouse

## 2024-12-09 NOTE — DISCHARGE INSTRUCTIONS
Warning Signs after Having a Baby    Keep this paper on your fridge or somewhere else where you can see it.    Call your provider if you have any of these symptoms up to 12 weeks after having your baby.    Thoughts of hurting yourself or your baby  Pain in your chest or trouble breathing  Severe headache not helped by pain medicine  Eyesight concerns (blurry vision, seeing spots or flashes of light, other changes to eyesight)  Fainting, shaking or other signs of a seizure    Call 9-1-1 if you feel that it is an emergency.     The symptoms below can happen to anyone after giving birth. They can be very serious. Call your provider if you have any of these warning signs.    My provider s phone number: _______________________    Losing too much blood (hemorrhage)    Call your provider if you soak through a pad in less than an hour or pass blood clots bigger than a golf ball. These may be signs that you are bleeding too much.    Blood clots in the legs or lungs    After you give birth, your body naturally clots its blood to help prevent blood loss. Sometimes this increased clotting can happen in other areas of the body, like the legs or lungs. This can block your blood flow and be very dangerous.     Call your provider if you:  Have a red, swollen spot on the back of your leg that is warm or painful when you touch it.   Are coughing up blood.     Infection    Call your provider if you have any of these symptoms:  Fever of 100.4 F (38 C) or higher.  Pain or redness around your stitches if you had an incision.   Any yellow, white, or green fluid coming from places where you had stitches or surgery.    Mood Problems (postpartum depression)    Many people feel sad or have mood changes after having a baby. But for some people, these mood swings are worse.     Call your provider right away if you feel so anxious or nervous that you can't care for yourself or your baby.    Preeclampsia (high blood pressure)    Even if you  didn't have high blood pressure when you were pregnant, you are at risk for the high blood pressure disease called preeclampsia. This risk can last up to 12 weeks after giving birth.     Call your provider if you have:   Pain on your right side under your rib cage  Sudden swelling in the hands and face    Remember: You know your body. If something doesn't feel right, get medical help.     For informational purposes only. Not to replace the advice of your health care provider. Copyright 2020 [facility]. All rights reserved. Clinically reviewed by Tori Flores, RNC-OB, MSN. [employer] 436657 - Rev 02/23.

## 2024-12-09 NOTE — DISCHARGE SUMMARY
[facility]  Delivery Discharge Summary    Admit date: 2024  Discharge date: 2024     Admit Dx:   - 39 year old y/o    - History of  section   - AMA  - recurrent miscarriage  - GDMA1  - resolved placenta previa  - + GBS   Discharge Dx:  - Same as above, s/p repeat low transverse  section    Procedures:  - repeat low transverse  section with double layer closure via Pfannenstiel incision  - Spinal analgesia  -bilateral salpingectomy    Admit HPI:  Ms. Serina Washington is a 39 year old 39w0d who presented for repeat CS.    Please see her admit H&P for full details of her PMH, PSH, Meds, Allergies and exam on admit.    Hospital course:  After discussion of risk and and benefits and signing informed consent the patient was taken to the operating room for repeat  section.    Indications for : repeat     EBL from the delivery was 353. Please see her  Section Operative Note for full details regarding her delivery.    Her postoperative course was uncomplicated  On POD#3, she was meeting all of her postpartum goals and deemed stable for discharge. She was voiding without difficulty, tolerating a regular diet without nausea and vomiting, her pain was well controlled on oral pain medicines and her lochia was appropriate. Her hemoglobin after delivery was 9.9. Her Rh status was + and Rhogam was not indicated.     Physical exam on the day of discharge:  Vitals:    24 1500 24 2316 24 0837 24 0918   BP: 105/65 101/71  111/62   BP Location: Left arm Left arm     Patient Position: Semi-Contreras's Sitting     Cuff Size: Adult Regular Adult Regular     Pulse: 73 72  70   Resp: 16 16  20   Temp: 98.7  F (37.1  C) 97.8  F (36.6  C)  98.1  F (36.7  C)   TempSrc: Oral Oral  Oral   SpO2:  97%     Weight:   77.9 kg (171 lb 11.2 oz)        General: sitting up, alert and cooperative  Abd: soft, non-distended, non-tender. Fundus  firm, nontender,  below umbilicus.   Incision clean/dry/intact with dermabond in place.  Extremities: calves nontender, trace edema of lower extremities bilaterally    Lab Results   Component Value Date    HGB 9.9 12/07/2024    HGB 12.3 12/06/2024    HGB 13.2 11/01/2020    HGB 13.7 10/08/2020     Blood type:   Lab Results   Component Value Date    RH Pos 09/09/2017       Discharge/Disposition:  Serina Washington was discharged to home in stable condition with the following instructions/medications:  1) Call for temperature > 100.4, foul smelling vaginal discharge, bleeding > 1 pad per hour x 2 hrs, pain not controlled by oral pain meds, severe constipation or severe nausea or vomiting.  2) She received contraceptive counseling.  3) She was instructed to follow-up with her primary OB in 6 weeks for a routine postpartum visit.  4) She was instructed to continue her PNV on discharge if she wished to breast feed her infant.  5) She was discharged home with the following medications: senna s ibuprofen tylenol roxicodone iron    Esther Phillips MD   [facility]

## 2025-01-21 ENCOUNTER — PRENATAL OFFICE VISIT (OUTPATIENT)
Dept: OBGYN | Facility: CLINIC | Age: 40
End: 2025-01-21
Payer: COMMERCIAL

## 2025-01-21 VITALS — BODY MASS INDEX: 28.71 KG/M2 | WEIGHT: 168.2 LBS | TEMPERATURE: 97.9 F | HEIGHT: 64 IN | RESPIRATION RATE: 20 BRPM

## 2025-01-21 DIAGNOSIS — B37.2 CANDIDIASIS OF SKIN: Primary | ICD-10-CM

## 2025-01-21 DIAGNOSIS — Z86.32 HISTORY OF GESTATIONAL DIABETES: ICD-10-CM

## 2025-01-21 PROCEDURE — 99213 OFFICE O/P EST LOW 20 MIN: CPT | Mod: 25 | Performed by: OBSTETRICS & GYNECOLOGY

## 2025-01-21 PROCEDURE — 99207 PR POST PARTUM EXAM: CPT | Performed by: OBSTETRICS & GYNECOLOGY

## 2025-01-21 RX ORDER — NYSTATIN 100000 U/G
CREAM TOPICAL 2 TIMES DAILY
Qty: 30 G | Refills: 2 | Status: SHIPPED | OUTPATIENT
Start: 2025-01-21

## 2025-01-21 ASSESSMENT — EDINBURGH POSTNATAL DEPRESSION SCALE (EPDS)
I HAVE BEEN SO UNHAPPY THAT I HAVE BEEN CRYING: ONLY OCCASIONALLY
I HAVE LOOKED FORWARD WITH ENJOYMENT TO THINGS: AS MUCH AS I EVER DID
I HAVE BEEN ABLE TO LAUGH AND SEE THE FUNNY SIDE OF THINGS: AS MUCH AS I ALWAYS COULD
I HAVE BLAMED MYSELF UNNECESSARILY WHEN THINGS WENT WRONG: NOT VERY OFTEN
I HAVE FELT SAD OR MISERABLE: NOT VERY OFTEN
I HAVE BEEN SO UNHAPPY THAT I HAVE HAD DIFFICULTY SLEEPING: NOT AT ALL
THE THOUGHT OF HARMING MYSELF HAS OCCURRED TO ME: NEVER
I HAVE BEEN SO UNHAPPY THAT I HAVE HAD DIFFICULTY SLEEPING: NOT AT ALL
I HAVE BEEN ABLE TO LAUGH AND SEE THE FUNNY SIDE OF THINGS: AS MUCH AS I ALWAYS COULD
I HAVE BEEN ANXIOUS OR WORRIED FOR NO GOOD REASON: YES, SOMETIMES
THE THOUGHT OF HARMING MYSELF HAS OCCURRED TO ME: NEVER
I HAVE FELT SCARED OR PANICKY FOR NO GOOD REASON: NO, NOT MUCH
I HAVE FELT SAD OR MISERABLE: NOT VERY OFTEN
I HAVE BEEN SO UNHAPPY THAT I HAVE BEEN CRYING: ONLY OCCASIONALLY
THINGS HAVE BEEN GETTING ON TOP OF ME: NO, MOST OF THE TIME I HAVE COPED QUITE WELL
THINGS HAVE BEEN GETTING ON TOP OF ME: NO, MOST OF THE TIME I HAVE COPED QUITE WELL
I HAVE BEEN ANXIOUS OR WORRIED FOR NO GOOD REASON: YES, SOMETIMES
I HAVE LOOKED FORWARD WITH ENJOYMENT TO THINGS: AS MUCH AS I EVER DID
I HAVE BLAMED MYSELF UNNECESSARILY WHEN THINGS WENT WRONG: NOT VERY OFTEN
TOTAL SCORE: 7
I HAVE FELT SCARED OR PANICKY FOR NO GOOD REASON: NO, NOT MUCH

## 2025-01-21 NOTE — NURSING NOTE
"Initial Temp 97.9  F (36.6  C) (Tympanic)   Resp 20   Ht 1.626 m (5' 4\")   Wt 76.3 kg (168 lb 3.2 oz)   LMP 03/08/2024   Breastfeeding Yes   BMI 28.87 kg/m   Estimated body mass index is 28.87 kg/m  as calculated from the following:    Height as of this encounter: 1.626 m (5' 4\").    Weight as of this encounter: 76.3 kg (168 lb 3.2 oz). .    "

## 2025-01-21 NOTE — PROGRESS NOTES
"[facility]    CC: Postpartum Visit    S: Pt doing well. Off any pain medications. Having quite a bit of itching at c/s scar with a mid rash/bumps. Eating, drinking, urinating and moving bowels without any issues. Lochia has resolved. Mood is doing pretty good. She did have an episode of feeling shakey, so took her blood sugar and it was 74. Is breastfeeding.     O:   VS: Patient Vitals for the past 24 hrs:   Temp Temp src Resp Height Weight   01/21/25 1108 97.9  F (36.6  C) Tympanic 20 1.626 m (5' 4\") 76.3 kg (168 lb 3.2 oz)     General: NAD  CV: Regular rate, warm and well perfused  Resp: breathing comfortably on room air   Abdomen: soft, non-tender, non-distended, incision intact and healing well but with a superficial skin candidiasis  Extremities: non-tender, non-edematous     A/P: 39 year old now P3,   Appropriate postpartum recovery.   Plan for b/l salpingectomy for contraception.     Plan for routine GYN cares, PAP smear due 2027, recommended annual well-woman exam.     Skin candidiasis: Nystatin cream sent for two weeks     Recommended 2hr GTT to screen for diabetes outside of pregnancy given hx of gestational diabetes     Vicki Tracey MD, MD  [facility]   1/21/2025 11:14 AM   "

## 2025-02-11 ENCOUNTER — MEDICAL CORRESPONDENCE (OUTPATIENT)
Dept: HEALTH INFORMATION MANAGEMENT | Facility: CLINIC | Age: 40
End: 2025-02-11
Payer: COMMERCIAL

## 2025-07-19 ENCOUNTER — HEALTH MAINTENANCE LETTER (OUTPATIENT)
Age: 40
End: 2025-07-19

## 2025-08-09 ENCOUNTER — HEALTH MAINTENANCE LETTER (OUTPATIENT)
Age: 40
End: 2025-08-09

## (undated) DEVICE — TUBING CYSTO/BLADDER IRRIG SET 80" 06544-01

## (undated) DEVICE — SU VICRYL 2-0 CT-1 36" UND J945H

## (undated) DEVICE — DRSG ABDOMINAL 07 1/2X8" 7197D

## (undated) DEVICE — STOCKING SLEEVE COMPRESSION CALF MED

## (undated) DEVICE — SEAL SET MYOSURE ROD LENS SCOPE SINGLE USE 40-902

## (undated) DEVICE — SOL NACL 0.9% IRRIG 1000ML BOTTLE 07138-09

## (undated) DEVICE — DRAPE SHEET REV FOLD 3/4 9349

## (undated) DEVICE — ADHESIVE SWIFTSET 0.8ML OCTYL SS6

## (undated) DEVICE — Device

## (undated) DEVICE — PACK LAPAROSCOPY/PELVISCOPY STD

## (undated) DEVICE — LABEL MEDICATION SYSTEM  3304

## (undated) DEVICE — STOCKING SLEEVE COMPRESSION CALF LG

## (undated) DEVICE — GLOVE PROTEXIS BLUE W/NEU-THERA 7.0  2D73EB70

## (undated) DEVICE — PAD PERI INDIV WRAP 11" 2022

## (undated) DEVICE — CATH INTERMITTENT CLEAN-CATH FEMALE 14FR 6" VINYL LF 420614

## (undated) DEVICE — SUCTION VACUUM CANISTER STANDARD W/LID&CAPS 003987-901

## (undated) DEVICE — TUBING SUCTION 12"X1/4" N612

## (undated) DEVICE — SU PLAIN 3-0 CT 27" 852H

## (undated) DEVICE — BLADE CLIPPER 4406

## (undated) DEVICE — DRSG TELFA 3X8" 1238

## (undated) DEVICE — SU MONOCRYL 0 CT-1 36" T352H

## (undated) DEVICE — GOWN IMPERVIOUS SPECIALTY XLG/XLONG 32474

## (undated) DEVICE — SOL WATER IRRIG 1000ML BOTTLE 07139-09

## (undated) DEVICE — ESU HOLSTER PLASTIC DISP E2400

## (undated) DEVICE — GLOVE BIOGEL PI MICRO INDICATOR UNDERGLOVE SZ 7.0 48970

## (undated) DEVICE — SUCTION CANNULA UTERINE 08MM CVD 022108-10

## (undated) DEVICE — SU MONOCRYL 4-0 PS-2 18" UND Y496G

## (undated) DEVICE — NDL SPINAL 22GA 3.5" QUINCKE 405181

## (undated) DEVICE — GOWN LG DISP 9515

## (undated) DEVICE — LUBRICATING JELLY 4.25OZ

## (undated) DEVICE — CATH TRAY FOLEY SURESTEP 16FR W/URINE MTR STATLK LF A303416A

## (undated) DEVICE — SU DERMABOND ADVANCED .7ML DNX12

## (undated) DEVICE — PREP CHLORAPREP 26ML TINTED ORANGE  260815

## (undated) DEVICE — GLOVE PROTEXIS BLUE W/NEU-THERA 6.0  2D73EB60

## (undated) DEVICE — SOL NACL 0.9% IRRIG 3000ML BAG 07972-08

## (undated) DEVICE — DECANTER VIAL 2006S

## (undated) DEVICE — SU VICRYL 0 CT-1 36" J946H

## (undated) DEVICE — SU VICRYL 4-0 FS-2 27" J422-H

## (undated) DEVICE — SU CHROMIC 0 BP-1 27" 47T

## (undated) DEVICE — GLOVE PROTEXIS W/NEU-THERA 7.5  2D73TE75

## (undated) DEVICE — GLOVE PROTEXIS MICRO 5.5  2D73PM55

## (undated) DEVICE — GLOVE PROTEXIS W/NEU-THERA 7.0  2D73TE70

## (undated) DEVICE — SUCTION CANNULA UTERINE 07MM CVD  21853

## (undated) DEVICE — PAD FLOOR SURGISAFE

## (undated) DEVICE — PREP SKIN SCRUB TRAY 4461A

## (undated) DEVICE — GLOVE PROTEXIS W/NEU-THERA 6.5  2D73TE65

## (undated) DEVICE — SUCTION MANIFOLD NEPTUNE 2 SYS 1 PORT 702-025-000

## (undated) DEVICE — LINEN BABY BLANKET 5434

## (undated) DEVICE — SU MONOCRYL 0 CT-1 27" Y340H

## (undated) DEVICE — PREP CHLORHEXIDINE 4% 4OZ (HIBICLENS) 57504

## (undated) DEVICE — SU WND CLOSURE VLOC 90 ABS 3-0 18" P-14 VLOCM0124

## (undated) DEVICE — ESU LIGASURE OPEN SEALER/DIVIDER SM JAW 16.5MM LF1212A

## (undated) DEVICE — GLOVE BIOGEL PI MICRO SZ 6.5 48565

## (undated) DEVICE — PACK C-SECTION LF PL15OTA83B

## (undated) DEVICE — GLOVE PROTEXIS BLUE W/NEU-THERA 8.0  2D73EB80

## (undated) DEVICE — ADH SKIN CLOSURE PREMIERPRO EXOFIN 1.0ML 3470

## (undated) DEVICE — TUBING VACUUM COLLECTION 6FT 23116

## (undated) DEVICE — SUCTION CANNULA UTERINE 08MM CVD  20317

## (undated) DEVICE — GLOVE BIOGEL PI ULTRATOUCH G SZ 6.5 42165

## (undated) DEVICE — TUBING SYS AQUILEX BLUE INFLOW AQL-110 YLW OUTFLOW AQL-111

## (undated) DEVICE — GLOVE PROTEXIS W/NEU-THERA 8.0  2D73TE80

## (undated) DEVICE — SUTURE ABSORBABLE VICRYL CT-B1 L36 IN BRAID VIOLET JB947H

## (undated) RX ORDER — FENTANYL CITRATE-0.9 % NACL/PF 10 MCG/ML
PLASTIC BAG, INJECTION (ML) INTRAVENOUS
Status: DISPENSED
Start: 2022-11-30

## (undated) RX ORDER — DEXAMETHASONE SODIUM PHOSPHATE 4 MG/ML
INJECTION, SOLUTION INTRA-ARTICULAR; INTRALESIONAL; INTRAMUSCULAR; INTRAVENOUS; SOFT TISSUE
Status: DISPENSED
Start: 2023-11-10

## (undated) RX ORDER — BUPIVACAINE HYDROCHLORIDE 5 MG/ML
INJECTION, SOLUTION PERINEURAL
Status: DISPENSED
Start: 2020-11-16

## (undated) RX ORDER — ONDANSETRON 2 MG/ML
INJECTION INTRAMUSCULAR; INTRAVENOUS
Status: DISPENSED
Start: 2024-12-06

## (undated) RX ORDER — EPHEDRINE SULFATE 50 MG/ML
INJECTION, SOLUTION INTRAVENOUS
Status: DISPENSED
Start: 2017-09-10

## (undated) RX ORDER — GLYCOPYRROLATE 0.2 MG/ML
INJECTION, SOLUTION INTRAMUSCULAR; INTRAVENOUS
Status: DISPENSED
Start: 2022-11-30

## (undated) RX ORDER — OXYTOCIN 10 [USP'U]/ML
INJECTION, SOLUTION INTRAMUSCULAR; INTRAVENOUS
Status: DISPENSED
Start: 2024-12-06

## (undated) RX ORDER — KETOROLAC TROMETHAMINE 30 MG/ML
INJECTION, SOLUTION INTRAMUSCULAR; INTRAVENOUS
Status: DISPENSED
Start: 2022-11-30

## (undated) RX ORDER — GABAPENTIN 300 MG/1
CAPSULE ORAL
Status: DISPENSED
Start: 2021-11-09

## (undated) RX ORDER — FENTANYL CITRATE-0.9 % NACL/PF 10 MCG/ML
PLASTIC BAG, INJECTION (ML) INTRAVENOUS
Status: DISPENSED
Start: 2024-12-06

## (undated) RX ORDER — GABAPENTIN 300 MG/1
CAPSULE ORAL
Status: DISPENSED
Start: 2020-11-16

## (undated) RX ORDER — OXYTOCIN/0.9 % SODIUM CHLORIDE 30/500 ML
PLASTIC BAG, INJECTION (ML) INTRAVENOUS
Status: DISPENSED
Start: 2022-11-30

## (undated) RX ORDER — KETOROLAC TROMETHAMINE 30 MG/ML
INJECTION, SOLUTION INTRAMUSCULAR; INTRAVENOUS
Status: DISPENSED
Start: 2024-12-06

## (undated) RX ORDER — DEXAMETHASONE SODIUM PHOSPHATE 4 MG/ML
INJECTION, SOLUTION INTRA-ARTICULAR; INTRALESIONAL; INTRAMUSCULAR; INTRAVENOUS; SOFT TISSUE
Status: DISPENSED
Start: 2021-11-09

## (undated) RX ORDER — MORPHINE SULFATE 1 MG/ML
INJECTION, SOLUTION EPIDURAL; INTRATHECAL; INTRAVENOUS
Status: DISPENSED
Start: 2022-11-30

## (undated) RX ORDER — PROPOFOL 10 MG/ML
INJECTION, EMULSION INTRAVENOUS
Status: DISPENSED
Start: 2023-11-10

## (undated) RX ORDER — OXYTOCIN/0.9 % SODIUM CHLORIDE 30/500 ML
PLASTIC BAG, INJECTION (ML) INTRAVENOUS
Status: DISPENSED
Start: 2017-09-10

## (undated) RX ORDER — GLYCOPYRROLATE 0.2 MG/ML
INJECTION, SOLUTION INTRAMUSCULAR; INTRAVENOUS
Status: DISPENSED
Start: 2023-11-10

## (undated) RX ORDER — SEVOFLURANE 250 ML/250ML
LIQUID RESPIRATORY (INHALATION)
Status: DISPENSED
Start: 2023-11-10

## (undated) RX ORDER — LIDOCAINE HYDROCHLORIDE AND EPINEPHRINE 10; 10 MG/ML; UG/ML
INJECTION, SOLUTION INFILTRATION; PERINEURAL
Status: DISPENSED
Start: 2023-11-10

## (undated) RX ORDER — KETOROLAC TROMETHAMINE 30 MG/ML
INJECTION, SOLUTION INTRAMUSCULAR; INTRAVENOUS
Status: DISPENSED
Start: 2021-11-09

## (undated) RX ORDER — PHENYLEPHRINE HCL IN 0.9% NACL 1 MG/10 ML
SYRINGE (ML) INTRAVENOUS
Status: DISPENSED
Start: 2017-09-10

## (undated) RX ORDER — MORPHINE SULFATE 1 MG/ML
INJECTION, SOLUTION EPIDURAL; INTRATHECAL; INTRAVENOUS
Status: DISPENSED
Start: 2017-09-10

## (undated) RX ORDER — CEFAZOLIN SODIUM 2 G/100ML
INJECTION, SOLUTION INTRAVENOUS
Status: DISPENSED
Start: 2020-11-16

## (undated) RX ORDER — FENTANYL CITRATE 50 UG/ML
INJECTION, SOLUTION INTRAMUSCULAR; INTRAVENOUS
Status: DISPENSED
Start: 2021-11-09

## (undated) RX ORDER — MORPHINE SULFATE 1 MG/ML
INJECTION, SOLUTION EPIDURAL; INTRATHECAL; INTRAVENOUS
Status: DISPENSED
Start: 2024-12-06

## (undated) RX ORDER — METHYLERGONOVINE MALEATE 0.2 MG/ML
INJECTION INTRAVENOUS
Status: DISPENSED
Start: 2017-09-10

## (undated) RX ORDER — EPINEPHRINE 1 MG/ML
INJECTION, SOLUTION, CONCENTRATE INTRAVENOUS
Status: DISPENSED
Start: 2024-12-06

## (undated) RX ORDER — ONDANSETRON 2 MG/ML
INJECTION INTRAMUSCULAR; INTRAVENOUS
Status: DISPENSED
Start: 2022-11-30

## (undated) RX ORDER — OXYTOCIN/0.9 % SODIUM CHLORIDE 30/500 ML
PLASTIC BAG, INJECTION (ML) INTRAVENOUS
Status: DISPENSED
Start: 2024-12-06

## (undated) RX ORDER — OXYCODONE HYDROCHLORIDE 5 MG/1
TABLET ORAL
Status: DISPENSED
Start: 2021-11-09

## (undated) RX ORDER — ACETAMINOPHEN 325 MG/1
TABLET ORAL
Status: DISPENSED
Start: 2020-11-16

## (undated) RX ORDER — FENTANYL CITRATE 50 UG/ML
INJECTION, SOLUTION INTRAMUSCULAR; INTRAVENOUS
Status: DISPENSED
Start: 2017-09-10

## (undated) RX ORDER — ACETAMINOPHEN 325 MG/1
TABLET ORAL
Status: DISPENSED
Start: 2021-11-09

## (undated) RX ORDER — LIDOCAINE HCL/EPINEPHRINE/PF 2%-1:200K
VIAL (ML) INJECTION
Status: DISPENSED
Start: 2017-09-10

## (undated) RX ORDER — EPINEPHRINE 1 MG/ML
INJECTION, SOLUTION, CONCENTRATE INTRAVENOUS
Status: DISPENSED
Start: 2022-11-30

## (undated) RX ORDER — LIDOCAINE HYDROCHLORIDE 10 MG/ML
INJECTION, SOLUTION INFILTRATION; PERINEURAL
Status: DISPENSED
Start: 2021-11-09

## (undated) RX ORDER — KETOROLAC TROMETHAMINE 30 MG/ML
INJECTION, SOLUTION INTRAMUSCULAR; INTRAVENOUS
Status: DISPENSED
Start: 2020-11-16

## (undated) RX ORDER — EPHEDRINE SULFATE 50 MG/ML
INJECTION, SOLUTION INTRAVENOUS
Status: DISPENSED
Start: 2024-12-06

## (undated) RX ORDER — ACETAMINOPHEN 325 MG/1
TABLET ORAL
Status: DISPENSED
Start: 2023-11-10

## (undated) RX ORDER — FENTANYL CITRATE 50 UG/ML
INJECTION, SOLUTION INTRAMUSCULAR; INTRAVENOUS
Status: DISPENSED
Start: 2023-11-10

## (undated) RX ORDER — DEXAMETHASONE SODIUM PHOSPHATE 4 MG/ML
INJECTION, SOLUTION INTRA-ARTICULAR; INTRALESIONAL; INTRAMUSCULAR; INTRAVENOUS; SOFT TISSUE
Status: DISPENSED
Start: 2022-11-30

## (undated) RX ORDER — FENTANYL CITRATE 50 UG/ML
INJECTION, SOLUTION INTRAMUSCULAR; INTRAVENOUS
Status: DISPENSED
Start: 2020-11-16

## (undated) RX ORDER — DEXAMETHASONE SODIUM PHOSPHATE 4 MG/ML
INJECTION, SOLUTION INTRA-ARTICULAR; INTRALESIONAL; INTRAMUSCULAR; INTRAVENOUS; SOFT TISSUE
Status: DISPENSED
Start: 2024-12-06

## (undated) RX ORDER — ONDANSETRON 2 MG/ML
INJECTION INTRAMUSCULAR; INTRAVENOUS
Status: DISPENSED
Start: 2023-11-10